# Patient Record
Sex: MALE | Race: WHITE | Employment: FULL TIME | URBAN - METROPOLITAN AREA
[De-identification: names, ages, dates, MRNs, and addresses within clinical notes are randomized per-mention and may not be internally consistent; named-entity substitution may affect disease eponyms.]

---

## 2017-01-03 ENCOUNTER — OFFICE VISIT (OUTPATIENT)
Dept: ORTHOPEDIC SURGERY | Age: 47
End: 2017-01-03

## 2017-01-03 VITALS
DIASTOLIC BLOOD PRESSURE: 84 MMHG | HEIGHT: 74 IN | HEART RATE: 91 BPM | BODY MASS INDEX: 34.01 KG/M2 | WEIGHT: 264.99 LBS | SYSTOLIC BLOOD PRESSURE: 117 MMHG

## 2017-01-03 DIAGNOSIS — M19.011 GLENOHUMERAL ARTHRITIS, RIGHT: ICD-10-CM

## 2017-01-03 DIAGNOSIS — M75.82 ROTATOR CUFF TENDINITIS, LEFT: Primary | ICD-10-CM

## 2017-01-03 PROCEDURE — 20610 DRAIN/INJ JOINT/BURSA W/O US: CPT | Performed by: ORTHOPAEDIC SURGERY

## 2017-01-03 PROCEDURE — 99214 OFFICE O/P EST MOD 30 MIN: CPT | Performed by: ORTHOPAEDIC SURGERY

## 2017-01-27 ENCOUNTER — OFFICE VISIT (OUTPATIENT)
Dept: FAMILY MEDICINE CLINIC | Age: 47
End: 2017-01-27

## 2017-01-27 ENCOUNTER — HOSPITAL ENCOUNTER (OUTPATIENT)
Dept: OTHER | Age: 47
Discharge: HOME | End: 2017-01-31
Attending: ORTHOPAEDIC SURGERY | Admitting: ORTHOPAEDIC SURGERY

## 2017-01-27 ENCOUNTER — OFFICE VISIT (OUTPATIENT)
Dept: PAIN MANAGEMENT | Age: 47
End: 2017-01-27

## 2017-01-27 VITALS
HEIGHT: 74 IN | DIASTOLIC BLOOD PRESSURE: 66 MMHG | SYSTOLIC BLOOD PRESSURE: 110 MMHG | WEIGHT: 267 LBS | TEMPERATURE: 98.1 F | BODY MASS INDEX: 34.27 KG/M2

## 2017-01-27 VITALS
WEIGHT: 267 LBS | DIASTOLIC BLOOD PRESSURE: 76 MMHG | SYSTOLIC BLOOD PRESSURE: 109 MMHG | HEART RATE: 108 BPM | BODY MASS INDEX: 34.28 KG/M2

## 2017-01-27 DIAGNOSIS — G89.4 CHRONIC PAIN SYNDROME: ICD-10-CM

## 2017-01-27 DIAGNOSIS — I10 ESSENTIAL HYPERTENSION: ICD-10-CM

## 2017-01-27 DIAGNOSIS — M54.50 LOW BACK PAIN RADIATING TO RIGHT LEG: ICD-10-CM

## 2017-01-27 DIAGNOSIS — M50.30 DDD (DEGENERATIVE DISC DISEASE), CERVICAL: ICD-10-CM

## 2017-01-27 DIAGNOSIS — F32.A DEPRESSIVE DISORDER: ICD-10-CM

## 2017-01-27 DIAGNOSIS — M51.37 DDD (DEGENERATIVE DISC DISEASE), LUMBOSACRAL: ICD-10-CM

## 2017-01-27 DIAGNOSIS — M47.819 DEGENERATIVE JOINT DISEASE OF SPINAL FACET JOINT: ICD-10-CM

## 2017-01-27 DIAGNOSIS — E03.9 HYPOTHYROIDISM, UNSPECIFIED TYPE: ICD-10-CM

## 2017-01-27 DIAGNOSIS — E78.00 HYPERCHOLESTEROLEMIA: ICD-10-CM

## 2017-01-27 DIAGNOSIS — E66.9 OBESITY (BMI 30-39.9): ICD-10-CM

## 2017-01-27 DIAGNOSIS — M54.16 LUMBAR RADICULOPATHY: ICD-10-CM

## 2017-01-27 DIAGNOSIS — Z00.00 PREVENTATIVE HEALTH CARE: Primary | ICD-10-CM

## 2017-01-27 DIAGNOSIS — M79.604 LOW BACK PAIN RADIATING TO RIGHT LEG: ICD-10-CM

## 2017-01-27 DIAGNOSIS — M96.1 FAILED BACK SURGICAL SYNDROME: ICD-10-CM

## 2017-01-27 DIAGNOSIS — Z23 NEED FOR INFLUENZA VACCINATION: ICD-10-CM

## 2017-01-27 DIAGNOSIS — F32.89 DEPRESSIVE DISORDER, NOT ELSEWHERE CLASSIFIED: ICD-10-CM

## 2017-01-27 LAB
T4 FREE: 1.7 NG/DL (ref 0.9–1.8)
TSH REFLEX: 0.1 UIU/ML (ref 0.27–4.2)

## 2017-01-27 PROCEDURE — 99396 PREV VISIT EST AGE 40-64: CPT | Performed by: FAMILY MEDICINE

## 2017-01-27 PROCEDURE — G8419 CALC BMI OUT NRM PARAM NOF/U: HCPCS | Performed by: INTERNAL MEDICINE

## 2017-01-27 PROCEDURE — 90686 IIV4 VACC NO PRSV 0.5 ML IM: CPT | Performed by: FAMILY MEDICINE

## 2017-01-27 PROCEDURE — G8427 DOCREV CUR MEDS BY ELIG CLIN: HCPCS | Performed by: INTERNAL MEDICINE

## 2017-01-27 PROCEDURE — 99214 OFFICE O/P EST MOD 30 MIN: CPT | Performed by: INTERNAL MEDICINE

## 2017-01-27 PROCEDURE — G8484 FLU IMMUNIZE NO ADMIN: HCPCS | Performed by: INTERNAL MEDICINE

## 2017-01-27 PROCEDURE — 1036F TOBACCO NON-USER: CPT | Performed by: INTERNAL MEDICINE

## 2017-01-27 PROCEDURE — 90471 IMMUNIZATION ADMIN: CPT | Performed by: FAMILY MEDICINE

## 2017-01-27 RX ORDER — PREGABALIN 100 MG/1
CAPSULE ORAL
Qty: 90 CAPSULE | Refills: 0 | Status: SHIPPED | OUTPATIENT
Start: 2017-01-27 | End: 2017-04-05 | Stop reason: SDUPTHER

## 2017-01-27 RX ORDER — DULOXETIN HYDROCHLORIDE 30 MG/1
30 CAPSULE, DELAYED RELEASE ORAL DAILY
Qty: 90 CAPSULE | Refills: 0 | Status: SHIPPED | OUTPATIENT
Start: 2017-01-27 | End: 2017-04-05 | Stop reason: SDUPTHER

## 2017-01-27 RX ORDER — MELOXICAM 15 MG/1
TABLET ORAL
Qty: 90 TABLET | Refills: 0 | Status: SHIPPED | OUTPATIENT
Start: 2017-01-27 | End: 2017-04-05 | Stop reason: SDUPTHER

## 2017-01-27 RX ORDER — ATORVASTATIN CALCIUM 40 MG/1
TABLET, FILM COATED ORAL
Qty: 90 TABLET | Refills: 3 | Status: SHIPPED | OUTPATIENT
Start: 2017-01-27 | End: 2018-01-30 | Stop reason: SDUPTHER

## 2017-01-27 RX ORDER — TIZANIDINE 4 MG/1
TABLET ORAL
Qty: 60 TABLET | Refills: 0 | Status: SHIPPED | OUTPATIENT
Start: 2017-01-27 | End: 2017-05-08 | Stop reason: SDUPTHER

## 2017-01-27 RX ORDER — HYDROCHLOROTHIAZIDE 25 MG/1
TABLET ORAL
Qty: 90 TABLET | Refills: 3 | Status: SHIPPED | OUTPATIENT
Start: 2017-01-27 | End: 2018-01-30 | Stop reason: SDUPTHER

## 2017-01-27 ASSESSMENT — ENCOUNTER SYMPTOMS
RESPIRATORY NEGATIVE: 1
BACK PAIN: 1
GASTROINTESTINAL NEGATIVE: 1
ALLERGIC/IMMUNOLOGIC NEGATIVE: 1
EYES NEGATIVE: 1

## 2017-01-28 LAB — T3 TOTAL: 1.47 NG/ML (ref 0.8–2)

## 2017-02-01 ENCOUNTER — HOSPITAL ENCOUNTER (OUTPATIENT)
Dept: OTHER | Age: 47
Discharge: HOME | End: 2017-02-28
Attending: ORTHOPAEDIC SURGERY | Admitting: ORTHOPAEDIC SURGERY

## 2017-02-20 ENCOUNTER — TELEPHONE (OUTPATIENT)
Dept: PAIN MANAGEMENT | Age: 47
End: 2017-02-20

## 2017-02-21 ENCOUNTER — HOSPITAL ENCOUNTER (OUTPATIENT)
Dept: PHYSICAL THERAPY | Age: 47
Discharge: HOME | End: 2017-02-21
Admitting: ORTHOPAEDIC SURGERY

## 2017-02-23 ENCOUNTER — HOSPITAL ENCOUNTER (OUTPATIENT)
Dept: PHYSICAL THERAPY | Age: 47
Discharge: HOME | End: 2017-02-23
Admitting: ORTHOPAEDIC SURGERY

## 2017-02-28 ENCOUNTER — HOSPITAL ENCOUNTER (OUTPATIENT)
Dept: PHYSICAL THERAPY | Age: 47
Discharge: HOME | End: 2017-02-28
Admitting: ORTHOPAEDIC SURGERY

## 2017-03-28 DIAGNOSIS — M96.1 FAILED BACK SURGICAL SYNDROME: ICD-10-CM

## 2017-03-28 DIAGNOSIS — M47.819 DEGENERATIVE JOINT DISEASE OF SPINAL FACET JOINT: ICD-10-CM

## 2017-03-28 DIAGNOSIS — M54.16 LUMBAR RADICULOPATHY: ICD-10-CM

## 2017-03-28 DIAGNOSIS — M50.30 DDD (DEGENERATIVE DISC DISEASE), CERVICAL: ICD-10-CM

## 2017-03-28 DIAGNOSIS — G89.4 CHRONIC PAIN SYNDROME: ICD-10-CM

## 2017-03-28 DIAGNOSIS — M51.37 DDD (DEGENERATIVE DISC DISEASE), LUMBOSACRAL: ICD-10-CM

## 2017-04-04 ENCOUNTER — OFFICE VISIT (OUTPATIENT)
Dept: ORTHOPEDIC SURGERY | Age: 47
End: 2017-04-04

## 2017-04-04 VITALS
HEART RATE: 89 BPM | BODY MASS INDEX: 34.27 KG/M2 | SYSTOLIC BLOOD PRESSURE: 125 MMHG | DIASTOLIC BLOOD PRESSURE: 87 MMHG | WEIGHT: 267 LBS | HEIGHT: 74 IN

## 2017-04-04 DIAGNOSIS — M25.512 CHRONIC PAIN OF BOTH SHOULDERS: ICD-10-CM

## 2017-04-04 DIAGNOSIS — M19.011 GLENOHUMERAL ARTHRITIS, RIGHT: ICD-10-CM

## 2017-04-04 DIAGNOSIS — M25.511 CHRONIC PAIN OF BOTH SHOULDERS: ICD-10-CM

## 2017-04-04 DIAGNOSIS — G89.29 CHRONIC PAIN OF BOTH SHOULDERS: ICD-10-CM

## 2017-04-04 DIAGNOSIS — M75.82 ROTATOR CUFF TENDINITIS, LEFT: Primary | ICD-10-CM

## 2017-04-04 PROCEDURE — 99214 OFFICE O/P EST MOD 30 MIN: CPT | Performed by: ORTHOPAEDIC SURGERY

## 2017-04-04 PROCEDURE — 1036F TOBACCO NON-USER: CPT | Performed by: ORTHOPAEDIC SURGERY

## 2017-04-04 PROCEDURE — G8427 DOCREV CUR MEDS BY ELIG CLIN: HCPCS | Performed by: ORTHOPAEDIC SURGERY

## 2017-04-04 PROCEDURE — 20610 DRAIN/INJ JOINT/BURSA W/O US: CPT | Performed by: ORTHOPAEDIC SURGERY

## 2017-04-04 PROCEDURE — G8417 CALC BMI ABV UP PARAM F/U: HCPCS | Performed by: ORTHOPAEDIC SURGERY

## 2017-04-05 ENCOUNTER — OFFICE VISIT (OUTPATIENT)
Dept: PAIN MANAGEMENT | Age: 47
End: 2017-04-05

## 2017-04-05 VITALS
DIASTOLIC BLOOD PRESSURE: 90 MMHG | SYSTOLIC BLOOD PRESSURE: 123 MMHG | BODY MASS INDEX: 34.41 KG/M2 | WEIGHT: 268 LBS | HEART RATE: 123 BPM

## 2017-04-05 DIAGNOSIS — M54.16 LUMBAR RADICULOPATHY: ICD-10-CM

## 2017-04-05 DIAGNOSIS — M54.50 LOW BACK PAIN RADIATING TO RIGHT LEG: ICD-10-CM

## 2017-04-05 DIAGNOSIS — M79.604 LOW BACK PAIN RADIATING TO RIGHT LEG: ICD-10-CM

## 2017-04-05 DIAGNOSIS — M47.819 DEGENERATIVE JOINT DISEASE OF SPINAL FACET JOINT: ICD-10-CM

## 2017-04-05 DIAGNOSIS — M19.011 GLENOHUMERAL ARTHRITIS, RIGHT: ICD-10-CM

## 2017-04-05 DIAGNOSIS — M51.37 DDD (DEGENERATIVE DISC DISEASE), LUMBOSACRAL: ICD-10-CM

## 2017-04-05 DIAGNOSIS — F32.89 DEPRESSIVE DISORDER, NOT ELSEWHERE CLASSIFIED: ICD-10-CM

## 2017-04-05 DIAGNOSIS — M50.30 DDD (DEGENERATIVE DISC DISEASE), CERVICAL: ICD-10-CM

## 2017-04-05 DIAGNOSIS — M96.1 FAILED BACK SURGICAL SYNDROME: ICD-10-CM

## 2017-04-05 DIAGNOSIS — G89.4 CHRONIC PAIN SYNDROME: ICD-10-CM

## 2017-04-05 PROCEDURE — 99214 OFFICE O/P EST MOD 30 MIN: CPT | Performed by: INTERNAL MEDICINE

## 2017-04-05 PROCEDURE — G8427 DOCREV CUR MEDS BY ELIG CLIN: HCPCS | Performed by: INTERNAL MEDICINE

## 2017-04-05 PROCEDURE — G8417 CALC BMI ABV UP PARAM F/U: HCPCS | Performed by: INTERNAL MEDICINE

## 2017-04-05 PROCEDURE — 1036F TOBACCO NON-USER: CPT | Performed by: INTERNAL MEDICINE

## 2017-04-05 RX ORDER — MELOXICAM 15 MG/1
TABLET ORAL
Qty: 90 TABLET | Refills: 0 | Status: SHIPPED | OUTPATIENT
Start: 2017-04-05 | End: 2017-06-22 | Stop reason: SDUPTHER

## 2017-04-05 RX ORDER — PREGABALIN 100 MG/1
CAPSULE ORAL
Qty: 90 CAPSULE | Refills: 0 | Status: SHIPPED | OUTPATIENT
Start: 2017-04-05 | End: 2017-05-08 | Stop reason: SDUPTHER

## 2017-04-05 RX ORDER — TRAZODONE HYDROCHLORIDE 50 MG/1
50-100 TABLET ORAL NIGHTLY
Qty: 60 TABLET | Refills: 0 | Status: SHIPPED | OUTPATIENT
Start: 2017-04-05 | End: 2017-05-08 | Stop reason: SDUPTHER

## 2017-04-05 RX ORDER — DULOXETIN HYDROCHLORIDE 30 MG/1
30 CAPSULE, DELAYED RELEASE ORAL DAILY
Qty: 90 CAPSULE | Refills: 0 | Status: SHIPPED | OUTPATIENT
Start: 2017-04-05 | End: 2017-06-30 | Stop reason: SDUPTHER

## 2017-05-08 ENCOUNTER — OFFICE VISIT (OUTPATIENT)
Dept: PAIN MANAGEMENT | Age: 47
End: 2017-05-08

## 2017-05-08 VITALS
DIASTOLIC BLOOD PRESSURE: 83 MMHG | SYSTOLIC BLOOD PRESSURE: 123 MMHG | HEART RATE: 114 BPM | WEIGHT: 265 LBS | BODY MASS INDEX: 34.02 KG/M2

## 2017-05-08 DIAGNOSIS — M79.604 LOW BACK PAIN RADIATING TO RIGHT LEG: ICD-10-CM

## 2017-05-08 DIAGNOSIS — M51.37 DDD (DEGENERATIVE DISC DISEASE), LUMBOSACRAL: ICD-10-CM

## 2017-05-08 DIAGNOSIS — F32.89 DEPRESSIVE DISORDER, NOT ELSEWHERE CLASSIFIED: ICD-10-CM

## 2017-05-08 DIAGNOSIS — M54.16 LUMBAR RADICULOPATHY: ICD-10-CM

## 2017-05-08 DIAGNOSIS — M47.819 DEGENERATIVE JOINT DISEASE OF SPINAL FACET JOINT: ICD-10-CM

## 2017-05-08 DIAGNOSIS — M54.50 LOW BACK PAIN RADIATING TO RIGHT LEG: ICD-10-CM

## 2017-05-08 DIAGNOSIS — G89.4 CHRONIC PAIN SYNDROME: ICD-10-CM

## 2017-05-08 DIAGNOSIS — M96.1 FAILED BACK SURGICAL SYNDROME: ICD-10-CM

## 2017-05-08 DIAGNOSIS — M50.30 DDD (DEGENERATIVE DISC DISEASE), CERVICAL: ICD-10-CM

## 2017-05-08 PROCEDURE — G8427 DOCREV CUR MEDS BY ELIG CLIN: HCPCS | Performed by: INTERNAL MEDICINE

## 2017-05-08 PROCEDURE — 1036F TOBACCO NON-USER: CPT | Performed by: INTERNAL MEDICINE

## 2017-05-08 PROCEDURE — 99214 OFFICE O/P EST MOD 30 MIN: CPT | Performed by: INTERNAL MEDICINE

## 2017-05-08 PROCEDURE — G8417 CALC BMI ABV UP PARAM F/U: HCPCS | Performed by: INTERNAL MEDICINE

## 2017-05-08 RX ORDER — TIZANIDINE 4 MG/1
TABLET ORAL
Qty: 60 TABLET | Refills: 0 | Status: SHIPPED | OUTPATIENT
Start: 2017-05-08 | End: 2017-06-30 | Stop reason: SDUPTHER

## 2017-05-08 RX ORDER — PREGABALIN 100 MG/1
CAPSULE ORAL
Qty: 90 CAPSULE | Refills: 0 | Status: SHIPPED | OUTPATIENT
Start: 2017-05-08 | End: 2017-06-30 | Stop reason: SDUPTHER

## 2017-05-08 RX ORDER — TRAZODONE HYDROCHLORIDE 50 MG/1
50-100 TABLET ORAL NIGHTLY
Qty: 60 TABLET | Refills: 0 | Status: SHIPPED | OUTPATIENT
Start: 2017-05-08 | End: 2017-06-30 | Stop reason: SDUPTHER

## 2017-05-11 ENCOUNTER — TELEPHONE (OUTPATIENT)
Dept: ORTHOPEDIC SURGERY | Age: 47
End: 2017-05-11

## 2017-05-11 ENCOUNTER — OFFICE VISIT (OUTPATIENT)
Dept: ORTHOPEDIC SURGERY | Age: 47
End: 2017-05-11

## 2017-05-11 VITALS
DIASTOLIC BLOOD PRESSURE: 81 MMHG | SYSTOLIC BLOOD PRESSURE: 115 MMHG | HEART RATE: 93 BPM | WEIGHT: 264.99 LBS | BODY MASS INDEX: 34.01 KG/M2 | HEIGHT: 74 IN

## 2017-05-11 DIAGNOSIS — M19.011 GLENOHUMERAL ARTHRITIS, RIGHT: ICD-10-CM

## 2017-05-11 DIAGNOSIS — M25.512 CHRONIC PAIN OF BOTH SHOULDERS: Primary | ICD-10-CM

## 2017-05-11 DIAGNOSIS — M25.511 CHRONIC PAIN OF BOTH SHOULDERS: Primary | ICD-10-CM

## 2017-05-11 DIAGNOSIS — M77.12 LEFT TENNIS ELBOW: ICD-10-CM

## 2017-05-11 DIAGNOSIS — M75.82 ROTATOR CUFF TENDINITIS, LEFT: ICD-10-CM

## 2017-05-11 DIAGNOSIS — G89.29 CHRONIC PAIN OF BOTH SHOULDERS: Primary | ICD-10-CM

## 2017-05-11 PROCEDURE — G8417 CALC BMI ABV UP PARAM F/U: HCPCS | Performed by: ORTHOPAEDIC SURGERY

## 2017-05-11 PROCEDURE — 99214 OFFICE O/P EST MOD 30 MIN: CPT | Performed by: ORTHOPAEDIC SURGERY

## 2017-05-11 PROCEDURE — 1036F TOBACCO NON-USER: CPT | Performed by: ORTHOPAEDIC SURGERY

## 2017-05-11 PROCEDURE — G8427 DOCREV CUR MEDS BY ELIG CLIN: HCPCS | Performed by: ORTHOPAEDIC SURGERY

## 2017-06-15 ENCOUNTER — OFFICE VISIT (OUTPATIENT)
Dept: ORTHOPEDIC SURGERY | Age: 47
End: 2017-06-15

## 2017-06-15 VITALS
DIASTOLIC BLOOD PRESSURE: 89 MMHG | HEART RATE: 105 BPM | HEIGHT: 74 IN | WEIGHT: 264 LBS | BODY MASS INDEX: 33.88 KG/M2 | SYSTOLIC BLOOD PRESSURE: 139 MMHG

## 2017-06-15 DIAGNOSIS — M25.511 CHRONIC PAIN OF BOTH SHOULDERS: ICD-10-CM

## 2017-06-15 DIAGNOSIS — M19.011 GLENOHUMERAL ARTHRITIS, RIGHT: Primary | ICD-10-CM

## 2017-06-15 DIAGNOSIS — M25.512 CHRONIC PAIN OF BOTH SHOULDERS: ICD-10-CM

## 2017-06-15 DIAGNOSIS — G89.29 CHRONIC PAIN OF BOTH SHOULDERS: ICD-10-CM

## 2017-06-15 PROCEDURE — G8427 DOCREV CUR MEDS BY ELIG CLIN: HCPCS | Performed by: ORTHOPAEDIC SURGERY

## 2017-06-15 PROCEDURE — 1036F TOBACCO NON-USER: CPT | Performed by: ORTHOPAEDIC SURGERY

## 2017-06-15 PROCEDURE — PREOPEXAM PRE-OP EXAM: Performed by: ORTHOPAEDIC SURGERY

## 2017-06-15 PROCEDURE — G8417 CALC BMI ABV UP PARAM F/U: HCPCS | Performed by: ORTHOPAEDIC SURGERY

## 2017-06-15 PROCEDURE — L3670 SO ACRO/CLAV CAN WEB PRE OTS: HCPCS | Performed by: ORTHOPAEDIC SURGERY

## 2017-06-20 RX ORDER — SODIUM CHLORIDE, SODIUM LACTATE, POTASSIUM CHLORIDE, CALCIUM CHLORIDE 600; 310; 30; 20 MG/100ML; MG/100ML; MG/100ML; MG/100ML
INJECTION, SOLUTION INTRAVENOUS CONTINUOUS
Status: CANCELLED | OUTPATIENT
Start: 2017-06-20

## 2017-06-21 ENCOUNTER — HOSPITAL ENCOUNTER (OUTPATIENT)
Dept: PREADMISSION TESTING | Age: 47
Discharge: HOME | End: 2017-06-21
Attending: ORTHOPAEDIC SURGERY | Admitting: ORTHOPAEDIC SURGERY

## 2017-06-21 VITALS
SYSTOLIC BLOOD PRESSURE: 139 MMHG | HEIGHT: 74 IN | TEMPERATURE: 98.2 F | RESPIRATION RATE: 16 BRPM | HEART RATE: 103 BPM | OXYGEN SATURATION: 99 % | DIASTOLIC BLOOD PRESSURE: 79 MMHG

## 2017-06-21 LAB
ABO/RH: NORMAL
ANION GAP SERPL CALCULATED.3IONS-SCNC: 12 MMOL/L (ref 3–16)
ANTIBODY SCREEN: NORMAL
APTT: 29 SEC (ref 21–31.8)
BASOPHILS ABSOLUTE: 0 K/UL (ref 0–0.2)
BASOPHILS RELATIVE PERCENT: 0.8 %
BILIRUBIN URINE: NEGATIVE
BLOOD, URINE: NEGATIVE
BUN BLDV-MCNC: 21 MG/DL (ref 7–20)
CALCIUM SERPL-MCNC: 9.7 MG/DL (ref 8.3–10.6)
CHLORIDE BLD-SCNC: 100 MMOL/L (ref 99–110)
CLARITY: CLEAR
CO2: 27 MMOL/L (ref 21–32)
COLOR: YELLOW
CREAT SERPL-MCNC: 0.7 MG/DL (ref 0.9–1.3)
EKG ATRIAL RATE: 98 BPM
EKG DIAGNOSIS: NORMAL
EKG P AXIS: 67 DEGREES
EKG P-R INTERVAL: 130 MS
EKG Q-T INTERVAL: 328 MS
EKG QRS DURATION: 88 MS
EKG QTC CALCULATION (BAZETT): 418 MS
EKG R AXIS: 64 DEGREES
EKG T AXIS: 54 DEGREES
EKG VENTRICULAR RATE: 98 BPM
EOSINOPHILS ABSOLUTE: 0.1 K/UL (ref 0–0.6)
EOSINOPHILS RELATIVE PERCENT: 2.3 %
GFR AFRICAN AMERICAN: >60
GFR NON-AFRICAN AMERICAN: >60
GLUCOSE BLD-MCNC: 138 MG/DL (ref 70–99)
GLUCOSE URINE: NEGATIVE MG/DL
HCT VFR BLD CALC: 41.6 % (ref 40.5–52.5)
HEMOGLOBIN: 13.6 G/DL (ref 13.5–17.5)
INR BLD: 0.93 (ref 0.85–1.15)
KETONES, URINE: NEGATIVE MG/DL
LEUKOCYTE ESTERASE, URINE: NEGATIVE
LYMPHOCYTES ABSOLUTE: 1.5 K/UL (ref 1–5.1)
LYMPHOCYTES RELATIVE PERCENT: 23.7 %
MCH RBC QN AUTO: 28.5 PG (ref 26–34)
MCHC RBC AUTO-ENTMCNC: 32.8 G/DL (ref 31–36)
MCV RBC AUTO: 86.8 FL (ref 80–100)
MICROSCOPIC EXAMINATION: NORMAL
MONOCYTES ABSOLUTE: 0.6 K/UL (ref 0–1.3)
MONOCYTES RELATIVE PERCENT: 9.2 %
NEUTROPHILS ABSOLUTE: 4 K/UL (ref 1.7–7.7)
NEUTROPHILS RELATIVE PERCENT: 64 %
NITRITE, URINE: NEGATIVE
PDW BLD-RTO: 13.4 % (ref 12.4–15.4)
PH UA: 5.5
PLATELET # BLD: 162 K/UL (ref 135–450)
PMV BLD AUTO: 9.9 FL (ref 5–10.5)
POTASSIUM SERPL-SCNC: 4.2 MMOL/L (ref 3.5–5.1)
PROTEIN UA: NEGATIVE MG/DL
PROTHROMBIN TIME: 10.5 SEC (ref 9.6–13)
RBC # BLD: 4.79 M/UL (ref 4.2–5.9)
SODIUM BLD-SCNC: 139 MMOL/L (ref 136–145)
SPECIFIC GRAVITY UA: >=1.03
URINE TYPE: NORMAL
UROBILINOGEN, URINE: 0.2 E.U./DL
WBC # BLD: 6.2 K/UL (ref 4–11)

## 2017-06-21 PROCEDURE — 93010 ELECTROCARDIOGRAM REPORT: CPT | Performed by: INTERNAL MEDICINE

## 2017-06-21 ASSESSMENT — PAIN SCALES - GENERAL: PAINLEVEL_OUTOF10: 7

## 2017-06-21 ASSESSMENT — PAIN DESCRIPTION - LOCATION: LOCATION: SHOULDER

## 2017-06-21 ASSESSMENT — PAIN DESCRIPTION - PAIN TYPE: TYPE: CHRONIC PAIN

## 2017-06-21 ASSESSMENT — PAIN DESCRIPTION - ORIENTATION: ORIENTATION: RIGHT

## 2017-06-22 ENCOUNTER — TELEPHONE (OUTPATIENT)
Dept: PAIN MANAGEMENT | Age: 47
End: 2017-06-22

## 2017-06-22 DIAGNOSIS — M47.819 DEGENERATIVE JOINT DISEASE OF SPINAL FACET JOINT: ICD-10-CM

## 2017-06-22 DIAGNOSIS — M50.30 DDD (DEGENERATIVE DISC DISEASE), CERVICAL: ICD-10-CM

## 2017-06-22 DIAGNOSIS — M51.37 DDD (DEGENERATIVE DISC DISEASE), LUMBOSACRAL: ICD-10-CM

## 2017-06-22 DIAGNOSIS — M54.16 LUMBAR RADICULOPATHY: ICD-10-CM

## 2017-06-22 DIAGNOSIS — M96.1 FAILED BACK SURGICAL SYNDROME: ICD-10-CM

## 2017-06-22 DIAGNOSIS — G89.4 CHRONIC PAIN SYNDROME: ICD-10-CM

## 2017-06-22 LAB
ESTIMATED AVERAGE GLUCOSE: 111.2 MG/DL
HBA1C MFR BLD: 5.5 %
MRSA SCREEN RT-PCR: NORMAL
ORGANISM: ABNORMAL
URINE CULTURE, ROUTINE: ABNORMAL

## 2017-06-22 RX ORDER — MELOXICAM 15 MG/1
TABLET ORAL
Qty: 90 TABLET | Refills: 0 | Status: SHIPPED | OUTPATIENT
Start: 2017-06-22 | End: 2017-06-30 | Stop reason: SDUPTHER

## 2017-06-27 ENCOUNTER — OFFICE VISIT (OUTPATIENT)
Dept: FAMILY MEDICINE CLINIC | Age: 47
End: 2017-06-27

## 2017-06-27 VITALS
HEIGHT: 74 IN | TEMPERATURE: 97.4 F | HEART RATE: 84 BPM | BODY MASS INDEX: 35.32 KG/M2 | DIASTOLIC BLOOD PRESSURE: 84 MMHG | SYSTOLIC BLOOD PRESSURE: 136 MMHG | WEIGHT: 275.2 LBS

## 2017-06-27 DIAGNOSIS — E78.00 HYPERCHOLESTEROLEMIA: ICD-10-CM

## 2017-06-27 DIAGNOSIS — Z01.818 PREOP EXAMINATION: Primary | ICD-10-CM

## 2017-06-27 DIAGNOSIS — N30.00 ACUTE CYSTITIS WITHOUT HEMATURIA: ICD-10-CM

## 2017-06-27 DIAGNOSIS — M19.011 GLENOHUMERAL ARTHRITIS, RIGHT: ICD-10-CM

## 2017-06-27 DIAGNOSIS — I10 ESSENTIAL HYPERTENSION: ICD-10-CM

## 2017-06-27 PROCEDURE — 1036F TOBACCO NON-USER: CPT | Performed by: FAMILY MEDICINE

## 2017-06-27 PROCEDURE — G8417 CALC BMI ABV UP PARAM F/U: HCPCS | Performed by: FAMILY MEDICINE

## 2017-06-27 PROCEDURE — 99243 OFF/OP CNSLTJ NEW/EST LOW 30: CPT | Performed by: FAMILY MEDICINE

## 2017-06-27 PROCEDURE — G8427 DOCREV CUR MEDS BY ELIG CLIN: HCPCS | Performed by: FAMILY MEDICINE

## 2017-06-27 RX ORDER — AMOXICILLIN 500 MG/1
500 CAPSULE ORAL 2 TIMES DAILY
Qty: 14 CAPSULE | Refills: 0 | Status: SHIPPED | OUTPATIENT
Start: 2017-06-27 | End: 2017-07-04

## 2017-06-27 ASSESSMENT — ENCOUNTER SYMPTOMS
ABDOMINAL PAIN: 0
RHINORRHEA: 0
SINUS PRESSURE: 0
DIARRHEA: 0
CONSTIPATION: 0
CHEST TIGHTNESS: 0
BACK PAIN: 1
BLOOD IN STOOL: 0
EYE DISCHARGE: 0
WHEEZING: 0
EYE PAIN: 0
SHORTNESS OF BREATH: 0
VOMITING: 0
COUGH: 0

## 2017-06-30 ENCOUNTER — OFFICE VISIT (OUTPATIENT)
Dept: PAIN MANAGEMENT | Age: 47
End: 2017-06-30

## 2017-06-30 VITALS
BODY MASS INDEX: 35.31 KG/M2 | SYSTOLIC BLOOD PRESSURE: 118 MMHG | DIASTOLIC BLOOD PRESSURE: 81 MMHG | HEART RATE: 89 BPM | WEIGHT: 275 LBS

## 2017-06-30 DIAGNOSIS — G89.4 CHRONIC PAIN SYNDROME: ICD-10-CM

## 2017-06-30 DIAGNOSIS — M54.16 LUMBAR RADICULOPATHY: ICD-10-CM

## 2017-06-30 DIAGNOSIS — M79.604 LOW BACK PAIN RADIATING TO RIGHT LEG: ICD-10-CM

## 2017-06-30 DIAGNOSIS — M50.30 DDD (DEGENERATIVE DISC DISEASE), CERVICAL: ICD-10-CM

## 2017-06-30 DIAGNOSIS — M51.37 DDD (DEGENERATIVE DISC DISEASE), LUMBOSACRAL: ICD-10-CM

## 2017-06-30 DIAGNOSIS — M96.1 FAILED BACK SURGICAL SYNDROME: ICD-10-CM

## 2017-06-30 DIAGNOSIS — M54.50 LOW BACK PAIN RADIATING TO RIGHT LEG: ICD-10-CM

## 2017-06-30 DIAGNOSIS — M47.819 DEGENERATIVE JOINT DISEASE OF SPINAL FACET JOINT: ICD-10-CM

## 2017-06-30 PROCEDURE — 1036F TOBACCO NON-USER: CPT | Performed by: INTERNAL MEDICINE

## 2017-06-30 PROCEDURE — G8427 DOCREV CUR MEDS BY ELIG CLIN: HCPCS | Performed by: INTERNAL MEDICINE

## 2017-06-30 PROCEDURE — G8417 CALC BMI ABV UP PARAM F/U: HCPCS | Performed by: INTERNAL MEDICINE

## 2017-06-30 PROCEDURE — 99213 OFFICE O/P EST LOW 20 MIN: CPT | Performed by: INTERNAL MEDICINE

## 2017-06-30 RX ORDER — TIZANIDINE 4 MG/1
TABLET ORAL
Qty: 60 TABLET | Refills: 1 | Status: SHIPPED | OUTPATIENT
Start: 2017-06-30 | End: 2018-07-24 | Stop reason: SDUPTHER

## 2017-06-30 RX ORDER — TRAZODONE HYDROCHLORIDE 50 MG/1
50-100 TABLET ORAL NIGHTLY
Qty: 60 TABLET | Refills: 1 | Status: SHIPPED | OUTPATIENT
Start: 2017-06-30 | End: 2017-12-19 | Stop reason: ALTCHOICE

## 2017-06-30 RX ORDER — MELOXICAM 15 MG/1
TABLET ORAL
Qty: 90 TABLET | Refills: 1 | Status: SHIPPED | OUTPATIENT
Start: 2017-06-30 | End: 2018-02-13 | Stop reason: SDUPTHER

## 2017-06-30 RX ORDER — DULOXETIN HYDROCHLORIDE 30 MG/1
30 CAPSULE, DELAYED RELEASE ORAL DAILY
Qty: 90 CAPSULE | Refills: 1 | Status: SHIPPED | OUTPATIENT
Start: 2017-06-30 | End: 2018-10-05 | Stop reason: SDUPTHER

## 2017-06-30 RX ORDER — PREGABALIN 100 MG/1
CAPSULE ORAL
Qty: 90 CAPSULE | Refills: 1 | Status: SHIPPED | OUTPATIENT
Start: 2017-06-30 | End: 2017-12-19 | Stop reason: SDUPTHER

## 2017-07-10 ENCOUNTER — HOSPITAL ENCOUNTER (OUTPATIENT)
Dept: VASCULAR LAB | Age: 47
Discharge: HOME | End: 2017-07-10
Admitting: EMERGENCY MEDICINE

## 2017-07-10 DIAGNOSIS — M79.89 OTHER SPECIFIED SOFT TISSUE DISORDERS: ICD-10-CM

## 2017-07-11 ENCOUNTER — OFFICE VISIT (OUTPATIENT)
Dept: ORTHOPEDIC SURGERY | Age: 47
End: 2017-07-11

## 2017-07-11 ENCOUNTER — HOSPITAL ENCOUNTER (OUTPATIENT)
Dept: OTHER | Age: 47
Discharge: HOME | End: 2017-07-31
Attending: ORTHOPAEDIC SURGERY | Admitting: ORTHOPAEDIC SURGERY

## 2017-07-11 VITALS
DIASTOLIC BLOOD PRESSURE: 80 MMHG | BODY MASS INDEX: 35.65 KG/M2 | WEIGHT: 277.78 LBS | HEIGHT: 74 IN | HEART RATE: 110 BPM | SYSTOLIC BLOOD PRESSURE: 128 MMHG

## 2017-07-11 DIAGNOSIS — Z96.611 S/P SHOULDER REPLACEMENT, RIGHT: Primary | ICD-10-CM

## 2017-07-11 PROCEDURE — 99024 POSTOP FOLLOW-UP VISIT: CPT | Performed by: ORTHOPAEDIC SURGERY

## 2017-07-11 RX ORDER — HYDROCODONE BITARTRATE AND ACETAMINOPHEN 5; 325 MG/1; MG/1
TABLET ORAL
Qty: 60 TABLET | Refills: 0 | Status: SHIPPED | OUTPATIENT
Start: 2017-07-11 | End: 2017-08-04 | Stop reason: ALTCHOICE

## 2017-07-17 ENCOUNTER — HOSPITAL ENCOUNTER (OUTPATIENT)
Dept: PHYSICAL THERAPY | Age: 47
Discharge: HOME | End: 2017-07-18
Admitting: ORTHOPAEDIC SURGERY

## 2017-07-19 ENCOUNTER — HOSPITAL ENCOUNTER (OUTPATIENT)
Dept: PHYSICAL THERAPY | Age: 47
Discharge: HOME | End: 2017-07-20
Admitting: ORTHOPAEDIC SURGERY

## 2017-07-31 ENCOUNTER — HOSPITAL ENCOUNTER (OUTPATIENT)
Dept: PHYSICAL THERAPY | Age: 47
Discharge: HOME | End: 2017-08-01
Admitting: ORTHOPAEDIC SURGERY

## 2017-08-01 ENCOUNTER — OFFICE VISIT (OUTPATIENT)
Dept: ORTHOPEDIC SURGERY | Age: 47
End: 2017-08-01

## 2017-08-01 VITALS
WEIGHT: 277.78 LBS | DIASTOLIC BLOOD PRESSURE: 90 MMHG | HEIGHT: 74 IN | SYSTOLIC BLOOD PRESSURE: 128 MMHG | BODY MASS INDEX: 35.65 KG/M2 | HEART RATE: 101 BPM

## 2017-08-01 DIAGNOSIS — Z96.611 S/P SHOULDER REPLACEMENT, RIGHT: Primary | ICD-10-CM

## 2017-08-01 PROCEDURE — 99024 POSTOP FOLLOW-UP VISIT: CPT | Performed by: ORTHOPAEDIC SURGERY

## 2017-08-04 ENCOUNTER — TELEPHONE (OUTPATIENT)
Dept: FAMILY MEDICINE CLINIC | Age: 47
End: 2017-08-04

## 2017-08-04 ENCOUNTER — HOSPITAL ENCOUNTER (OUTPATIENT)
Dept: PHYSICAL THERAPY | Age: 47
Discharge: HOME | End: 2017-08-05
Admitting: ORTHOPAEDIC SURGERY

## 2017-08-04 ENCOUNTER — OFFICE VISIT (OUTPATIENT)
Dept: FAMILY MEDICINE CLINIC | Age: 47
End: 2017-08-04

## 2017-08-04 VITALS
SYSTOLIC BLOOD PRESSURE: 120 MMHG | HEART RATE: 98 BPM | BODY MASS INDEX: 35.55 KG/M2 | TEMPERATURE: 98.3 F | HEIGHT: 74 IN | DIASTOLIC BLOOD PRESSURE: 72 MMHG | WEIGHT: 277 LBS | OXYGEN SATURATION: 98 %

## 2017-08-04 DIAGNOSIS — R00.0 TACHYCARDIA: Primary | ICD-10-CM

## 2017-08-04 DIAGNOSIS — Z85.850 HISTORY OF THYROID CANCER: ICD-10-CM

## 2017-08-04 DIAGNOSIS — R00.0 TACHYCARDIA: ICD-10-CM

## 2017-08-04 LAB
ANION GAP SERPL CALCULATED.3IONS-SCNC: 16 MMOL/L (ref 3–16)
BASOPHILS ABSOLUTE: 0.1 K/UL (ref 0–0.2)
BASOPHILS RELATIVE PERCENT: 1 %
BUN BLDV-MCNC: 15 MG/DL (ref 7–20)
CALCIUM SERPL-MCNC: 9.9 MG/DL (ref 8.3–10.6)
CHLORIDE BLD-SCNC: 99 MMOL/L (ref 99–110)
CO2: 23 MMOL/L (ref 21–32)
CREAT SERPL-MCNC: 0.6 MG/DL (ref 0.9–1.3)
EOSINOPHILS ABSOLUTE: 0.2 K/UL (ref 0–0.6)
EOSINOPHILS RELATIVE PERCENT: 1.9 %
GFR AFRICAN AMERICAN: >60
GFR NON-AFRICAN AMERICAN: >60
GLUCOSE BLD-MCNC: 110 MG/DL (ref 70–99)
HCT VFR BLD CALC: 41.9 % (ref 40.5–52.5)
HEMOGLOBIN: 14 G/DL (ref 13.5–17.5)
LYMPHOCYTES ABSOLUTE: 1.7 K/UL (ref 1–5.1)
LYMPHOCYTES RELATIVE PERCENT: 20.3 %
MCH RBC QN AUTO: 29 PG (ref 26–34)
MCHC RBC AUTO-ENTMCNC: 33.4 G/DL (ref 31–36)
MCV RBC AUTO: 86.8 FL (ref 80–100)
MONOCYTES ABSOLUTE: 0.7 K/UL (ref 0–1.3)
MONOCYTES RELATIVE PERCENT: 8.5 %
NEUTROPHILS ABSOLUTE: 5.9 K/UL (ref 1.7–7.7)
NEUTROPHILS RELATIVE PERCENT: 68.3 %
PDW BLD-RTO: 13 % (ref 12.4–15.4)
PLATELET # BLD: 198 K/UL (ref 135–450)
PMV BLD AUTO: 10.5 FL (ref 5–10.5)
POTASSIUM SERPL-SCNC: 4.7 MMOL/L (ref 3.5–5.1)
PRO-BNP: <5 PG/ML (ref 0–124)
RBC # BLD: 4.83 M/UL (ref 4.2–5.9)
SODIUM BLD-SCNC: 138 MMOL/L (ref 136–145)
T3 TOTAL: 1.54 NG/ML (ref 0.8–2)
T4 FREE: 1.7 NG/DL (ref 0.9–1.8)
TSH REFLEX: 0.07 UIU/ML (ref 0.27–4.2)
WBC # BLD: 8.6 K/UL (ref 4–11)

## 2017-08-04 PROCEDURE — 93000 ELECTROCARDIOGRAM COMPLETE: CPT | Performed by: FAMILY MEDICINE

## 2017-08-04 PROCEDURE — G8417 CALC BMI ABV UP PARAM F/U: HCPCS | Performed by: FAMILY MEDICINE

## 2017-08-04 PROCEDURE — G8427 DOCREV CUR MEDS BY ELIG CLIN: HCPCS | Performed by: FAMILY MEDICINE

## 2017-08-04 PROCEDURE — 1036F TOBACCO NON-USER: CPT | Performed by: FAMILY MEDICINE

## 2017-08-04 PROCEDURE — 99214 OFFICE O/P EST MOD 30 MIN: CPT | Performed by: FAMILY MEDICINE

## 2017-08-04 PROCEDURE — 1111F DSCHRG MED/CURRENT MED MERGE: CPT | Performed by: FAMILY MEDICINE

## 2017-08-04 RX ORDER — LISINOPRIL 10 MG/1
TABLET ORAL
Qty: 90 TABLET | Refills: 1 | Status: SHIPPED | OUTPATIENT
Start: 2017-08-04 | End: 2017-12-08 | Stop reason: SDUPTHER

## 2017-08-04 RX ORDER — LEVOTHYROXINE SODIUM 0.12 MG/1
125 TABLET ORAL DAILY
Qty: 90 TABLET | Refills: 0 | Status: SHIPPED | OUTPATIENT
Start: 2017-08-04 | End: 2018-10-05 | Stop reason: SDUPTHER

## 2017-08-04 ASSESSMENT — ENCOUNTER SYMPTOMS
COUGH: 0
SHORTNESS OF BREATH: 1
CHEST TIGHTNESS: 0

## 2017-08-04 ASSESSMENT — PATIENT HEALTH QUESTIONNAIRE - PHQ9
2. FEELING DOWN, DEPRESSED OR HOPELESS: 0
1. LITTLE INTEREST OR PLEASURE IN DOING THINGS: 0
SUM OF ALL RESPONSES TO PHQ QUESTIONS 1-9: 0
SUM OF ALL RESPONSES TO PHQ9 QUESTIONS 1 & 2: 0

## 2017-08-07 ENCOUNTER — HOSPITAL ENCOUNTER (OUTPATIENT)
Dept: PHYSICAL THERAPY | Age: 47
Discharge: HOME | End: 2017-08-08
Admitting: ORTHOPAEDIC SURGERY

## 2017-08-10 ENCOUNTER — HOSPITAL ENCOUNTER (OUTPATIENT)
Dept: PHYSICAL THERAPY | Age: 47
Discharge: HOME | End: 2017-08-11
Admitting: ORTHOPAEDIC SURGERY

## 2017-08-14 ENCOUNTER — HOSPITAL ENCOUNTER (OUTPATIENT)
Dept: PHYSICAL THERAPY | Age: 47
Discharge: HOME | End: 2017-08-15
Admitting: ORTHOPAEDIC SURGERY

## 2017-08-18 ENCOUNTER — HOSPITAL ENCOUNTER (OUTPATIENT)
Dept: PHYSICAL THERAPY | Age: 47
Discharge: HOME | End: 2017-08-19
Admitting: ORTHOPAEDIC SURGERY

## 2017-08-22 ENCOUNTER — OFFICE VISIT (OUTPATIENT)
Dept: ORTHOPEDIC SURGERY | Age: 47
End: 2017-08-22

## 2017-08-22 VITALS
DIASTOLIC BLOOD PRESSURE: 92 MMHG | HEIGHT: 74 IN | WEIGHT: 275 LBS | SYSTOLIC BLOOD PRESSURE: 110 MMHG | HEART RATE: 83 BPM | BODY MASS INDEX: 35.29 KG/M2

## 2017-08-22 DIAGNOSIS — Z96.611 S/P SHOULDER REPLACEMENT, RIGHT: Primary | ICD-10-CM

## 2017-08-22 PROBLEM — Z96.619 S/P SHOULDER REPLACEMENT: Status: ACTIVE | Noted: 2017-08-22

## 2017-08-22 PROCEDURE — 99024 POSTOP FOLLOW-UP VISIT: CPT | Performed by: ORTHOPAEDIC SURGERY

## 2017-08-25 ENCOUNTER — OFFICE VISIT (OUTPATIENT)
Dept: PAIN MANAGEMENT | Age: 47
End: 2017-08-25

## 2017-08-25 ENCOUNTER — HOSPITAL ENCOUNTER (OUTPATIENT)
Dept: PHYSICAL THERAPY | Age: 47
Discharge: HOME | End: 2017-08-26
Admitting: ORTHOPAEDIC SURGERY

## 2017-08-25 VITALS
HEART RATE: 84 BPM | WEIGHT: 289.4 LBS | DIASTOLIC BLOOD PRESSURE: 89 MMHG | BODY MASS INDEX: 37.16 KG/M2 | SYSTOLIC BLOOD PRESSURE: 129 MMHG

## 2017-08-25 DIAGNOSIS — M50.30 DDD (DEGENERATIVE DISC DISEASE), CERVICAL: ICD-10-CM

## 2017-08-25 DIAGNOSIS — M51.37 DDD (DEGENERATIVE DISC DISEASE), LUMBOSACRAL: ICD-10-CM

## 2017-08-25 DIAGNOSIS — F32.89 DEPRESSIVE DISORDER, NOT ELSEWHERE CLASSIFIED: ICD-10-CM

## 2017-08-25 DIAGNOSIS — M54.16 LUMBAR RADICULOPATHY: ICD-10-CM

## 2017-08-25 DIAGNOSIS — M79.604 LOW BACK PAIN RADIATING TO RIGHT LEG: ICD-10-CM

## 2017-08-25 DIAGNOSIS — M96.1 FAILED BACK SURGICAL SYNDROME: ICD-10-CM

## 2017-08-25 DIAGNOSIS — G89.4 CHRONIC PAIN SYNDROME: ICD-10-CM

## 2017-08-25 DIAGNOSIS — M54.50 LOW BACK PAIN RADIATING TO RIGHT LEG: ICD-10-CM

## 2017-08-25 DIAGNOSIS — M47.819 DEGENERATIVE JOINT DISEASE OF SPINAL FACET JOINT: ICD-10-CM

## 2017-08-25 PROCEDURE — G8427 DOCREV CUR MEDS BY ELIG CLIN: HCPCS | Performed by: INTERNAL MEDICINE

## 2017-08-25 PROCEDURE — 1036F TOBACCO NON-USER: CPT | Performed by: INTERNAL MEDICINE

## 2017-08-25 PROCEDURE — G8417 CALC BMI ABV UP PARAM F/U: HCPCS | Performed by: INTERNAL MEDICINE

## 2017-08-25 PROCEDURE — 99213 OFFICE O/P EST LOW 20 MIN: CPT | Performed by: INTERNAL MEDICINE

## 2017-08-28 ENCOUNTER — HOSPITAL ENCOUNTER (OUTPATIENT)
Dept: PHYSICAL THERAPY | Age: 47
Discharge: HOME | End: 2017-08-29
Admitting: ORTHOPAEDIC SURGERY

## 2017-09-01 ENCOUNTER — HOSPITAL ENCOUNTER (OUTPATIENT)
Dept: OTHER | Age: 47
Discharge: HOME | End: 2017-09-30
Attending: ORTHOPAEDIC SURGERY | Admitting: ORTHOPAEDIC SURGERY

## 2017-09-06 ENCOUNTER — HOSPITAL ENCOUNTER (OUTPATIENT)
Dept: PHYSICAL THERAPY | Age: 47
Discharge: HOME | End: 2017-09-07
Admitting: ORTHOPAEDIC SURGERY

## 2017-09-08 ENCOUNTER — HOSPITAL ENCOUNTER (OUTPATIENT)
Dept: PHYSICAL THERAPY | Age: 47
Discharge: HOME | End: 2017-09-09
Admitting: ORTHOPAEDIC SURGERY

## 2017-09-11 ENCOUNTER — HOSPITAL ENCOUNTER (OUTPATIENT)
Dept: PHYSICAL THERAPY | Age: 47
Discharge: HOME | End: 2017-09-12
Admitting: ORTHOPAEDIC SURGERY

## 2017-09-13 ENCOUNTER — HOSPITAL ENCOUNTER (OUTPATIENT)
Dept: PHYSICAL THERAPY | Age: 47
Discharge: HOME | End: 2017-09-14
Admitting: ORTHOPAEDIC SURGERY

## 2017-09-21 ENCOUNTER — OFFICE VISIT (OUTPATIENT)
Dept: ORTHOPEDIC SURGERY | Age: 47
End: 2017-09-21

## 2017-09-21 VITALS
HEART RATE: 96 BPM | HEIGHT: 74 IN | WEIGHT: 289.46 LBS | SYSTOLIC BLOOD PRESSURE: 114 MMHG | BODY MASS INDEX: 37.15 KG/M2 | DIASTOLIC BLOOD PRESSURE: 83 MMHG

## 2017-09-21 DIAGNOSIS — M67.912 TENDINOPATHY OF ROTATOR CUFF, LEFT: ICD-10-CM

## 2017-09-21 DIAGNOSIS — Z96.611 S/P SHOULDER REPLACEMENT, RIGHT: Primary | ICD-10-CM

## 2017-09-21 PROCEDURE — 99024 POSTOP FOLLOW-UP VISIT: CPT | Performed by: ORTHOPAEDIC SURGERY

## 2017-09-25 ENCOUNTER — HOSPITAL ENCOUNTER (OUTPATIENT)
Dept: PHYSICAL THERAPY | Age: 47
Discharge: HOME | End: 2017-09-26
Admitting: ORTHOPAEDIC SURGERY

## 2017-09-26 ENCOUNTER — OFFICE VISIT (OUTPATIENT)
Dept: FAMILY MEDICINE CLINIC | Age: 47
End: 2017-09-26

## 2017-09-26 VITALS
BODY MASS INDEX: 37.73 KG/M2 | SYSTOLIC BLOOD PRESSURE: 124 MMHG | HEIGHT: 74 IN | DIASTOLIC BLOOD PRESSURE: 86 MMHG | WEIGHT: 294 LBS | TEMPERATURE: 98.4 F

## 2017-09-26 DIAGNOSIS — F41.9 ANXIETY: Primary | ICD-10-CM

## 2017-09-26 PROCEDURE — G8427 DOCREV CUR MEDS BY ELIG CLIN: HCPCS | Performed by: FAMILY MEDICINE

## 2017-09-26 PROCEDURE — 99213 OFFICE O/P EST LOW 20 MIN: CPT | Performed by: FAMILY MEDICINE

## 2017-09-26 PROCEDURE — G8417 CALC BMI ABV UP PARAM F/U: HCPCS | Performed by: FAMILY MEDICINE

## 2017-09-26 PROCEDURE — 1036F TOBACCO NON-USER: CPT | Performed by: FAMILY MEDICINE

## 2017-09-26 RX ORDER — HYDROXYZINE HYDROCHLORIDE 25 MG/1
25 TABLET, FILM COATED ORAL EVERY 8 HOURS PRN
Qty: 30 TABLET | Refills: 0 | Status: SHIPPED | OUTPATIENT
Start: 2017-09-26 | End: 2017-10-06

## 2017-09-26 RX ORDER — HYDROXYZINE HYDROCHLORIDE 25 MG/1
25 TABLET, FILM COATED ORAL EVERY 8 HOURS PRN
Qty: 30 TABLET | Refills: 0 | Status: SHIPPED | OUTPATIENT
Start: 2017-09-26 | End: 2017-09-26 | Stop reason: SDUPTHER

## 2017-09-26 NOTE — MR AVS SNAPSHOT
After Visit Summary             Sofie Meier   2017 8:45 AM   Office Visit    Description:  Male : 1970   Provider:  Nohemi Cruz DO   Department:  [facility]              Your Follow-Up and Future Appointments         Below is a list of your follow-up and future appointments. This may not be a complete list as you may have made appointments directly with providers that we are not aware of or your providers may have made some for you. Please call your providers to confirm appointments. It is important to keep your appointments. Please bring your current insurance card, photo ID, co-pay, and all medication bottles to your appointment. If self-pay, payment is expected at the time of service.         Your To-Do List     Future Appointments Provider Department Dept Phone    2017 10:00 AM Claudeen Soulier, PT [place] -[phone]    10/2/2017 9:30 AM Claudeen Soulier, PT [place] -[phone]    10/4/2017 9:30 AM Claudeen Soulier, PT [place] -[phone]    10/9/2017 9:30 AM Claudeen Soulier, PT [place] -[phone]    10/11/2017 9:30 AM Claudeen Soulier, PT [place] -[phone]    10/16/2017 9:30 AM Claudeen Soulier, PT [place] -[phone]    10/18/2017 9:30 AM Claudeen Soulier, PT [place] -[phone]    10/24/2017 11:30 AM Chago Berman MD [address] [phone]    10/25/2017 9:30 AM Claudeen Soulier, PT [place] -[phone]    10/30/2017 9:30 AM Claudeen Soulier, PT [place] [phone]    2017 9:30 AM Claudeen Soulier, PT [place] -[phone]         Information from Your Visit        Department     Name Address Phone Fax    [facility] [address] [place] [address] [phone]      You Were Seen for:         Comments    Anxiety   [[id number]]         Vital Signs     Blood Pressure Temperature Height Weight Body Mass Index Smoking Status Where to Get Your Medications      These medications were sent to [address] [facility],[address], [address]  [address] [facility], [facility],[address]     Phone:  [phone]     hydrOXYzine 25 MG tablet               Your Current Medications Are              hydrOXYzine (ATARAX) 25 MG tablet Take 1 tablet by mouth every 8 hours as needed for Anxiety    levothyroxine (SYNTHROID) 125 MCG tablet Take 1 tablet by mouth Daily    lisinopril (PRINIVIL;ZESTRIL) 10 MG tablet TAKE 1 TABLET DAILY    aspirin 325 MG EC tablet Take 1 tablet by mouth 2 times daily    tapentadol (NUCYNTA) 75 MG TABS Take 1 tablet by mouth every 6 hours  Max 1-2 per day .     meloxicam (MOBIC) 15 MG tablet TAKE 1 TABLET DAILY    traZODone (DESYREL) 50 MG tablet Take 1-2 tablets by mouth nightly    pregabalin (LYRICA) 100 MG capsule TAKE 1 TAB IN THE AM AND 2 TABS IN THE PM    tiZANidine (ZANAFLEX) 4 MG tablet Take 1-2 tabs po q pm prn    DULoxetine (CYMBALTA) 30 MG extended release capsule Take 1 capsule by mouth daily    hydrochlorothiazide (HYDRODIURIL) 25 MG tablet TAKE 1 TABLET DAILY    atorvastatin (LIPITOR) 40 MG tablet TAKE 1 TABLET DAILY      Allergies              Percocet [Oxycodone-acetaminophen] Nausea And Vomiting         Additional Information        Basic Information     Date Of Birth Sex Race Ethnicity Preferred Language    1970 Male White Non-/Non  English      Problem List as of 9/26/2017  Date Reviewed: 9/21/2017                S/P shoulder replacement    Acute cystitis without hematuria    Hypercholesterolemia    Rotator cuff tendinitis    Glenohumeral arthritis    [place] Quervain's disease (radial styloid tenosynovitis)    History of thyroid cancer    Sinusitis    DDD (degenerative disc disease), lumbosacral    Failed back surgical syndrome    Low back pain radiating to right leg    Rt flank pain    Microscopic hematuria    Back pain    Sinusitis

## 2017-09-26 NOTE — PROGRESS NOTES
times daily 30 tablet 2    tapentadol (NUCYNTA) 75 MG TABS Take 1 tablet by mouth every 6 hours  Max 1-2 per day . 40 tablet 0    meloxicam (MOBIC) 15 MG tablet TAKE 1 TABLET DAILY 90 tablet 1    traZODone (DESYREL) 50 MG tablet Take 1-2 tablets by mouth nightly 60 tablet 1    pregabalin (LYRICA) 100 MG capsule TAKE 1 TAB IN THE AM AND 2 TABS IN THE PM 90 capsule 1    tiZANidine (ZANAFLEX) 4 MG tablet Take 1-2 tabs po q pm prn 60 tablet 1    DULoxetine (CYMBALTA) 30 MG extended release capsule Take 1 capsule by mouth daily 90 capsule 1    hydrochlorothiazide (HYDRODIURIL) 25 MG tablet TAKE 1 TABLET DAILY 90 tablet 3    atorvastatin (LIPITOR) 40 MG tablet TAKE 1 TABLET DAILY 90 tablet 3    [DISCONTINUED] hydrOXYzine (ATARAX) 25 MG tablet Take 1 tablet by mouth every 8 hours as needed for Anxiety 30 tablet 0     No facility-administered encounter medications on file as of 9/26/2017.           Mike Rayo D.O.

## 2017-09-26 NOTE — PATIENT INSTRUCTIONS
Please call your pharmacy if you need any refills of your medication(s). Please call our office at [phone] if you don't hear from us about your test results. Please be sure to call our office if your illness/problem has been treated for but has not completely resolved. Bring an accurate list of your medications with you at every appointment to ensure that we have the correct information. Our office hours are: Monday - Friday 7 am- 5 pm; Saturdays vary on doctor working.     Phone lines turn on at 8 am.

## 2017-09-27 ENCOUNTER — HOSPITAL ENCOUNTER (OUTPATIENT)
Dept: PHYSICAL THERAPY | Age: 47
Discharge: HOME | End: 2017-09-28
Admitting: ORTHOPAEDIC SURGERY

## 2017-10-02 ENCOUNTER — HOSPITAL ENCOUNTER (OUTPATIENT)
Dept: PHYSICAL THERAPY | Age: 47
Discharge: HOME | End: 2017-10-03
Admitting: ORTHOPAEDIC SURGERY

## 2017-10-04 ENCOUNTER — HOSPITAL ENCOUNTER (OUTPATIENT)
Dept: PHYSICAL THERAPY | Age: 47
Discharge: HOME | End: 2017-10-05
Admitting: ORTHOPAEDIC SURGERY

## 2017-10-09 ASSESSMENT — ENCOUNTER SYMPTOMS: SHORTNESS OF BREATH: 0

## 2017-10-11 ENCOUNTER — HOSPITAL ENCOUNTER (OUTPATIENT)
Dept: PHYSICAL THERAPY | Age: 47
Discharge: HOME | End: 2017-10-12
Admitting: ORTHOPAEDIC SURGERY

## 2017-10-16 ENCOUNTER — HOSPITAL ENCOUNTER (OUTPATIENT)
Dept: PHYSICAL THERAPY | Age: 47
Discharge: HOME | End: 2017-10-17
Admitting: ORTHOPAEDIC SURGERY

## 2017-10-18 RX ORDER — HYDROXYZINE HYDROCHLORIDE 25 MG/1
TABLET, FILM COATED ORAL
Qty: 30 TABLET | Refills: 0 | Status: SHIPPED | OUTPATIENT
Start: 2017-10-18 | End: 2017-12-08 | Stop reason: SDUPTHER

## 2017-10-20 ENCOUNTER — HOSPITAL ENCOUNTER (OUTPATIENT)
Dept: PHYSICAL THERAPY | Age: 47
Discharge: HOME | End: 2017-10-21
Admitting: ORTHOPAEDIC SURGERY

## 2017-10-24 ENCOUNTER — OFFICE VISIT (OUTPATIENT)
Dept: ORTHOPEDIC SURGERY | Age: 47
End: 2017-10-24

## 2017-10-24 VITALS
SYSTOLIC BLOOD PRESSURE: 140 MMHG | BODY MASS INDEX: 37.74 KG/M2 | WEIGHT: 294.09 LBS | DIASTOLIC BLOOD PRESSURE: 96 MMHG | HEART RATE: 101 BPM | HEIGHT: 74 IN

## 2017-10-24 DIAGNOSIS — Z96.611 S/P SHOULDER REPLACEMENT, RIGHT: Primary | ICD-10-CM

## 2017-10-24 PROCEDURE — 1036F TOBACCO NON-USER: CPT | Performed by: ORTHOPAEDIC SURGERY

## 2017-10-24 PROCEDURE — G8427 DOCREV CUR MEDS BY ELIG CLIN: HCPCS | Performed by: ORTHOPAEDIC SURGERY

## 2017-10-24 PROCEDURE — G8484 FLU IMMUNIZE NO ADMIN: HCPCS | Performed by: ORTHOPAEDIC SURGERY

## 2017-10-24 PROCEDURE — G8417 CALC BMI ABV UP PARAM F/U: HCPCS | Performed by: ORTHOPAEDIC SURGERY

## 2017-10-24 PROCEDURE — 99213 OFFICE O/P EST LOW 20 MIN: CPT | Performed by: ORTHOPAEDIC SURGERY

## 2017-10-25 ENCOUNTER — HOSPITAL ENCOUNTER (OUTPATIENT)
Dept: PHYSICAL THERAPY | Age: 47
Discharge: HOME | End: 2017-10-26
Admitting: ORTHOPAEDIC SURGERY

## 2017-10-30 ENCOUNTER — HOSPITAL ENCOUNTER (OUTPATIENT)
Dept: PHYSICAL THERAPY | Age: 47
Discharge: HOME | End: 2017-10-31
Admitting: ORTHOPAEDIC SURGERY

## 2017-10-30 NOTE — PROGRESS NOTES
History of Present Illness:  [address] for follow-up of his right shoulder. He is 3-1/2 months out following right anatomic total shoulder replacement. His pain levels continue to be very low and he rates his pain level today at 0 out of 10. He still has a hard time lifting his arm up all the way overhead. He has been doing physical therapy with Sandeep Simmons at the [place] location. He apparently experienced some chest pain and this initiated a workup. He wore a Holter monitor which was negative. An EKG was negative. Doppler did not reveal any blood clots. He does have a history of sleep apnea. Medical History:  Patient's medications, allergies, past medical, surgical, social and family histories were reviewed and updated as appropriate. Pertinent items are noted in HPI  Review of systems reviewed from Patient History Form dated on November 22, 2016 and available in the patient's chart under the Media tab. Vital Signs:  Vitals:    10/24/17 1156   BP: (!) 140/96   Pulse: 101     Constitutional: He is a large man which appears his stated age of 49. He is in no apparent distress. Right Shoulder Examination:    Inspection: The wound looks good with no signs of an infection. Palpation:  No crepitus. Active Range of Motion: Active forward elevation is 110°. Internal rotation to the back is to L5 versus L2 with slight discomfort. Passive Range of Motion:  Passive forward elevation is 160°. Strength:  Negative belly press test.  Positive [place]. Negative external rotation lag sign. Special Tests:  Distal neurovascular exam is intact. Assessment :  My impression is that Kasia Dixon is doing well. He did not have much active forward elevation preoperatively. For this reason he does have deficits compared to his excellent passive range of motion. I do think his active range of motion will improve over time. Impression:  Encounter Diagnosis   Name Primary?     S/P

## 2017-11-01 ENCOUNTER — HOSPITAL ENCOUNTER (OUTPATIENT)
Dept: OTHER | Age: 47
Discharge: HOME | End: 2017-11-30
Attending: ORTHOPAEDIC SURGERY | Admitting: ORTHOPAEDIC SURGERY

## 2017-11-08 ENCOUNTER — HOSPITAL ENCOUNTER (OUTPATIENT)
Dept: PHYSICAL THERAPY | Age: 47
Discharge: HOME | End: 2017-11-09
Admitting: ORTHOPAEDIC SURGERY

## 2017-11-14 ENCOUNTER — HOSPITAL ENCOUNTER (OUTPATIENT)
Dept: PHYSICAL THERAPY | Age: 47
Discharge: HOME | End: 2017-11-15
Admitting: ORTHOPAEDIC SURGERY

## 2017-11-21 ENCOUNTER — TELEPHONE (OUTPATIENT)
Dept: ORTHOPEDIC SURGERY | Age: 47
End: 2017-11-21

## 2017-11-21 ENCOUNTER — HOSPITAL ENCOUNTER (OUTPATIENT)
Dept: PHYSICAL THERAPY | Age: 47
Discharge: HOME | End: 2017-11-22
Admitting: ORTHOPAEDIC SURGERY

## 2017-11-22 ENCOUNTER — HOSPITAL ENCOUNTER (OUTPATIENT)
Dept: PHYSICAL THERAPY | Age: 47
Discharge: HOME | End: 2017-11-23
Admitting: ORTHOPAEDIC SURGERY

## 2017-11-30 ENCOUNTER — HOSPITAL ENCOUNTER (OUTPATIENT)
Dept: PHYSICAL THERAPY | Age: 47
Discharge: HOME | End: 2017-12-01
Admitting: ORTHOPAEDIC SURGERY

## 2017-12-01 ENCOUNTER — HOSPITAL ENCOUNTER (OUTPATIENT)
Dept: OTHER | Age: 47
Discharge: HOME | End: 2017-12-31
Attending: ORTHOPAEDIC SURGERY | Admitting: ORTHOPAEDIC SURGERY

## 2017-12-07 ENCOUNTER — HOSPITAL ENCOUNTER (OUTPATIENT)
Dept: PHYSICAL THERAPY | Age: 47
Discharge: HOME | End: 2017-12-08
Admitting: ORTHOPAEDIC SURGERY

## 2017-12-08 RX ORDER — LISINOPRIL 10 MG/1
TABLET ORAL
Qty: 90 TABLET | Refills: 1 | Status: SHIPPED | OUTPATIENT
Start: 2017-12-08 | End: 2018-10-05 | Stop reason: SDUPTHER

## 2017-12-08 RX ORDER — HYDROXYZINE HYDROCHLORIDE 25 MG/1
TABLET, FILM COATED ORAL
Qty: 30 TABLET | Refills: 0 | Status: SHIPPED | OUTPATIENT
Start: 2017-12-08 | End: 2018-04-10

## 2017-12-12 ENCOUNTER — OFFICE VISIT (OUTPATIENT)
Dept: ORTHOPEDIC SURGERY | Age: 47
End: 2017-12-12

## 2017-12-12 ENCOUNTER — HOSPITAL ENCOUNTER (OUTPATIENT)
Dept: PHYSICAL THERAPY | Age: 47
Discharge: HOME | End: 2017-12-13
Admitting: ORTHOPAEDIC SURGERY

## 2017-12-12 VITALS
BODY MASS INDEX: 36.57 KG/M2 | WEIGHT: 285 LBS | DIASTOLIC BLOOD PRESSURE: 87 MMHG | HEIGHT: 74 IN | HEART RATE: 93 BPM | SYSTOLIC BLOOD PRESSURE: 130 MMHG

## 2017-12-12 DIAGNOSIS — Z96.611 S/P SHOULDER REPLACEMENT, RIGHT: Primary | ICD-10-CM

## 2017-12-12 PROCEDURE — 1036F TOBACCO NON-USER: CPT | Performed by: ORTHOPAEDIC SURGERY

## 2017-12-12 PROCEDURE — G8484 FLU IMMUNIZE NO ADMIN: HCPCS | Performed by: ORTHOPAEDIC SURGERY

## 2017-12-12 PROCEDURE — 99213 OFFICE O/P EST LOW 20 MIN: CPT | Performed by: ORTHOPAEDIC SURGERY

## 2017-12-12 PROCEDURE — G8417 CALC BMI ABV UP PARAM F/U: HCPCS | Performed by: ORTHOPAEDIC SURGERY

## 2017-12-12 PROCEDURE — G8427 DOCREV CUR MEDS BY ELIG CLIN: HCPCS | Performed by: ORTHOPAEDIC SURGERY

## 2017-12-12 NOTE — LETTER
Sofie Meier  1970      Status post right total shoulder replacement    Advance physical therapy to endrange stretches and strengthening. Okay to lift subscapularis precautions. Transition to home-based program.    Modalities as needed. Once or twice a week for 6 weeks. Samer S. Sharyle Scarpa, MD, PhD  12/12/2017

## 2017-12-13 DIAGNOSIS — M96.1 FAILED BACK SURGICAL SYNDROME: ICD-10-CM

## 2017-12-13 DIAGNOSIS — M47.819 DEGENERATIVE JOINT DISEASE OF SPINAL FACET JOINT: ICD-10-CM

## 2017-12-13 DIAGNOSIS — M54.16 LUMBAR RADICULOPATHY: ICD-10-CM

## 2017-12-13 DIAGNOSIS — M50.30 DDD (DEGENERATIVE DISC DISEASE), CERVICAL: ICD-10-CM

## 2017-12-13 DIAGNOSIS — M51.37 DDD (DEGENERATIVE DISC DISEASE), LUMBOSACRAL: ICD-10-CM

## 2017-12-13 DIAGNOSIS — G89.4 CHRONIC PAIN SYNDROME: ICD-10-CM

## 2017-12-16 NOTE — PROGRESS NOTES
History of Present Illness:  Elise Officer returns for follow-up of his right shoulder. He is nearing 5 months out following right anatomic total shoulder replacement. He is working with [employer] to [facility]. He is pleased with his progress. He is doing more with his arm. His pain levels are subsiding. He denies any reinjury. He rates his pain level today at 1 out of 10. Medical History:  Patient's medications, allergies, past medical, surgical, social and family histories were reviewed and updated as appropriate. Pertinent items are noted in HPI  Review of systems reviewed from Patient History Form dated on November 22, 2016 and available in the patient's chart under the Media tab. Vital Signs:  Vitals:    12/12/17 1151   BP: 130/87   Pulse: 93     Constitutional: He is in no apparent distress. He appears a little bit younger than his stated age of 49. Right Shoulder Examination:    Inspection:  The right shoulder is benign appearing. No deformity. Palpation:  No crepitus. Slight tenderness anteriorly. Active Range of Motion: Forward elevation is to 120° external rotation at the side 40°. Internal rotation to the back to L5. Passive Range of Motion:  Further progression of his passive forward elevation to 150° and internal rotation to the back to L3 with some slight pain. Strength:  Positive [place]. Negative belly press test.  4 out of 5 rotator cuff strength. Special Tests:  Distal neurovascular exam is intact. Radiology:     Plane radiographs of the right shoulder were not obtained today. Assessment :  My impression is that Zane Munoz is doing much better. He still needs to work a little bit more and strength. Impression:  Encounter Diagnosis   Name Primary?     S/P shoulder replacement, right Yes       Office Procedures:  Orders Placed This Encounter   Procedures   [facility]     Referral Priority:   Routine

## 2017-12-19 ENCOUNTER — OFFICE VISIT (OUTPATIENT)
Dept: PAIN MANAGEMENT | Age: 47
End: 2017-12-19

## 2017-12-19 VITALS
BODY MASS INDEX: 37.23 KG/M2 | SYSTOLIC BLOOD PRESSURE: 115 MMHG | HEART RATE: 80 BPM | WEIGHT: 290 LBS | DIASTOLIC BLOOD PRESSURE: 80 MMHG

## 2017-12-19 DIAGNOSIS — M51.37 DDD (DEGENERATIVE DISC DISEASE), LUMBOSACRAL: ICD-10-CM

## 2017-12-19 DIAGNOSIS — M47.819 DEGENERATIVE JOINT DISEASE OF SPINAL FACET JOINT: ICD-10-CM

## 2017-12-19 DIAGNOSIS — M79.604 LOW BACK PAIN RADIATING TO RIGHT LEG: ICD-10-CM

## 2017-12-19 DIAGNOSIS — G89.4 CHRONIC PAIN SYNDROME: ICD-10-CM

## 2017-12-19 DIAGNOSIS — M54.16 LUMBAR RADICULOPATHY: ICD-10-CM

## 2017-12-19 DIAGNOSIS — M54.50 LOW BACK PAIN RADIATING TO RIGHT LEG: ICD-10-CM

## 2017-12-19 DIAGNOSIS — M96.1 FAILED BACK SURGICAL SYNDROME: ICD-10-CM

## 2017-12-19 DIAGNOSIS — F33.41 RECURRENT MAJOR DEPRESSIVE DISORDER, IN PARTIAL REMISSION (HCC): ICD-10-CM

## 2017-12-19 DIAGNOSIS — M50.30 DDD (DEGENERATIVE DISC DISEASE), CERVICAL: ICD-10-CM

## 2017-12-19 PROBLEM — F32.4 MAJOR DEPRESSIVE DISORDER IN PARTIAL REMISSION (HCC): Status: ACTIVE | Noted: 2017-12-19

## 2017-12-19 PROCEDURE — G8417 CALC BMI ABV UP PARAM F/U: HCPCS | Performed by: INTERNAL MEDICINE

## 2017-12-19 PROCEDURE — 1036F TOBACCO NON-USER: CPT | Performed by: INTERNAL MEDICINE

## 2017-12-19 PROCEDURE — G8427 DOCREV CUR MEDS BY ELIG CLIN: HCPCS | Performed by: INTERNAL MEDICINE

## 2017-12-19 PROCEDURE — 99214 OFFICE O/P EST MOD 30 MIN: CPT | Performed by: INTERNAL MEDICINE

## 2017-12-19 PROCEDURE — G8484 FLU IMMUNIZE NO ADMIN: HCPCS | Performed by: INTERNAL MEDICINE

## 2017-12-19 RX ORDER — PREGABALIN 100 MG/1
CAPSULE ORAL
Qty: 90 CAPSULE | Refills: 1 | Status: SHIPPED | OUTPATIENT
Start: 2017-12-19 | End: 2018-02-13 | Stop reason: SDUPTHER

## 2017-12-19 NOTE — PROGRESS NOTES
So Armenta  1970  [id number]    HISTORY OF PRESENT ILLNESS:  Mr. Keon Samson is a 49 y.o. male returns for a follow up visit for multiple medical problems. His current presenting problems are   1. Chronic pain syndrome    2. Degenerative joint disease of spinal facet joint    3. DDD (degenerative disc disease), cervical    4. Recurrent major depressive disorder, in partial remission ([address]    5. Lumbar radiculopathy    6. Low back pain radiating to right leg    7. Failed back surgical syndrome    8. DDD (degenerative disc disease), lumbosacral    .    As per information/history obtained from the PADT(patient assessment and documentation tool) - He complains of pain in the lower back with radiation to the buttocks He rates the pain 5/10 and describes it as sharp, aching. Pain is made worse by: movement, walking, standing, sitting, bending, lifting. Current treatment regimen has helped relieve about 50% of the pain. He denies side effects from the current pain regimen. Patient reports that since the last follow up visit the physical functioning is unchanged, family/social relationships are unchanged, mood is unchanged and sleep patterns are unchanged, and that the overall functioning is unchanged. Patient denies neurological bowel or bladder. Patient denies misusing/abusing his narcotic pain medications or using any illegal drugs. There are No indicators for possible drug abuse, addiction or diversion problems. Upon obtaining the medical history from Mr. Keon Samson regarding today's office visit for his presenting problems, Patient states he has been doing ok. He states he is not working, off until March or so next year. Mr. Keon Samson mentions his back hurts more than the shoulder. He mentions the shoulder pain has been ok since surgery, decreased ROM still. Patient states his sleep is fair. Has fairly normal sleep latency. Averages about 4-6 hours of sleep a night. Denies any signs of sleep apnea.  Feels somewhat TABLET DAILY 90 tablet 1    traZODone (DESYREL) 50 MG tablet Take 1-2 tablets by mouth nightly 60 tablet 1    pregabalin (LYRICA) 100 MG capsule TAKE 1 TAB IN THE AM AND 2 TABS IN THE PM 90 capsule 1    tiZANidine (ZANAFLEX) 4 MG tablet Take 1-2 tabs po q pm prn 60 tablet 1    DULoxetine (CYMBALTA) 30 MG extended release capsule Take 1 capsule by mouth daily 90 capsule 1    hydrochlorothiazide (HYDRODIURIL) 25 MG tablet TAKE 1 TABLET DAILY 90 tablet 3    atorvastatin (LIPITOR) 40 MG tablet TAKE 1 TABLET DAILY 90 tablet 3     No facility-administered medications prior to visit. REVIEW OF SYSTEMS:   Constitutional: Negative for fatigue and unexpected weight change. Eyes: Negative for visual disturbance. Respiratory: Negative for shortness of breath. Cardiovascular: Negative for chest pain, palpitations  Gastrointestinal: Negative for blood in stool, abdominal distention, nausea, vomiting, abdominal pain, diarrhea,constipation. Skin: Negative for color change or any abnormal bruising. Neurological: Negative for speech difficulty, weakness and light-headedness, dizziness, tremors, sleepiness  Psychiatric/Behavioral: Negative for suicidal ideas, hallucinations, behavioral problems, self-injury, decreased concentration/cognition, agitation, confusion. PHYSICAL EXAM:   Nursing note and vitals reviewed. /80 (Site: Left Arm, Position: Sitting)   Pulse 80   Wt 290 lb (131.5 kg)   BMI 37.23 kg/m²   General Appearance: Patient is well nourished, well developed, well groomed and in no acute distress. Skin: Skin is warm and dry, good turgor . No rash or lesions noted. He is not diaphoretic. Pulmonary/Chest: Effort normal. No respiratory distress or use of accessory muscles. Auscultation revealing normal air entry. He does not have wheezes in the lung fields. He has no rales. Cardiovascular: Normal rate, regular rhythm, normal heart sounds, and does not have murmur.   Exam reveals no gallop and no friction rub. Abdominal: Soft. Bowel sounds are normal.  On inspection of abdomen is obese no distension and no mass. No tenderness. He has no rebound and no guarding. Musculoskeletal / Extremities: Range of motion is normal. Gait is normal, assistive devices use: none. He exhibits trace edema LE and no tenderness. Neurological/Psychiatric:He is alert and oriented to person, place, and time. Coordination is  normal.   Judgement and Insight is normal  His mood is Appropriate and affect is Flat/blunted and Anxious . His behavior is normal.   thought content normal.        IMPRESSION:     1. Chronic pain syndrome    2. Degenerative joint disease of spinal facet joint    3. DDD (degenerative disc disease), cervical    4. Recurrent major depressive disorder, in partial remission ([address]    5. Lumbar radiculopathy    6. Low back pain radiating to right leg    7. Failed back surgical syndrome    8. DDD (degenerative disc disease), lumbosacral        PLAN:  Informed verbal consent was obtained. -[place] record was obtained and reviewed  for the last one year and no indicators of drug misuse  were found. Any other controlled substance prescriptions  seen on the record have been accounted for, I am aware of the patient receiving these medications. Julian Asencio  [place] record will be rechecked as part of office protocol.   -Urine drug screen with GC/MS for opiates and drugs of abuse was ordered and will follow up on results.  -Will continue with current regimen   -ROM exercises for shoulder   -Continue Nucynta, uses it PRN only, has pills left from before   -d/c Trazodone   -he was advised proper sleep hygiene-told to avoid:use of caffeine or other stimulants after noon, alcohol use near bedtime, long or frequent naps during the day, erratic sleep schedule, heavy meals near bedtime, vigorous exercise near bedtime and use of electronic devices near bedtime  -Advised caffeine reduction, dietary changes, elevate head end of bed, NPO after supper, if using alcohol advised reduction of alcohol intake, tobacco cessation if smoking, weight loss  -He was advised to increase fluids ( 5-7  glasses of fluid daily), limit caffeine, avoid cheese products, increase dietary fiber, increase activity and exercise as tolerated and relax regularly and enjoy meals  -Adv Biofeedback, relaxation and meditation techniques. Referral to psychologist for CBT was also discussed with patient    Mr. Radha Willis will be prescribed  the medications  listed below which are for treatment of his presenting  medical problems which for this visit include:   Diagnoses of Chronic pain syndrome, Degenerative joint disease of spinal facet joint, DDD (degenerative disc disease), cervical, Recurrent major depressive disorder, in partial remission ([address] Lumbar radiculopathy, Low back pain radiating to right leg, Failed back surgical syndrome, and DDD (degenerative disc disease), lumbosacral were pertinent to this visit. Medications/orders associated with this visit:    Current Outpatient Prescriptions   Medication Sig Dispense Refill    lisinopril (PRINIVIL;ZESTRIL) 10 MG tablet TAKE 1 TABLET DAILY 90 tablet 1    hydrOXYzine (ATARAX) 25 MG tablet TAKE 1 TABLET EVERY 8 HOURSAS NEEDED FOR ANXIETY 30 tablet 0    levothyroxine (SYNTHROID) 125 MCG tablet Take 1 tablet by mouth Daily 90 tablet 0    aspirin 325 MG EC tablet Take 1 tablet by mouth 2 times daily 30 tablet 2    tapentadol (NUCYNTA) 75 MG TABS Take 1 tablet by mouth every 6 hours  Max 1-2 per day .  40 tablet 0    meloxicam (MOBIC) 15 MG tablet TAKE 1 TABLET DAILY 90 tablet 1    traZODone (DESYREL) 50 MG tablet Take 1-2 tablets by mouth nightly 60 tablet 1    pregabalin (LYRICA) 100 MG capsule TAKE 1 TAB IN THE AM AND 2 TABS IN THE PM 90 capsule 1    tiZANidine (ZANAFLEX) 4 MG tablet Take 1-2 tabs po q pm prn 60 tablet 1    DULoxetine (CYMBALTA) 30 MG extended release capsule Take 1 capsule by mouth daily

## 2017-12-20 ENCOUNTER — HOSPITAL ENCOUNTER (OUTPATIENT)
Dept: PHYSICAL THERAPY | Age: 47
Discharge: HOME | End: 2017-12-21
Admitting: ORTHOPAEDIC SURGERY

## 2017-12-26 ENCOUNTER — HOSPITAL ENCOUNTER (OUTPATIENT)
Dept: PHYSICAL THERAPY | Age: 47
Discharge: HOME | End: 2017-12-27
Admitting: ORTHOPAEDIC SURGERY

## 2017-12-28 ENCOUNTER — HOSPITAL ENCOUNTER (OUTPATIENT)
Dept: PHYSICAL THERAPY | Age: 47
Discharge: HOME | End: 2017-12-29
Admitting: ORTHOPAEDIC SURGERY

## 2018-01-01 ENCOUNTER — HOSPITAL ENCOUNTER (OUTPATIENT)
Dept: OTHER | Age: 48
Discharge: HOME | End: 2018-01-31
Attending: ORTHOPAEDIC SURGERY | Admitting: ORTHOPAEDIC SURGERY

## 2018-01-03 ENCOUNTER — HOSPITAL ENCOUNTER (OUTPATIENT)
Dept: PHYSICAL THERAPY | Age: 48
Discharge: HOME | End: 2018-01-04
Admitting: ORTHOPAEDIC SURGERY

## 2018-01-10 ENCOUNTER — HOSPITAL ENCOUNTER (OUTPATIENT)
Dept: PHYSICAL THERAPY | Age: 48
Discharge: HOME | End: 2018-01-11
Admitting: ORTHOPAEDIC SURGERY

## 2018-01-17 ENCOUNTER — HOSPITAL ENCOUNTER (OUTPATIENT)
Dept: PHYSICAL THERAPY | Age: 48
Discharge: HOME | End: 2018-01-18
Admitting: ORTHOPAEDIC SURGERY

## 2018-01-18 ENCOUNTER — OFFICE VISIT (OUTPATIENT)
Dept: ORTHOPEDIC SURGERY | Age: 48
End: 2018-01-18

## 2018-01-18 VITALS
DIASTOLIC BLOOD PRESSURE: 89 MMHG | HEIGHT: 74 IN | HEART RATE: 93 BPM | BODY MASS INDEX: 37.21 KG/M2 | WEIGHT: 289.9 LBS | SYSTOLIC BLOOD PRESSURE: 137 MMHG

## 2018-01-18 DIAGNOSIS — Z96.611 S/P SHOULDER REPLACEMENT, RIGHT: Primary | ICD-10-CM

## 2018-01-18 PROCEDURE — 1036F TOBACCO NON-USER: CPT | Performed by: ORTHOPAEDIC SURGERY

## 2018-01-18 PROCEDURE — 99213 OFFICE O/P EST LOW 20 MIN: CPT | Performed by: ORTHOPAEDIC SURGERY

## 2018-01-18 PROCEDURE — G8427 DOCREV CUR MEDS BY ELIG CLIN: HCPCS | Performed by: ORTHOPAEDIC SURGERY

## 2018-01-18 PROCEDURE — G8417 CALC BMI ABV UP PARAM F/U: HCPCS | Performed by: ORTHOPAEDIC SURGERY

## 2018-01-18 PROCEDURE — G8484 FLU IMMUNIZE NO ADMIN: HCPCS | Performed by: ORTHOPAEDIC SURGERY

## 2018-01-18 RX ORDER — METHYLPREDNISOLONE 4 MG/1
TABLET ORAL
Qty: 1 KIT | Refills: 0 | Status: SHIPPED | OUTPATIENT
Start: 2018-01-18 | End: 2018-01-24

## 2018-01-18 NOTE — LETTER
Physical Therapy Rehabilitation Referral    Patient Name:  Isidoro Beth      YOB: 1970    Diagnosis:    1. S/P shoulder replacement, right        Precautions:      Evaluate and Treat    Post Op Instructions:   Continuous passive motion (CPM)  Elbow ROM   Exercise in plane of scapula    Strengthening      [place] and instruction    Home exercise program (copy to patient)    Sling when arm at risk   Sling or brace at all times    AAROM: Forward elevation to  140             AAROM: External rotation  To  40     Isometric external rotator strengthening  AAROM: internal rotation: up the back   Isometric abductor strengthening   AAROM: Internal abduction    Isometric internal rotator strengthening  AAROM: cross-body adduction             Stretching:     Strengthening:   Four quadrant (FE, ER, IR, CBA)   Rotator cuff (ER, IR, Abd)   Forward Elevation     External Rotators      External Rotation     Internal Rotators   Internal Rotation: up/back    Abductors      Internal Rotation: supine in abduction  Flexors   Cross-body abduction     Extensors   Pendulum (FE, Abd/Add, cw/ccw)   Scapular Stabilizers    Wall-walking (FE, Abd)         Shoulder shrugs      Table slides (FE)                 Rhomboid pinch   Elbow (flex, ext, pron, sup)         Lat.  Pull downs      Medial epicondylitis program        Forward punch    Lateral epicondylitis program        Internal rotators      Progressive resistive exercises   Bench Press         Bench press plus  Activities:      Lateral pull-downs   Rowing      Progressive two-hand supine press   Stepper/Exercise bike    Biceps: curls/supination   Swimming   Water exercises    Modalities:     Return to Sport:   Of Choice       Plyometrics   Ultrasound      Rhythmic stabilization   Iontophoresis     Core strengthening    Moist heat      Sports specific program:    Massage          Cryotherapy       Electrical stimulation      Paraffin   Whirlpool   TENS Home exercise program (copy to patient).         Perform exercises for:   30     minutes    3      times/day   Supervised physical therapy  Frequency:   1x week   2x week   3x week   Other:   Duration:  2 weeks    4 weeks   6 weeks   Other:     Additional Instructions:              Tommas Gosselin, MD

## 2018-01-24 ENCOUNTER — HOSPITAL ENCOUNTER (OUTPATIENT)
Dept: PHYSICAL THERAPY | Age: 48
Discharge: HOME | End: 2018-01-25
Admitting: ORTHOPAEDIC SURGERY

## 2018-01-24 NOTE — FLOWSHEET NOTE
understanding of exercises. 7/17/17:  Reviewed HEP. Patient required verbal cues for correct performance of all exercises. 7/24/17 isometric ABD  7/31/17 table slides flexion up to 90 degrees; 8/7/17 increased ROM restriction up to 140  9/13/17 supine wand flexion   9/27/17 for L and R shoulder: sleeper stretch  For L shoulder only: table slides flexion and ER, t-band IR and ER  For R shoulder only: isometric IR/ER/ABD  10/16/17 wall slide L UE  11/8/17 t-band scapular rows, IR and ER with yellow band  1/17/18 reassessed and condensed HEP: scapular circles, Codman's, isometric ABD, supine wand flexion, sleeper stretch, SLER; theraband scapular rows, shoulder IR and ER, shoulder extension. 1/24/18 revised HEP: currently do shrugs, pinches, biceps, curls, supine wand flexion AAROM; stop the exercises currently as issued on 1/17/18. Manual Treatments: STM R levator scapula    Modalities:  IFC with CP R and L shoulders 20' in sitting. Timed Code Treatment Minutes:  45    Total Treatment Minutes:  45    Treatment/Activity Tolerance:  [] Patient tolerated treatment well [] Patient limited by fatigue  [x] Patient limited by pain  [] Patient limited by other medical complications  [] Other:     Prognosis: [x] Good [] Fair  [] Poor    Patient Requires Follow-up: [x] Yes  [] No    Plan:   [x] Continue per plan of care [] Alter current plan (see comments)  [] Plan of care initiated [] Hold pending MD visit [] Discharge  Plan for Next Session:   Continue per protocol. Progress HEP with two-handed supine press.  Resume light strengthening of IR/ER, ABD in 2 sessions from today (1/24/18) if tolerated well       Electronically signed by:  Dena Fatima, PT

## 2018-02-01 ENCOUNTER — HOSPITAL ENCOUNTER (OUTPATIENT)
Dept: OTHER | Age: 48
Discharge: HOME | End: 2018-02-28
Attending: ORTHOPAEDIC SURGERY | Admitting: ORTHOPAEDIC SURGERY

## 2018-02-06 ENCOUNTER — OFFICE VISIT (OUTPATIENT)
Dept: FAMILY MEDICINE CLINIC | Age: 48
End: 2018-02-06

## 2018-02-06 VITALS
BODY MASS INDEX: 38.12 KG/M2 | SYSTOLIC BLOOD PRESSURE: 124 MMHG | WEIGHT: 297 LBS | HEART RATE: 100 BPM | HEIGHT: 74 IN | DIASTOLIC BLOOD PRESSURE: 76 MMHG | TEMPERATURE: 98.4 F

## 2018-02-06 DIAGNOSIS — H65.01 RIGHT ACUTE SEROUS OTITIS MEDIA, RECURRENCE NOT SPECIFIED: Primary | ICD-10-CM

## 2018-02-06 PROCEDURE — 1036F TOBACCO NON-USER: CPT | Performed by: FAMILY MEDICINE

## 2018-02-06 PROCEDURE — 99213 OFFICE O/P EST LOW 20 MIN: CPT | Performed by: FAMILY MEDICINE

## 2018-02-06 PROCEDURE — G8427 DOCREV CUR MEDS BY ELIG CLIN: HCPCS | Performed by: FAMILY MEDICINE

## 2018-02-06 PROCEDURE — G8484 FLU IMMUNIZE NO ADMIN: HCPCS | Performed by: FAMILY MEDICINE

## 2018-02-06 PROCEDURE — G8417 CALC BMI ABV UP PARAM F/U: HCPCS | Performed by: FAMILY MEDICINE

## 2018-02-06 RX ORDER — PREDNISONE 10 MG/1
TABLET ORAL
Qty: 21 TABLET | Refills: 0 | Status: SHIPPED | OUTPATIENT
Start: 2018-02-06 | End: 2018-02-16

## 2018-02-06 RX ORDER — AMOXICILLIN AND CLAVULANATE POTASSIUM 875; 125 MG/1; MG/1
1 TABLET, FILM COATED ORAL 2 TIMES DAILY
Qty: 20 TABLET | Refills: 0 | Status: SHIPPED | OUTPATIENT
Start: 2018-02-06 | End: 2018-02-16

## 2018-02-06 NOTE — PROGRESS NOTES
ANXIETY 30 tablet 0    levothyroxine (SYNTHROID) 125 MCG tablet Take 1 tablet by mouth Daily 90 tablet 0    aspirin 325 MG EC tablet Take 1 tablet by mouth 2 times daily 30 tablet 2    tapentadol (NUCYNTA) 75 MG TABS Take 1 tablet by mouth every 6 hours  Max 1-2 per day . 40 tablet 0    tiZANidine (ZANAFLEX) 4 MG tablet Take 1-2 tabs po q pm prn 60 tablet 1    DULoxetine (CYMBALTA) 30 MG extended release capsule Take 1 capsule by mouth daily 90 capsule 1    pregabalin (LYRICA) 100 MG capsule TAKE 1 TAB IN THE AM AND 2 TABS IN THE PM. 90 capsule 1    meloxicam (MOBIC) 15 MG tablet TAKE 1 TABLET DAILY 90 tablet 1    hydrochlorothiazide (HYDRODIURIL) 25 MG tablet TAKE 1 TABLET DAILY 90 tablet 0    atorvastatin (LIPITOR) 40 MG tablet TAKE 1 TABLET DAILY 90 tablet 0     No current facility-administered medications for this visit. Vitals:    18 1113   BP: 124/76   Pulse: 100   Temp: 98.4 °F (36.9 °C)     Wt Readings from Last 3 Encounters:   18 285 lb (129.3 kg)   18 296 lb 15.4 oz (134.7 kg)   18 297 lb (134.7 kg)     Body mass index is 38.13 kg/m². Immunization History   Administered Date(s) Administered    Influenza Virus Vaccine 2015    Influenza Whole 2012    Influenza, Melina Blanco, 3 yrs and older, IM, Preservative Free 2017    Tdap (Boostrix, Adacel) 2015               ASSESSMENT  1. Right acute serous otitis media, recurrence not specified         PLAN    augmentin and prednisone                  New meds this visit:    Orders Placed This Encounter   Medications    amoxicillin-clavulanate (AUGMENTIN) 875-125 MG per tablet     Sig: Take 1 tablet by mouth 2 times daily for 10 days     Dispense:  20 tablet     Refill:  0    predniSONE (DELTASONE) 10 MG tablet     Si po day #1, 5 po day #2, 4 po day #3, 3 po day #4, 2 po day #5, 1 po day #6, then off.      Dispense:  21 tablet     Refill:  0       New orders placed this visit:  No orders of the defined

## 2018-02-06 NOTE — PATIENT INSTRUCTIONS
Please call your pharmacy if you need any refills of your medication(s). Please call our office at [phone] if you don't hear from us about your test results. Please be sure to call our office if your illness/problem has been treated for but has not completely resolved. Bring an accurate list of your medications with you at every appointment to ensure that we have the correct information. Our office hours are: Monday - Friday 7 am- 5 pm; Saturdays vary on doctor working.     Phone lines turn on at 8 am.]

## 2018-02-07 ENCOUNTER — HOSPITAL ENCOUNTER (OUTPATIENT)
Dept: PHYSICAL THERAPY | Age: 48
Discharge: HOME | End: 2018-02-08
Admitting: ORTHOPAEDIC SURGERY

## 2018-02-07 NOTE — FLOWSHEET NOTE
appointment. 7/24/17 Pt stated he has been doing CPM 1 hour/sessions, 3x/day. Pt has been icing his shoulder 2-3x/day.  7/26/17 POD#19 RTMD 8/1/17. Pt c/o neck and back pain today, and anterior shoulder pain. 8/4/17 POD#28 Pt went to MD, pt stated Dr. Dylon Pierre was pleased with progress so far and gave new PT guidelines. (see new guidelines, hard copy in pt's chart, and updated at \"Exercise\" section of this note.)  8/7/17 Shoulder is doing well today. 8/18/17 POD#42 Shoulder pain improved following STM to shoulder. See new Rx.  9/6/17 POD#61 Pt stated his shoulder pain is decreasing and he is regularly using CPM machine. 9/8/17 POD #63 A little sore following last session. 9/11/17 POD #66 Pt went to [facility] last night, shoulder did okay. 9/13/17 POD #68 Shoulder gets tired at times. 9/21/17 POD #76 Pt cancelled his last 2 sessions. Shoulder pain is a lot less. Pt has had trouble sleeping, somewhat related to his shoulder and also due to difficulty breathing at night. Pt wore a cardiac monitor Monday/Tuesday. Pt had furniture and  delivered on Wednesday. 9/25/17 POD #80 Saw MD, was given new Rx for L shoulder and updated Rx for R shoulder. 9/27/17 POD #82 Shoulder did well following last session  10/2/17 POD #87 pt c/o light pain, pointed to supraspinatus  10/4/17 POD #89   10/11/17 POD #96 Pt stated he has been receiving cardiac testing as his lower legs are swelling. Had EKG and cardiac monitor tests which \"came back fine. \"  Pt stated US testing showed \"small veins. \"    Pt stated the shoulders are \"not too bad. \" Pt stated he feels like he is making some progress with the L shoulder but \"it will bother me for a while and then it goes away. .. And then it will start to flare up again. \"  10/16/17 POD #101 Shoulders are doing well. 10/20/17 POD #105 Pt stated he is frustrated in that his R shoulder active motion does not match his passive motion.   Pt stated his L shoulder pain has been Darwin Jacques yesterday. Pt was instructed to have patience with strengthening. 12/20/17 POD #166 Pt arrived 13' late. Shoulder doing well. PT reviewed attendance policy again. 12/26/17 POD #172   12/28/17 POD #174  1/3/18  Was bothering him pretty good after last session. C/o B shoulder pain. Still pretty sore in B UTs and R ant shoulder. Pain has been waking him up at night. Pain 3-4/10 at rest. Up to 7/10 with movement. Pt has pain when raising arm \"to go over my head. \"  1/10/19 POD #181  Pt stated that he has bilateral shoulder pain, that the L shoulder and R shoulder hurt the same, both rated 5/10. R shoulder pain is located at the lateral aspect of the shoulder and top of the shoulder, and L shoulder pain is located at the anterior shoulder. 1/17/18 POD #188 Pt stated his shoulder has been a lot better over the past week. Today the R shoulder pain rated 0/10. But B shoulder pain (pointed to upper traps) wakes him. Pt stated he has had posterior neck pain and pain at the both of both shoulders. Pt stated he feels like neck problems may contribute to these pains. 1/24/18 POD #195 Pt stated he saw Dr. Darwin Jacques last week. Pt was ordered more PT, and was instructed to \"lay off the weight. \"  Pt stated his shoulder is feeling a little better following day 4 of Medrol. 2/7/18 POD #209 Pt stated his back has been bothering him and he has been unable to get out of bed. Pt also has a major sinus infection, per report. Pt stated his shoulders have been much better since he took the steroid pack. Pt stated he has had no issues with his current HEP (as revised on 1/24/18) and is ready to progress is HEP. Objective:  Observation:  7/19/17:  Min/Moderate edema noted elbow, medial aspect of humerus. Patient walking partially flexed, decreased stride length, wide base of support  7/11/17  Ambulated into PT clinci with UltraSling IV by Jaclyn.   Body Mechanics: Pt demonstrated that he could take his brace off almost are supposed to be, and to avoid pushing himself at home to gain more motion. Pt asked if he can be out of sling some, other than showering and HEP. PT instructed pt to continue to wear sling full time. Home Exercise Program:    7/11/17 HEP was instructed and included Codmans flex/ext, L/R, CW and CCW; scapular shrugs and pinches. .  Pt was given written hand outs and demonstrated exercises correctly. Pt expressed understanding of exercises. 7/17/17:  Reviewed HEP. Patient required verbal cues for correct performance of all exercises. 7/24/17 isometric ABD  7/31/17 table slides flexion up to 90 degrees; 8/7/17 increased ROM restriction up to 140  9/13/17 supine wand flexion   9/27/17 for L and R shoulder: sleeper stretch  For L shoulder only: table slides flexion and ER, t-band IR and ER  For R shoulder only: isometric IR/ER/ABD  10/16/17 wall slide L UE  11/8/17 t-band scapular rows, IR and ER with yellow band  1/17/18 reassessed and condensed HEP: scapular circles, Codman's, isometric ABD, supine wand flexion, sleeper stretch, SLER; theraband scapular rows, shoulder IR and ER, shoulder extension. 1/24/18 revised HEP: currently do shrugs, pinches, biceps, curls, supine wand flexion AAROM; stop the exercises currently as issued on 1/17/18.  2/7/18 resume T-slide IR/ER      Manual Treatments:     Modalities:     Timed Code Treatment Minutes:  60    Total Treatment Minutes:  60    Treatment/Activity Tolerance:  [] Patient tolerated treatment well [] Patient limited by fatigue  [x] Patient limited by pain  [] Patient limited by other medical complications  [] Other:     Prognosis: [x] Good [] Fair  [] Poor    Patient Requires Follow-up: [x] Yes  [] No    Plan:   [x] Continue per plan of care [] Alter current plan (see comments)  [] Plan of care initiated [] Hold pending MD visit [] Discharge  Plan for Next Session:   Continue per protocol. Progress HEP with two-handed supine press.  Resume light strengthening

## 2018-02-08 ENCOUNTER — OFFICE VISIT (OUTPATIENT)
Dept: ORTHOPEDIC SURGERY | Age: 48
End: 2018-02-08

## 2018-02-08 VITALS
WEIGHT: 296.96 LBS | HEIGHT: 74 IN | DIASTOLIC BLOOD PRESSURE: 86 MMHG | BODY MASS INDEX: 38.11 KG/M2 | HEART RATE: 98 BPM | SYSTOLIC BLOOD PRESSURE: 133 MMHG

## 2018-02-08 DIAGNOSIS — Z96.611 S/P SHOULDER REPLACEMENT, RIGHT: Primary | ICD-10-CM

## 2018-02-08 PROCEDURE — G8484 FLU IMMUNIZE NO ADMIN: HCPCS | Performed by: ORTHOPAEDIC SURGERY

## 2018-02-08 PROCEDURE — 1036F TOBACCO NON-USER: CPT | Performed by: ORTHOPAEDIC SURGERY

## 2018-02-08 PROCEDURE — G8427 DOCREV CUR MEDS BY ELIG CLIN: HCPCS | Performed by: ORTHOPAEDIC SURGERY

## 2018-02-08 PROCEDURE — G8417 CALC BMI ABV UP PARAM F/U: HCPCS | Performed by: ORTHOPAEDIC SURGERY

## 2018-02-08 PROCEDURE — 99213 OFFICE O/P EST LOW 20 MIN: CPT | Performed by: ORTHOPAEDIC SURGERY

## 2018-02-08 NOTE — PROGRESS NOTES
Right     shoulder    ROTATOR CUFF REPAIR Right 1993    right    SHOULDER SURGERY      SPINAL FUSION  2006    THYROIDECTOMY  2008       Family History   Problem Relation Age of Onset    Diabetes Father     Heart Failure Father     Hypertension Father     High Blood Pressure Father     High Blood Pressure Mother     Stroke Maternal Grandmother     Anesth Problems Neg Hx     Malig Hyperten Neg Hx     Hypotension Neg Hx     Malig Hypertherm Neg Hx     Pseudochol. Deficiency Neg Hx        Social History     Social History    Marital status:      Spouse name: N/A    Number of children: 3    Years of education: N/A     Occupational History          Social History Main Topics    Smoking status: Never Smoker    Smokeless tobacco: Never Used    Alcohol use 0.0 oz/week      Comment: occasional use    Drug use: No    Sexual activity: Not Asked     Other Topics Concern    None     Social History Narrative    None       Current Outpatient Prescriptions   Medication Sig Dispense Refill    amoxicillin-clavulanate (AUGMENTIN) 875-125 MG per tablet Take 1 tablet by mouth 2 times daily for 10 days 20 tablet 0    predniSONE (DELTASONE) 10 MG tablet 6 po day #1, 5 po day #2, 4 po day #3, 3 po day #4, 2 po day #5, 1 po day #6, then off. 21 tablet 0    pregabalin (LYRICA) 100 MG capsule TAKE 1 TAB IN THE AM AND 2 TABS IN THE PM. 90 capsule 1    lisinopril (PRINIVIL;ZESTRIL) 10 MG tablet TAKE 1 TABLET DAILY 90 tablet 1    hydrOXYzine (ATARAX) 25 MG tablet TAKE 1 TABLET EVERY 8 HOURSAS NEEDED FOR ANXIETY 30 tablet 0    levothyroxine (SYNTHROID) 125 MCG tablet Take 1 tablet by mouth Daily 90 tablet 0    aspirin 325 MG EC tablet Take 1 tablet by mouth 2 times daily 30 tablet 2    tapentadol (NUCYNTA) 75 MG TABS Take 1 tablet by mouth every 6 hours  Max 1-2 per day .  40 tablet 0    meloxicam (MOBIC) 15 MG tablet TAKE 1 TABLET DAILY 90 tablet 1    tiZANidine (ZANAFLEX) 4 MG tablet Take 1-2 tabs po q pm prn 60 tablet 1    DULoxetine (CYMBALTA) 30 MG extended release capsule Take 1 capsule by mouth daily 90 capsule 1    hydrochlorothiazide (HYDRODIURIL) 25 MG tablet TAKE 1 TABLET DAILY 90 tablet 3    atorvastatin (LIPITOR) 40 MG tablet TAKE 1 TABLET DAILY 90 tablet 3     No current facility-administered medications for this visit. Allergies   Allergen Reactions    Percocet [Oxycodone-Acetaminophen] Nausea And Vomiting       Vital signs:  /86   Pulse 98   Ht 6' 2.02\" (1.88 m)   Wt 296 lb 15.4 oz (134.7 kg)   BMI 38.11 kg/m²        Neuro: Alert & oriented x 3,  normal,  no focal deficits noted. Normal affect. Eyes: sclera clear  Ears: Normal external ear  Mouth:  No perioral lesions  Pulm: Respirations unlabored and regular  Pulse: Regular rate    Skin: Warm, well perfused        Right shoulder exam    Inspection:  No gross deformities    Palpation: Mild tenderness overlying the rotator cuff footprint    Active ROM: 150° forward flexion, 140° abduction, 40° external rotation with the elbow at the side, and internal rotation to the level of the back pocket    Passive ROM: Similar    Strength: +4/5 strength testing in abduction in the scapular plane, +4/5 resisted external rotation with the elbow at the side, +4/5 resisted internal rotation with the elbow at the side    Stability:  no gross instability    Neurovascular: Neurovascularly intact    Special Tests: Negative belly press tablet, negative Kevin sign      Diagnostics:  Radiology:     No new x-rays were obtained today    Assessment: 7 months status post anatomic right total shoulder arthroplasty with much improved pain control and range of motion after oral corticosteroid taper. Plan: We are extremely pleased with the patient's subjective pain as well as range of motion.   The patient states that he has resumed the use of his meloxicam which we are finding with an encouraged him to continue with his physical therapy and she

## 2018-02-08 NOTE — LETTER
Home exercise program (copy to patient). Supervised physical therapy  Frequency:   1x week   2x week   3x week   Other:   Duration:  2 weeks    4 weeks   6 weeks   Other:     Sincerely,        Odell Fraser MD  Clinical Fellow in [address] [place] [address]  2/8/2018     This dictation was performed with a verbal recognition program [facility]) and it was checked for errors. It is possible that there are still dictated errors within this office note. If so, please bring any errors to my attention for an addendum. All efforts were made to ensure that this office note is accurate.

## 2018-02-13 ENCOUNTER — OFFICE VISIT (OUTPATIENT)
Dept: PAIN MANAGEMENT | Age: 48
End: 2018-02-13

## 2018-02-13 VITALS
SYSTOLIC BLOOD PRESSURE: 111 MMHG | DIASTOLIC BLOOD PRESSURE: 71 MMHG | BODY MASS INDEX: 36.58 KG/M2 | HEART RATE: 105 BPM | WEIGHT: 285 LBS

## 2018-02-13 DIAGNOSIS — M96.1 FAILED BACK SURGICAL SYNDROME: ICD-10-CM

## 2018-02-13 DIAGNOSIS — M50.30 DDD (DEGENERATIVE DISC DISEASE), CERVICAL: ICD-10-CM

## 2018-02-13 DIAGNOSIS — M47.819 DEGENERATIVE JOINT DISEASE OF SPINAL FACET JOINT: ICD-10-CM

## 2018-02-13 DIAGNOSIS — G89.4 CHRONIC PAIN SYNDROME: ICD-10-CM

## 2018-02-13 DIAGNOSIS — M19.011 OSTEOARTHRITIS OF RIGHT GLENOHUMERAL JOINT: ICD-10-CM

## 2018-02-13 DIAGNOSIS — M51.37 DDD (DEGENERATIVE DISC DISEASE), LUMBOSACRAL: ICD-10-CM

## 2018-02-13 DIAGNOSIS — M54.16 LUMBAR RADICULOPATHY: ICD-10-CM

## 2018-02-13 PROCEDURE — 1036F TOBACCO NON-USER: CPT | Performed by: INTERNAL MEDICINE

## 2018-02-13 PROCEDURE — 99213 OFFICE O/P EST LOW 20 MIN: CPT | Performed by: INTERNAL MEDICINE

## 2018-02-13 PROCEDURE — G8484 FLU IMMUNIZE NO ADMIN: HCPCS | Performed by: INTERNAL MEDICINE

## 2018-02-13 PROCEDURE — G8428 CUR MEDS NOT DOCUMENT: HCPCS | Performed by: INTERNAL MEDICINE

## 2018-02-13 PROCEDURE — G8417 CALC BMI ABV UP PARAM F/U: HCPCS | Performed by: INTERNAL MEDICINE

## 2018-02-13 RX ORDER — MELOXICAM 15 MG/1
TABLET ORAL
Qty: 90 TABLET | Refills: 1 | Status: SHIPPED | OUTPATIENT
Start: 2018-02-13 | End: 2018-07-24 | Stop reason: SDUPTHER

## 2018-02-13 RX ORDER — PREGABALIN 100 MG/1
CAPSULE ORAL
Qty: 90 CAPSULE | Refills: 1 | Status: SHIPPED | OUTPATIENT
Start: 2018-02-13 | End: 2018-07-24 | Stop reason: SDUPTHER

## 2018-02-13 NOTE — PROGRESS NOTES
Tony Ashkan  1970  [id number]    HISTORY OF PRESENT ILLNESS:  Mr. Dena Lockett is a 51 y.o. male returns for a follow up visit for multiple medical problems. His current presenting problems are   1. Chronic pain syndrome    2. Failed back surgical syndrome    3. Degenerative joint disease of spinal facet joint    4. DDD (degenerative disc disease), cervical    5. Lumbar radiculopathy    6. DDD (degenerative disc disease), lumbosacral    7. Osteoarthritis of right glenohumeral joint    . As per information/history obtained from the PADT(patient assessment and documentation tool) - He complains of pain in the lower back with radiation to the buttocks He rates the pain 5/10 and describes it as aching. Pain is made worse by: standing, bending, lifting. Current treatment regimen has helped relieve about 70% of the pain. He denies side effects from the current pain regimen. Patient reports that since the last follow up visit the physical functioning is unchanged, family/social relationships are unchanged, mood is unchanged and sleep patterns are unchanged, and that the overall functioning is unchanged. Patient denies neurological bowel or bladder. Patient denies misusing/abusing his narcotic pain medications or using any illegal drugs. There are No. indicators for possible drug abuse, addiction or diversion problems. Upon obtaining the medical history from Mr. Dena Lockett regarding today's office visit for his presenting problems, Patient reports that the pain is fairly well tolerated with the current regimen has had some exacerbations, but overall has been tolerable. Mr. Dena Lockett states that  he has been staying with his exercise routine and working indoors and/or outdoors as tolerated, performing household chores. Mr. Dena Lockett states he is doing better. Patient reports he is not working, he is in rehab for his shoulder exercises, he saw Ortho recently.        ALLERGIES: Patients list of allergies were reviewed well-developed and well-nourished. No acute distress. Skin: Skin is warm and dry, good turgor. No rash noted. He is not diaphoretic. Cardiovascular: Normal rate, regular rhythm, normal heart sounds, and does not have murmur. Pulmonary/Chest: Effort normal. No respiratory distress. He does not have wheezes in the lung fields. He has no rales. Neurological/Psychiatric:He is alert and oriented to person, place, and time. Coordination is  normal. His mood isAppropriate and affect is Neutral/Euthymic(normal) . IMPRESSION:   1. Chronic pain syndrome    2. Failed back surgical syndrome    3. Degenerative joint disease of spinal facet joint    4. DDD (degenerative disc disease), cervical    5. Lumbar radiculopathy    6. DDD (degenerative disc disease), lumbosacral    7. Osteoarthritis of right glenohumeral joint        PLAN:  Informed verbal consent was obtained  -continue with current regimen  -continue with Lyrica and Mobic  -He was advised weight reduction, diet changes- 800-1200 flex diet, diet diary, exercising, nutritional  consult increased physical activity as tolerated  -continue with exercises as advised  -Patient's urine drug screen results with GC/MS confirmation were obtained and reviewed and were negative for any illicit drugs. Prescribed medications were negative for prescribed medications, using it PRN, ok within acceptable range.     Current Outpatient Prescriptions   Medication Sig Dispense Refill    hydrochlorothiazide (HYDRODIURIL) 25 MG tablet TAKE 1 TABLET DAILY 90 tablet 0    atorvastatin (LIPITOR) 40 MG tablet TAKE 1 TABLET DAILY 90 tablet 0    amoxicillin-clavulanate (AUGMENTIN) 875-125 MG per tablet Take 1 tablet by mouth 2 times daily for 10 days 20 tablet 0    predniSONE (DELTASONE) 10 MG tablet 6 po day #1, 5 po day #2, 4 po day #3, 3 po day #4, 2 po day #5, 1 po day #6, then off. 21 tablet 0    pregabalin (LYRICA) 100 MG capsule TAKE 1 TAB IN THE AM AND 2 TABS IN THE PM. 90 Ability to do home exercises independently. Ability to do household chores indoor and/or outdoor work and social and leisure activities. Improve psychosocial and physical functioning. - he is showing progression towards this treatment goal with the current regimen. He was advised against drinking alcohol with the narcotic pain medicines, advised against driving or handling machinery while adjusting the dose of medicines or if having cognitive  issues related to the current medications. Risk of overdose and death, if medicines not taken as prescribed, were also discussed. If the patient develops new symptoms or if the symptoms worsen, the patient should call the office. While transcribing every attempt was made to maintain the accuracy of the note in terms of it's contents,there may have been some errors made inadvertently. Thank you for allowing me to participate in the care of this patient. Jose Blanchard MD.    Cc: DO FIGUEROA Lange, Mau Pozo, scribing for in the presence  of Dr. Jose Blanchard.   02/13/18  10:29 AM  Octavio Clark Gemma Assistant  I, Dr. Jose Blanchard, personally performed the services described in this documentation as scribed by  Mau Pozo MA in my presence and it is both accurate and complete

## 2018-02-14 ENCOUNTER — HOSPITAL ENCOUNTER (OUTPATIENT)
Dept: PHYSICAL THERAPY | Age: 48
Discharge: HOME | End: 2018-02-15
Admitting: ORTHOPAEDIC SURGERY

## 2018-02-14 NOTE — PROGRESS NOTES
Outpatient Physical Therapy  [] [facility]    Phone: [phone]   Fax: [phone]   [] [facility]   Phone: [phone]   Fax: [phone]  [] [facility]   Phone: [phone]   Fax: [phone]     Physical Therapy Prescription    Date: 2018    Patient Jm Klein                      :  1970                     MRN: [id number]  Restrictions/Precautions:    Pertinent Medical History:  Medical/Treatment Diagnosis Information:  · Diagnosis: s/p TSA; hardware removal  · Treatment Diagnosis: Decreased ADL status  Insurance/Certification information:  PT Insurance Information: [employer] [place]  Physician Information:  Referring Practitioner: Dr. Danni Danielle of care signed (Y/N):  Sent to in basket on 17  Drug Allergies: none    With your approval we would like to add the following to the patient's current PT treatment:             [x] Iontophoresis: Dexamethasone 4mg/ml injectable-30 ml vial     Quantity: 1 vial for iontophoresis     As needed        Electronically signed by:  bAhay Aguillon PT     If you have any questions or concerns, please don't hesitate to call.   Thank you for your referral.    Physician Signature:________________________________Date:__________________  By signing above, therapists plan is approved by physician

## 2018-02-14 NOTE — FLOWSHEET NOTE
shoulder. 1/17/18 POD #188 Pt stated his shoulder has been a lot better over the past week. Today the R shoulder pain rated 0/10. But B shoulder pain (pointed to upper traps) wakes him. Pt stated he has had posterior neck pain and pain at the both of both shoulders. Pt stated he feels like neck problems may contribute to these pains. 1/24/18 POD #195 Pt stated he saw Dr. Roman Stiles last week. Pt was ordered more PT, and was instructed to \"lay off the weight. \"  Pt stated his shoulder is feeling a little better following day 4 of Medrol. 2/7/18 POD #209 Pt stated his back has been bothering him and he has been unable to get out of bed. Pt also has a major sinus infection, per report. Pt stated his shoulders have been much better since he took the steroid pack. Pt stated he has had no issues with his current HEP (as revised on 1/24/18) and is ready to progress is HEP.  2/14/18 POD #216  R shoulder pain is improving but \"I haven't been doing much. \"  Pt stated he saw Dr. Roman Stiles last week. Pt was given new Rx for 1x/week for 6 weeks    Objective:  Observation:  7/19/17:  Min/Moderate edema noted elbow, medial aspect of humerus. Patient walking partially flexed, decreased stride length, wide base of support  7/11/17  Ambulated into PT clinci with UltraSling IV by Jaclyn. Body Mechanics: Pt demonstrated that he could take his brace off almost independently   10/20/17  Pt had tight rhomboids and limited scapular winging (which loosened up during the PT session).   Test measurements:      R UE 10/20/17 11/8/17 12/7/17 12/26/17 1/3/18 1/10/18 1/17/18 2/7/18   AROM           Flexion 87  140  85 120 145 160   ABD 90  120  80  150   IR 68 70   50 60 60 60   ER 70 78   75 75 75 75   Extension 35 40   30 40 45 50   IR behind back To sacrum          PROM           Flexion 150 150 160 160 160 160 160 160   ABD  160 160 160 160 160 160   IR 70 70 70 70 50 60 60 60   IR behind back To L4  To L2  To L2  To L2    ER 70 extension. 1/24/18 revised HEP: currently do shrugs, pinches, biceps, curls, supine wand flexion AAROM; stop the exercises currently as issued on 1/17/18.  2/7/18 resume T-slide IR/ER  2/14/18 SLER      Manual Treatments:     Modalities: US R shoulder    Timed Code Treatment Minutes:  60    Total Treatment Minutes:  60    Treatment/Activity Tolerance:  [] Patient tolerated treatment well [] Patient limited by fatigue  [x] Patient limited by pain  [] Patient limited by other medical complications  [] Other:     Prognosis: [x] Good [] Fair  [] Poor    Patient Requires Follow-up: [x] Yes  [] No    Plan:   [x] Continue per plan of care [] Alter current plan (see comments)  [] Plan of care initiated [] Hold pending MD visit [] Discharge  Plan for Next Session:   Continue per protocol. Progress HEP with supine IR/ER, SLER.  Resume light strengthening of ABD if tolerated well       Electronically signed by:  Gayla Pyle PT

## 2018-02-19 ASSESSMENT — ENCOUNTER SYMPTOMS
EYE PAIN: 0
SHORTNESS OF BREATH: 0
NAUSEA: 0
RHINORRHEA: 1
SORE THROAT: 0

## 2018-02-28 ENCOUNTER — HOSPITAL ENCOUNTER (OUTPATIENT)
Dept: PHYSICAL THERAPY | Age: 48
Discharge: HOME | End: 2018-03-01
Admitting: ORTHOPAEDIC SURGERY

## 2018-03-01 ENCOUNTER — HOSPITAL ENCOUNTER (OUTPATIENT)
Dept: OTHER | Age: 48
Discharge: HOME | End: 2018-03-31
Attending: ORTHOPAEDIC SURGERY | Admitting: ORTHOPAEDIC SURGERY

## 2018-03-07 ENCOUNTER — HOSPITAL ENCOUNTER (OUTPATIENT)
Dept: PHYSICAL THERAPY | Age: 48
Discharge: HOME | End: 2018-03-08
Admitting: ORTHOPAEDIC SURGERY

## 2018-03-21 ENCOUNTER — HOSPITAL ENCOUNTER (OUTPATIENT)
Dept: PHYSICAL THERAPY | Age: 48
Discharge: HOME | End: 2018-03-22
Admitting: ORTHOPAEDIC SURGERY

## 2018-03-21 NOTE — FLOWSHEET NOTE
included Codmans flex/ext, L/R, CW and CCW; scapular shrugs and pinches. .  Pt was given written hand outs and demonstrated exercises correctly. Pt expressed understanding of exercises. 7/17/17:  Reviewed HEP. Patient required verbal cues for correct performance of all exercises. 7/24/17 isometric ABD  7/31/17 table slides flexion up to 90 degrees; 8/7/17 increased ROM restriction up to 140  9/13/17 supine wand flexion   9/27/17 for L and R shoulder: sleeper stretch  For L shoulder only: table slides flexion and ER, t-band IR and ER  For R shoulder only: isometric IR/ER/ABD  10/16/17 wall slide L UE  11/8/17 t-band scapular rows, IR and ER with yellow band  1/17/18 reassessed and condensed HEP: scapular circles, Codman's, isometric ABD, supine wand flexion, sleeper stretch, SLER; theraband scapular rows, shoulder IR and ER, shoulder extension. 1/24/18 revised HEP: currently do shrugs, pinches, biceps, curls, supine wand flexion AAROM; stop the exercises currently as issued on 1/17/18.  2/7/18 resume T-slide IR/ER  2/14/18 SLER  2/19/18 standing ABD, no resistance  3/8/18 t-band shoulder ABD      Manual Treatments:     Modalities: US R shoulder 1.5 w/cm2, 8'  Iontophoresis (hybresis) R anterior shoulder- pt was instructed to keep patch on for 3 hours, check for redness/skin irritation, take off if irritable. Timed Code Treatment Minutes:  60    Total Treatment Minutes:  60    Treatment/Activity Tolerance:  [] Patient tolerated treatment well [] Patient limited by fatigue  [x] Patient limited by pain  [] Patient limited by other medical complications  [] Other:     Prognosis: [x] Good [] Fair  [] Poor    Patient Requires Follow-up: [x] Yes  [] No    Plan:   [x] Continue per plan of care [] Alter current plan (see comments)  [] Plan of care initiated [] Hold pending MD visit [] Discharge  Plan for Next Session:   Continue per protocol. Check for response from Dr. Yamel Rousseau.       Electronically signed by:  Harley Carmona

## 2018-03-22 ENCOUNTER — OFFICE VISIT (OUTPATIENT)
Dept: ORTHOPEDIC SURGERY | Age: 48
End: 2018-03-22

## 2018-03-22 VITALS
HEIGHT: 74 IN | SYSTOLIC BLOOD PRESSURE: 114 MMHG | WEIGHT: 285.06 LBS | BODY MASS INDEX: 36.58 KG/M2 | DIASTOLIC BLOOD PRESSURE: 69 MMHG | HEART RATE: 89 BPM

## 2018-03-22 DIAGNOSIS — Z96.611 S/P SHOULDER REPLACEMENT, RIGHT: Primary | ICD-10-CM

## 2018-03-22 PROCEDURE — G8484 FLU IMMUNIZE NO ADMIN: HCPCS | Performed by: ORTHOPAEDIC SURGERY

## 2018-03-22 PROCEDURE — 1036F TOBACCO NON-USER: CPT | Performed by: ORTHOPAEDIC SURGERY

## 2018-03-22 PROCEDURE — G8417 CALC BMI ABV UP PARAM F/U: HCPCS | Performed by: ORTHOPAEDIC SURGERY

## 2018-03-22 PROCEDURE — G8427 DOCREV CUR MEDS BY ELIG CLIN: HCPCS | Performed by: ORTHOPAEDIC SURGERY

## 2018-03-22 PROCEDURE — 99213 OFFICE O/P EST LOW 20 MIN: CPT | Performed by: ORTHOPAEDIC SURGERY

## 2018-03-26 ENCOUNTER — HOSPITAL ENCOUNTER (OUTPATIENT)
Dept: PHYSICAL THERAPY | Age: 48
Discharge: HOME | End: 2018-03-27
Admitting: ORTHOPAEDIC SURGERY

## 2018-03-26 NOTE — PROGRESS NOTES
History of Present Illness:  [address] for follow-up of his right shoulder. He underwent right anatomic shoulder arthroplasty back in July. He does very physical work and still does not have adequate overhead work strength or endurance. He's been working with Teresa Gloria at the [employer]. They have suggested some work conditioning. He had that previously after back surgery. He still has pain with certain stretches. However he does not have pain at rest.  He feels better than he did before surgery. He rates his pain level today at 0 out of 10. Medical History:  Patient's medications, allergies, past medical, surgical, social and family histories were reviewed and updated as appropriate. Pertinent items are noted in HPI  Review of systems reviewed from Patient History Form dated on January 18, 2018 and available in the patient's chart under the Media tab. Vital Signs:  Vitals:    03/22/18 1324   BP: 114/69   Pulse: 89     Constitutional: he is in no apparent distress. Right Shoulder Examination:    Inspection:  The right shoulder is benign appearing. Palpation:  No subacromial or glenohumeral crepitus. Active Range of Motion: active elevation 150. Internal rotation to the back to the ipsilateral back pocket. Passive Range of Motion:  Similar to his active range of motion. Passive internal rotation L5. Passive external rotation to 60. Strength:  Good rotator cuff strength. Equivocal [place]. Negative belly press test.    Special Tests:  No Kevin muscle deformity. Assessment :  My impression is that he does doing well but his pain level is likely related to tightness of his posterior capsule. He really needs to work on sleeper stretches. Impression:  Encounter Diagnosis   Name Primary?     S/P shoulder replacement, right Yes       Office Procedures:  Orders Placed This Encounter   Procedures    OT evaluate and treat for work conditioning

## 2018-03-29 ENCOUNTER — HOSPITAL ENCOUNTER (OUTPATIENT)
Dept: PHYSICAL THERAPY | Age: 48
Discharge: HOME | End: 2018-03-30
Admitting: ORTHOPAEDIC SURGERY

## 2018-03-29 NOTE — FLOWSHEET NOTE
Physical Therapy Daily Treatment Note  Date:  3/29/2018    Patient Name:  Mandy Ramesh    :  1970  MRN: [id number]  Restrictions/Precautions:    Pertinent Medical History:  Medical/Treatment Diagnosis Information:  · Diagnosis: s/p TSA; hardware removal  · Treatment Diagnosis: Decreased ADL status  Insurance/Certification information:  PT Insurance Information: Medical [place]  Physician Information:  Referring Practitioner: Dr. Hawa Dumas of care signed (Y/N):  Sent to in basket on 17  Visit# / total visits:  52  Pain level: 0-6/10     G-Code (if applicable):      Date / Visit # G-Code Applied:  17  PT G-Codes  Functional Assessment Tool Used: Darrel [place]  Score: 100% dysfunction  Functional Limitation: Carrying, moving and handling objects  Carrying, Moving and Handling Objects Current Status (): 100 percent impaired, limited or restricted  Carrying, Moving and Handling Objects Goal Status (): At least 20 percent but less than 40 percent impaired, limited or restricted    Progress Note: []  Yes  []  No  Next due by: Visit #10      History of Injury:Pt had R total shoulder arthroplasty on 17. Pt stated his R shoulder pain was surprisingly lower than he expected. But, pt rated shoulder pain from 0/10 to 5-8/10. Pt stated he has been wearing his sling regularly, and he started to use his CPM machine yesterday and today. Pt reported he had a rotator cuff muscle that needed to be repaired for a tear and his biceps tendon was repaired, and that he had 2 staples removed from previous surgeries. Pt stated he is expected to be off of his normal work for 6-9 months (). Subjective:  1/10/19 POD #181  Pt stated that he has bilateral shoulder pain, that the L shoulder and R shoulder hurt the same, both rated 5/10.   R shoulder pain is located at the lateral aspect of the shoulder and top of the shoulder, and L shoulder pain is located at the anterior

## 2018-04-01 ENCOUNTER — HOSPITAL ENCOUNTER (OUTPATIENT)
Dept: OTHER | Age: 48
Discharge: HOME | End: 2018-04-11
Attending: ORTHOPAEDIC SURGERY | Admitting: ORTHOPAEDIC SURGERY

## 2018-04-05 ENCOUNTER — HOSPITAL ENCOUNTER (OUTPATIENT)
Dept: PHYSICAL THERAPY | Age: 48
Discharge: HOME | End: 2018-04-06
Admitting: ORTHOPAEDIC SURGERY

## 2018-04-10 ENCOUNTER — OFFICE VISIT (OUTPATIENT)
Dept: PAIN MANAGEMENT | Age: 48
End: 2018-04-10

## 2018-04-10 VITALS
DIASTOLIC BLOOD PRESSURE: 74 MMHG | WEIGHT: 296 LBS | BODY MASS INDEX: 37.99 KG/M2 | HEART RATE: 93 BPM | SYSTOLIC BLOOD PRESSURE: 127 MMHG

## 2018-04-10 DIAGNOSIS — M50.30 DDD (DEGENERATIVE DISC DISEASE), CERVICAL: ICD-10-CM

## 2018-04-10 DIAGNOSIS — G89.4 CHRONIC PAIN SYNDROME: ICD-10-CM

## 2018-04-10 DIAGNOSIS — M96.1 FAILED BACK SURGICAL SYNDROME: ICD-10-CM

## 2018-04-10 DIAGNOSIS — F33.41 RECURRENT MAJOR DEPRESSIVE DISORDER, IN PARTIAL REMISSION (HCC): ICD-10-CM

## 2018-04-10 DIAGNOSIS — M51.37 DDD (DEGENERATIVE DISC DISEASE), LUMBOSACRAL: ICD-10-CM

## 2018-04-10 DIAGNOSIS — M47.819 DEGENERATIVE JOINT DISEASE OF SPINAL FACET JOINT: ICD-10-CM

## 2018-04-10 DIAGNOSIS — M54.16 LUMBAR RADICULOPATHY: ICD-10-CM

## 2018-04-10 PROCEDURE — 1036F TOBACCO NON-USER: CPT | Performed by: INTERNAL MEDICINE

## 2018-04-10 PROCEDURE — G8427 DOCREV CUR MEDS BY ELIG CLIN: HCPCS | Performed by: INTERNAL MEDICINE

## 2018-04-10 PROCEDURE — 99214 OFFICE O/P EST MOD 30 MIN: CPT | Performed by: INTERNAL MEDICINE

## 2018-04-10 PROCEDURE — G8417 CALC BMI ABV UP PARAM F/U: HCPCS | Performed by: INTERNAL MEDICINE

## 2018-04-10 RX ORDER — LOSARTAN POTASSIUM AND HYDROCHLOROTHIAZIDE 12.5; 5 MG/1; MG/1
1 TABLET ORAL DAILY
Qty: 30 TABLET | Refills: 0 | Status: SHIPPED | OUTPATIENT
Start: 2018-04-10 | End: 2018-04-10 | Stop reason: ALTCHOICE

## 2018-04-10 RX ORDER — HYDROCHLOROTHIAZIDE 50 MG/1
50 TABLET ORAL DAILY
Qty: 30 TABLET | Refills: 0 | Status: SHIPPED | OUTPATIENT
Start: 2018-04-10 | End: 2018-05-29

## 2018-04-11 ENCOUNTER — HOSPITAL ENCOUNTER (OUTPATIENT)
Dept: PHYSICAL THERAPY | Age: 48
Discharge: HOME | End: 2018-04-12
Admitting: ORTHOPAEDIC SURGERY

## 2018-04-11 NOTE — FLOWSHEET NOTE
shoulder. 1/17/18 POD #188 Pt stated his shoulder has been a lot better over the past week. Today the R shoulder pain rated 0/10. But B shoulder pain (pointed to upper traps) wakes him. Pt stated he has had posterior neck pain and pain at the both of both shoulders. Pt stated he feels like neck problems may contribute to these pains. 1/24/18 POD #195 Pt stated he saw Dr. Nohemy Quintero last week. Pt was ordered more PT, and was instructed to \"lay off the weight. \"  Pt stated his shoulder is feeling a little better following day 4 of Medrol. 2/7/18 POD #209 Pt stated his back has been bothering him and he has been unable to get out of bed. Pt also has a major sinus infection, per report. Pt stated his shoulders have been much better since he took the steroid pack. Pt stated he has had no issues with his current HEP (as revised on 1/24/18) and is ready to progress is HEP.  2/14/18 POD #216  R shoulder pain is improving but \"I haven't been doing much. \"  Pt stated he saw Dr. Nohemy Quintero last week. Pt was given new Rx for 1x/week for 6 weeks  2/19/18 shoulder was \"a little fatigued\" after last session, but doing well. 2/28/18 POD #230  3/8/18 POD #238 shoulder pain has been good, R arm \"still fatigues\"  3/14/18 POD #244 Pt stated his R shoulder no longer hurts, it \"just gets sore from using it. \"  Shoulder no longer hurts when he lies on the R side. Pt stated he has not tested out his R arm. Pt cannot yet wash his groin in the shower with the R arm.   3/21/18 POD #251 Pt was doing yardwork throughout the day on Sunday, was sore \"all over,\" including his back and shoulder blades. 3/26/18 POD #256  Pt saw doctor. Pt stated that he told doctor he was not happy with reaching behind the back. 3/29/18 POD #259 Shoulder was fatigued following last session, did not have pain afterward. 4/5/18 POD  #265  Pt stated he called [employer] to get started with work conditioning, but did not get scheduled.   Pt stated he did not know when to get started. PT requested pt to get started as soon as possible. Pt stated that he left [employer] his insurance information and he thinks that they will check for approval, but was not certain. Pt got his overhead pulleys and has been using them. PT asked pt if he would like to call [facility] from the PT clinic and PT could help him get set up. Pt stated he was on a tight deadline today. 4/11/18 POD #271 Pt stated that he will begin with [facility] for work conditioning on Thursday, April 12. Pt stated he has overhead pulleys to work his shoulder ROM. Pain levels of R shoulder have been low. Pt can lie on his R side to sleep. Pt has been able to perform yardwork for several hours. Pt noted improvements with washing himself between his legs and at his R buttocks. Pt stated he is \"trying to do them (HEP) every day. .. As long as I don't have anything going on. \"    Objective:  Observation:    Test measurements:      R UE 10/20/17 3/14/18 4/11/18   AROM      Flexion 87 150    ABD 90 150    IR 68 65    ER 70 85 85   Extension 35 60 T0 S2   IR behind back      PROM      Flexion 150 160 160   ABD  160   IR 70 65 65   IR behind back To L4 To L2 To L2   ER 70 90 90   Strength      Flex 3-/5 4/5 4+/5   ABD 3-/5 4/5 4+/5   IR NT 4+/5 5/5   ER 5/5 5/5 5/5     L shoulder 1/3/18   AROM    Flexion WFL   ABD WFL   IR WFL   ER WFL   Extension WFL   IR behind back    PROM    Flexion WFL   ABD WFL   IR [facility]   ER WFL   Strength    Flex 4/5, pain   ABD 4+/5   IR 5/5   ER 5/5          Exercises:     Exercise/Equipment Resistance/Repetitions Other comments   R shoulder     Four quadrant stretches  Forward elevation  Cross body  PROM shoulder IR behind back  Extension  AAROM shoulder IR behind back with belt   60\"  3x30\" (70, 90, 110 degrees)  15x  10x          Supine IR/ER 2#, 30x   SLER 2#, 10x3    Overhead pulley  flexion  IR behind back  Belt IR stretch 30x  10x, 10\" holds  10x, 10\" holds         T-band  IR  ER  ABD

## 2018-04-12 ENCOUNTER — TELEPHONE (OUTPATIENT)
Dept: ORTHOPEDIC SURGERY | Age: 48
End: 2018-04-12

## 2018-04-18 DIAGNOSIS — M75.80 ROTATOR CUFF TENDINITIS, UNSPECIFIED LATERALITY: Primary | ICD-10-CM

## 2018-05-23 RX ORDER — HYDROCHLOROTHIAZIDE 25 MG/1
TABLET ORAL
Qty: 90 TABLET | Refills: 1 | Status: SHIPPED | OUTPATIENT
Start: 2018-05-23 | End: 2018-10-05 | Stop reason: SDUPTHER

## 2018-05-29 ENCOUNTER — OFFICE VISIT (OUTPATIENT)
Dept: PAIN MANAGEMENT | Age: 48
End: 2018-05-29

## 2018-05-29 ENCOUNTER — TELEPHONE (OUTPATIENT)
Dept: FAMILY MEDICINE CLINIC | Age: 48
End: 2018-05-29

## 2018-05-29 VITALS
BODY MASS INDEX: 37.86 KG/M2 | HEART RATE: 82 BPM | SYSTOLIC BLOOD PRESSURE: 128 MMHG | DIASTOLIC BLOOD PRESSURE: 84 MMHG | WEIGHT: 295 LBS

## 2018-05-29 DIAGNOSIS — M47.819 DEGENERATIVE JOINT DISEASE OF SPINAL FACET JOINT: ICD-10-CM

## 2018-05-29 DIAGNOSIS — M50.30 DDD (DEGENERATIVE DISC DISEASE), CERVICAL: ICD-10-CM

## 2018-05-29 DIAGNOSIS — F33.0 MILD EPISODE OF RECURRENT MAJOR DEPRESSIVE DISORDER (HCC): ICD-10-CM

## 2018-05-29 DIAGNOSIS — G89.4 CHRONIC PAIN SYNDROME: ICD-10-CM

## 2018-05-29 DIAGNOSIS — M96.1 FAILED BACK SURGICAL SYNDROME: ICD-10-CM

## 2018-05-29 DIAGNOSIS — M54.16 LUMBAR RADICULOPATHY: ICD-10-CM

## 2018-05-29 PROCEDURE — G8417 CALC BMI ABV UP PARAM F/U: HCPCS | Performed by: INTERNAL MEDICINE

## 2018-05-29 PROCEDURE — 1036F TOBACCO NON-USER: CPT | Performed by: INTERNAL MEDICINE

## 2018-05-29 PROCEDURE — 99213 OFFICE O/P EST LOW 20 MIN: CPT | Performed by: INTERNAL MEDICINE

## 2018-05-29 PROCEDURE — G8427 DOCREV CUR MEDS BY ELIG CLIN: HCPCS | Performed by: INTERNAL MEDICINE

## 2018-07-17 ENCOUNTER — OFFICE VISIT (OUTPATIENT)
Dept: ORTHOPEDIC SURGERY | Age: 48
End: 2018-07-17

## 2018-07-17 VITALS
SYSTOLIC BLOOD PRESSURE: 106 MMHG | HEIGHT: 74 IN | DIASTOLIC BLOOD PRESSURE: 70 MMHG | HEART RATE: 103 BPM | BODY MASS INDEX: 37.6 KG/M2 | WEIGHT: 293 LBS

## 2018-07-17 DIAGNOSIS — M19.011 GLENOHUMERAL ARTHRITIS, RIGHT: ICD-10-CM

## 2018-07-17 DIAGNOSIS — Z96.611 S/P SHOULDER REPLACEMENT, RIGHT: Primary | ICD-10-CM

## 2018-07-17 DIAGNOSIS — M25.511 RIGHT SHOULDER PAIN, UNSPECIFIED CHRONICITY: ICD-10-CM

## 2018-07-17 PROCEDURE — G8417 CALC BMI ABV UP PARAM F/U: HCPCS | Performed by: ORTHOPAEDIC SURGERY

## 2018-07-17 PROCEDURE — 1036F TOBACCO NON-USER: CPT | Performed by: ORTHOPAEDIC SURGERY

## 2018-07-17 PROCEDURE — G8427 DOCREV CUR MEDS BY ELIG CLIN: HCPCS | Performed by: ORTHOPAEDIC SURGERY

## 2018-07-17 PROCEDURE — 99213 OFFICE O/P EST LOW 20 MIN: CPT | Performed by: ORTHOPAEDIC SURGERY

## 2018-07-24 ENCOUNTER — OFFICE VISIT (OUTPATIENT)
Dept: PAIN MANAGEMENT | Age: 48
End: 2018-07-24

## 2018-07-24 VITALS
HEIGHT: 74 IN | HEART RATE: 73 BPM | WEIGHT: 295 LBS | DIASTOLIC BLOOD PRESSURE: 68 MMHG | SYSTOLIC BLOOD PRESSURE: 100 MMHG | OXYGEN SATURATION: 97 % | BODY MASS INDEX: 37.86 KG/M2

## 2018-07-24 DIAGNOSIS — G89.4 CHRONIC PAIN SYNDROME: ICD-10-CM

## 2018-07-24 DIAGNOSIS — M47.819 DEGENERATIVE JOINT DISEASE OF SPINAL FACET JOINT: ICD-10-CM

## 2018-07-24 DIAGNOSIS — M54.16 LUMBAR RADICULOPATHY: ICD-10-CM

## 2018-07-24 DIAGNOSIS — M96.1 FAILED BACK SURGICAL SYNDROME: ICD-10-CM

## 2018-07-24 DIAGNOSIS — M50.30 DDD (DEGENERATIVE DISC DISEASE), CERVICAL: ICD-10-CM

## 2018-07-24 DIAGNOSIS — M54.50 LOW BACK PAIN RADIATING TO RIGHT LEG: ICD-10-CM

## 2018-07-24 DIAGNOSIS — M51.37 DDD (DEGENERATIVE DISC DISEASE), LUMBOSACRAL: ICD-10-CM

## 2018-07-24 DIAGNOSIS — M19.011 OSTEOARTHRITIS OF RIGHT GLENOHUMERAL JOINT: ICD-10-CM

## 2018-07-24 DIAGNOSIS — M79.604 LOW BACK PAIN RADIATING TO RIGHT LEG: ICD-10-CM

## 2018-07-24 PROCEDURE — 99213 OFFICE O/P EST LOW 20 MIN: CPT | Performed by: INTERNAL MEDICINE

## 2018-07-24 PROCEDURE — G8427 DOCREV CUR MEDS BY ELIG CLIN: HCPCS | Performed by: INTERNAL MEDICINE

## 2018-07-24 PROCEDURE — G8417 CALC BMI ABV UP PARAM F/U: HCPCS | Performed by: INTERNAL MEDICINE

## 2018-07-24 PROCEDURE — 1036F TOBACCO NON-USER: CPT | Performed by: INTERNAL MEDICINE

## 2018-07-24 RX ORDER — MELOXICAM 15 MG/1
TABLET ORAL
Qty: 90 TABLET | Refills: 1 | Status: SHIPPED | OUTPATIENT
Start: 2018-07-24 | End: 2018-07-24 | Stop reason: CLARIF

## 2018-07-24 RX ORDER — PREGABALIN 100 MG/1
CAPSULE ORAL
Qty: 90 CAPSULE | Refills: 2 | Status: SHIPPED | OUTPATIENT
Start: 2018-07-24 | End: 2018-07-24 | Stop reason: CLARIF

## 2018-07-24 RX ORDER — TIZANIDINE 4 MG/1
TABLET ORAL
Qty: 60 TABLET | Refills: 1 | Status: SHIPPED | OUTPATIENT
Start: 2018-07-24 | End: 2018-07-24 | Stop reason: CLARIF

## 2018-07-24 NOTE — PROGRESS NOTES
Medication Sig Dispense Refill    hydrochlorothiazide (HYDRODIURIL) 25 MG tablet TAKE 1 TABLET DAILY 90 tablet 1    pregabalin (LYRICA) 100 MG capsule TAKE 1 TAB IN THE AM AND 2 TABS IN THE PM. 90 capsule 1    meloxicam (MOBIC) 15 MG tablet TAKE 1 TABLET DAILY 90 tablet 1    atorvastatin (LIPITOR) 40 MG tablet TAKE 1 TABLET DAILY 90 tablet 0    lisinopril (PRINIVIL;ZESTRIL) 10 MG tablet TAKE 1 TABLET DAILY 90 tablet 1    levothyroxine (SYNTHROID) 125 MCG tablet Take 1 tablet by mouth Daily 90 tablet 0    aspirin 325 MG EC tablet Take 1 tablet by mouth 2 times daily 30 tablet 2    tapentadol (NUCYNTA) 75 MG TABS Take 1 tablet by mouth every 6 hours  Max 1-2 per day . 40 tablet 0    tiZANidine (ZANAFLEX) 4 MG tablet Take 1-2 tabs po q pm prn 60 tablet 1    DULoxetine (CYMBALTA) 30 MG extended release capsule Take 1 capsule by mouth daily 90 capsule 1     No facility-administered medications prior to visit. SOCIAL/FAMILY/PAST MEDICAL HISTORY: Mr. Alyssa Lomeli, family and past medical history was reviewed. REVIEW OF SYSTEMS:    Respiratory: Negative for apnea, chest tightness and shortness of breath or change in baseline breathing. Gastrointestinal: Negative for nausea, vomiting, abdominal pain, diarrhea, constipation, blood in stool and abdominal distention. PHYSICAL EXAM:   Nursing note and vitals reviewed. /68 (Site: Left Arm, Position: Sitting, Cuff Size: Large Adult)   Pulse 73   Ht 6' 2\" (1.88 m)   Wt 295 lb (133.8 kg)   SpO2 97% Comment: RA  BMI 37.88 kg/m²   Constitutional: He appears well-developed and well-nourished. No acute distress. Skin: Skin is warm and dry, good turgor. No rash noted. He is not diaphoretic. Cardiovascular: Normal rate, regular rhythm, normal heart sounds, and does not have murmur. Pulmonary/Chest: Effort normal. No respiratory distress. He does not have wheezes in the lung fields. He has no rales.      Neurological/Psychiatric:He is alert allowing me to participate in the care of this patient. Rickie Echols MD.    Cc: Clive Dawn,     I, Ashwini Church, scribing for in the presence  of Dr. Rickie Echols.   07/24/18  9:01 AM  Hallie Cao Assistant  I, Dr. Rickie Echols, personally performed the services described in this documentation as scribed by  Ashwini Church MA in my presence and it is both accurate and complete

## 2018-08-07 ENCOUNTER — HOSPITAL ENCOUNTER (OUTPATIENT)
Dept: OTHER | Age: 48
Discharge: HOME | End: 2018-08-31

## 2018-08-07 ASSESSMENT — PAIN SCALES - QUEBEC BACK PAIN DISABILITY SCALE
QUEBEC CMS MODIFIER: CJ
TAKE FOOD OUT OF THE REFRIGERATOR: 0
MAKE YOUR BED: 0
CLIMB ONE FLIGHT OF STAIRS: 1
STAND UP FOR 20 TO 30 MINUTES: 2
CARRY TWO BAGS OF GROCERIES: 1
LIFT AND CARRY A HEAVY SUITCASE: 2
WALK A FEW BLOCKS OR 300 TO 400M: 2
REACH UP TO HIGH SHELVES: 0
SLEEP THROUGH THE NIGHT: 1
BEND OVER TO CLEAN THE BATHTUB: 2
RUN ONE BLOCK OR 100M: 5
GET OUT OF BED: 1
PULL OR PUSH HEAVY DOORS: 2
WALK SEVERAL KILOMETERS  OR MILES: 4
RIDE IN A CAR: 1
THROW A BALL: 1
PUT ON SOCKS OR PANYHOSE: 1
SIT IN A CHAIR FOR SEVERAL HOURS: 4
TURN OVER IN BED: 1
TOTAL SCORE: 32
MOVE A CHAIR: 1
QUEBEC DISABILITY INDEX: 20-39%

## 2018-08-07 NOTE — PROGRESS NOTES
Outpatient Physical Therapy  [] [facility]    Phone: [phone]   Fax: [phone]   [] [facility]  Phone: [phone]              Fax: [phone]  [x] [facility]   Phone: [phone]   Fax: [phone]     To: Referring Practitioner: Sulma Dixon PA-C      Patient: Rodolfo Aguilar   : 1970   MRN: [id number]  Evaluation Date: 2018      Diagnosis Information:  · Diagnosis: s/p shoulder replacement, R (Z96.611)   · Treatment Diagnosis: Decreased ADL status     Physical Therapy Certification/Re-Certification Form  Dear Dr. Gay Hendricks PA-C,  The following patient has been evaluated for physical therapy services and for therapy to continue, Medicare requires monthly physician review of the treatment plan. Please review the attached evaluation and/or summary of the patient's plan of care, and verify that you agree therapy should continue by signing the attached document and sending it back to our office. Plan of Care/Treatment to date:  [x] Therapeutic Exercise    [x] Modalities:  [] Therapeutic Activity     [] Ultrasound  [] Electrical Stimulation  [] Gait Training      [] Cervical Traction [] Lumbar Traction  [x] Neuromuscular Re-education    [] Cold/hotpack [] Iontophoresis   [x] Instruction in HEP     Other:  [x] Manual Therapy      []             [x]    Work on strengthening and conditioning in order to help pt return to work at [employer]. ? Frequency/Duration:  # Days per week: [] 1 day # Weeks: [] 1 week [] 5 weeks     [x] 2 days? [] 2 weeks [] 6 weeks     [] 3 days   [] 3 weeks [] 7 weeks     [] 4 days   [x] 4 weeks [] 8 weeks    Rehab Potential: [] Excellent [x] Good [] Fair  [] Poor       Electronically signed by:  Luis Anderson PT      If you have any questions or concerns, please don't hesitate to call.   Thank you for your referral.      Physician Signature:________________________________Date:__________________  By signing above, therapists plan is

## 2018-08-07 NOTE — FLOWSHEET NOTE
Physical Therapy Daily Treatment Note  Date:  2018    Patient Name:  Angelina Kruse    :  1970  MRN: [id number]  Restrictions/Precautions:    Pertinent Medical History:  Medical/Treatment Diagnosis Information:  · Diagnosis: s/p shoulder replacement, R (Z96.611)  · Treatment Diagnosis: Decreased ADL status  Insurance/Certification information:  PT Insurance Information: Medical [place]  Physician Information:  Referring Practitioner: Jimmy Edgar PA-C  Plan of care signed (Y/N):  Sent to in basket on 18  Visit# / total visits:    Pain level: /10     G-Code (if applicable):      Date / Visit # G-Code Applied:  18  PT G-Codes  Functional Assessment Tool Used: Hetaher Calderone  Score: 34.1% dysfunction  Functional Limitation: Carrying, moving and handling objects  Carrying, Moving and Handling Objects Current Status (): At least 20 percent but less than 40 percent impaired, limited or restricted  Carrying, Moving and Handling Objects Goal Status (): At least 1 percent but less than 20 percent impaired, limited or restricted    Progress Note: []  Yes  []  No  Next due by: Visit #10      History of Injury: Pt stated he was scheduled for work conditioning but it was denied. Pt was referred to PT to work more intensely in order to return to work. Pt stated he lacks stamina and strength. 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 Pt stated he has weakness reaching overhead. Pt stated he would have \"difficulty dragging and pulling things, like 300 pounds on a 2 wheel wendy. \"  Pt stated he has not yet returned to work and would not be able to return to work until he is \"100%\". Pt stated he must be able to lift up to 150 pounds. Pt stated currently he is not doing much of an exercise program for his shoulders. Pt has been walking \"probably 3 times a week\" for 30-40 minutes.   \"The problem is my back has been bothering me- I

## 2018-08-07 NOTE — PROGRESS NOTES
poor control when arms are overhead and \"I can't support weight\" overhead. 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 Pt can throw a softball 10 feet but \"I can't do for a long period of time. \"    Additional Comments: Pt has been working part time at a produce stand. Pt stated his endurance has been limited as he can \"move melons\" for up to 15 minutes then must take a break. Pt stated he can move 2 melons at a time by carrying 1 melon with each arm, for 5 trips, then can only carry one melon. Pt stated the \"shock from catching (produce) from a truck\" is fatiguing. Objective  Gait: L hip hike and circumduction. RLE, LLE, LUE AROM: WFL  AROM RUE (degrees)  R Shoulder Flexion 0-180: 165 (PROM 168)  R Shoulder Extension 0-45: 60 (IR behind back AROM to L5, PROM to L2 R; L AROM to T7.)  R Shoulder ABduction 0-180: 165 (PROM 168)  R Shoulder Int Rotation  0-70: 60 (PROM 60) (UE ABD 80 deg)  R Shoulder Ext Rotation 0-90: 80 (PROM 80) (UE ABD 90 deg)  AROM Cervical: AROM flexion, extension, rotation L and R WFL, no c/o pain. (Pt stated when driving and looking over L shoulder he has sharp pain at R neck.)  AROM Lumbar: AROM flexion: decreased 50%, low back pain L/R; Extension WFL, no pain; SB L and R WFL, no pain. (Hx L3/4 spinal fusion 2006.)  Strength RLE, LLE, LUE: WNL  Strength RUE  Strength RUE: Exception  R Shoulder Flexion: 4/5  R Shoulder ABduction: 4/5  R Shoulder Internal Rotation: 5/5  R Shoulder External Rotation: 5/5  R Elbow Flexion: 5/5  R Elbow Extension: 5/5  R Forearm Pron: 5/5  R Forearm Sup: 5/5  R Wrist Flexion: 5/5  R Wrist Extension: 5/5  R Wrist Radial Deviation: 5/5  R Wrist Ulnar Deviation: 5/5  Strength Other: Scapular strength: Lats L 5/5, R 4+/5; mid traps L 5/5, R 4-/5; low traps L 4+/5, R 3-/5. B hip extensors 5/5. Flexibility: Hamstrings B poor; gastrocs B fair.   Valentín test: poor B IT band, B Disability/Symptom Score : 34.09 %    Goals  Long term goals  Time Frame for Long term goals : 4 weeks  Long term goal 1: Pt will be independent with HEP and conditioning program.  Long term goal 2: Pt will have 5/5 R shoulder flexors strength. Long term goal 3: Pt will report performing flexibility program at home daily. Long term goal 4: Pt will be able to return to work. Patient Goals   Patient goals : \"I want to get my arm strengthen. .. and my endurance in my arm built up. \"  \"Get back to work. \"       Therapy Time   Individual Concurrent Group Co-treatment   Time In 1330         Time Out 1450         Minutes 80         Timed Code Treatment Minutes: 1416 Giorgi Leggett

## 2018-08-14 ENCOUNTER — HOSPITAL ENCOUNTER (OUTPATIENT)
Dept: PHYSICAL THERAPY | Age: 48
Discharge: HOME | End: 2018-08-15

## 2018-08-14 NOTE — FLOWSHEET NOTE
Physical Therapy Daily Treatment Note  Date:  2018    Patient Name:  Wayne Gitelman    :  1970  MRN: [id number]  Restrictions/Precautions:    Pertinent Medical History:  Medical/Treatment Diagnosis Information:  · Diagnosis: s/p shoulder replacement, R (Z96.611)  · Treatment Diagnosis: Decreased ADL status  Insurance/Certification information:  PT Insurance Information: Medical [place]  Physician Information:  Referring Practitioner: Gurney Councilman, PA-C  Plan of care signed (Y/N):  Sent to in basket on 18  Visit# / total visits:  3/8  Pain level: /10     G-Code (if applicable):      Date / Visit # G-Code Applied:  18  PT G-Codes  Functional Assessment Tool Used: Marcel Bee  Score: 34.1% dysfunction  Functional Limitation: Carrying, moving and handling objects  Carrying, Moving and Handling Objects Current Status (): At least 20 percent but less than 40 percent impaired, limited or restricted  Carrying, Moving and Handling Objects Goal Status (): At least 1 percent but less than 20 percent impaired, limited or restricted    Progress Note: []  Yes  []  No  Next due by: Visit #10      History of Injury: Pt stated he was scheduled for work conditioning but it was denied. Pt was referred to PT to work more intensely in order to return to work. Pt stated he lacks stamina and strength. 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 Pt stated he has weakness reaching overhead. Pt stated he would have \"difficulty dragging and pulling things, like 300 pounds on a 2 wheel wendy. \"  Pt stated he has not yet returned to work and would not be able to return to work until he is \"100%\". Pt stated he must be able to lift up to 150 pounds. Pt stated currently he is not doing much of an exercise program for his shoulders. Pt has been walking \"probably 3 times a week\" for 30-40 minutes.   \"The problem is my back has been bothering me- I poor   Quads poor   Gastrocs Fair               Exercises:  Exercise/Equipment Resistance/Repetitions Other comments   Treadmill  8/14/18 pt stated he did his tm 13' at home just prior to coming to PT at 2.5 mph   Stretches  Gastroc incline  Hamstring  Quads prone standing  Hip flexors kneeling   1x30\"  1x30\"  1x30\"    Stretching UE R  Cross body  IR  ER  Forward elevation  IR supine (in ABD)   1x30\"  1x30\"  1x30\"  1x30\"  3x15\"    IR behind back 10x 5\"    Supine shoulder flexion  deg 1#, 30x    AAROM R UE  Shoulder flexion  ER  IR  Internal ABDuction  Cross body  Elbow flexion/extension   10x  10x  10x  10x  10x  10x    T-slide  IR  ER  ABD  Rows  Biceps curls  Triceps curls  Shoulder extension  T-slide shoulder ADD  T-slide shoulder extension   3x10, green  3x10, green  2x10, yellow  3x10, green  3x15, orange  3x15, orange  3x10, purple  3x10, purple  3x10, purple    T-slide shoulder ABD 2x10, yellow    Rower  Narrow  Wide   20#, 2x10  20#, 2x10    Lat pull     Leg press 100#, 3x15 Sled #3   Squat ball on wall with ball raise 20x    Dumbbell biceps curls 8#, 3x10           Other Therapeutic Activities:      Home Exercise Program:   8/7/18 HEP was instructed and included isometric shoulder ABD, IR, ER, t-band IR, t-band ER, treadmill walk 15' at 2-3 MPH. Pt was given written hand outs and demonstrated exercises correctly. Pt expressed understanding of exercises. 8/10/18 HEP: standing HS stretch, standing gastroc stretch, standing quad stretch, triceps stretch and posterior capsule stretch.      Manual Treatments:       Modalities:      Timed Code Treatment Minutes:  60    Total Treatment Minutes:  80    Treatment/Activity Tolerance:  [x] Patient tolerated treatment well [] Patient limited by fatigue  [] Patient limited by pain  [] Patient limited by other medical complications  [] Other:     Prognosis: [x] Good [] Fair  [] Poor    Patient Requires Follow-up: [x] Yes  [] No    Plan:   [x] Continue per plan

## 2018-08-16 ENCOUNTER — HOSPITAL ENCOUNTER (OUTPATIENT)
Dept: PHYSICAL THERAPY | Age: 48
Discharge: HOME | End: 2018-08-17

## 2018-08-20 ENCOUNTER — HOSPITAL ENCOUNTER (OUTPATIENT)
Dept: PHYSICAL THERAPY | Age: 48
Discharge: HOME | End: 2018-08-21

## 2018-08-20 NOTE — FLOWSHEET NOTE
can't walk a lot, I do what I have to do. \"  Pt stated walking on the treadmill is easier than walking on the road/concrete. Pt stated he can not do repetitive motion with 40-50 pounds as his R arm must rest.   Pt stated his shoulder pain is minimal to none. The R shoulder strength is limited as he has poor control when arms are overhead and \"I can't support weight\" overhead. 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 Pt can throw a softball 10 feet but \"I can't do for a long period of time. \"      Subjective:     8/10/18 Pt stated he needed to switch a time on Thursday, 8/16/18. PT department switched pt's schedule on 8/16/18 from 1:30 PM to 7:30 AM to accommodate pt's work schedule. PT expressed to pt the importance of keeping his schedule and arriving to PT punctually. Pt stated shoulder did okay following first PT session. Pt stated he is doing HEP 1x/day and exercises are going well. Pt did \"15 minutes on treadmill at home yesterday. \"    Objective:  Observation:   8/7/18  Gait: L hip hike and circumduction. RLE, LLE, LUE AROM: WFL  Strength RLE, LLE, LUE: WNL  AROM Cervical: AROM flexion, extension, rotation L and R WFL, no c/o pain. (Pt stated when driving and looking over L shoulder he has sharp pain at R neck.)  AROM Lumbar: AROM flexion: decreased 50%, low back pain L/R; Extension WFL, no pain; SB L and R WFL, no pain. (Hx L3/4 spinal fusion 2006. )  Special Tests: (-) R empty can and speeds test.  Test measurements:    8/7/18    AROM R shoulder 8/7/18   Flexion 165      Extension 60   IR behind back To L5   IR (UE ABD 90 deg) 60   ER (UE ABD 90 deg) 80   Lumbar flexion Decreased 50%, low back pain   Strength    R shoulder flexion 4/5   R shoulder ABD 4/5   R shoulder IR 5/5   R shoulder ER 5/5   R lats 4+/5   R mid traps 4-/5   R low traps 3-/5   Flexibility B LE    Hamstrings poor   ITB poor   Iliopsoas treatment well [] Patient limited by fatigue  [] Patient limited by pain  [] Patient limited by other medical complications  [] Other:     Prognosis: [x] Good [] Fair  [] Poor    Patient Requires Follow-up: [x] Yes  [] No    Plan:   [x] Continue per plan of care [] Alter current plan (see comments)  [] Plan of care initiated [] Hold pending MD visit [] Discharge  Plan for Next Session:  Focus on strengthening and endurance of R UE, work on LE flexibility, progression of HEP.       Electronically signed by:  Quincy Lantigua PT

## 2018-08-28 ENCOUNTER — HOSPITAL ENCOUNTER (OUTPATIENT)
Dept: PHYSICAL THERAPY | Age: 48
Discharge: HOME | End: 2018-08-29

## 2018-08-30 ENCOUNTER — HOSPITAL ENCOUNTER (OUTPATIENT)
Dept: PHYSICAL THERAPY | Age: 48
Discharge: HOME | End: 2018-08-31

## 2018-09-01 ENCOUNTER — HOSPITAL ENCOUNTER (OUTPATIENT)
Dept: OTHER | Age: 48
Discharge: HOME | End: 2018-09-01

## 2018-09-18 ENCOUNTER — OFFICE VISIT (OUTPATIENT)
Dept: PAIN MANAGEMENT | Age: 48
End: 2018-09-18

## 2018-09-18 VITALS — HEART RATE: 100 BPM | DIASTOLIC BLOOD PRESSURE: 79 MMHG | SYSTOLIC BLOOD PRESSURE: 133 MMHG

## 2018-09-18 DIAGNOSIS — M54.50 LOW BACK PAIN RADIATING TO RIGHT LEG: ICD-10-CM

## 2018-09-18 DIAGNOSIS — M50.30 DDD (DEGENERATIVE DISC DISEASE), CERVICAL: ICD-10-CM

## 2018-09-18 DIAGNOSIS — M51.37 DDD (DEGENERATIVE DISC DISEASE), LUMBOSACRAL: ICD-10-CM

## 2018-09-18 DIAGNOSIS — M96.1 FAILED BACK SURGICAL SYNDROME: ICD-10-CM

## 2018-09-18 DIAGNOSIS — G89.4 CHRONIC PAIN SYNDROME: ICD-10-CM

## 2018-09-18 DIAGNOSIS — M54.16 LUMBAR RADICULOPATHY: ICD-10-CM

## 2018-09-18 DIAGNOSIS — M79.604 LOW BACK PAIN RADIATING TO RIGHT LEG: ICD-10-CM

## 2018-09-18 DIAGNOSIS — M47.819 DEGENERATIVE JOINT DISEASE OF SPINAL FACET JOINT: ICD-10-CM

## 2018-09-18 PROCEDURE — 99213 OFFICE O/P EST LOW 20 MIN: CPT | Performed by: INTERNAL MEDICINE

## 2018-09-18 PROCEDURE — 1036F TOBACCO NON-USER: CPT | Performed by: INTERNAL MEDICINE

## 2018-09-18 PROCEDURE — G8417 CALC BMI ABV UP PARAM F/U: HCPCS | Performed by: INTERNAL MEDICINE

## 2018-09-18 PROCEDURE — G8427 DOCREV CUR MEDS BY ELIG CLIN: HCPCS | Performed by: INTERNAL MEDICINE

## 2018-09-18 RX ORDER — TIZANIDINE 4 MG/1
TABLET ORAL
Qty: 60 TABLET | Refills: 0 | Status: SHIPPED | OUTPATIENT
Start: 2018-09-18 | End: 2019-03-06 | Stop reason: SDUPTHER

## 2018-09-18 NOTE — PROGRESS NOTES
medications are   Outpatient Medications Prior to Visit   Medication Sig Dispense Refill    hydrochlorothiazide (HYDRODIURIL) 25 MG tablet TAKE 1 TABLET DAILY 90 tablet 1    atorvastatin (LIPITOR) 40 MG tablet TAKE 1 TABLET DAILY 90 tablet 0    lisinopril (PRINIVIL;ZESTRIL) 10 MG tablet TAKE 1 TABLET DAILY 90 tablet 1    levothyroxine (SYNTHROID) 125 MCG tablet Take 1 tablet by mouth Daily 90 tablet 0    aspirin 325 MG EC tablet Take 1 tablet by mouth 2 times daily 30 tablet 2    tapentadol (NUCYNTA) 75 MG TABS Take 1 tablet by mouth every 6 hours  Max 1-2 per day . 40 tablet 0    DULoxetine (CYMBALTA) 30 MG extended release capsule Take 1 capsule by mouth daily 90 capsule 1     No facility-administered medications prior to visit. SOCIAL/FAMILY/PAST MEDICAL HISTORY: Mr. Jewel Zavala, family and past medical history was reviewed. REVIEW OF SYSTEMS:    Respiratory: Negative for apnea, chest tightness and shortness of breath or change in baseline breathing. Gastrointestinal: Negative for nausea, vomiting, abdominal pain, diarrhea, constipation, blood in stool and abdominal distention. PHYSICAL EXAM:   Nursing note and vitals reviewed. /79   Pulse 100   Constitutional: He appears well-developed and well-nourished. No acute distress. Skin: Skin is warm and dry, good turgor. No rash noted. He is not diaphoretic. Cardiovascular: Normal rate, regular rhythm, normal heart sounds, and does not have murmur. Pulmonary/Chest: Effort normal. No respiratory distress. He does not have wheezes in the lung fields. He has no rales. Neurological/Psychiatric:He is alert and oriented to person, place, and time. Coordination is  normal. His mood isAppropriate and affect is Neutral/Euthymic(normal) . IMPRESSION:   1. Chronic pain syndrome    2. Failed back surgical syndrome    3. Degenerative joint disease of spinal facet joint    4. DDD (degenerative disc disease), cervical    5.  Lumbar obtained. Goals of current treatment regimen include improvement in pain, restoration of functioning- with focus on improvement in physical performance, general activity, work or disability,emotional distress, health care utilization and  decreased medication consumption. Will continue to monitor progress towards achieving/maintaining therapeutic goals with special emphasis on  1. Improvement in perceived interfernce  of pain with ADL's. Ability to do home exercises independently. Ability to do household chores indoor and/or outdoor work and social and leisure activities. Improve psychosocial and physical functioning. - he is showing progression towards this treatment goal with the current regimen. He was advised against drinking alcohol with the narcotic pain medicines, advised against driving or handling machinery while adjusting the dose of medicines or if having cognitive  issues related to the current medications. Risk of overdose and death, if medicines not taken as prescribed, were also discussed. If the patient develops new symptoms or if the symptoms worsen, the patient should call the office. While transcribing every attempt was made to maintain the accuracy of the note in terms of it's contents,there may have been some errors made inadvertently. Thank you for allowing me to participate in the care of this patient. Robbi Naranjo MD.    Cc: DO FIGUEROA Yang Smitty Gauss, scribing for in the presence  of Dr. Robbi Naranjo.   09/18/18  1:39 PM  Gonzalez Chandler I, Dr. Robbi Naranjo, personally performed the services described in this documentation as scribed by  Dc Parsons MA in my presence and it is both accurate and complete

## 2018-09-20 ENCOUNTER — OFFICE VISIT (OUTPATIENT)
Dept: ORTHOPEDIC SURGERY | Age: 48
End: 2018-09-20

## 2018-09-20 VITALS
BODY MASS INDEX: 37.86 KG/M2 | HEART RATE: 96 BPM | WEIGHT: 295 LBS | SYSTOLIC BLOOD PRESSURE: 111 MMHG | HEIGHT: 74 IN | DIASTOLIC BLOOD PRESSURE: 70 MMHG

## 2018-09-20 DIAGNOSIS — Z96.611 S/P SHOULDER REPLACEMENT, RIGHT: Primary | ICD-10-CM

## 2018-09-20 DIAGNOSIS — M19.011 GLENOHUMERAL ARTHRITIS, RIGHT: ICD-10-CM

## 2018-09-20 PROCEDURE — 1036F TOBACCO NON-USER: CPT | Performed by: ORTHOPAEDIC SURGERY

## 2018-09-20 PROCEDURE — G8427 DOCREV CUR MEDS BY ELIG CLIN: HCPCS | Performed by: ORTHOPAEDIC SURGERY

## 2018-09-20 PROCEDURE — 99213 OFFICE O/P EST LOW 20 MIN: CPT | Performed by: ORTHOPAEDIC SURGERY

## 2018-09-20 PROCEDURE — G8417 CALC BMI ABV UP PARAM F/U: HCPCS | Performed by: ORTHOPAEDIC SURGERY

## 2018-09-20 NOTE — PROGRESS NOTES
Review of Systems   Musculoskeletal: Positive for joint pain. Right shoulder pain    All other systems reviewed and are negative.

## 2018-09-23 NOTE — PROGRESS NOTES
History of Present Illness:  [address] for follow-up of his right shoulder. 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 He had his initial open stabilization around 30 years ago. He recovered good motion but his strength has not been enough to return back to physical work. He's been off work for over a year. We tried to get work conditioning approved but since his surgery was not through [facility] of workers compensation his private insurance denied this. Instead he's been working with Dorisann Landau. He's had 8 sessions. He feels that his shoulder is much stronger but he still questions whether or not he'll be able do his work. He fatigues easily and he continues to experience pain with excessive use. He denies any reinjury. Medical History:  Patient's medications, allergies, past medical, surgical, social and family histories were reviewed and updated as appropriate. Pertinent items are noted in HPI  Review of systems reviewed from Patient History Form dated on January 10, 2018 and available in the patient's chart under the Media tab. Vital Signs:  Vitals:    09/20/18 1112   BP: 111/70   Pulse: 96     Constitutional: He is in no apparent distress. He appears his stated age of 51. Right Shoulder Examination:    Inspection:  The right shoulder is benign appearing. No sign of an infection. Palpation:  Slight tenderness anteriorly. No crepitus. Active Range of Motion: Active forward elevation is 150 abduction 140. External rotation at the side is to 60. Internal rotation to the back is to L5. Passive Range of Motion:  Her other progression of his internal rotation to L4 but with pain. Strength:  Negative bear hug. Negative belly press test.  4+ over 5 external rotation strength and internal rotation strength. 5 over 5 deltoid strength.     Special Tests:  No Kevin muscle

## 2018-10-01 ENCOUNTER — TELEPHONE (OUTPATIENT)
Dept: FAMILY MEDICINE CLINIC | Age: 48
End: 2018-10-01

## 2018-10-01 DIAGNOSIS — M51.37 DDD (DEGENERATIVE DISC DISEASE), LUMBOSACRAL: ICD-10-CM

## 2018-10-01 DIAGNOSIS — M54.16 LUMBAR RADICULOPATHY: ICD-10-CM

## 2018-10-01 DIAGNOSIS — M50.30 DDD (DEGENERATIVE DISC DISEASE), CERVICAL: ICD-10-CM

## 2018-10-01 DIAGNOSIS — M96.1 FAILED BACK SURGICAL SYNDROME: ICD-10-CM

## 2018-10-01 DIAGNOSIS — G89.4 CHRONIC PAIN SYNDROME: ICD-10-CM

## 2018-10-03 ENCOUNTER — TELEPHONE (OUTPATIENT)
Dept: FAMILY MEDICINE CLINIC | Age: 48
End: 2018-10-03

## 2018-10-03 DIAGNOSIS — E78.00 PURE HYPERCHOLESTEROLEMIA: Primary | ICD-10-CM

## 2018-10-03 DIAGNOSIS — Z00.00 ROUTINE GENERAL MEDICAL EXAMINATION AT A HEALTH CARE FACILITY: ICD-10-CM

## 2018-10-03 DIAGNOSIS — Z12.5 SCREENING PSA (PROSTATE SPECIFIC ANTIGEN): ICD-10-CM

## 2018-10-03 DIAGNOSIS — Z85.850 HISTORY OF THYROID CANCER: ICD-10-CM

## 2018-10-03 RX ORDER — LISINOPRIL 10 MG/1
TABLET ORAL
Qty: 90 TABLET | Refills: 1 | OUTPATIENT
Start: 2018-10-03

## 2018-10-05 RX ORDER — ATORVASTATIN CALCIUM 40 MG/1
TABLET, FILM COATED ORAL
Qty: 90 TABLET | Refills: 0 | Status: SHIPPED | OUTPATIENT
Start: 2018-10-05 | End: 2018-12-06 | Stop reason: SDUPTHER

## 2018-10-05 RX ORDER — DULOXETIN HYDROCHLORIDE 30 MG/1
30 CAPSULE, DELAYED RELEASE ORAL DAILY
Qty: 90 CAPSULE | Refills: 0 | Status: SHIPPED | OUTPATIENT
Start: 2018-10-05 | End: 2018-11-06 | Stop reason: SDUPTHER

## 2018-10-05 RX ORDER — HYDROCHLOROTHIAZIDE 25 MG/1
TABLET ORAL
Qty: 90 TABLET | Refills: 0 | Status: SHIPPED | OUTPATIENT
Start: 2018-10-05 | End: 2019-03-04 | Stop reason: SDUPTHER

## 2018-10-05 RX ORDER — LISINOPRIL 10 MG/1
TABLET ORAL
Qty: 90 TABLET | Refills: 0 | Status: SHIPPED | OUTPATIENT
Start: 2018-10-05 | End: 2018-12-06 | Stop reason: SDUPTHER

## 2018-10-05 RX ORDER — LEVOTHYROXINE SODIUM 0.12 MG/1
125 TABLET ORAL DAILY
Qty: 90 TABLET | Refills: 0 | Status: SHIPPED | OUTPATIENT
Start: 2018-10-05 | End: 2019-03-25 | Stop reason: DRUGHIGH

## 2018-10-10 ENCOUNTER — TELEPHONE (OUTPATIENT)
Dept: ORTHOPEDIC SURGERY | Age: 48
End: 2018-10-10

## 2018-10-15 NOTE — PROGRESS NOTES
Outpatient Physical Therapy  [] [facility]    Phone: [phone]   Fax: [phone]   [] [facility]  Phone: [phone]   Fax: [phone]  [x] [facility]              Phone: [phone]   Fax: [phone]     Physical Therapy Discharge Note  Date: 10/15/2018        Patient Angel DONG                      OWL:  [birth year]                     ECY: [id number]  Restrictions/Precautions:    Pertinent Medical History:  Medical/Treatment Diagnosis Information:  · Diagnosis: s/p shoulder replacement, R (Z96.611)  · Treatment Diagnosis: Decreased ADL status  Insurance/Certification information:  PT Insurance Information: [employer]  Physician Information:  Referring Practitioner: Mikie Castellanos PA-C  Plan of care signed (Y/N):  Sent to in basket on 8/7/18  Visit# / total visits:  8/8  Pain level:     0 /10         Time Period for Report:   8/7/28-8/30/18  Cancels/No-shows to date:  2, which were made up the day after cancelling     G-Code (if applicable):      Date G-Code Applied:  10/15/18  PT G-Codes  Functional Assessment Tool Used: Judeth Clause  Score: 29.5  Functional Limitation: Carrying, moving and handling objects  Carrying, Moving and Handling Objects Goal Status (): At least 1 percent but less than 20 percent impaired, limited or restricted  Carrying, Moving and Handling Objects Discharge Status (): At least 20 percent but less than 40 percent impaired, limited or restricted     Time Period for Report:  8/7/18-8/30/18  Cancels/No-shows to date:  1    Plan of Care/Treatment to date:  [x] Therapeutic Exercise    [x] Modalities:  [] Therapeutic Activity     [] Ultrasound  [] Electrical Stimulation  [] Gait Training      [] Cervical Traction    [] Lumbar Traction  [] Neuromuscular Re-education  [] Cold/hotpack [] Iontophoresis  [] Instruction in HEP      Other:  [x] Manual Therapy       []    [] Aquatic Therapy       []                    ?       Significant Findings At Last Visit/Comments:    Subjective:     8/30/18 Pt will see Dr. Mariela Son soon.  Pt stated he does not have R shoulder pain, but has shoulder soreness at times. 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  Pt stated he has been working out daily, including using the treadmill 15 minutes at a time and lifting dumbbells at home. When asked if pt could return to [facility], pt stated \"I don't know if I'd be able to yet, I don't know if I'm strong enough\" and pt questioned his endurance. Pt stated he feels like he is improving and that \"I feel better now than I did before, that's for sure. \"     Objective:  · Observation:  · Quick Dash: pt improved from 34% dysfunction to 29% dysfunction per Jak Arch. · Test measurements:    · 8/7/18  Gait: L hip hike and circumduction.    RLE, LLE, LUE AROM: WFL  Strength RLE, LLE, LUE: WNL  AROM Cervical: AROM flexion, extension, rotation L and R WFL, no c/o pain. (Pt stated when driving and looking over L shoulder he has sharp pain at R neck.)  AROM Lumbar: AROM flexion: decreased 50%, low back pain L/R; Extension WFL, no pain; SB L and R WFL, no pain. (Hx L3/4 spinal fusion 2006. )  Special Tests: (-) R empty can and speeds test.  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 Pt has been working out at home and in the clinic. Pt has had 90 minute to 2 hour sessions in PT has performed them well. · Pt is currently working at a fruit stand 25 hours/week and has been able to unload fruit trucks. When pt returns to [address] [place] [address] he will have to manage heavier loads. Pt is progressing toward this goal.  Pt's true physical status to return to work is unknown. · PT department at [facility] is not equipped to perform a functional capacity evaluation to find pt's true maximum loads. Pt may benefit from an FCE. · Patient's response to treatment: good     Progress towards goals:   Pt is progressing toward goals. Long term goals  Time Frame for Long term goals : 4 weeks  Long term goal 1: Pt will be independent with HEP and conditioning program.  Long term goal 2: Pt will have 5/5 R shoulder flexors strength. Long term goal 3: Pt will report performing flexibility program at home daily. Long term goal 4: Pt will be able to return to work. Patient Goals   Patient goals : \"I want to get my arm strengthen. .. and my endurance in my arm built up. \"  \"Get back to work. \"     Current Frequency/Duration:  # Days per week:       [] 1 day  # Weeks:        [] 1 week            [x] 4 weeks                                                [x] 2 days? [] 2 weeks          [] 5 weeks                                                [] 3 days                         [] 3 weeks          [] 6 weeks               Rehab Potential:        [] Excellent        [x] Good  [] Fair                 [] Poor                Goal Status:               [] Achieved        [x] Partially Achieved                  [] Not Achieved       Patient Status:           [x] DC PT. Pt will continue conditioning and strengthening independently.                                           Electronically signed by:  Quincy Lantigua PT     If you have any questions or concerns, please don't hesitate to call.   Thank you for your referral.

## 2018-10-24 ENCOUNTER — TELEPHONE (OUTPATIENT)
Dept: ORTHOPEDIC SURGERY | Age: 48
End: 2018-10-24

## 2018-10-25 ENCOUNTER — OFFICE VISIT (OUTPATIENT)
Dept: ORTHOPEDIC SURGERY | Age: 48
End: 2018-10-25
Payer: COMMERCIAL

## 2018-10-25 VITALS
HEART RATE: 90 BPM | BODY MASS INDEX: 37.86 KG/M2 | WEIGHT: 294.98 LBS | SYSTOLIC BLOOD PRESSURE: 131 MMHG | HEIGHT: 74 IN | DIASTOLIC BLOOD PRESSURE: 80 MMHG

## 2018-10-25 DIAGNOSIS — M25.521 RIGHT ELBOW PAIN: ICD-10-CM

## 2018-10-25 DIAGNOSIS — M77.11 RIGHT TENNIS ELBOW: Primary | ICD-10-CM

## 2018-10-25 PROCEDURE — 99214 OFFICE O/P EST MOD 30 MIN: CPT | Performed by: ORTHOPAEDIC SURGERY

## 2018-10-25 PROCEDURE — 20551 NJX 1 TENDON ORIGIN/INSJ: CPT | Performed by: ORTHOPAEDIC SURGERY

## 2018-10-25 PROCEDURE — G8417 CALC BMI ABV UP PARAM F/U: HCPCS | Performed by: ORTHOPAEDIC SURGERY

## 2018-10-25 PROCEDURE — 1036F TOBACCO NON-USER: CPT | Performed by: ORTHOPAEDIC SURGERY

## 2018-10-25 PROCEDURE — L3908 WHO COCK-UP NONMOLDE PRE OTS: HCPCS | Performed by: ORTHOPAEDIC SURGERY

## 2018-10-25 PROCEDURE — G8427 DOCREV CUR MEDS BY ELIG CLIN: HCPCS | Performed by: ORTHOPAEDIC SURGERY

## 2018-10-25 PROCEDURE — G8484 FLU IMMUNIZE NO ADMIN: HCPCS | Performed by: ORTHOPAEDIC SURGERY

## 2018-10-25 PROCEDURE — MISCD86 TENNIS ELBOW STRAP-BREG: Performed by: ORTHOPAEDIC SURGERY

## 2018-11-01 ENCOUNTER — OFFICE VISIT (OUTPATIENT)
Dept: FAMILY MEDICINE CLINIC | Age: 48
End: 2018-11-01
Payer: COMMERCIAL

## 2018-11-01 VITALS
TEMPERATURE: 98.3 F | BODY MASS INDEX: 37.22 KG/M2 | HEIGHT: 74 IN | DIASTOLIC BLOOD PRESSURE: 80 MMHG | WEIGHT: 290 LBS | SYSTOLIC BLOOD PRESSURE: 130 MMHG

## 2018-11-01 DIAGNOSIS — E78.00 PURE HYPERCHOLESTEROLEMIA: ICD-10-CM

## 2018-11-01 DIAGNOSIS — M75.81 TENDINITIS OF RIGHT ROTATOR CUFF: ICD-10-CM

## 2018-11-01 DIAGNOSIS — Z00.00 ROUTINE GENERAL MEDICAL EXAMINATION AT A HEALTH CARE FACILITY: ICD-10-CM

## 2018-11-01 DIAGNOSIS — M77.8 ELBOW TENDINITIS: ICD-10-CM

## 2018-11-01 DIAGNOSIS — Z85.850 HISTORY OF THYROID CANCER: ICD-10-CM

## 2018-11-01 DIAGNOSIS — Z12.5 SCREENING PSA (PROSTATE SPECIFIC ANTIGEN): ICD-10-CM

## 2018-11-01 DIAGNOSIS — M19.019 GLENOHUMERAL ARTHRITIS: ICD-10-CM

## 2018-11-01 DIAGNOSIS — Z00.00 ENCOUNTER FOR PREVENTIVE CARE: Primary | ICD-10-CM

## 2018-11-01 LAB
A/G RATIO: 1.8 (ref 1.1–2.2)
ALBUMIN SERPL-MCNC: 4.4 G/DL (ref 3.4–5)
ALP BLD-CCNC: 78 U/L (ref 40–129)
ALT SERPL-CCNC: 21 U/L (ref 10–40)
ANION GAP SERPL CALCULATED.3IONS-SCNC: 15 MMOL/L (ref 3–16)
AST SERPL-CCNC: 16 U/L (ref 15–37)
BILIRUB SERPL-MCNC: 0.4 MG/DL (ref 0–1)
BUN BLDV-MCNC: 21 MG/DL (ref 7–20)
CALCIUM SERPL-MCNC: 9.1 MG/DL (ref 8.3–10.6)
CHLORIDE BLD-SCNC: 102 MMOL/L (ref 99–110)
CHOLESTEROL, TOTAL: 192 MG/DL (ref 0–199)
CO2: 23 MMOL/L (ref 21–32)
CREAT SERPL-MCNC: 0.9 MG/DL (ref 0.9–1.3)
GFR AFRICAN AMERICAN: >60
GFR NON-AFRICAN AMERICAN: >60
GLOBULIN: 2.5 G/DL
GLUCOSE BLD-MCNC: 96 MG/DL (ref 70–99)
HDLC SERPL-MCNC: 38 MG/DL (ref 40–60)
LDL CHOLESTEROL CALCULATED: 104 MG/DL
POTASSIUM SERPL-SCNC: 4.3 MMOL/L (ref 3.5–5.1)
PROSTATE SPECIFIC ANTIGEN: 0.3 NG/ML (ref 0–4)
SODIUM BLD-SCNC: 140 MMOL/L (ref 136–145)
T4 FREE: 0.5 NG/DL (ref 0.9–1.8)
TOTAL PROTEIN: 6.9 G/DL (ref 6.4–8.2)
TRIGL SERPL-MCNC: 248 MG/DL (ref 0–150)
TSH REFLEX: 100 UIU/ML (ref 0.27–4.2)
VLDLC SERPL CALC-MCNC: 50 MG/DL

## 2018-11-01 PROCEDURE — 99396 PREV VISIT EST AGE 40-64: CPT | Performed by: FAMILY MEDICINE

## 2018-11-01 PROCEDURE — G8484 FLU IMMUNIZE NO ADMIN: HCPCS | Performed by: FAMILY MEDICINE

## 2018-11-01 RX ORDER — MULTIVITAMIN WITH IRON
250 TABLET ORAL DAILY
COMMUNITY
End: 2022-07-14

## 2018-11-01 RX ORDER — MELOXICAM 15 MG/1
15 TABLET ORAL DAILY
COMMUNITY
End: 2019-11-08

## 2018-11-01 ASSESSMENT — ENCOUNTER SYMPTOMS
CONSTIPATION: 0
EYE DISCHARGE: 0
SORE THROAT: 0
BLOOD IN STOOL: 0
SINUS PAIN: 0
SHORTNESS OF BREATH: 0
WHEEZING: 0

## 2018-11-01 NOTE — PROGRESS NOTES
Review of Systems   Musculoskeletal: Positive for joint pain.      Depomedrol     NDC#: 2551-9798-93  Lot: [id number]  Expiration Date: 10/20  Dose: 1cc  Location: injection was given in Right  elbow      Lido    NDC#: 1326-8666-19  Lot: [id number]  Expiration Date: 5/20  Dose:1   Location: injection was given in Right elbow
with terminal extension. Strength:  4/5  strength. Good elbow flexion and extension strength. Special Tests:  Positive piano key test.  Pain with resisted extension. Radiology:     Plain radiographs of the right elbow comprising 3 views: AP lateral and oblique obtained and reviewed in the office: These are unremarkable with no osteoarthritis or loose bodies. There is a small olecranon osteophyte. Impression: Chronic lateral epicondylitis right elbow. Assessment :  My impression is that Alex Alfaro has chronic lateral epicondylitis of the right elbow. It has recently flared up. I'm recommending multimodal conservative treatment including a corticosteroid injection today. Impression:  Encounter Diagnoses   Name Primary?  Right elbow pain     Right tennis elbow Yes       Office Procedures:  Orders Placed This Encounter   Procedures    Aircast Arm Band $20     Patient was supplied a Aircast Arm Band. This retail item was supplied to provide functional support and assist in protecting the affected area. Verbal and written instructions for the use of and application of this item were provided. The patient was educated and fit by a healthcare professional with expert knowledge and specialization in brace application. They were instructed to contact the office immediately should the equipment result in increased pain, decreased sensation, increased swelling or worsening of the condition.       XR ELBOW RIGHT (MIN 3 VIEWS)     X-ray elbow right 3 views     Order Specific Question:   Reason for exam:     Answer:   pain    [facility]     Referral Priority:   Routine     Referral Type:   Eval and Treat     Referral Reason:   Specialty Services Required     Requested Specialty:   Physical Therapy     Number of Visits Requested:   1    ND METHYLPREDNISOLONE 40 MG INJ    ND ARTHROCENTESIS ASPIR&/INJ INTERM JT/BURS W/O US    DJO Quick Fit Wrist     Patient was

## 2018-11-06 ENCOUNTER — OFFICE VISIT (OUTPATIENT)
Dept: PAIN MANAGEMENT | Age: 48
End: 2018-11-06
Payer: COMMERCIAL

## 2018-11-06 VITALS
DIASTOLIC BLOOD PRESSURE: 78 MMHG | WEIGHT: 295 LBS | BODY MASS INDEX: 37.86 KG/M2 | SYSTOLIC BLOOD PRESSURE: 116 MMHG | HEART RATE: 87 BPM

## 2018-11-06 DIAGNOSIS — M19.011 OSTEOARTHRITIS OF RIGHT GLENOHUMERAL JOINT: ICD-10-CM

## 2018-11-06 DIAGNOSIS — M47.819 DEGENERATIVE JOINT DISEASE OF SPINAL FACET JOINT: ICD-10-CM

## 2018-11-06 DIAGNOSIS — M54.16 LUMBAR RADICULOPATHY: ICD-10-CM

## 2018-11-06 DIAGNOSIS — M96.1 FAILED BACK SURGICAL SYNDROME: ICD-10-CM

## 2018-11-06 DIAGNOSIS — M79.604 LOW BACK PAIN RADIATING TO RIGHT LEG: ICD-10-CM

## 2018-11-06 DIAGNOSIS — G89.4 CHRONIC PAIN SYNDROME: ICD-10-CM

## 2018-11-06 DIAGNOSIS — M51.37 DDD (DEGENERATIVE DISC DISEASE), LUMBOSACRAL: ICD-10-CM

## 2018-11-06 DIAGNOSIS — M54.50 LOW BACK PAIN RADIATING TO RIGHT LEG: ICD-10-CM

## 2018-11-06 DIAGNOSIS — F33.41 RECURRENT MAJOR DEPRESSIVE DISORDER, IN PARTIAL REMISSION (HCC): ICD-10-CM

## 2018-11-06 DIAGNOSIS — M50.30 DDD (DEGENERATIVE DISC DISEASE), CERVICAL: ICD-10-CM

## 2018-11-06 PROCEDURE — 99214 OFFICE O/P EST MOD 30 MIN: CPT | Performed by: INTERNAL MEDICINE

## 2018-11-06 PROCEDURE — G8484 FLU IMMUNIZE NO ADMIN: HCPCS | Performed by: INTERNAL MEDICINE

## 2018-11-06 PROCEDURE — G8417 CALC BMI ABV UP PARAM F/U: HCPCS | Performed by: INTERNAL MEDICINE

## 2018-11-06 PROCEDURE — 1036F TOBACCO NON-USER: CPT | Performed by: INTERNAL MEDICINE

## 2018-11-06 PROCEDURE — G8427 DOCREV CUR MEDS BY ELIG CLIN: HCPCS | Performed by: INTERNAL MEDICINE

## 2018-11-06 RX ORDER — DULOXETIN HYDROCHLORIDE 30 MG/1
30 CAPSULE, DELAYED RELEASE ORAL DAILY
Qty: 30 CAPSULE | Refills: 0 | Status: SHIPPED | OUTPATIENT
Start: 2018-11-06 | End: 2019-03-06 | Stop reason: SDUPTHER

## 2018-11-06 RX ORDER — PREGABALIN 100 MG/1
CAPSULE ORAL
Qty: 90 CAPSULE | Refills: 2 | Status: SHIPPED | OUTPATIENT
Start: 2018-11-06 | End: 2019-03-06 | Stop reason: SDUPTHER

## 2018-11-06 NOTE — PROGRESS NOTES
MCG tablet Take 1 tablet by mouth Daily 90 tablet 0    tiZANidine (ZANAFLEX) 4 MG tablet Take . 5-1 tablet by mouth bid prn pain 60 tablet 0    aspirin 325 MG EC tablet Take 1 tablet by mouth 2 times daily 30 tablet 2    DULoxetine (CYMBALTA) 30 MG extended release capsule Take 1 capsule by mouth daily 90 capsule 0     No facility-administered medications prior to visit. REVIEW OF SYSTEMS:   Constitutional: Negative for fatigue and unexpected weight change. Eyes: Negative for visual disturbance. Respiratory: Negative for shortness of breath. Cardiovascular: Negative for chest pain, palpitations  Gastrointestinal: Negative for blood in stool, abdominal distention, nausea, vomiting, abdominal pain, diarrhea,constipation. Skin: Negative for color change or any abnormal bruising. Neurological: Negative for speech difficulty, weakness and light-headedness, dizziness, tremors, sleepiness  Psychiatric/Behavioral: Negative for suicidal ideas, hallucinations, behavioral problems, self-injury, decreased concentration/cognition, agitation, confusion. PHYSICAL EXAM:   Nursing note and vitals reviewed. /78   Pulse 87   Wt 295 lb (133.8 kg)   BMI 37.86 kg/m²   General Appearance: Patient is well nourished, well developed, well groomed and in no acute distress. Skin: Skin is warm and dry, good turgor . No rash or lesions noted. He is not diaphoretic. Pulmonary/Chest: Effort normal. No respiratory distress or use of accessory muscles. Auscultation revealing normal air entry. He does not have wheezes in the lung fields. He has no rales. Cardiovascular: Normal rate, regular rhythm, normal heart sounds, and does not have murmur. Exam reveals no gallop and no friction rub. Abdominal: Soft. Bowel sounds are normal.  On inspection of abdomen is obese no distension and no mass. No tenderness. He has no rebound and no guarding.      Musculoskeletal / Extremities: Range of motion is normal. Gait is normal, assistive devices use: none. He exhibits edema: none, and no tenderness. Neurological/Psychiatric:He is alert and oriented to person, place, and time. Coordination is  normal.   Judgement and Insight is normal  His mood is Appropriate and affect is Flat/blunted and Anxious . His behavior is normal.   thought content normal.        IMPRESSION:     1. Chronic pain syndrome    2. Degenerative joint disease of spinal facet joint    3. DDD (degenerative disc disease), cervical    4. Lumbar radiculopathy    5. Low back pain radiating to right leg    6. Failed back surgical syndrome    7. DDD (degenerative disc disease), lumbosacral    8. Recurrent major depressive disorder, in partial remission ([facility] [address]    9. Osteoarthritis of right glenohumeral joint        PLAN:  Informed verbal consent was obtained. -[place] record was obtained and reviewed  for the last one year and no indicators of drug misuse  were found. Any other controlled substance prescriptions  seen on the record have been accounted for, I am aware of the patient receiving these medications. Farooq Garcia [place] record will be rechecked as part of office protocol.    -Continue with ROM/Stretching exercises for back   -He was advised weight reduction, diet changes- 800-1200 flex diet, diet diary, exercising, nutritional  consult increased physical activity as tolerated   -Walk 30 minutes daily   -Adv Biofeedback, relaxation and meditation techniques.  Referral to psychologist for CBT was also discussed with patient   -Urine drug screen with GC/MS for opiates and drugs of abuse was ordered and will follow up on results.   -Continue with Mobic as needed only and Zanaflex 1-2 per day   -he was advised proper sleep hygiene-told to avoid:use of caffeine or other stimulants after noon, alcohol use near bedtime, long or frequent naps during the day, erratic sleep schedule, heavy meals near bedtime, vigorous exercise near bedtime and use of electronic devices near bedtime -He was advised to increase fluids ( 5-7  glasses of fluid daily), limit caffeine, avoid cheese products, increase dietary fiber, increase activity and exercise as tolerated and relax regularly and enjoy meals   -Continue with Lyrica same dose and Cymbalta 30 mg QD  -Will try tapering off the Lyrica over 2 months   -Still has Nucynta left over from before   -Most recent labs were reviewed and are within normal limits. Will repeat them within next 9-12 months if there is no status change   Mr. Wilber Miranda will be prescribed  the medications  listed below which are for treatment of his presenting  medical problems which for this visit include:   Diagnoses of Chronic pain syndrome, Degenerative joint disease of spinal facet joint, DDD (degenerative disc disease), cervical, Lumbar radiculopathy, Low back pain radiating to right leg, Failed back surgical syndrome, DDD (degenerative disc disease), lumbosacral, Recurrent major depressive disorder, in partial remission ([address] and Osteoarthritis of right glenohumeral joint were pertinent to this visit. Medications/orders associated with this visit:    Current Outpatient Prescriptions   Medication Sig Dispense Refill    pregabalin (LYRICA) 100 MG capsule TAKE 1 TAB IN THE AM AND 2 TABS IN THE PM. 90 capsule 2    DULoxetine (CYMBALTA) 30 MG extended release capsule Take 1 capsule by mouth daily 30 capsule 0    meloxicam (MOBIC) 15 MG tablet Take 15 mg by mouth daily      magnesium (MAGNESIUM-OXIDE) 250 MG TABS tablet Take 250 mg by mouth daily      hydrochlorothiazide (HYDRODIURIL) 25 MG tablet TAKE 1 TABLET DAILY 90 tablet 0    atorvastatin (LIPITOR) 40 MG tablet TAKE 1 TABLET DAILY 90 tablet 0    lisinopril (PRINIVIL;ZESTRIL) 10 MG tablet TAKE 1 TABLET DAILY 90 tablet 0    levothyroxine (SYNTHROID) 125 MCG tablet Take 1 tablet by mouth Daily 90 tablet 0    tiZANidine (ZANAFLEX) 4 MG tablet Take . 5-1 tablet by mouth bid prn pain 60 tablet 0    aspirin 325 MG EC tablet Take 1 tablet by mouth 2 times daily 30 tablet 2     No current facility-administered medications for this visit. Goals of current treatment regimen include improvement in pain, restoration of functioning- with focus on improvement in physical performance, general activity, work or disability,emotional distress, health care utilization and  decreased medication consumption. Will continue to monitor progress towards achieving/maintaining therapeutic goals with special emphasis on  1. Improvement in perceived interfernce  of pain with ADL's. Ability to do home exercises independently. Ability to do household chores indoor and/or outdoor work and social and leisure activities. To increase flexibility/ROM, strength and endurance. Improve psychosocial and physical functioning.- he is showing progression towards this treatment goal with the current regimen. 2. Improving sleep to 6-7 hours a night. Improve mood/ anxiety and depression symptoms such as crying spells, low energy, problems with concentration, motivation.- he is showing progression towards this treatment goal with the current regimen. 3. Reduction of reliance on opioid analgesia/more appropriate opioid use. - he is showing progression towards this treatment goal with the current regimen. Risks and benefits of the medications and other alternative treatments have been/were  discussed with the patient. Any questions on the  common side effects of these medications were also answered. He was advised against drinking alcohol with the narcotic pain medicines, advised against driving or handling machinery when  starting or adjusting the dose of medicines, feeling groggy or drowsy, or if having any cognitive issues related to the current medications. Heis fully aware of the risk of overdose and death, if medicines are misused and not taken as prescribed. If he develops new symptoms or if the symptoms worsen, he was told to call the office.  .  Thank

## 2018-12-04 ENCOUNTER — HOSPITAL ENCOUNTER (OUTPATIENT)
Dept: PHYSICAL THERAPY | Age: 48
Setting detail: THERAPIES SERIES
Discharge: HOME | End: 2018-12-04
Payer: COMMERCIAL

## 2018-12-04 PROCEDURE — 97161 PT EVAL LOW COMPLEX 20 MIN: CPT

## 2018-12-04 PROCEDURE — G8985 CARRY GOAL STATUS: HCPCS

## 2018-12-04 PROCEDURE — 97140 MANUAL THERAPY 1/> REGIONS: CPT

## 2018-12-04 PROCEDURE — 97035 APP MDLTY 1+ULTRASOUND EA 15: CPT

## 2018-12-04 PROCEDURE — G8984 CARRY CURRENT STATUS: HCPCS

## 2018-12-04 PROCEDURE — 97110 THERAPEUTIC EXERCISES: CPT

## 2018-12-04 NOTE — FLOWSHEET NOTE
Objective:  Observation:   Test measurements:     12/4/18  Palpation: TTP: R radial head. AROM RUE (degrees)  RUE General AROM: R elbow, forearm and hand WFL. Strength RUE  R Shoulder Flexion: 4/5  R Shoulder ABduction: 4+/5  R Shoulder Internal Rotation: 5/5  R Shoulder External Rotation: 5/5  R Elbow Flexion: 5/5  R Elbow Extension: 5/5 (\"little twinge\" of pain at elbow)  R Forearm Pron: 5/5  R Forearm Sup: 4+/5 (pain)  R Wrist Flexion: 5/5  R Wrist Extension: 5/5  R Wrist Radial Deviation: 5/5  R Wrist Ulnar Deviation: 5/5  Strength Other  Other: : dynomometer (lbs): L 122, 105, 104; R 80, 89, 78. Special Tests: Flexibility: tight R wrist extensors. Exercises:  Exercise/Equipment Resistance/Repetitions Other comments   Dumbbell Wrist  Extension  RD  Pronation/supination   1#, 10x; 2#, 10x  1#, 10x; 2#, 10x  1#, 10x, 2#, 10x    Dumbbell biceps 1#, 10x; 2#, 10x                        Manual stretch of R wrist extensors Begin     Mob radial head  Begin                                     Other Therapeutic Activities:      Home Exercise Program:   12/4/18 HEP was instructed and included stretch wrist flexors and extensors, resisted wrist extension, RD, supination/pronation and biceps curls with supination. Pt was given written hand outs and demonstrated exercises correctly. Pt expressed understanding of exercises.         Manual Treatments:  [facility] R proximal wrist extensors 15'    Modalities:  US R proximal wrist extensors 1.5 W/cm2, 100%, 8'    Timed Code Treatment Minutes:   40    Total Treatment Minutes:  55    Treatment/Activity Tolerance:  [] Patient tolerated treatment well [] Patient limited by fatigue  [x] Patient limited by pain  [] Patient limited by other medical complications  [] Other:     Prognosis: [x] Good [] Fair  [] Poor    Patient Requires Follow-up: [x] Yes  [] No    Plan:   [] Continue per plan of care [] Alter current plan (see comments)  [x] Plan of care initiated [] Hold pending

## 2018-12-04 NOTE — PROGRESS NOTES
: dynomometer (lbs): L 122, 105, 104; R 80, 89, 78. Special Tests: Flexibility: tight R wrist extensors. Other: HEP: stretch wrist flexors and extensors, resisted wrist extension, RD, supination/pronation and biceps curls with supination. Assessment   Conditions Requiring Skilled Therapeutic Intervention  Body structures, Functions, Activity limitations: Decreased ADL status; Decreased strength  Assessment: Pt presented to PT with subacute R elbow pain and dysfunction. PLOF: independent and active with R lower arm  Treatment Diagnosis: Decreased ADL status  Prognosis: Good  Decision Making: Low Complexity  REQUIRES PT FOLLOW UP: Yes  Activity Tolerance  Activity Tolerance: Patient limited by pain         Plan   Plan  Times per week: PT 1x/week for 4-6 weeks    G-Code  PT G-Codes  Functional Assessment Tool Used: Quick Dash  Score: 34.1% dysfunction  Functional Limitation: Carrying, moving and handling objects  Carrying, Moving and Handling Objects Current Status (): At least 20 percent but less than 40 percent impaired, limited or restricted  Carrying, Moving and Handling Objects Goal Status (): At least 1 percent but less than 20 percent impaired, limited or restricted    OutComes Score  QuickDASH Total Score: 26       QuickDASH Disability/Symptom Score : 34.09 %    Goals  Long term goals  Time Frame for Long term goals : 4-6 weeks  Long term goal 1: Pt will be independent with HEP. Long term goal 2: AROM R elbow, forearm, wrist and hand will be full and painless. Long term goal 3: Pt will report R elbow pain 0-2/10. Long term goal 4: Strength R  95 pounds or greater per dynomometer.   Long term goal 5: Pt will be able to resume fitness program.  Patient Goals   Patient goals : \"get this pain away so I can strengthen my arms again so I can get back to work\"       Therapy Time   Individual Concurrent Group Co-treatment   Time In 1435         Time Out 1530         Minutes 55         Timed

## 2018-12-06 ENCOUNTER — APPOINTMENT (OUTPATIENT)
Dept: PHYSICAL THERAPY | Age: 48
End: 2018-12-06
Payer: COMMERCIAL

## 2018-12-06 DIAGNOSIS — Z00.00 PREVENTATIVE HEALTH CARE: Primary | ICD-10-CM

## 2018-12-06 PROCEDURE — 6360000002 HC RX W HCPCS

## 2018-12-06 PROCEDURE — 99396 PREV VISIT EST AGE 40-64: CPT | Performed by: FAMILY MEDICINE

## 2018-12-06 RX ORDER — ATORVASTATIN CALCIUM 40 MG/1
TABLET, FILM COATED ORAL
Qty: 90 TABLET | Refills: 3 | Status: SHIPPED | OUTPATIENT
Start: 2018-12-06 | End: 2019-03-04 | Stop reason: SDUPTHER

## 2018-12-06 RX ORDER — LISINOPRIL 10 MG/1
TABLET ORAL
Qty: 90 TABLET | Refills: 3 | Status: SHIPPED | OUTPATIENT
Start: 2018-12-06 | End: 2019-03-04 | Stop reason: SDUPTHER

## 2018-12-08 ENCOUNTER — OFFICE VISIT (OUTPATIENT)
Dept: ORTHOPEDIC SURGERY | Age: 48
End: 2018-12-08
Payer: COMMERCIAL

## 2018-12-08 VITALS
BODY MASS INDEX: 37.86 KG/M2 | SYSTOLIC BLOOD PRESSURE: 101 MMHG | HEART RATE: 100 BPM | WEIGHT: 294.98 LBS | DIASTOLIC BLOOD PRESSURE: 72 MMHG | HEIGHT: 74 IN

## 2018-12-08 DIAGNOSIS — Z96.611 STATUS POST TOTAL SHOULDER ARTHROPLASTY, RIGHT: ICD-10-CM

## 2018-12-08 DIAGNOSIS — M77.11 RIGHT TENNIS ELBOW: Primary | ICD-10-CM

## 2018-12-08 DIAGNOSIS — M25.521 RIGHT ELBOW PAIN: ICD-10-CM

## 2018-12-08 PROCEDURE — G8427 DOCREV CUR MEDS BY ELIG CLIN: HCPCS | Performed by: ORTHOPAEDIC SURGERY

## 2018-12-08 PROCEDURE — 99214 OFFICE O/P EST MOD 30 MIN: CPT | Performed by: ORTHOPAEDIC SURGERY

## 2018-12-08 PROCEDURE — G8417 CALC BMI ABV UP PARAM F/U: HCPCS | Performed by: ORTHOPAEDIC SURGERY

## 2018-12-08 PROCEDURE — G8484 FLU IMMUNIZE NO ADMIN: HCPCS | Performed by: ORTHOPAEDIC SURGERY

## 2018-12-08 PROCEDURE — 1036F TOBACCO NON-USER: CPT | Performed by: ORTHOPAEDIC SURGERY

## 2018-12-08 NOTE — PROGRESS NOTES
Chief Complaint    Shoulder Pain (F/u right elbow)      History of Present Illness:  Loretta Norris is a 51 y.o. male returns for follow up on his right tennis elbow. He has a recent history of right total shoulder with biceps tenodesis in 2017. During his rehab process he flared up a chronic underlying lateral epicondylitis. Since our last visit in October 2018 he has been using a counterforce brace, cockup wrist splint, and anti-inflammatories. He also had an injection at his last appointment. He reports that his pain is much improved at this point and reports his pain today 2/10. He reports that he needs to be at full strength in order to return to his job at [employer]. He has been off work for over one year. He denies any recent setbacks. Medical History:    Patient's medications, allergies, past medical, surgical, social and family histories were reviewed and updated as appropriate.     Past Medical History:   Diagnosis Date    Back pain     Chronic pain     Chronic pain syndrome     DDD (degenerative disc disease), cervical     Degenerative joint disease of spinal facet joint     Depressive disorder, not elsewhere classified     Dizziness     Failed back surgical syndrome     Headache(784.0)     HTN (hypertension)     Hyperlipidemia     Lumbar radiculopathy     Sleep apnea     does not use CPAP    thyroid cancer     thyroid    Thyroid disease       Social History     Social History    Marital status:      Spouse name: N/A    Number of children: 3    Years of education: N/A     Occupational History          Social History Main Topics    Smoking status: Never Smoker    Smokeless tobacco: Never Used    Alcohol use 0.0 oz/week      Comment: occasional use    Drug use: No    Sexual activity: Not on file     Other Topics Concern    Not on file     Social History Narrative    No narrative on file       Allergies   Allergen Reactions    Oxycontin [Oxycodone] Nausea Only supination. Passive Range of Motion:  Pain with terminal extension. Strength:  4/5  strength. Good elbow flexion and extension strength. Special Tests:  Positive piano key test.  Pain with resisted extension. Right shoulder exam shows great range of motion and good strength of his rotator cuff and deltoid. Good contour to his biceps. Neurovascularly intact. Radiology:     None taken today         Assessment :  Acute on chronic right lateral epicondylitis and history of right total shoulder in 2017    Impression:  Encounter Diagnoses   Name Primary?  Right elbow pain     Right tennis elbow Yes    Status post total shoulder arthroplasty, right        Office Procedures:  Orders Placed This Encounter   Procedures    [facility] Vikki Esposito     Referral Priority:   Routine     Referral Type:   Eval and Treat     Referral Reason:   Specialty Services Required     Requested Specialty:   Physical Therapy     Number of Visits Requested:   1       Treatment Plan:  We discussed at length the issues he is having with his right elbow. He has undergone many conservative treatments for the elbow at this time and he is improving slowly. We recommend that he stay the course and continue anti-inflammatories, counterforce bracing, cock up splint. If it is not improved he may need another injection for the elbow at a future date. He should continue to work with therapy. He was given a new prescription to work with therapist for range of motion and strengthening of his shoulder as well as lateral epicondylitis program.  Our goal is to get him back to work at [employer]. We would like to see him back in 6 weeks. All questions were answered today. He is in agreement with the plan. Sindy Walsh IV, D.O.    Clinical Fellow, [facility] [place]  Date:    12/8/2018      The encounter with Martina Arce was supervised by Dr. Grant Zhao, who

## 2018-12-10 ENCOUNTER — HOSPITAL ENCOUNTER (OUTPATIENT)
Dept: PHYSICAL THERAPY | Age: 48
Setting detail: THERAPIES SERIES
Discharge: HOME | End: 2018-12-10
Payer: COMMERCIAL

## 2018-12-10 PROCEDURE — 97035 APP MDLTY 1+ULTRASOUND EA 15: CPT

## 2018-12-10 PROCEDURE — 97140 MANUAL THERAPY 1/> REGIONS: CPT

## 2018-12-10 PROCEDURE — 97110 THERAPEUTIC EXERCISES: CPT

## 2018-12-10 NOTE — FLOWSHEET NOTE
Physical Therapy Daily Treatment Note  Date:  12/10/2018    Patient Name:  Velvet Pfeiffer    :  1970  MRN: [id number]  Restrictions/Precautions:    Pertinent Medical History:  Medical/Treatment Diagnosis Information:  · Diagnosis: R elbow pain (M25.521); R tennis elbow (M77.11)  · Treatment Diagnosis: Decreased ADL status  Insurance/Certification information:  PT Insurance Information: Medical [place]  Physician Information:  Referring Practitioner: Dr. Roberto Mitchell of care signed (Y/N):  Sent to in basket on 18  Visit# / total visits:    Pain level: 0-9/10 R elbow    G-Code (if applicable):      Date / Visit # G-Code Applied:  18       Progress Note: []  Yes  []  No  Next due by: Visit #10      History of Injury: Pt stated one month ago he strained his elbow when trying to rehab himself in order to get back to work. Pt was doing some biceps curls, some dips on a dip machine, \"nothing crazy\". Pt was lifting 8 to 10 pounds. Pt stated he had pain during his exercising, but had pain afterward. Pt stated he had tendinitis in the elbow which did not get better, but got progressively worse. Pt saw Dr. Lino Govea and had cortisone shot which was helpful as it \"took a lot of the pain away\" but still has \"twinges of pain. \"  Pt has been wearing a splint at night to help immobilize, and has been wearing tennis elbow splint. Pt rated elbow pain 0-4/10 at rest and 5-10/10 with activity. Pain is worst at the R lateral epicondyle and feels like \"a knot, its real tender. \"      Subjective:     12/10/18  Pt saw Dr. Lino Govea on 18 and was given a new Rx to add the shoulder to his PT. Objective:  Observation:   Test measurements:     18  Palpation: TTP: R radial head. AROM RUE (degrees)  RUE General AROM: R elbow, forearm and hand WFL. : dynomometer (lbs): L 122, 105, 104; R 80, 89, 78.     R shoulder 18   AROM    Flexion WFL   ABD [facility]   IR [facility] [place]   ER [facility] [place]   Extension [facility]   Strength

## 2018-12-12 ENCOUNTER — APPOINTMENT (OUTPATIENT)
Dept: PHYSICAL THERAPY | Age: 48
End: 2018-12-12
Payer: COMMERCIAL

## 2018-12-17 ENCOUNTER — HOSPITAL ENCOUNTER (OUTPATIENT)
Dept: PHYSICAL THERAPY | Age: 48
Setting detail: THERAPIES SERIES
Discharge: HOME | End: 2018-12-17
Payer: COMMERCIAL

## 2018-12-17 PROCEDURE — 97140 MANUAL THERAPY 1/> REGIONS: CPT

## 2018-12-17 PROCEDURE — 97033 APP MDLTY 1+IONTPHRSIS EA 15: CPT

## 2018-12-17 PROCEDURE — 97110 THERAPEUTIC EXERCISES: CPT

## 2018-12-17 NOTE — FLOWSHEET NOTE
Physical Therapy Daily Treatment Note  Date:  2018    Patient Name:  Agnes Aschoff    :  1970  MRN: [id number]  Restrictions/Precautions:    Pertinent Medical History:  Medical/Treatment Diagnosis Information:  · Diagnosis: R elbow pain (M25.521); R tennis elbow (M77.11)  · Treatment Diagnosis: Decreased ADL status  Insurance/Certification information:  PT Insurance Information: Medical [place]  Physician Information:  Referring Practitioner: Dr. Hanna Sanchez of care signed (Y/N):  Sent to in basket on 18  Visit# / total visits:  3/12  Pain level: 0-9/10 R elbow    G-Code (if applicable):      Date / Visit # G-Code Applied:  18       Progress Note: []  Yes  []  No  Next due by: Visit #10      History of Injury: Pt stated one month ago he strained his elbow when trying to rehab himself in order to get back to work. Pt was doing some biceps curls, some dips on a dip machine, \"nothing crazy\". Pt was lifting 8 to 10 pounds. Pt stated he had pain during his exercising, but had pain afterward. Pt stated he had tendinitis in the elbow which did not get better, but got progressively worse. Pt saw Dr. Gabe Rice and had cortisone shot which was helpful as it \"took a lot of the pain away\" but still has \"twinges of pain. \"  Pt has been wearing a splint at night to help immobilize, and has been wearing tennis elbow splint. Pt rated elbow pain 0-4/10 at rest and 5-10/10 with activity. Pain is worst at the R lateral epicondyle and feels like \"a knot, its real tender. \"      Subjective:     12/10/18  Pt saw Dr. Gabe Rice on 18 and was given a new Rx to add the shoulder to his PT.  18  Hurting today. Just doesn't get time to rest it  Objective:  Observation:   Test measurements:     18  Palpation: TTP: R radial head. AROM RUE (degrees)  RUE General AROM: R elbow, forearm and hand WFL. : dynomometer (lbs): L 122, 105, 104; R 80, 89, 78.     R shoulder 18   AROM    Flexion [facility]

## 2018-12-19 ENCOUNTER — APPOINTMENT (OUTPATIENT)
Dept: PHYSICAL THERAPY | Age: 48
End: 2018-12-19
Payer: COMMERCIAL

## 2018-12-24 ENCOUNTER — HOSPITAL ENCOUNTER (OUTPATIENT)
Dept: PHYSICAL THERAPY | Age: 48
Setting detail: THERAPIES SERIES
Discharge: HOME | End: 2018-12-24
Payer: COMMERCIAL

## 2018-12-24 PROCEDURE — 97033 APP MDLTY 1+IONTPHRSIS EA 15: CPT

## 2018-12-24 PROCEDURE — 97110 THERAPEUTIC EXERCISES: CPT

## 2018-12-24 PROCEDURE — 97140 MANUAL THERAPY 1/> REGIONS: CPT

## 2019-01-02 ENCOUNTER — HOSPITAL ENCOUNTER (OUTPATIENT)
Dept: PHYSICAL THERAPY | Age: 49
Setting detail: THERAPIES SERIES
Discharge: HOME | End: 2019-01-02
Payer: COMMERCIAL

## 2019-01-02 PROCEDURE — 97140 MANUAL THERAPY 1/> REGIONS: CPT

## 2019-01-02 PROCEDURE — 97110 THERAPEUTIC EXERCISES: CPT

## 2019-01-02 PROCEDURE — 97033 APP MDLTY 1+IONTPHRSIS EA 15: CPT

## 2019-01-07 ENCOUNTER — HOSPITAL ENCOUNTER (OUTPATIENT)
Dept: PHYSICAL THERAPY | Age: 49
Setting detail: THERAPIES SERIES
Discharge: HOME | End: 2019-01-07
Payer: COMMERCIAL

## 2019-01-07 PROCEDURE — 97110 THERAPEUTIC EXERCISES: CPT

## 2019-01-07 PROCEDURE — 97033 APP MDLTY 1+IONTPHRSIS EA 15: CPT

## 2019-01-07 PROCEDURE — 97140 MANUAL THERAPY 1/> REGIONS: CPT

## 2019-01-09 ENCOUNTER — HOSPITAL ENCOUNTER (OUTPATIENT)
Dept: PHYSICAL THERAPY | Age: 49
Setting detail: THERAPIES SERIES
Discharge: HOME | End: 2019-01-09
Payer: COMMERCIAL

## 2019-01-09 ENCOUNTER — OFFICE VISIT (OUTPATIENT)
Dept: PAIN MANAGEMENT | Age: 49
End: 2019-01-09
Payer: COMMERCIAL

## 2019-01-09 VITALS
HEART RATE: 93 BPM | BODY MASS INDEX: 37.37 KG/M2 | SYSTOLIC BLOOD PRESSURE: 113 MMHG | DIASTOLIC BLOOD PRESSURE: 61 MMHG | WEIGHT: 291.2 LBS

## 2019-01-09 DIAGNOSIS — M47.819 DEGENERATIVE JOINT DISEASE OF SPINAL FACET JOINT: ICD-10-CM

## 2019-01-09 DIAGNOSIS — G89.4 CHRONIC PAIN SYNDROME: ICD-10-CM

## 2019-01-09 DIAGNOSIS — M54.16 LUMBAR RADICULOPATHY: ICD-10-CM

## 2019-01-09 DIAGNOSIS — M50.30 DDD (DEGENERATIVE DISC DISEASE), CERVICAL: ICD-10-CM

## 2019-01-09 DIAGNOSIS — M96.1 FAILED BACK SURGICAL SYNDROME: ICD-10-CM

## 2019-01-09 DIAGNOSIS — M51.37 DDD (DEGENERATIVE DISC DISEASE), LUMBOSACRAL: ICD-10-CM

## 2019-01-09 PROCEDURE — G8484 FLU IMMUNIZE NO ADMIN: HCPCS | Performed by: INTERNAL MEDICINE

## 2019-01-09 PROCEDURE — 97033 APP MDLTY 1+IONTPHRSIS EA 15: CPT

## 2019-01-09 PROCEDURE — G8417 CALC BMI ABV UP PARAM F/U: HCPCS | Performed by: INTERNAL MEDICINE

## 2019-01-09 PROCEDURE — 97140 MANUAL THERAPY 1/> REGIONS: CPT

## 2019-01-09 PROCEDURE — 99213 OFFICE O/P EST LOW 20 MIN: CPT | Performed by: INTERNAL MEDICINE

## 2019-01-09 PROCEDURE — 97110 THERAPEUTIC EXERCISES: CPT

## 2019-01-09 PROCEDURE — G8427 DOCREV CUR MEDS BY ELIG CLIN: HCPCS | Performed by: INTERNAL MEDICINE

## 2019-01-09 PROCEDURE — 1036F TOBACCO NON-USER: CPT | Performed by: INTERNAL MEDICINE

## 2019-01-14 ENCOUNTER — HOSPITAL ENCOUNTER (OUTPATIENT)
Dept: PHYSICAL THERAPY | Age: 49
Setting detail: THERAPIES SERIES
Discharge: HOME | End: 2019-01-14
Payer: COMMERCIAL

## 2019-01-14 PROCEDURE — 97110 THERAPEUTIC EXERCISES: CPT

## 2019-01-14 PROCEDURE — 97033 APP MDLTY 1+IONTPHRSIS EA 15: CPT

## 2019-01-14 PROCEDURE — 97140 MANUAL THERAPY 1/> REGIONS: CPT

## 2019-01-16 ENCOUNTER — HOSPITAL ENCOUNTER (OUTPATIENT)
Dept: PHYSICAL THERAPY | Age: 49
Setting detail: THERAPIES SERIES
Discharge: HOME | End: 2019-01-16
Payer: COMMERCIAL

## 2019-01-16 PROCEDURE — 97140 MANUAL THERAPY 1/> REGIONS: CPT

## 2019-01-16 PROCEDURE — 97110 THERAPEUTIC EXERCISES: CPT

## 2019-01-16 PROCEDURE — 97033 APP MDLTY 1+IONTPHRSIS EA 15: CPT

## 2019-01-21 ENCOUNTER — HOSPITAL ENCOUNTER (OUTPATIENT)
Dept: PHYSICAL THERAPY | Age: 49
Setting detail: THERAPIES SERIES
Discharge: HOME | End: 2019-01-21
Payer: COMMERCIAL

## 2019-01-21 PROCEDURE — 97033 APP MDLTY 1+IONTPHRSIS EA 15: CPT

## 2019-01-21 PROCEDURE — 97110 THERAPEUTIC EXERCISES: CPT

## 2019-01-21 PROCEDURE — 97140 MANUAL THERAPY 1/> REGIONS: CPT

## 2019-01-28 ENCOUNTER — HOSPITAL ENCOUNTER (OUTPATIENT)
Dept: PHYSICAL THERAPY | Age: 49
Setting detail: THERAPIES SERIES
Discharge: HOME | End: 2019-01-28
Payer: COMMERCIAL

## 2019-01-28 PROCEDURE — 97033 APP MDLTY 1+IONTPHRSIS EA 15: CPT

## 2019-01-28 PROCEDURE — 97110 THERAPEUTIC EXERCISES: CPT

## 2019-01-28 PROCEDURE — 97035 APP MDLTY 1+ULTRASOUND EA 15: CPT

## 2019-01-28 PROCEDURE — 97140 MANUAL THERAPY 1/> REGIONS: CPT

## 2019-01-29 ENCOUNTER — APPOINTMENT (OUTPATIENT)
Dept: GENERAL RADIOLOGY | Age: 49
End: 2019-01-29
Payer: COMMERCIAL

## 2019-01-29 ENCOUNTER — HOSPITAL ENCOUNTER (EMERGENCY)
Age: 49
Discharge: HOME | End: 2019-01-29
Attending: EMERGENCY MEDICINE
Payer: COMMERCIAL

## 2019-01-29 VITALS
RESPIRATION RATE: 12 BRPM | WEIGHT: 285.94 LBS | OXYGEN SATURATION: 99 % | TEMPERATURE: 97.7 F | HEART RATE: 88 BPM | BODY MASS INDEX: 36.7 KG/M2 | SYSTOLIC BLOOD PRESSURE: 107 MMHG | HEIGHT: 74 IN | DIASTOLIC BLOOD PRESSURE: 66 MMHG

## 2019-01-29 DIAGNOSIS — K22.2 ESOPHAGEAL OBSTRUCTION DUE TO FOOD IMPACTION: Primary | ICD-10-CM

## 2019-01-29 DIAGNOSIS — T18.128A ESOPHAGEAL OBSTRUCTION DUE TO FOOD IMPACTION: Primary | ICD-10-CM

## 2019-01-29 PROCEDURE — 2709999900 HC NON-CHARGEABLE SUPPLY: Performed by: INTERNAL MEDICINE

## 2019-01-29 PROCEDURE — 99284 EMERGENCY DEPT VISIT MOD MDM: CPT

## 2019-01-29 PROCEDURE — 74022 RADEX COMPL AQT ABD SERIES: CPT

## 2019-01-29 PROCEDURE — 99152 MOD SED SAME PHYS/QHP 5/>YRS: CPT | Performed by: INTERNAL MEDICINE

## 2019-01-29 PROCEDURE — 6360000002 HC RX W HCPCS: Performed by: INTERNAL MEDICINE

## 2019-01-29 PROCEDURE — 2500000003 HC RX 250 WO HCPCS: Performed by: EMERGENCY MEDICINE

## 2019-01-29 PROCEDURE — 96374 THER/PROPH/DIAG INJ IV PUSH: CPT

## 2019-01-29 PROCEDURE — 3609012800 HC EGD DIAGNOSTIC ONLY: Performed by: INTERNAL MEDICINE

## 2019-01-29 RX ORDER — FENTANYL CITRATE 50 UG/ML
INJECTION, SOLUTION INTRAMUSCULAR; INTRAVENOUS
Status: COMPLETED | OUTPATIENT
Start: 2019-01-29 | End: 2019-01-29

## 2019-01-29 RX ORDER — MIDAZOLAM HYDROCHLORIDE 1 MG/ML
INJECTION INTRAMUSCULAR; INTRAVENOUS
Status: COMPLETED | OUTPATIENT
Start: 2019-01-29 | End: 2019-01-29

## 2019-01-29 RX ORDER — OMEPRAZOLE 40 MG/1
40 CAPSULE, DELAYED RELEASE ORAL
Qty: 30 CAPSULE | Refills: 0 | Status: SHIPPED | OUTPATIENT
Start: 2019-01-29 | End: 2020-09-08

## 2019-01-29 RX ORDER — MIDAZOLAM HYDROCHLORIDE 1 MG/ML
INJECTION INTRAMUSCULAR; INTRAVENOUS
Status: DISCONTINUED
Start: 2019-01-29 | End: 2019-01-30 | Stop reason: HOSPADM

## 2019-01-29 RX ORDER — FENTANYL CITRATE 50 UG/ML
INJECTION, SOLUTION INTRAMUSCULAR; INTRAVENOUS
Status: DISCONTINUED
Start: 2019-01-29 | End: 2019-01-30 | Stop reason: HOSPADM

## 2019-01-29 RX ADMIN — GLUCAGON HYDROCHLORIDE 1 MG: KIT at 20:53

## 2019-01-29 ASSESSMENT — PAIN SCALES - GENERAL
PAINLEVEL_OUTOF10: 0
PAINLEVEL_OUTOF10: 3

## 2019-01-29 ASSESSMENT — PAIN SCALES - WONG BAKER: WONGBAKER_NUMERICALRESPONSE: 4

## 2019-01-29 ASSESSMENT — PAIN DESCRIPTION - PAIN TYPE: TYPE: ACUTE PAIN

## 2019-01-29 ASSESSMENT — PAIN DESCRIPTION - LOCATION: LOCATION: THROAT

## 2019-01-29 ASSESSMENT — PAIN DESCRIPTION - DESCRIPTORS: DESCRIPTORS: PRESSURE

## 2019-01-29 ASSESSMENT — PAIN DESCRIPTION - FREQUENCY: FREQUENCY: CONTINUOUS

## 2019-01-30 ENCOUNTER — HOSPITAL ENCOUNTER (OUTPATIENT)
Dept: PHYSICAL THERAPY | Age: 49
Setting detail: THERAPIES SERIES
Discharge: HOME | End: 2019-01-30
Payer: COMMERCIAL

## 2019-01-30 PROCEDURE — G8986 CARRY D/C STATUS: HCPCS

## 2019-01-30 PROCEDURE — 97140 MANUAL THERAPY 1/> REGIONS: CPT

## 2019-01-30 PROCEDURE — G8985 CARRY GOAL STATUS: HCPCS

## 2019-01-30 PROCEDURE — 97110 THERAPEUTIC EXERCISES: CPT

## 2019-02-07 NOTE — PROGRESS NOTES
Specific Question:   Is Patient Fasting?/# of Hours     Answer:   12 hours     We were unable to discuss labs taken 11/1/18 because the results had not come back yet. We will call him with today's results tomorrow. We discussed the importance of exercise to strengthen his shoulder and back in order to be able to get back to work.           Tess Felipe, DO Asc Procedure Text (F): After obtaining clear surgical margins the patient was sent to an ASC for surgical repair.  The patient understands they will receive post-surgical care and follow-up from the ASC physician.

## 2019-02-12 ENCOUNTER — OFFICE VISIT (OUTPATIENT)
Dept: ORTHOPEDIC SURGERY | Age: 49
End: 2019-02-12
Payer: COMMERCIAL

## 2019-02-12 VITALS
HEART RATE: 91 BPM | DIASTOLIC BLOOD PRESSURE: 77 MMHG | BODY MASS INDEX: 36.7 KG/M2 | WEIGHT: 285.94 LBS | HEIGHT: 74 IN | SYSTOLIC BLOOD PRESSURE: 109 MMHG

## 2019-02-12 DIAGNOSIS — M77.11 RIGHT TENNIS ELBOW: Primary | ICD-10-CM

## 2019-02-12 DIAGNOSIS — M25.521 RIGHT ELBOW PAIN: ICD-10-CM

## 2019-02-12 PROCEDURE — 1036F TOBACCO NON-USER: CPT | Performed by: ORTHOPAEDIC SURGERY

## 2019-02-12 PROCEDURE — 3017F COLORECTAL CA SCREEN DOC REV: CPT | Performed by: ORTHOPAEDIC SURGERY

## 2019-02-12 PROCEDURE — G8417 CALC BMI ABV UP PARAM F/U: HCPCS | Performed by: ORTHOPAEDIC SURGERY

## 2019-02-12 PROCEDURE — G8427 DOCREV CUR MEDS BY ELIG CLIN: HCPCS | Performed by: ORTHOPAEDIC SURGERY

## 2019-02-12 PROCEDURE — G8484 FLU IMMUNIZE NO ADMIN: HCPCS | Performed by: ORTHOPAEDIC SURGERY

## 2019-02-12 PROCEDURE — 99213 OFFICE O/P EST LOW 20 MIN: CPT | Performed by: ORTHOPAEDIC SURGERY

## 2019-02-13 ENCOUNTER — OFFICE VISIT (OUTPATIENT)
Dept: ORTHOPEDIC SURGERY | Age: 49
End: 2019-02-13
Payer: COMMERCIAL

## 2019-02-13 VITALS — HEIGHT: 74 IN | BODY MASS INDEX: 36.57 KG/M2 | WEIGHT: 285 LBS

## 2019-02-13 DIAGNOSIS — S53.431A RADIAL COLLATERAL LIGAMENT SPRAIN OF RIGHT ELBOW, INITIAL ENCOUNTER: ICD-10-CM

## 2019-02-13 DIAGNOSIS — M77.11 LATERAL EPICONDYLITIS, RIGHT ELBOW: Primary | ICD-10-CM

## 2019-02-13 DIAGNOSIS — M67.90 TENDINOPATHY OF UPPER EXTREMITY: ICD-10-CM

## 2019-02-13 DIAGNOSIS — M25.421 EFFUSION OF RIGHT ELBOW: ICD-10-CM

## 2019-02-13 PROCEDURE — G8427 DOCREV CUR MEDS BY ELIG CLIN: HCPCS | Performed by: INTERNAL MEDICINE

## 2019-02-13 PROCEDURE — 3017F COLORECTAL CA SCREEN DOC REV: CPT | Performed by: INTERNAL MEDICINE

## 2019-02-13 PROCEDURE — G8417 CALC BMI ABV UP PARAM F/U: HCPCS | Performed by: INTERNAL MEDICINE

## 2019-02-13 PROCEDURE — G8484 FLU IMMUNIZE NO ADMIN: HCPCS | Performed by: INTERNAL MEDICINE

## 2019-02-13 PROCEDURE — 99243 OFF/OP CNSLTJ NEW/EST LOW 30: CPT | Performed by: INTERNAL MEDICINE

## 2019-02-15 ENCOUNTER — TELEPHONE (OUTPATIENT)
Dept: ORTHOPEDIC SURGERY | Age: 49
End: 2019-02-15

## 2019-03-04 RX ORDER — HYDROCHLOROTHIAZIDE 25 MG/1
TABLET ORAL
Qty: 90 TABLET | Refills: 3 | Status: SHIPPED | OUTPATIENT
Start: 2019-03-04 | End: 2019-03-13 | Stop reason: DRUGHIGH

## 2019-03-04 RX ORDER — ATORVASTATIN CALCIUM 40 MG/1
TABLET, FILM COATED ORAL
Qty: 90 TABLET | Refills: 3 | Status: SHIPPED | OUTPATIENT
Start: 2019-03-04 | End: 2019-11-08

## 2019-03-04 RX ORDER — LISINOPRIL 10 MG/1
TABLET ORAL
Qty: 90 TABLET | Refills: 3 | Status: SHIPPED | OUTPATIENT
Start: 2019-03-04 | End: 2020-03-30 | Stop reason: SDUPTHER

## 2019-03-05 ENCOUNTER — OFFICE VISIT (OUTPATIENT)
Dept: ORTHOPEDIC SURGERY | Age: 49
End: 2019-03-05
Payer: COMMERCIAL

## 2019-03-05 VITALS
WEIGHT: 285.06 LBS | HEART RATE: 88 BPM | DIASTOLIC BLOOD PRESSURE: 86 MMHG | HEIGHT: 74 IN | BODY MASS INDEX: 36.58 KG/M2 | SYSTOLIC BLOOD PRESSURE: 121 MMHG

## 2019-03-05 DIAGNOSIS — M67.90 TENDINOPATHY OF UPPER EXTREMITY: ICD-10-CM

## 2019-03-05 DIAGNOSIS — M77.11 LATERAL EPICONDYLITIS, RIGHT ELBOW: Primary | ICD-10-CM

## 2019-03-05 PROCEDURE — 99213 OFFICE O/P EST LOW 20 MIN: CPT | Performed by: ORTHOPAEDIC SURGERY

## 2019-03-06 ENCOUNTER — OFFICE VISIT (OUTPATIENT)
Dept: PAIN MANAGEMENT | Age: 49
End: 2019-03-06
Payer: COMMERCIAL

## 2019-03-06 VITALS
DIASTOLIC BLOOD PRESSURE: 74 MMHG | BODY MASS INDEX: 37.35 KG/M2 | WEIGHT: 291 LBS | SYSTOLIC BLOOD PRESSURE: 112 MMHG | HEART RATE: 97 BPM

## 2019-03-06 DIAGNOSIS — M50.30 DDD (DEGENERATIVE DISC DISEASE), CERVICAL: ICD-10-CM

## 2019-03-06 DIAGNOSIS — M51.37 DDD (DEGENERATIVE DISC DISEASE), LUMBOSACRAL: ICD-10-CM

## 2019-03-06 DIAGNOSIS — M96.1 FAILED BACK SURGICAL SYNDROME: ICD-10-CM

## 2019-03-06 DIAGNOSIS — M19.011 OSTEOARTHRITIS OF RIGHT GLENOHUMERAL JOINT: ICD-10-CM

## 2019-03-06 DIAGNOSIS — M47.819 DEGENERATIVE JOINT DISEASE OF SPINAL FACET JOINT: ICD-10-CM

## 2019-03-06 DIAGNOSIS — M19.011 GLENOHUMERAL ARTHRITIS, RIGHT: ICD-10-CM

## 2019-03-06 DIAGNOSIS — M54.16 LUMBAR RADICULOPATHY: ICD-10-CM

## 2019-03-06 DIAGNOSIS — G89.4 CHRONIC PAIN SYNDROME: ICD-10-CM

## 2019-03-06 PROCEDURE — 99213 OFFICE O/P EST LOW 20 MIN: CPT | Performed by: INTERNAL MEDICINE

## 2019-03-06 RX ORDER — DULOXETIN HYDROCHLORIDE 30 MG/1
30 CAPSULE, DELAYED RELEASE ORAL DAILY
Qty: 30 CAPSULE | Refills: 2 | Status: SHIPPED | OUTPATIENT
Start: 2019-03-06 | End: 2019-05-29 | Stop reason: SDUPTHER

## 2019-03-06 RX ORDER — PREGABALIN 100 MG/1
CAPSULE ORAL
Qty: 90 CAPSULE | Refills: 2 | Status: SHIPPED | OUTPATIENT
Start: 2019-03-06 | End: 2019-05-29 | Stop reason: SDUPTHER

## 2019-03-06 RX ORDER — TIZANIDINE 4 MG/1
TABLET ORAL
Qty: 60 TABLET | Refills: 2 | Status: SHIPPED | OUTPATIENT
Start: 2019-03-06 | End: 2019-05-29 | Stop reason: SDUPTHER

## 2019-03-12 DIAGNOSIS — E03.9 HYPOTHYROIDISM (ACQUIRED): Primary | ICD-10-CM

## 2019-03-13 ENCOUNTER — OFFICE VISIT (OUTPATIENT)
Dept: FAMILY MEDICINE CLINIC | Age: 49
End: 2019-03-13
Payer: COMMERCIAL

## 2019-03-13 VITALS
HEIGHT: 74 IN | OXYGEN SATURATION: 98 % | HEART RATE: 92 BPM | WEIGHT: 293 LBS | TEMPERATURE: 98.6 F | SYSTOLIC BLOOD PRESSURE: 120 MMHG | DIASTOLIC BLOOD PRESSURE: 82 MMHG | BODY MASS INDEX: 37.6 KG/M2

## 2019-03-13 DIAGNOSIS — Z01.818 PRE-OP EXAM: Primary | ICD-10-CM

## 2019-03-13 DIAGNOSIS — E03.9 HYPOTHYROIDISM (ACQUIRED): ICD-10-CM

## 2019-03-13 DIAGNOSIS — M51.37 DDD (DEGENERATIVE DISC DISEASE), LUMBOSACRAL: ICD-10-CM

## 2019-03-13 DIAGNOSIS — I10 ESSENTIAL HYPERTENSION: ICD-10-CM

## 2019-03-13 DIAGNOSIS — G89.4 CHRONIC PAIN SYNDROME: ICD-10-CM

## 2019-03-13 DIAGNOSIS — G47.33 OSA (OBSTRUCTIVE SLEEP APNEA): ICD-10-CM

## 2019-03-13 DIAGNOSIS — M77.11 LATERAL EPICONDYLITIS OF RIGHT ELBOW: ICD-10-CM

## 2019-03-13 DIAGNOSIS — E78.00 HYPERCHOLESTEROLEMIA: ICD-10-CM

## 2019-03-13 DIAGNOSIS — M67.921 TENDINOPATHY OF ELBOW, RIGHT: ICD-10-CM

## 2019-03-13 PROCEDURE — 99243 OFF/OP CNSLTJ NEW/EST LOW 30: CPT | Performed by: FAMILY MEDICINE

## 2019-03-13 RX ORDER — HYDROCHLOROTHIAZIDE 25 MG/1
TABLET ORAL
Qty: 90 TABLET | Refills: 3 | Status: SHIPPED | OUTPATIENT
Start: 2019-03-13 | End: 2020-03-30 | Stop reason: SDUPTHER

## 2019-03-13 ASSESSMENT — ENCOUNTER SYMPTOMS
VOMITING: 0
SORE THROAT: 0
WHEEZING: 0
EYE DISCHARGE: 0
ROS SKIN COMMENTS: NO SKIN WOUNDS.
EYE ITCHING: 0
SHORTNESS OF BREATH: 0
COUGH: 0
EYE PAIN: 0
BLOOD IN STOOL: 0
DIARRHEA: 0
ABDOMINAL PAIN: 0

## 2019-03-14 ENCOUNTER — TELEPHONE (OUTPATIENT)
Dept: ORTHOPEDIC SURGERY | Age: 49
End: 2019-03-14

## 2019-03-14 LAB — TSH REFLEX: 3.15 UIU/ML (ref 0.27–4.2)

## 2019-03-25 RX ORDER — LEVOTHYROXINE SODIUM 0.2 MG/1
200 TABLET ORAL DAILY
Qty: 90 TABLET | Refills: 3 | Status: SHIPPED | OUTPATIENT
Start: 2019-03-25 | End: 2019-11-08

## 2019-04-02 NOTE — PROGRESS NOTES
effect during this procedure. I give my doctor permission to give me blood or blood products. I understand that there are risks with receiving blood such as hepatitis, AIDS, fever, or allergic reaction. I acknowledge that the risks, benefits, and alternatives of this treatment have been explained to me and that no express or implied warranty has been given by the hospital, any blood bank, or any person or entity as to the blood or blood components transfused. At the discretion of my doctor, I agree to allow observers, equipment/product representatives and allow photographing, and/or televising of the procedure, provided my name or identity is maintained confidentially. I agree the hospital may dispose of or use for scientific or educational purposes any tissue, fluid, or body parts which may be removed.     ________________________________Date________Time______ am/pm  ([facility])  Patient or Signature of Closest Relative or Legal Guardian    ________________________________Date________Time______am/pm      Page 1 of  1  Witness

## 2019-04-04 NOTE — PROGRESS NOTES
PRE-OP INSTRUCTIONS FOR THE SURGICAL PATIENT YOU ARE UNABLE TO MAKE CONTACT FOR AN INTERVIEW:      1. Follow instructions for your ARRIVAL TIME as DIRECTED BY YOUR SURGEON. 2. Enter the MAIN entrance located on [address] and report to the desk. 3. Bring your insurance & prescription card and photo ID with you. You may also be asked to pay a co-pay, as you may want to bring a check or credit card with you. 4. Leave all other valuables at home. 5. Arrange for someone to drive you home and be with you for the first 24 hours after discharge. 6. You must contact your surgeon for ALL medication instructions, especially if taking blood thinners, aspirin, or diabetic medication. 7. A Pre-op History and Physical for surgery MUST be completed by your Physician or an Urgent Care within 30 days of your procedure date. Please bring a copy with you on the day of your procedure and along with any other testing performed. 8. DO NOT EAT ANYTHING eight hours prior to surgery. May have up to 8 ounces of water 4 hours prior to surgery. 9. No gum, candy, mints, or ice chips day of procedure. 10. Please refrain from drinking alcohol the day before or day of your procedure. 11. Please do not smoke the day of your procedure. 12. Dress in loose, comfortable clothing appropriate for redressing after your procedure. Do not wear jewelry (including body piercings), make-up, fingernail polish, lotion, powders or metal hairclips. 15. Contacts will need to be removed prior to surgery. You may want to bring your            Eye glasses to wear immediately before and after surgery. 14. Dentures will need to be removed before your procedure. 13. Bring cases for your glasses, contacts, dentures, or hearing aids to protect them while you are in surgery. 16. If you use a CPAP, please bring it with you on the day of your procedure.   17. Do not shave or wax for 72 hours prior to procedure near your operative site  18. FOR WOMAN OF CHILDBEARING AGE ONLY- please bring a urine sample with you on day of surgery or make sure we can collect on arrival.    If you have further questions, you may contact us at [phone]    Left instructions on patient's voicemail. Vandana Bustos. 4/4/2019 .8:52 AM

## 2019-04-07 ENCOUNTER — ANESTHESIA EVENT (OUTPATIENT)
Dept: OPERATING ROOM | Age: 49
End: 2019-04-07
Payer: COMMERCIAL

## 2019-04-08 ENCOUNTER — ANESTHESIA (OUTPATIENT)
Dept: OPERATING ROOM | Age: 49
End: 2019-04-08
Payer: COMMERCIAL

## 2019-04-08 ENCOUNTER — HOSPITAL ENCOUNTER (OUTPATIENT)
Age: 49
Setting detail: OUTPATIENT SURGERY
Discharge: HOME | End: 2019-04-08
Attending: ORTHOPAEDIC SURGERY | Admitting: ORTHOPAEDIC SURGERY
Payer: COMMERCIAL

## 2019-04-08 ENCOUNTER — APPOINTMENT (OUTPATIENT)
Dept: PHYSICAL THERAPY | Age: 49
End: 2019-04-08
Payer: COMMERCIAL

## 2019-04-08 VITALS — SYSTOLIC BLOOD PRESSURE: 101 MMHG | OXYGEN SATURATION: 88 % | TEMPERATURE: 97.3 F | DIASTOLIC BLOOD PRESSURE: 57 MMHG

## 2019-04-08 VITALS
DIASTOLIC BLOOD PRESSURE: 70 MMHG | RESPIRATION RATE: 18 BRPM | BODY MASS INDEX: 36.57 KG/M2 | HEART RATE: 92 BPM | SYSTOLIC BLOOD PRESSURE: 122 MMHG | TEMPERATURE: 97.9 F | OXYGEN SATURATION: 96 % | HEIGHT: 74 IN | WEIGHT: 285 LBS

## 2019-04-08 DIAGNOSIS — G89.18 ACUTE POST-OPERATIVE PAIN: Primary | ICD-10-CM

## 2019-04-08 PROCEDURE — 3600000004 HC SURGERY LEVEL 4 BASE: Performed by: ORTHOPAEDIC SURGERY

## 2019-04-08 PROCEDURE — 2580000003 HC RX 258: Performed by: ANESTHESIOLOGY

## 2019-04-08 PROCEDURE — 6360000002 HC RX W HCPCS: Performed by: ANESTHESIOLOGY

## 2019-04-08 PROCEDURE — 2580000003 HC RX 258: Performed by: ORTHOPAEDIC SURGERY

## 2019-04-08 PROCEDURE — 6360000002 HC RX W HCPCS: Performed by: NURSE ANESTHETIST, CERTIFIED REGISTERED

## 2019-04-08 PROCEDURE — 7100000000 HC PACU RECOVERY - FIRST 15 MIN: Performed by: ORTHOPAEDIC SURGERY

## 2019-04-08 PROCEDURE — 2709999900 HC NON-CHARGEABLE SUPPLY: Performed by: ORTHOPAEDIC SURGERY

## 2019-04-08 PROCEDURE — 7100000001 HC PACU RECOVERY - ADDTL 15 MIN: Performed by: ORTHOPAEDIC SURGERY

## 2019-04-08 PROCEDURE — 2500000003 HC RX 250 WO HCPCS: Performed by: ORTHOPAEDIC SURGERY

## 2019-04-08 PROCEDURE — 2500000003 HC RX 250 WO HCPCS: Performed by: NURSE ANESTHETIST, CERTIFIED REGISTERED

## 2019-04-08 PROCEDURE — 3600000014 HC SURGERY LEVEL 4 ADDTL 15MIN: Performed by: ORTHOPAEDIC SURGERY

## 2019-04-08 PROCEDURE — 3700000001 HC ADD 15 MINUTES (ANESTHESIA): Performed by: ORTHOPAEDIC SURGERY

## 2019-04-08 PROCEDURE — 3700000000 HC ANESTHESIA ATTENDED CARE: Performed by: ORTHOPAEDIC SURGERY

## 2019-04-08 RX ORDER — FENTANYL CITRATE 50 UG/ML
50 INJECTION, SOLUTION INTRAMUSCULAR; INTRAVENOUS EVERY 5 MIN PRN
Status: DISCONTINUED | OUTPATIENT
Start: 2019-04-08 | End: 2019-04-08 | Stop reason: HOSPADM

## 2019-04-08 RX ORDER — BUPIVACAINE HYDROCHLORIDE 5 MG/ML
INJECTION, SOLUTION EPIDURAL; INTRACAUDAL PRN
Status: DISCONTINUED | OUTPATIENT
Start: 2019-04-08 | End: 2019-04-08 | Stop reason: ALTCHOICE

## 2019-04-08 RX ORDER — PROPOFOL 10 MG/ML
INJECTION, EMULSION INTRAVENOUS PRN
Status: DISCONTINUED | OUTPATIENT
Start: 2019-04-08 | End: 2019-04-08 | Stop reason: SDUPTHER

## 2019-04-08 RX ORDER — SODIUM CHLORIDE 0.9 % (FLUSH) 0.9 %
10 SYRINGE (ML) INJECTION EVERY 12 HOURS SCHEDULED
Status: DISCONTINUED | OUTPATIENT
Start: 2019-04-08 | End: 2019-04-08 | Stop reason: SDUPTHER

## 2019-04-08 RX ORDER — HYDROCODONE BITARTRATE AND ACETAMINOPHEN 5; 325 MG/1; MG/1
1 TABLET ORAL
Status: DISCONTINUED | OUTPATIENT
Start: 2019-04-08 | End: 2019-04-08 | Stop reason: HOSPADM

## 2019-04-08 RX ORDER — FENTANYL CITRATE 50 UG/ML
INJECTION, SOLUTION INTRAMUSCULAR; INTRAVENOUS PRN
Status: DISCONTINUED | OUTPATIENT
Start: 2019-04-08 | End: 2019-04-08 | Stop reason: SDUPTHER

## 2019-04-08 RX ORDER — LIDOCAINE HYDROCHLORIDE 10 MG/ML
1 INJECTION, SOLUTION EPIDURAL; INFILTRATION; INTRACAUDAL; PERINEURAL
Status: DISCONTINUED | OUTPATIENT
Start: 2019-04-08 | End: 2019-04-08 | Stop reason: HOSPADM

## 2019-04-08 RX ORDER — ROCURONIUM BROMIDE 10 MG/ML
INJECTION, SOLUTION INTRAVENOUS PRN
Status: DISCONTINUED | OUTPATIENT
Start: 2019-04-08 | End: 2019-04-08 | Stop reason: SDUPTHER

## 2019-04-08 RX ORDER — SODIUM CHLORIDE 0.9 % (FLUSH) 0.9 %
10 SYRINGE (ML) INJECTION PRN
Status: DISCONTINUED | OUTPATIENT
Start: 2019-04-08 | End: 2019-04-08 | Stop reason: HOSPADM

## 2019-04-08 RX ORDER — GLYCOPYRROLATE 1 MG/5 ML
SYRINGE (ML) INTRAVENOUS PRN
Status: DISCONTINUED | OUTPATIENT
Start: 2019-04-08 | End: 2019-04-08 | Stop reason: SDUPTHER

## 2019-04-08 RX ORDER — SODIUM CHLORIDE, SODIUM LACTATE, POTASSIUM CHLORIDE, CALCIUM CHLORIDE 600; 310; 30; 20 MG/100ML; MG/100ML; MG/100ML; MG/100ML
INJECTION, SOLUTION INTRAVENOUS CONTINUOUS
Status: DISCONTINUED | OUTPATIENT
Start: 2019-04-08 | End: 2019-04-08 | Stop reason: SDUPTHER

## 2019-04-08 RX ORDER — SODIUM CHLORIDE 0.9 % (FLUSH) 0.9 %
10 SYRINGE (ML) INJECTION PRN
Status: DISCONTINUED | OUTPATIENT
Start: 2019-04-08 | End: 2019-04-08 | Stop reason: SDUPTHER

## 2019-04-08 RX ORDER — MORPHINE SULFATE 4 MG/ML
2 INJECTION, SOLUTION INTRAMUSCULAR; INTRAVENOUS EVERY 5 MIN PRN
Status: DISCONTINUED | OUTPATIENT
Start: 2019-04-08 | End: 2019-04-08 | Stop reason: HOSPADM

## 2019-04-08 RX ORDER — MIDAZOLAM HYDROCHLORIDE 1 MG/ML
INJECTION INTRAMUSCULAR; INTRAVENOUS PRN
Status: DISCONTINUED | OUTPATIENT
Start: 2019-04-08 | End: 2019-04-08 | Stop reason: SDUPTHER

## 2019-04-08 RX ORDER — AMOXICILLIN 250 MG
2 CAPSULE ORAL DAILY PRN
Qty: 30 TABLET | Refills: 0 | COMMUNITY
Start: 2019-04-08 | End: 2019-10-11 | Stop reason: ALTCHOICE

## 2019-04-08 RX ORDER — CEFAZOLIN SODIUM 1 G/3ML
INJECTION, POWDER, FOR SOLUTION INTRAMUSCULAR; INTRAVENOUS PRN
Status: DISCONTINUED | OUTPATIENT
Start: 2019-04-08 | End: 2019-04-08 | Stop reason: SDUPTHER

## 2019-04-08 RX ORDER — FENTANYL CITRATE 50 UG/ML
25 INJECTION, SOLUTION INTRAMUSCULAR; INTRAVENOUS EVERY 5 MIN PRN
Status: DISCONTINUED | OUTPATIENT
Start: 2019-04-08 | End: 2019-04-08 | Stop reason: HOSPADM

## 2019-04-08 RX ORDER — FENTANYL CITRATE 50 UG/ML
INJECTION, SOLUTION INTRAMUSCULAR; INTRAVENOUS
Status: DISCONTINUED
Start: 2019-04-08 | End: 2019-04-08 | Stop reason: HOSPADM

## 2019-04-08 RX ORDER — MEPERIDINE HYDROCHLORIDE 25 MG/ML
12.5 INJECTION INTRAMUSCULAR; INTRAVENOUS; SUBCUTANEOUS EVERY 5 MIN PRN
Status: DISCONTINUED | OUTPATIENT
Start: 2019-04-08 | End: 2019-04-08 | Stop reason: HOSPADM

## 2019-04-08 RX ORDER — LABETALOL HYDROCHLORIDE 5 MG/ML
5 INJECTION, SOLUTION INTRAVENOUS EVERY 10 MIN PRN
Status: DISCONTINUED | OUTPATIENT
Start: 2019-04-08 | End: 2019-04-08 | Stop reason: HOSPADM

## 2019-04-08 RX ORDER — SUCCINYLCHOLINE/SOD CL,ISO/PF 200MG/10ML
SYRINGE (ML) INTRAVENOUS PRN
Status: DISCONTINUED | OUTPATIENT
Start: 2019-04-08 | End: 2019-04-08 | Stop reason: SDUPTHER

## 2019-04-08 RX ORDER — ONDANSETRON 2 MG/ML
4 INJECTION INTRAMUSCULAR; INTRAVENOUS
Status: DISCONTINUED | OUTPATIENT
Start: 2019-04-08 | End: 2019-04-08 | Stop reason: HOSPADM

## 2019-04-08 RX ORDER — PREDNISONE 10 MG/1
TABLET ORAL
Qty: 42 TABLET | Refills: 0 | Status: SHIPPED | OUTPATIENT
Start: 2019-04-08 | End: 2019-04-18

## 2019-04-08 RX ORDER — SODIUM CHLORIDE 0.9 % (FLUSH) 0.9 %
10 SYRINGE (ML) INJECTION EVERY 12 HOURS SCHEDULED
Status: DISCONTINUED | OUTPATIENT
Start: 2019-04-08 | End: 2019-04-08 | Stop reason: HOSPADM

## 2019-04-08 RX ORDER — ONDANSETRON 2 MG/ML
INJECTION INTRAMUSCULAR; INTRAVENOUS PRN
Status: DISCONTINUED | OUTPATIENT
Start: 2019-04-08 | End: 2019-04-08 | Stop reason: SDUPTHER

## 2019-04-08 RX ORDER — SODIUM CHLORIDE, SODIUM LACTATE, POTASSIUM CHLORIDE, CALCIUM CHLORIDE 600; 310; 30; 20 MG/100ML; MG/100ML; MG/100ML; MG/100ML
INJECTION, SOLUTION INTRAVENOUS CONTINUOUS
Status: DISCONTINUED | OUTPATIENT
Start: 2019-04-08 | End: 2019-04-08 | Stop reason: HOSPADM

## 2019-04-08 RX ORDER — HYDROCODONE BITARTRATE AND ACETAMINOPHEN 5; 325 MG/1; MG/1
1 TABLET ORAL EVERY 6 HOURS PRN
Qty: 28 TABLET | Refills: 0 | Status: SHIPPED | OUTPATIENT
Start: 2019-04-08 | End: 2019-04-15

## 2019-04-08 RX ORDER — MAGNESIUM HYDROXIDE 1200 MG/15ML
LIQUID ORAL CONTINUOUS PRN
Status: COMPLETED | OUTPATIENT
Start: 2019-04-08 | End: 2019-04-08

## 2019-04-08 RX ADMIN — CEFAZOLIN SODIUM 3000 MG: 1 POWDER, FOR SOLUTION INTRAMUSCULAR; INTRAVENOUS at 09:38

## 2019-04-08 RX ADMIN — Medication 0.2 MG: at 09:00

## 2019-04-08 RX ADMIN — FENTANYL CITRATE 25 MCG: 50 INJECTION INTRAMUSCULAR; INTRAVENOUS at 10:15

## 2019-04-08 RX ADMIN — FENTANYL CITRATE 50 MCG: 50 INJECTION INTRAMUSCULAR; INTRAVENOUS at 09:37

## 2019-04-08 RX ADMIN — ROCURONIUM BROMIDE 20 MG: 10 INJECTION, SOLUTION INTRAVENOUS at 09:39

## 2019-04-08 RX ADMIN — SODIUM CHLORIDE, POTASSIUM CHLORIDE, SODIUM LACTATE AND CALCIUM CHLORIDE: 600; 310; 30; 20 INJECTION, SOLUTION INTRAVENOUS at 09:00

## 2019-04-08 RX ADMIN — PROPOFOL 50 MG: 10 INJECTION, EMULSION INTRAVENOUS at 09:17

## 2019-04-08 RX ADMIN — Medication 140 MG: at 09:11

## 2019-04-08 RX ADMIN — PROPOFOL 250 MG: 10 INJECTION, EMULSION INTRAVENOUS at 09:11

## 2019-04-08 RX ADMIN — MIDAZOLAM HYDROCHLORIDE 1 MG: 2 INJECTION, SOLUTION INTRAMUSCULAR; INTRAVENOUS at 09:00

## 2019-04-08 RX ADMIN — SUGAMMADEX 300 MG: 100 INJECTION, SOLUTION INTRAVENOUS at 10:04

## 2019-04-08 RX ADMIN — PHENYLEPHRINE HYDROCHLORIDE 80 MCG: 10 INJECTION, SOLUTION INTRAMUSCULAR; INTRAVENOUS; SUBCUTANEOUS at 10:01

## 2019-04-08 RX ADMIN — ONDANSETRON 4 MG: 2 INJECTION INTRAMUSCULAR; INTRAVENOUS at 09:53

## 2019-04-08 RX ADMIN — FENTANYL CITRATE 50 MCG: 50 INJECTION INTRAMUSCULAR; INTRAVENOUS at 09:11

## 2019-04-08 RX ADMIN — ROCURONIUM BROMIDE 5 MG: 10 INJECTION, SOLUTION INTRAVENOUS at 09:11

## 2019-04-08 RX ADMIN — FENTANYL CITRATE 25 MCG: 50 INJECTION INTRAMUSCULAR; INTRAVENOUS at 09:58

## 2019-04-08 RX ADMIN — LIDOCAINE HYDROCHLORIDE 100 MG: 20 INJECTION, SOLUTION INTRAVENOUS at 09:11

## 2019-04-08 RX ADMIN — PHENYLEPHRINE HYDROCHLORIDE 40 MCG: 10 INJECTION, SOLUTION INTRAMUSCULAR; INTRAVENOUS; SUBCUTANEOUS at 09:52

## 2019-04-08 RX ADMIN — ROCURONIUM BROMIDE 25 MG: 10 INJECTION, SOLUTION INTRAVENOUS at 09:16

## 2019-04-08 ASSESSMENT — PULMONARY FUNCTION TESTS
PIF_VALUE: 0
PIF_VALUE: 3
PIF_VALUE: 25
PIF_VALUE: 23
PIF_VALUE: 1
PIF_VALUE: 25
PIF_VALUE: 25
PIF_VALUE: 1
PIF_VALUE: 25
PIF_VALUE: 24
PIF_VALUE: 25
PIF_VALUE: 26
PIF_VALUE: 25
PIF_VALUE: 14
PIF_VALUE: 26
PIF_VALUE: 25
PIF_VALUE: 24
PIF_VALUE: 25
PIF_VALUE: 26
PIF_VALUE: 4
PIF_VALUE: 25
PIF_VALUE: 23
PIF_VALUE: 1
PIF_VALUE: 25
PIF_VALUE: 26
PIF_VALUE: 24
PIF_VALUE: 23
PIF_VALUE: 24
PIF_VALUE: 25
PIF_VALUE: 23
PIF_VALUE: 25
PIF_VALUE: 24
PIF_VALUE: 24
PIF_VALUE: 23
PIF_VALUE: 1
PIF_VALUE: 24
PIF_VALUE: 6
PIF_VALUE: 26
PIF_VALUE: 0
PIF_VALUE: 24
PIF_VALUE: 1
PIF_VALUE: 24
PIF_VALUE: 24
PIF_VALUE: 23
PIF_VALUE: 24
PIF_VALUE: 26
PIF_VALUE: 23
PIF_VALUE: 26
PIF_VALUE: 25
PIF_VALUE: 24
PIF_VALUE: 25
PIF_VALUE: 24
PIF_VALUE: 22
PIF_VALUE: 25
PIF_VALUE: 24
PIF_VALUE: 25
PIF_VALUE: 25
PIF_VALUE: 26
PIF_VALUE: 24
PIF_VALUE: 25
PIF_VALUE: 25
PIF_VALUE: 24
PIF_VALUE: 27
PIF_VALUE: 24
PIF_VALUE: 25
PIF_VALUE: 24
PIF_VALUE: 24
PIF_VALUE: 19
PIF_VALUE: 25
PIF_VALUE: 26
PIF_VALUE: 25
PIF_VALUE: 24
PIF_VALUE: 1
PIF_VALUE: 43
PIF_VALUE: 25
PIF_VALUE: 1
PIF_VALUE: 2
PIF_VALUE: 20
PIF_VALUE: 25
PIF_VALUE: 25
PIF_VALUE: 1
PIF_VALUE: 26
PIF_VALUE: 7

## 2019-04-08 ASSESSMENT — PAIN SCALES - GENERAL
PAINLEVEL_OUTOF10: 3
PAINLEVEL_OUTOF10: 4
PAINLEVEL_OUTOF10: 4

## 2019-04-08 ASSESSMENT — PAIN - FUNCTIONAL ASSESSMENT: PAIN_FUNCTIONAL_ASSESSMENT: 0-10

## 2019-04-08 NOTE — ANESTHESIA PRE PROCEDURE
Department of Anesthesiology  Preprocedure Note       Name:  Bertha Delgado   Age:  47 y.o.  :  1970                                          MRN:  [id number]         Date:  2019      Surgeon: Selvin Naranjo):  Elida Blanco MD    Procedure: RIGHT TENNIS ELBOW RELEASE (Right )    Medications prior to admission:   Prior to Admission medications    Medication Sig Start Date End Date Taking? Authorizing Provider   levothyroxine (SYNTHROID) 200 MCG tablet Take 1 tablet by mouth daily 3/25/19   Lyell Border, DO   hydrochlorothiazide (HYDRODIURIL) 25 MG tablet TAKE 2 TABLETs every morning 3/13/19   [facility]   pregabalin (LYRICA) 100 MG capsule TAKE 1 TAB IN THE AM AND 2 TABS IN THE PM 3/6/19 8/14/19  Dez Cardona MD   DULoxetine (CYMBALTA) 30 MG extended release capsule Take 1 capsule by mouth daily 3/6/19 4/5/19  Dez Cardona MD   tiZANidine (ZANAFLEX) 4 MG tablet Take . 5-1 tablet by mouth bid prn pain 3/6/19   Dez Cardona MD   atorvastatin (LIPITOR) 40 MG tablet TAKE 1 TABLET DAILY 3/4/19   [facility]   lisinopril (PRINIVIL;ZESTRIL) 10 MG tablet TAKE 1 TABLET DAILY 3/4/19   [facility], DO   aspirin 81 MG tablet Take 81 mg by mouth daily    Historical Provider, MD   omeprazole (PRILOSEC) 40 MG delayed release capsule Take 1 capsule by mouth every morning (before breakfast) 19   Samara Tapia MD   meloxicam (MOBIC) 15 MG tablet Take 15 mg by mouth daily    Historical Provider, MD   magnesium (MAGNESIUM-OXIDE) 250 MG TABS tablet Take 250 mg by mouth daily    Historical Provider, MD       Current medications:    No current facility-administered medications for this encounter. Allergies:     Allergies   Allergen Reactions    Oxycontin [Oxycodone] Nausea Only    Percocet [Oxycodone-Acetaminophen] Nausea And Vomiting       Problem List:    Patient Active Problem List   Diagnosis Code    Chronic pain syndrome G89.4    Degenerative joint disease of spinal facet joint M47.819    Recurrent major depressive disorder ([address] F33.9    DDD (degenerative disc disease), cervical M50.30    Lumbar radiculopathy M54.16    Hyperlipidemia E78.5    HTN (hypertension) I10    CTS (carpal tunnel syndrome) G56.00    Sinusitis J32.9    Back pain M54.9    Rt flank pain R10.9    Microscopic hematuria R31.29    Low back pain radiating to right leg M54.5    Failed back surgical syndrome M96.1    DDD (degenerative disc disease), lumbosacral M51.37    History of thyroid cancer Z85.850    De Quervain's disease (radial styloid tenosynovitis) M65.4    Rotator cuff tendinitis M75.80    Glenohumeral arthritis M19.019    Hypercholesterolemia E78.00    Acute cystitis without hematuria N30.00    S/P shoulder replacement Z96.619    Major depressive disorder in partial remission ([address] F32.4       Past Medical History:        Diagnosis Date    Back pain     Chronic pain     Chronic pain syndrome     DDD (degenerative disc disease), cervical     Degenerative joint disease of spinal facet joint     Depressive disorder, not elsewhere classified     Dizziness     Failed back surgical syndrome     Headache(784.0)     HTN (hypertension)     Hyperlipidemia     Lumbar radiculopathy     Sleep apnea     does not use CPAP    thyroid cancer     thyroid    Thyroid disease        Past Surgical History:        Procedure Laterality Date    CARPAL TUNNEL RELEASE Left 3-5-2013    CARPAL TUNNEL RELEASE Right 4-    HAND SURGERY Left 1-5-2016    First dorsal extensor compartment tendon release.     JOINT REPLACEMENT Right     shoulder    ROTATOR CUFF REPAIR Right 1993    right    SHOULDER SURGERY      SPINAL FUSION  2006    THYROIDECTOMY  2008    UPPER GASTROINTESTINAL ENDOSCOPY N/A 1/29/2019    EGD DIAGNOSTIC ONLY performed by Cindy Amador MD at [address] [place] [address] History:    Social History     Tobacco Use    Smoking status: Never Smoker    Smokeless tobacco: Never Used   Substance Use Topics    Alcohol use: Yes     Alcohol/week: 0.0 oz     Comment: occasional use                                Counseling given: Not Answered      Vital Signs (Current): There were no vitals filed for this visit. BP Readings from Last 3 Encounters:   03/13/19 120/82   03/06/19 112/74   03/05/19 121/86       NPO Status:                                                                                 BMI:   Wt Readings from Last 3 Encounters:   03/13/19 293 lb (132.9 kg)   03/06/19 291 lb (132 kg)   03/05/19 285 lb 0.9 oz (129.3 kg)     There is no height or weight on file to calculate BMI.    CBC:   Lab Results   Component Value Date    WBC 8.6 08/04/2017    RBC 4.83 08/04/2017    HGB 14.0 08/04/2017    HCT 41.9 08/04/2017    MCV 86.8 08/04/2017    RDW 13.0 08/04/2017     08/04/2017       CMP:   Lab Results   Component Value Date     11/01/2018    K 4.3 11/01/2018     11/01/2018    CO2 23 11/01/2018    BUN 21 11/01/2018    CREATININE 0.9 11/01/2018    GFRAA >60 11/01/2018    GFRAA >60 05/24/2011    AGRATIO 1.8 11/01/2018    LABGLOM >60 11/01/2018    GLUCOSE 96 11/01/2018    PROT 6.9 11/01/2018    CALCIUM 9.1 11/01/2018    BILITOT 0.4 11/01/2018    ALKPHOS 78 11/01/2018    AST 16 11/01/2018    ALT 21 11/01/2018       POC Tests: No results for input(s): POCGLU, POCNA, POCK, POCCL, POCBUN, POCHEMO, POCHCT in the last 72 hours.     Coags:   Lab Results   Component Value Date    PROTIME 10.5 06/21/2017    INR 0.93 06/21/2017    APTT 29.0 06/21/2017       HCG (If Applicable): No results found for: PREGTESTUR, PREGSERUM, HCG, HCGQUANT     ABGs: No results found for: PHART, PO2ART, XXT1QOJ, SYT9PNR, BEART, W9FBPMFM     Type & Screen (If Applicable):  No results found for: LABABO, [facility]    Anesthesia Evaluation  Patient summary reviewed  Airway: Mallampati: IV        Dental:    (+) caps      Pulmonary:normal exam    (+) sleep apnea:                             Cardiovascular:    (+) hypertension:,                   Neuro/Psych:               GI/Hepatic/Renal: Neg GI/Hepatic/Renal ROS            Endo/Other:    (+) hypothyroidism::., .                 Abdominal:   (+) obese,         Vascular:                                        Anesthesia Plan      general     ASA 3             Anesthetic plan and risks discussed with patient. Plan discussed with CRNA.     Attending anesthesiologist reviewed and agrees with Pre Eval content              Sheron Morales MD   4/8/2019

## 2019-04-08 NOTE — OP NOTE
[facility]               [address]                                OPERATIVE REPORT    PATIENT NAME: Luna Condon                     :        1970  MED REC NO:   [id number]                          ROOM:  ACCOUNT NO:   [de-identified]                           ADMIT DATE: 2019  PROVIDER:     Sarah Thomas MD    DATE OF PROCEDURE:  2019    PREOPERATIVE DIAGNOSIS:  Chronic lateral epicondylitis, right elbow. POSTOPERATIVE DIAGNOSIS:  Chronic lateral epicondylitis, right elbow. PROCEDURE:  Right open tennis elbow release. ANESTHESIA:  General anesthesia, local anesthetic. IV FLUIDS:  800 mL of crystalloid. ESTIMATED BLOOD LOSS:  10 mL. TOURNIQUET TIME:  20 minutes. COMPLICATIONS:  None. SURGEON:  Sarah Thomas MD.    FINDINGS:  Examination under anesthesia revealed normal elbow range of  motion. No instability. Arthrotomy revealed extensive degenerative  tearing involving the extensor carpi radialis brevis and extending a  little bit, involved the extensor carpi radialis longus. We were able  to carry out incision, extensive debridement, drilling, and then primary  closure of extensor carpi radialis longus. The elbow remained stable. There was a fair amount of effusion. It was removed as well as some  intraarticular synovitis that was debrided. BRIEF HISTORY AND PRESENTING ILLNESS:  The patient is a 50-year-old  gentleman, who presented with chronic lateral epicondylitis. He had  lateral elbow pain for many months. He had failed multimodal  conservative treatments including corticosteroids injectables, bracing,  home exercise, physical therapy, activity modification, and modalities. We recommended operative intervention. An MRI confirmed the diagnosis.   He  understood the potential risks and benefits of surgery such as  incomplete pain relief, bleeding, infection, anesthetic risks, injury complex. We evacuated significant  effusion. We used a small rongeur to remove all the debris. Synovial  rongeur was available for the smaller and more delicate debridement. Finally, we used a curette to remove all the degenerative tissue off the  common extensor origin. We drilled three to four drill holes to a depth  of 4 mm for neovascularization. This was done into the bony origin. We  irrigated copiously. We closed the extensor carpi radialis longus using 0 Vicryl  figure-of-eight stitches, four sutures were used. We dropped tourniquet  time to 20 minutes. We used bipolar and regular cautery to establish  hemostasis. We closed the subcutaneous tissue with 2-0 Vicryl in  interrupted and inverted fashion. Routine skin closure with 4-0  Monocryl and Prineo dressing was used to close the skin. We infiltrated  with 0.5% Marcaine into the wound for postoperative analgesia. Local  dressing was applied, overwrapped with Webril and Ace wrap. A sling was  provided for comfort. The patient was repositioned, promptly awakened  from anesthesia having tolerated the procedure well and was taken from  the operating room to the recovery room in a satisfactory condition. PLAN:  The patient will be discharged on oral analgesics with  instructions to begin outpatient physical therapy. Follow up in the  office in one week.         Carole Oneal MD    D: 04/08/2019 10:35:44       T: 04/08/2019 12:58:33     [email or web address]  Job#: [id number]     Doc#: [id number]    CC:

## 2019-04-08 NOTE — H&P
Transportation needs:     Medical: Not on file     Non-medical: Not on file   Tobacco Use    Smoking status: Never Smoker    Smokeless tobacco: Never Used   Substance and Sexual Activity    Alcohol use: Yes     Alcohol/week: 0.0 oz     Comment: occasional use    Drug use: No    Sexual activity: Not on file   Lifestyle    Physical activity:     Days per week: Not on file     Minutes per session: Not on file    Stress: Not on file   Relationships    Social connections:     Talks on phone: Not on file     Gets together: Not on file     Attends Taoism service: Not on file     Active member of club or organization: Not on file     Attends meetings of clubs or organizations: Not on file     Relationship status: Not on file    Intimate partner violence:     Fear of current or ex partner: Not on file     Emotionally abused: Not on file     Physically abused: Not on file     Forced sexual activity: Not on file   Other Topics Concern    Not on file   Social History Narrative    Not on file         Medications Prior to Admission:      Prior to Admission medications    Medication Sig Start Date End Date Taking? Authorizing Provider   levothyroxine (SYNTHROID) 200 MCG tablet Take 1 tablet by mouth daily 3/25/19   Lashay Mcclure DO   hydrochlorothiazide (HYDRODIURIL) 25 MG tablet TAKE 2 TABLETs every morning 3/13/19   Lashay Mcclure DO   pregabalin (LYRICA) 100 MG capsule TAKE 1 TAB IN THE AM AND 2 TABS IN THE PM 3/6/19 8/14/19  Yfn Vásquez MD   DULoxetine (CYMBALTA) 30 MG extended release capsule Take 1 capsule by mouth daily 3/6/19 4/5/19  Yfn Vásquez MD   tiZANidine (ZANAFLEX) 4 MG tablet Take . 5-1 tablet by mouth bid prn pain 3/6/19   Yfn Vásquez MD   atorvastatin (LIPITOR) 40 MG tablet TAKE 1 TABLET DAILY 3/4/19   Lashay Mcclure DO   lisinopril (PRINIVIL;ZESTRIL) 10 MG tablet TAKE 1 TABLET DAILY 3/4/19   Lashay Mcclure DO   aspirin 81 MG tablet Take 81 mg by mouth daily Historical Provider, MD   omeprazole (PRILOSEC) 40 MG delayed release capsule Take 1 capsule by mouth every morning (before breakfast) 1/29/19   Samara Tapia MD   meloxicam (MOBIC) 15 MG tablet Take 15 mg by mouth daily    Historical Provider, MD   magnesium (MAGNESIUM-OXIDE) 250 MG TABS tablet Take 250 mg by mouth daily    Historical Provider, MD         Allergies:  Oxycontin [oxycodone] and Percocet [oxycodone-acetaminophen]    PHYSICAL EXAM:      /84   Pulse 92   Temp 97.6 °F (36.4 °C) (Oral)   Resp 18   Ht 6' 2\" (1.88 m)   Wt 285 lb (129.3 kg)   SpO2 95%   BMI 36.59 kg/m²      Heart:  Regular rate and rhythm, No murmur noted    Lungs: No increased work of breathing, good air exchange, clear to ausculation bilaterally     Abdomen:  Soft, non-distended, non-tender, normal active bowel sounds, no masses palpated    ASSESSMENT AND PLAN:    1. Patient seen and focused exam done today- no new changes since last physical exam on 3/13/19    2. Access to ancillary services are available per request of the provider.     HECTOR Barajas - CNP     4/8/2019

## 2019-04-10 ENCOUNTER — APPOINTMENT (OUTPATIENT)
Dept: PHYSICAL THERAPY | Age: 49
End: 2019-04-10
Payer: COMMERCIAL

## 2019-04-12 ENCOUNTER — TELEPHONE (OUTPATIENT)
Dept: ORTHOPEDIC SURGERY | Age: 49
End: 2019-04-12

## 2019-04-15 ENCOUNTER — APPOINTMENT (OUTPATIENT)
Dept: PHYSICAL THERAPY | Age: 49
End: 2019-04-15
Payer: COMMERCIAL

## 2019-04-15 ENCOUNTER — TELEPHONE (OUTPATIENT)
Dept: ORTHOPEDIC SURGERY | Age: 49
End: 2019-04-15

## 2019-04-15 ENCOUNTER — HOSPITAL ENCOUNTER (OUTPATIENT)
Dept: PHYSICAL THERAPY | Age: 49
Setting detail: THERAPIES SERIES
Discharge: HOME | End: 2019-04-15
Payer: COMMERCIAL

## 2019-04-15 PROCEDURE — 97161 PT EVAL LOW COMPLEX 20 MIN: CPT

## 2019-04-15 PROCEDURE — G0283 ELEC STIM OTHER THAN WOUND: HCPCS

## 2019-04-15 PROCEDURE — 97110 THERAPEUTIC EXERCISES: CPT

## 2019-04-15 NOTE — PROGRESS NOTES
Outpatient Physical Therapy  [] [facility]    Phone: [phone]   Fax: [phone]   [] [facility]  Phone: [phone]              Fax: [phone]  [x] [facility]   Phone: [phone]   Fax: [phone]     To: Referring Practitioner: Dr. Adela De Guzman      Patient: Natan Bradshaw   : 1970   MRN: [id number]  Evaluation Date: 4/15/2019      Diagnosis Information:  · Diagnosis: s/p tennis elbow release   · Treatment Diagnosis: Decreased ADL status     Physical Therapy Certification/Re-Certification Form  Dear Dr. Esperanza Neville,  The following patient has been evaluated for physical therapy services and for therapy to continue, Medicare requires monthly physician review of the treatment plan. Please review the attached evaluation and/or summary of the patient's plan of care, and verify that you agree therapy should continue by signing the attached document and sending it back to our office. Plan of Care/Treatment to date:  [x] Therapeutic Exercise    [x] Modalities:  [] Therapeutic Activity     [] Ultrasound  [] Electrical Stimulation  [] Gait Training      [] Cervical Traction [] Lumbar Traction  [] Neuromuscular Re-education    [] Cold/hotpack [] Iontophoresis   [x] Instruction in HEP     Other:  [x] Manual Therapy      []             [] Aquatic Therapy      []           ? Frequency/Duration:  # Days per week: [] 1 day # Weeks: [] 1 week [] 5 weeks     [x] 2 days? [] 2 weeks [] 6 weeks     [] 3 days   [] 3 weeks [] 7 weeks     [] 4 days   [x] 4 weeks [] 8 weeks    Rehab Potential: [] Excellent [x] Good [] Fair  [] Poor       Electronically signed by:  Emerson Wilkins PT      If you have any questions or concerns, please don't hesitate to call.   Thank you for your referral.      Physician Signature:________________________________Date:__________________  By signing above, therapists plan is approved by physician
4-/5(pain)  R Wrist Radial Deviation: 4+/5  R Wrist Ulnar Deviation: 4+/5  Strength Other  Other: R fingers 2-4 extension 3+/5, pain; Dynomometer (lbs): L 104, R 13       Assessment   Conditions Requiring Skilled Therapeutic Intervention  Body structures, Functions, Activity limitations: Decreased ADL status; Decreased strength; Increased Pain  Assessment: PT presented to PT with chronic R elbow pain for which he had an open tennis elbow release surgery 1 week ago. PLOF: independent, chronic pain at times affected function  Treatment Diagnosis: Decreased ADL status  Prognosis: Good  Decision Making: Low Complexity  REQUIRES PT FOLLOW UP: Yes  Activity Tolerance  Activity Tolerance: Patient limited by pain         Plan   Plan  Times per week: PT 2x/week for 4 weeks    OutComes Score  QuickDASH Total Score: 49       QuickDASH Disability/Symptom Score : 86.36 %    Goals  Short term goals  Time Frame for Short term goals: 1 week  Short term goal 1: Pt will be independent with a ROM HEP. Long term goals  Long term goal 1: R elbow, wrist and forearm AROM WFL and painless all directions. Long term goal 2: Pain R elbow will rate 0-2/10. Long term goal 3: Strength R biceps, triceps, wrist and finger extensors 5/5 and painless. Long term goal 4:  strength per dynomometer (pounds) will be 75 pounds of . Long term goal 5: Pt will be able to lift items greater than 5 pounds without pain.   Patient Goals   Patient goals : \"pain to finally go away\"       Therapy Time   Individual Concurrent Group Co-treatment   Time In 2506         Time Out 0886         Minutes 60         Timed Code Treatment Minutes: Fab 79, PT

## 2019-04-15 NOTE — FLOWSHEET NOTE
Physical Therapy Daily Treatment Note  Date:  4/15/2019    Patient Name:  Juan Carlos Hernandez    :  1970  MRN: [id number]  Restrictions/Precautions:    Pertinent Medical History:  Medical/Treatment Diagnosis Information:  · Diagnosis: s/p tennis elbow release  · Treatment Diagnosis: Decreased ADL status  Insurance/Certification information:  PT Insurance Information: [facility]  Physician Information:  Referring Practitioner: Dr. Breanne Garrett of care signed (Y/N):  Sent to in basket on 4/15/19  Visit# / total visits:    Pain level: 5-6/10       Progress Note: []  Yes  []  No  Next due by: Visit #10      History of Injury:Pt had R open tennis elbow release surgery on 19. Pt stated \"it was worse than they thought because they had to make a larger incision. \"  Pt has been resting his forearm this week and \"I had it all wrapped up until Saturday. \"  Pt stated his R elbow pain is rated 5-6/10- \"its not too bad, but I can't really use it. \"  Pt has been \"sleeping in a recliner and keeping it propped up all night. \"          Subjective:       Objective:  Observation:   Test measurements:  4/15/19    AROM RUE (degrees)  R Shoulder Flexion 0-180: WFL  R Shoulder Extension 0-45: WFL  R Shoulder ABduction 0-180: WFL  R Shoulder Int Rotation  0-70: WFL  R Shoulder Ext Rotation 0-90: WFL  R Elbow Flexion 0-145: 130  R Elbow Extension 145-0: -25, pain  R Forearm Pron 0-90: 90  R Forearm Supination  0-90: 70, minimal pain  R Wrist Flexion 0-80: 52  R Wrist Extension 0-70: 60, pain  R Wrist Radial Deviation 0-20: 27  R Wrist Ulnar Deviation 0-45: 15  Strength RUE  R Shoulder Flexion: 5/5  R Shoulder ABduction: 5/5  R Shoulder Internal Rotation: 5/5  R Shoulder External Rotation: 5/5  R Elbow Flexion: 4+/5  R Elbow Extension: 4+/5(min pain)  R Forearm Pron: 4+/5  R Forearm Sup: 4/5(min pain)  R Wrist Flexion: 4+/5  R Wrist Extension: 4-/5(pain)  R Wrist Radial Deviation: 4+/5  R Wrist Ulnar Deviation: 4+/5  Strength Other  Other: R fingers 2-4 extension 3+/5, pain; Dynomometer (lbs): L 104, R 13        Exercises:  Exercise/Equipment Resistance/Repetitions Other comments                                                                            Other Therapeutic Activities:      Home Exercise Program:    4/15/19 HEP was instructed and included AAROM elbow flex/ext, AROM wrist flex/ext, AROM wrist RD/UD, thumb/finger opposition, instructed all in a painless ROM. Pt was given written hand outs and demonstrated exercises correctly. Pt expressed understanding of exercises.       Manual Treatments:      Modalities:  IFC with CP 15' R elbow    Timed Code Treatment Minutes:  20    Total Treatment Minutes:  60    Assessment  [] Patient tolerated treatment well [] Patient limited by fatigue  [x] Patient limited by pain  [] Patient limited by other medical complications  [] Other:         Prognosis: [x] Good [] Fair  [] Poor    Patient Requires Follow-up: [x] Yes  [] No    Plan:   [] Continue per plan of care [] Alter current plan (see comments)  [x] Plan of care initiated [] Hold pending MD visit [] Discharge  Plan for Next Session:      Electronically signed by:  Sydney Gordillo PT

## 2019-04-16 ENCOUNTER — OFFICE VISIT (OUTPATIENT)
Dept: ORTHOPEDIC SURGERY | Age: 49
End: 2019-04-16

## 2019-04-16 VITALS
DIASTOLIC BLOOD PRESSURE: 66 MMHG | BODY MASS INDEX: 36.57 KG/M2 | HEART RATE: 105 BPM | WEIGHT: 285 LBS | SYSTOLIC BLOOD PRESSURE: 109 MMHG | HEIGHT: 74 IN

## 2019-04-16 DIAGNOSIS — M77.12 LEFT TENNIS ELBOW: Primary | ICD-10-CM

## 2019-04-16 DIAGNOSIS — Z98.890 HISTORY OF ELBOW SURGERY: ICD-10-CM

## 2019-04-16 PROCEDURE — 99024 POSTOP FOLLOW-UP VISIT: CPT | Performed by: PHYSICIAN ASSISTANT

## 2019-04-16 NOTE — PROGRESS NOTES
Review of Systems   Musculoskeletal:        Joint pain
Left tennis elbow Yes    History of elbow surgery        Office Procedures:  No orders of the defined types were placed in this encounter. Treatment Plan:    Overall Raiza Whitley is doing well. The pain is well-controlled. He will continue in physical therapy at [facility] just once per week for now. We will update his physical therapy script in Epic. He has continued to take Advil PRN for pain control. All of his questions were fully answered today. We would like to see Raiza Whitley back in 2 weeks for follow-up visit. 4/19/2019  7:34 PM    Howard Chaudhry ATC  Orthopaedic Sports Medicine     During this examination, I, Howard Chaudhry ATC, functioned as a scribe for [employer]. The history taking and physical examination were performed by ROCCO Bhatt. All counsleing during the appointment was performed between the patient and ROCCO Bhatt. 10:00 AM      Margaret Wu PA-C  Orthopaedic Sports Medicine Physician Assistant    This dictation was performed with a verbal recognition program [facility]) and it was checked for errors. It is possible that there are still dictated errors within this office note. If so, please bring any errors to my attention for an addendum. All efforts were made to ensure that this office note is accurate.

## 2019-04-16 NOTE — LETTER
Physical Therapy Rehabilitation Referral    Patient Name:  Callie Salazar      YOB: 1970    Diagnosis:    1. Left tennis elbow    2. Right open tennis elbow release. Precautions:     [x] Evaluate and Treat    Post Op Instructions:  [] Continuous passive motion (CPM)  [x] Elbow ROM  [x] Exercise in plane of scapula  []  Strengthening     [] Pulley and instruction   [x] Home exercise program (copy to patient)   [] Sling when arm at risk  [] Sling or brace at all times   [] AAROM: Forward elevation to  140            [] AAROM: External rotation  To  40    [] Isometric external rotator strengthening [] AAROM: internal rotation: up the back  [x] Isometric abductor strengthening  [] AAROM: Internal abduction   [] Isometric internal rotator strengthening [] AAROM: cross-body adduction             Stretching:     Strengthening:  [] Four quadrant (FE, ER, IR, CBA)  [] Rotator cuff (ER, IR, Abd)  [] Forward Elevation    [] External Rotators     [] External Rotation    [] Internal Rotators  [] Internal Rotation: up/back   [] Abductors     [] Internal Rotation: supine in abduction  [] Sleeper Stretch    [] Flexors  [] Cross-body abduction    [] Extensors  [] Pendulum (FE, Abd/Add, cw/ccw)  [x] Scapular Stabilizers   [] Wall-walking (FE, Abd)        [x] Shoulder shrugs     [] Table slides (FE)                [x] Rhomboid pinch  [x] Elbow (flex, ext, pron, sup)        [] Lat.  Pull downs     [x] Medial epicondylitis program       [] Forward punch   [x] Lateral epicondylitis program       [] Internal rotators     [x] Progressive resistive exercises  [] Bench Press        [] Bench press plus  Activities:     [] Lateral pull-downs  [] Rowing     [] Progressive two-hand supine press  [] Stepper/Exercise bike   [] Biceps: curls/supination  [] Swimming  [] Water exercises    Modalities:     Return to Sport:  [x] Of Choice      [] Plyometrics  [x] Ultrasound     [] Rhythmic stabilization [x] Iontophoresis    [] Core strengthening   [x] Moist heat     [] Sports specific program:   [x] Massage         [x] Cryotherapy      [x] Electrical stimulation     [x] Paraffin  [x] Whirlpool  [x] TENS    [x] Home exercise program (copy to patient). Perform exercises for:   15     minutes    3      times/day  [x] Supervised physical therapy  Frequency: []  1x week  [x] 2x week  [] 3x week  [] Other:   Duration: [] 2 weeks   [] 4 weeks  [x] 6 weeks  [] Other:     Additional Instructions:   Please work on obtaining full Elbow ROM FIRST.           Massiel Martinez PA-C

## 2019-04-17 ENCOUNTER — HOSPITAL ENCOUNTER (OUTPATIENT)
Dept: PHYSICAL THERAPY | Age: 49
Setting detail: THERAPIES SERIES
Discharge: HOME | End: 2019-04-17
Payer: COMMERCIAL

## 2019-04-17 PROCEDURE — 97140 MANUAL THERAPY 1/> REGIONS: CPT

## 2019-04-17 PROCEDURE — G0283 ELEC STIM OTHER THAN WOUND: HCPCS

## 2019-04-17 NOTE — FLOWSHEET NOTE
Physical Therapy Daily Treatment Note  Date:  2019    Patient Name:  Italia Donaldson    :  1970  MRN: [id number]  Restrictions/Precautions:    Pertinent Medical History:  Medical/Treatment Diagnosis Information:  · Diagnosis: s/p tennis elbow release  · Treatment Diagnosis: Decreased ADL status  Insurance/Certification information:  PT Insurance Information: [facility]  Physician Information:  Referring Practitioner: Dr. Jez Us of care signed (Y/N):  Sent to in basket on 4/15/19  Visit# / total visits:    Pain level: 5-6/10       Progress Note: []  Yes  []  No  Next due by: Visit #10      History of Injury:Pt had R open tennis elbow release surgery on 19. Pt stated \"it was worse than they thought because they had to make a larger incision. \"  Pt has been resting his forearm this week and \"I had it all wrapped up until Saturday. \"  Pt stated his R elbow pain is rated 5-6/10- \"its not too bad, but I can't really use it. \"  Pt has been \"sleeping in a recliner and keeping it propped up all night. \"          Subjective:   Trying not to use arm.  Some things are difficult because he is R handed    Objective:  Observation:   Test measurements:  4/15/19    AROM RUE (degrees)  R Shoulder Flexion 0-180: WFL  R Shoulder Extension 0-45: WFL  R Shoulder ABduction 0-180: WFL  R Shoulder Int Rotation  0-70: WFL  R Shoulder Ext Rotation 0-90: WFL  R Elbow Flexion 0-145: 130  R Elbow Extension 145-0: -25, pain  R Forearm Pron 0-90: 90  R Forearm Supination  0-90: 70, minimal pain  R Wrist Flexion 0-80: 52  R Wrist Extension 0-70: 60, pain  R Wrist Radial Deviation 0-20: 27  R Wrist Ulnar Deviation 0-45: 15  Strength RUE  R Shoulder Flexion: 5/5  R Shoulder ABduction: 5/5  R Shoulder Internal Rotation: 5/5  R Shoulder External Rotation: 5/5  R Elbow Flexion: 4+/5  R Elbow Extension: 4+/5(min pain)  R Forearm Pron: 4+/5  R Forearm Sup: 4/5(min pain)  R Wrist Flexion: 4+/5  R Wrist Extension: 4-/5(pain)  R Wrist Radial Deviation: 4+/5  R Wrist Ulnar Deviation: 4+/5  Strength Other  Other: R fingers 2-4 extension 3+/5, pain; Dynomometer (lbs): L 104, R 13        Exercises:  Exercise/Equipment Resistance/Repetitions Other comments        Shoulder shrugs  Scap retract                              Gentle stretching R wrist  Medial epicondyle   Lateral epicondyle x5 ea    PROM R elbow  Flex  Ext  Pron/sup  20 min                                Other Therapeutic Activities:      Home Exercise Program:    4/15/19 HEP was instructed and included AAROM elbow flex/ext, AROM wrist flex/ext, AROM wrist RD/UD, thumb/finger opposition, instructed all in a painless ROM. Pt was given written hand outs and demonstrated exercises correctly. Pt expressed understanding of exercises.       Manual Treatments:      Modalities:  IFC with CP 15' R elbow    Timed Code Treatment Minutes:  25    Total Treatment Minutes:  40    Assessment  [] Patient tolerated treatment well [] Patient limited by fatigue  [x] Patient limited by pain  [] Patient limited by other medical complications  [x] Other: Difficulty sleeping in bed because he sleeps on his side        Prognosis: [x] Good [] Fair  [] Poor    Patient Requires Follow-up: [x] Yes  [] No    Plan:   [x] Continue per plan of care [] Alter current plan (see comments)  [] Plan of care initiated [] Hold pending MD visit [] Discharge  Plan for Next Session:  See above    Electronically signed by:  Ramesh Li, PTA [id number]

## 2019-04-19 ENCOUNTER — OFFICE VISIT (OUTPATIENT)
Dept: FAMILY MEDICINE CLINIC | Age: 49
End: 2019-04-19
Payer: COMMERCIAL

## 2019-04-19 VITALS
TEMPERATURE: 98.1 F | SYSTOLIC BLOOD PRESSURE: 120 MMHG | BODY MASS INDEX: 36.85 KG/M2 | WEIGHT: 287 LBS | DIASTOLIC BLOOD PRESSURE: 70 MMHG

## 2019-04-19 DIAGNOSIS — H65.112 ACUTE MUCOID OTITIS MEDIA OF LEFT EAR: Primary | ICD-10-CM

## 2019-04-19 PROCEDURE — 99213 OFFICE O/P EST LOW 20 MIN: CPT | Performed by: FAMILY MEDICINE

## 2019-04-19 RX ORDER — AMOXICILLIN AND CLAVULANATE POTASSIUM 875; 125 MG/1; MG/1
1 TABLET, FILM COATED ORAL 2 TIMES DAILY
Qty: 20 TABLET | Refills: 0 | Status: SHIPPED | OUTPATIENT
Start: 2019-04-19 | End: 2019-04-29

## 2019-04-19 RX ORDER — MECLIZINE HYDROCHLORIDE 25 MG/1
25 TABLET ORAL 3 TIMES DAILY PRN
Qty: 30 TABLET | Refills: 0 | Status: CANCELLED | OUTPATIENT
Start: 2019-04-19 | End: 2019-04-29

## 2019-04-19 NOTE — PROGRESS NOTES
(LIPITOR) 40 MG tablet TAKE 1 TABLET DAILY 90 tablet 3    lisinopril (PRINIVIL;ZESTRIL) 10 MG tablet TAKE 1 TABLET DAILY 90 tablet 3    aspirin 81 MG tablet Take 81 mg by mouth daily      omeprazole (PRILOSEC) 40 MG delayed release capsule Take 1 capsule by mouth every morning (before breakfast) 30 capsule 0    meloxicam (MOBIC) 15 MG tablet Take 15 mg by mouth daily      magnesium (MAGNESIUM-OXIDE) 250 MG TABS tablet Take 250 mg by mouth daily       No current facility-administered medications for this visit. Vitals:    19 1401   BP: 120/70   Temp: 98.1 °F (36.7 °C)      Body mass index is 36.85 kg/m². Lab Review   Orders Only on 2019   Component Date Value    TSH 2019 3.15            ASSESSMENT AND PLAN     Diagnosis Orders   1. Acute mucoid otitis media of left ear         Pt is stable on current meds. Continue with current meds. New meds this visit:    Orders Placed This Encounter   Medications    amoxicillin-clavulanate (AUGMENTIN) 875-125 MG per tablet     Sig: Take 1 tablet by mouth 2 times daily for 10 days     Dispense:  20 tablet     Refill:  0     New orders placed this visit:  No orders of the defined types were placed in this encounter. No follow-ups on file.   continue with the following meds:    Outpatient Encounter Medications as of 2019   Medication Sig Dispense Refill    [] amoxicillin-clavulanate (AUGMENTIN) 875-125 MG per tablet Take 1 tablet by mouth 2 times daily for 10 days 20 tablet 0    senna-docusate (PERICOLACE) 8.6-50 MG per tablet Take 2 tablets by mouth daily as needed for Constipation 30 tablet 0    levothyroxine (SYNTHROID) 200 MCG tablet Take 1 tablet by mouth daily 90 tablet 3    hydrochlorothiazide (HYDRODIURIL) 25 MG tablet TAKE 2 TABLETs every morning 90 tablet 3    pregabalin (LYRICA) 100 MG capsule TAKE 1 TAB IN THE AM AND 2 TABS IN THE PM 90 capsule 2    DULoxetine (CYMBALTA) 30 MG extended release capsule Take 1 capsule by

## 2019-04-22 ENCOUNTER — HOSPITAL ENCOUNTER (OUTPATIENT)
Dept: PHYSICAL THERAPY | Age: 49
Setting detail: THERAPIES SERIES
Discharge: HOME | End: 2019-04-22
Payer: COMMERCIAL

## 2019-04-22 PROCEDURE — G0283 ELEC STIM OTHER THAN WOUND: HCPCS

## 2019-04-22 PROCEDURE — 97140 MANUAL THERAPY 1/> REGIONS: CPT

## 2019-04-22 NOTE — FLOWSHEET NOTE
Physical Therapy Daily Treatment Note  Date:  2019    Patient Name:  Tsering Lopez    :  1970  MRN: [id number]  Restrictions/Precautions:    Pertinent Medical History:  Medical/Treatment Diagnosis Information:  · Diagnosis: s/p tennis elbow release  · Treatment Diagnosis: Decreased ADL status  Insurance/Certification information:  PT Insurance Information: [facility]  Physician Information:  Referring Practitioner: Dr. Princess Rashid of care signed (Y/N):  Sent to in basket on 4/15/19  Visit# / total visits:  3/8  Pain level: 0-5/10        Progress Note: []  Yes  []  No  Next due by: Visit #10      History of Injury:Pt had R open tennis elbow release surgery on 19. Pt stated \"it was worse than they thought because they had to make a larger incision. \"  Pt has been resting his forearm this week and \"I had it all wrapped up until Saturday. \"  Pt stated his R elbow pain is rated 5-6/10- \"its not too bad, but I can't really use it. \"  Pt has been \"sleeping in a recliner and keeping it propped up all night. \"          Subjective:     19 Trying not to use arm. Some things are difficult because he is R handed  19  Arms not doing too bad. Pain going away a little bit. No pain at present. Up to 5/10 over the weekend.  Elbow is still pretty swollen    Objective:  Observation:   Test measurements:  4/15/19    AROM RUE (degrees)  R Shoulder Flexion 0-180: WFL  R Shoulder Extension 0-45: WFL  R Shoulder ABduction 0-180: WFL  R Shoulder Int Rotation  0-70: WFL  R Shoulder Ext Rotation 0-90: WFL  R Elbow Flexion 0-145: 130  R Elbow Extension 145-0: -25, pain  R Forearm Pron 0-90: 90  R Forearm Supination  0-90: 70, minimal pain  R Wrist Flexion 0-80: 52  R Wrist Extension 0-70: 60, pain  R Wrist Radial Deviation 0-20: 27  R Wrist Ulnar Deviation 0-45: 15  Strength RUE  R Shoulder Flexion: 5/5  R Shoulder ABduction: 5/5  R Shoulder Internal Rotation: 5/5  R Shoulder External Rotation: 5/5  R Elbow Flexion: 4+/5  R Elbow Extension: 4+/5(min pain)  R Forearm Pron: 4+/5  R Forearm Sup: 4/5(min pain)  R Wrist Flexion: 4+/5  R Wrist Extension: 4-/5(pain)  R Wrist Radial Deviation: 4+/5  R Wrist Ulnar Deviation: 4+/5  Strength Other  Other: R fingers 2-4 extension 3+/5, pain; Dynomometer (lbs): L 104, R 13        Exercises:  Exercise/Equipment Resistance/Repetitions Other comments        Shoulder shrugs  Scap retract 10x  10x                             Gentle stretching R wrist  Medial epicondyle   Lateral epicondyle x5 ea    PROM R elbow  Flex  Ext  Pron/sup  20 min                                Other Therapeutic Activities:      Home Exercise Program:    4/15/19 HEP was instructed and included AAROM elbow flex/ext, AROM wrist flex/ext, AROM wrist RD/UD, thumb/finger opposition, instructed all in a painless ROM. Pt was given written hand outs and demonstrated exercises correctly. Pt expressed understanding of exercises.       Manual Treatments:      Modalities:  IFC with CP 15' R elbow    Timed Code Treatment Minutes:  25    Total Treatment Minutes:  40    Assessment  [] Patient tolerated treatment well [] Patient limited by fatigue  [x] Patient limited by pain  [] Patient limited by other medical complications  [x] Other: Difficulty sleeping in bed because he sleeps on his side        Prognosis: [x] Good [] Fair  [] Poor    Patient Requires Follow-up: [x] Yes  [] No    Plan:   [x] Continue per plan of care [] Alter current plan (see comments)  [] Plan of care initiated [] Hold pending MD visit [] Discharge  Plan for Next Session:  See above    Electronically signed by:  Maricel Moore, PTA [id number]

## 2019-04-24 ENCOUNTER — HOSPITAL ENCOUNTER (OUTPATIENT)
Dept: PHYSICAL THERAPY | Age: 49
Setting detail: THERAPIES SERIES
Discharge: HOME | End: 2019-04-24
Payer: COMMERCIAL

## 2019-04-24 PROCEDURE — 97140 MANUAL THERAPY 1/> REGIONS: CPT

## 2019-04-24 PROCEDURE — G0283 ELEC STIM OTHER THAN WOUND: HCPCS

## 2019-04-24 NOTE — FLOWSHEET NOTE
Internal Rotation: 5/5  R Shoulder External Rotation: 5/5  R Elbow Flexion: 4+/5  R Elbow Extension: 4+/5(min pain)  R Forearm Pron: 4+/5  R Forearm Sup: 4/5(min pain)  R Wrist Flexion: 4+/5  R Wrist Extension: 4-/5(pain)  R Wrist Radial Deviation: 4+/5  R Wrist Ulnar Deviation: 4+/5  Strength Other  Other: R fingers 2-4 extension 3+/5, pain; Dynomometer (lbs): L 104, R 13        Exercises:  Exercise/Equipment Resistance/Repetitions Other comments        Shoulder shrugs  Scap retract 10x  10x                             Gentle stretching R wrist  Medial epicondyle   Lateral epicondyle x5 ea    PROM R elbow  Flex  Ext  Pron/sup  20 min                                Other Therapeutic Activities:      Home Exercise Program:    4/15/19 HEP was instructed and included AAROM elbow flex/ext, AROM wrist flex/ext, AROM wrist RD/UD, thumb/finger opposition, instructed all in a painless ROM. Pt was given written hand outs and demonstrated exercises correctly. Pt expressed understanding of exercises.       Manual Treatments:      Modalities:  IFC with CP 15' R elbow    Timed Code Treatment Minutes:  25    Total Treatment Minutes:  40    Assessment  [] Patient tolerated treatment well [] Patient limited by fatigue  [x] Patient limited by pain  [] Patient limited by other medical complications  [x] Other: Difficulty sleeping in bed because he sleeps on his side        Prognosis: [x] Good [] Fair  [] Poor    Patient Requires Follow-up: [x] Yes  [] No    Plan:   [x] Continue per plan of care [] Alter current plan (see comments)  [] Plan of care initiated [] Hold pending MD visit [] Discharge  Plan for Next Session:  See above    Electronically signed by:  Cody Champagne, PTA [id number]

## 2019-04-29 ENCOUNTER — HOSPITAL ENCOUNTER (OUTPATIENT)
Dept: PHYSICAL THERAPY | Age: 49
Setting detail: THERAPIES SERIES
Discharge: HOME | End: 2019-04-29
Payer: COMMERCIAL

## 2019-04-29 PROCEDURE — 97110 THERAPEUTIC EXERCISES: CPT

## 2019-04-29 PROCEDURE — G0283 ELEC STIM OTHER THAN WOUND: HCPCS

## 2019-04-29 NOTE — PROGRESS NOTES
Outpatient Physical Therapy  [] [facility]    Phone: [phone]   Fax: [phone]   [] [facility]  Phone: [phone]   Fax: [phone]  [x] [facility]              Phone: [phone]   Fax: [phone]     Physical Therapy Progress/Discharge Note  Date: 2019        Patient Name: Abimael Simons                      :  1970                     MRN: [id number]  Restrictions/Precautions:    Pertinent Medical History:  Medical/Treatment Diagnosis Information:  · Diagnosis: s/p tennis elbow release  · Treatment Diagnosis: Decreased ADL status  Insurance/Certification information:  PT Insurance Information: BCBS  Physician Information:  Referring Practitioner: Dr. Dwayne Ahmadi of care signed (Y/N):  Sent to in basket on 4/15/19  Visit# / total visits:    Pain level:      2-3/10     Time Period for Report:  4/15/19-19  Cancels/No-shows to date:  0    Plan of Care/Treatment to date:  [x] Therapeutic Exercise    [x] Modalities:  [] Therapeutic Activity     [] Ultrasound  [] Electrical Stimulation  [] Gait Training      [] Cervical Traction    [] Lumbar Traction  [] Neuromuscular Re-education  [] Cold/hotpack [] Iontophoresis  [x] Instruction in HEP      Other:  [x] Manual Therapy       []    [] Aquatic Therapy       []                    ? Significant Findings At Last Visit/Comments:    Subjective:      19 Trying not to use arm. Some things are difficult because he is R handed  19  Arms not doing too bad. Pain going away a little bit. No pain at present. Up to 5/10 over the weekend. Elbow is still pretty swollen  19 Didn't feel too bad after last session. Has been doing pretty good  19  Pt stated he will see Dr. Erwin Lu this week.   Pain has been low, rated 2/10     Objective:  Observation:  Test measurements:   ·  4/15/19    AROM and Strength R Shoulder WFL     AROM R UE 4/15/19 4/29/19   Elbow flexion 130 138   Elbow extension -25, pain -8   Pronation 90 90 Supination 70, min pain 70   Wrist flexion 52 70   Wrist extension 60, pain 70   RD 27 30   UD 15 25   Strength       Biceps 4+/5 4+/5   Triceps 4+/5, min pain 4+/5   Pronators 4+/5 5/5   Supinators 4/5, min pain 4+/5   Wrist flexors 4+/5 5/5   Wrist extensors 4-/5, pain 4+/5   Wrist RD 4+/5 5/5   Wrist UD 4+/5 5/5   Finger extensors 2-4 3+/5, pain 4+/5   Dynomometer (pounds) R 13, L 104 NT        Assessment:  Summary: ROM, strength, pain are progressing well. Patient's response to treatment: good    Progress towards goals:    Short term goals  Time Frame for Short term goals: 1 week  Short term goal 1: Pt will be independent with a ROM HEP. Long term goals  Long term goal 1: R elbow, wrist and forearm AROM WFL and painless all directions. Long term goal 2: Pain R elbow will rate 0-2/10. Long term goal 3: Strength R biceps, triceps, wrist and finger extensors 5/5 and painless. Long term goal 4:  strength per dynomometer (pounds) will be 75 pounds of . Long term goal 5: Pt will be able to lift items greater than 5 pounds without pain. Patient Goals   Patient goals : \"pain to finally go away\"       Rehab Potential: [] Excellent [x] Good [] Fair  [] Poor     Goal Status:  [] Achieved [x] Partially Achieved  [] Not Achieved     Patient Status: [x] Continue per initial plan of Care     [] Patient now discharged     [] Additional visits requested, Please re-certify for additional visits:      Requested frequency/duration:  X/week for weeks    Electronically signed by:  Bryson Holt PT    If you have any questions or concerns, please don't hesitate to call.   Thank you for your referral.    Physician Signature:________________________________Date:__________________  By signing above, therapists plan is approved by physician

## 2019-04-29 NOTE — FLOWSHEET NOTE
Physical Therapy Daily Treatment Note  Date:  2019    Patient Name:  Jennifer Brown    :  1970  MRN: [id number]  Restrictions/Precautions:    Pertinent Medical History:  Medical/Treatment Diagnosis Information:  · Diagnosis: s/p tennis elbow release  · Treatment Diagnosis: Decreased ADL status  Insurance/Certification information:  PT Insurance Information: [facility]  Physician Information:  Referring Practitioner: Dr. Yvan Linder of care signed (Y/N):  Sent to in basket on 4/15/19  Visit# / total visits:    Pain level: 2-3/10        Progress Note: []  Yes  []  No  Next due by: Visit #10      History of Injury:Pt had R open tennis elbow release surgery on 19. Pt stated \"it was worse than they thought because they had to make a larger incision. \"  Pt has been resting his forearm this week and \"I had it all wrapped up until Saturday. \"  Pt stated his R elbow pain is rated 5-6/10- \"its not too bad, but I can't really use it. \"  Pt has been \"sleeping in a recliner and keeping it propped up all night. \"          Subjective:     19 Trying not to use arm. Some things are difficult because he is R handed  19  Arms not doing too bad. Pain going away a little bit. No pain at present. Up to 5/10 over the weekend. Elbow is still pretty swollen  19 Didn't feel too bad after last session.  Has been doing pretty good  Objective:  Observation:   Test measurements:  4/15/19    AROM and Strength R Shoulder WFL    AROM R UE 4/15/19 4/29/19   Elbow flexion 130 138   Elbow extension -25, pain -8   Pronation 90 90   Supination 70, min pain 70   Wrist flexion 52 70   Wrist extension 60, pain 70   RD 27 30   UD 15 25   Strength     Biceps 4+/5 4+/5   Triceps 4+/5, min pain 4+/5   Pronators 4+/5 5/5   Supinators 4/5, min pain 4+/5   Wrist flexors 4+/5 5/5   Wrist extensors 4-/5, pain 4+/5   Wrist RD 4+/5 5/5   Wrist UD 4+/5 5/5   Finger extensors 2-4 3+/5, pain 4+/5   Dynomometer (pounds) R 13, L 104 NT Exercises:  Exercise/Equipment Resistance/Repetitions Other comments        Shoulder shrugs  Scap retract 10x  10x                             Gentle stretching R wrist  Medial epicondyle   Lateral epicondyle x5 ea    PROM, AAROM, AROM R elbow  Flex  Ext  Pron/sup   Wrist flex/ext  Wrist UD/RD  Finger flex/ext 15x each    Squeeze putty- pink 2'    Isometric wrist  Flexion  Extension  RD  UD  Pronation  Supination 10x each                     Other Therapeutic Activities:      Home Exercise Program:    4/15/19 HEP was instructed and included AAROM elbow flex/ext, AROM wrist flex/ext, AROM wrist RD/UD, thumb/finger opposition, instructed all in a painless ROM. Pt was given written hand outs and demonstrated exercises correctly. Pt expressed understanding of exercises. 4/29/19 isometrics: wrist flex/ext/RD/UD/supination/pronation.       Manual Treatments:      Modalities:  IFC with CP 15' R elbow    Timed Code Treatment Minutes:  35    Total Treatment Minutes:  50    Assessment  [] Patient tolerated treatment well [] Patient limited by fatigue  [x] Patient limited by pain  [] Patient limited by other medical complications  [x] Other: Difficulty sleeping in bed because he sleeps on his side        Prognosis: [x] Good [] Fair  [] Poor    Patient Requires Follow-up: [x] Yes  [] No    Plan:   [x] Continue per plan of care [] Alter current plan (see comments)  [] Plan of care initiated [] Hold pending MD visit [] Discharge  Plan for Next Session:  See above    Electronically signed by:  Aurelio Mix PT

## 2019-05-01 ENCOUNTER — HOSPITAL ENCOUNTER (OUTPATIENT)
Dept: PHYSICAL THERAPY | Age: 49
Setting detail: THERAPIES SERIES
Discharge: HOME | End: 2019-05-01
Payer: COMMERCIAL

## 2019-05-01 PROCEDURE — 97140 MANUAL THERAPY 1/> REGIONS: CPT

## 2019-05-01 PROCEDURE — G0283 ELEC STIM OTHER THAN WOUND: HCPCS

## 2019-05-01 PROCEDURE — 97110 THERAPEUTIC EXERCISES: CPT

## 2019-05-01 NOTE — FLOWSHEET NOTE
Physical Therapy Daily Treatment Note  Date:  2019    Patient Name:  Emily Olson    :  1970  MRN: [id number]  Restrictions/Precautions:    Pertinent Medical History:  Medical/Treatment Diagnosis Information:  · Diagnosis: s/p tennis elbow release  · Treatment Diagnosis: Decreased ADL status  Insurance/Certification information:  PT Insurance Information: [facility]  Physician Information:  Referring Practitioner: Dr. Efren Bauer of care signed (Y/N):  Sent to in basket on 4/15/19  Visit# / total visits:    Pain level: 2-3/10        Progress Note: []  Yes  []  No  Next due by: Visit #10      History of Injury:Pt had R open tennis elbow release surgery on 19. Pt stated \"it was worse than they thought because they had to make a larger incision. \"  Pt has been resting his forearm this week and \"I had it all wrapped up until Saturday. \"  Pt stated his R elbow pain is rated 5-6/10- \"its not too bad, but I can't really use it. \"  Pt has been \"sleeping in a recliner and keeping it propped up all night. \"          Subjective:     19 Trying not to use arm. Some things are difficult because he is R handed  19  Arms not doing too bad. Pain going away a little bit. No pain at present. Up to 5/10 over the weekend. Elbow is still pretty swollen  19 Didn't feel too bad after last session. Has been doing pretty good  19 Sore with doing the exercises.  Trying to use arm without over exerting it  Objective:  Observation:   Test measurements:  4/15/19    AROM and Strength R Shoulder WFL    AROM R UE 4/15/19 4/29/19   Elbow flexion 130 138   Elbow extension -25, pain -8   Pronation 90 90   Supination 70, min pain 70   Wrist flexion 52 70   Wrist extension 60, pain 70   RD 27 30   UD 15 25   Strength     Biceps 4+/5 4+/5   Triceps 4+/5, min pain 4+/5   Pronators 4+/5 5/5   Supinators 4/5, min pain 4+/5   Wrist flexors 4+/5 5/5   Wrist extensors 4-/5, pain 4+/5   Wrist RD 4+/5 5/5   Wrist UD 4+/5 5/5   Finger extensors 2-4 3+/5, pain 4+/5   Dynomometer (pounds) R 13, L 104 NT              Exercises:  Exercise/Equipment Resistance/Repetitions Other comments        Shoulder shrugs  Scap retract 10x  10x                             Gentle stretching R wrist  Medial epicondyle   Lateral epicondyle x5 ea    PROM, AAROM, AROM R elbow  Flex  Ext  Pron/sup   Wrist flex/ext  Wrist UD/RD  Finger flex/ext 15x each    Squeeze putty- pink 2'    Isometric wrist  Flexion  Extension  RD  UD  Pronation  Supination 10x each    AROM  Ext  Flex  RD/UD  Sup/pro 15x ea                Other Therapeutic Activities:      Home Exercise Program:    4/15/19 HEP was instructed and included AAROM elbow flex/ext, AROM wrist flex/ext, AROM wrist RD/UD, thumb/finger opposition, instructed all in a painless ROM. Pt was given written hand outs and demonstrated exercises correctly. Pt expressed understanding of exercises. 4/29/19 isometrics: wrist flex/ext/RD/UD/supination/pronation. Manual Treatments:      Modalities:  IFC with CP 15' R elbow    Timed Code Treatment Minutes:  35    Total Treatment Minutes:  50    Assessment  [] Patient tolerated treatment well [] Patient limited by fatigue  [x] Patient limited by pain  [] Patient limited by other medical complications  [x] Other: Sleeping somewhat better, but still tosses and turns all night.  Cont to wear wrist guard at night        Prognosis: [x] Good [] Fair  [] Poor    Patient Requires Follow-up: [x] Yes  [] No    Plan:   [x] Continue per plan of care [] Alter current plan (see comments)  [] Plan of care initiated [] Hold pending MD visit [] Discharge  Plan for Next Session:  See above    Electronically signed by:  Kailash Pearce PTA

## 2019-05-02 ENCOUNTER — OFFICE VISIT (OUTPATIENT)
Dept: ORTHOPEDIC SURGERY | Age: 49
End: 2019-05-02

## 2019-05-02 VITALS
HEIGHT: 74 IN | HEART RATE: 92 BPM | DIASTOLIC BLOOD PRESSURE: 69 MMHG | SYSTOLIC BLOOD PRESSURE: 116 MMHG | WEIGHT: 287.04 LBS | BODY MASS INDEX: 36.84 KG/M2

## 2019-05-02 DIAGNOSIS — M77.11 LATERAL EPICONDYLITIS, RIGHT ELBOW: ICD-10-CM

## 2019-05-02 DIAGNOSIS — M77.12 LEFT TENNIS ELBOW: ICD-10-CM

## 2019-05-02 DIAGNOSIS — Z98.890 HISTORY OF ELBOW SURGERY: Primary | ICD-10-CM

## 2019-05-02 PROCEDURE — 99024 POSTOP FOLLOW-UP VISIT: CPT | Performed by: ORTHOPAEDIC SURGERY

## 2019-05-02 NOTE — PROGRESS NOTES
Rachid [address] [facility]  Right  Upper Extremity Pain    Chief Complaint    Elbow Pain (F/u right elbow)      DOS: 4/8/19 - open debridement right chronic lateral epicondylitis    Pain Assessment  Location of Pain: Elbow  Location Modifiers: Right  Severity of Pain: 0  Quality of Pain: Aching  Frequency of Pain: Intermittent  Aggravating Factors: Exercise  Limiting Behavior: Yes  Relieving Factors: Exercise, Ice  Result of Injury: No  Work-Related Injury: No  Are there other pain locations you wish to document?: No    History of Present Illness:  Italia Donaldson is a 47 y.o. male who is now approximately 1 month status post open tennis elbow release. Patient reports that his incision has healed uneventfully. He continues to have a little bit of soreness just posterior to the surgical incision but denies any signs or symptoms of infection. He is taking his rehabilitation slow but reports is that he has improvements. Medical History:    Patient has had no medical changes since last evaluated      Patient's medications, allergies, past medical, surgical, social and family histories were reviewed and updated as appropriate. Past Medical History:   Diagnosis Date    Back pain     Chronic pain     Chronic pain syndrome     DDD (degenerative disc disease), cervical     Degenerative joint disease of spinal facet joint     Depressive disorder, not elsewhere classified     Dizziness     Failed back surgical syndrome     Headache(784.0)     HTN (hypertension)     Hyperlipidemia     Lumbar radiculopathy     Sleep apnea     does not use CPAP    thyroid cancer     thyroid    Thyroid disease       Past Surgical History:   Procedure Laterality Date    CARPAL TUNNEL RELEASE Left 3-5-2013    CARPAL TUNNEL RELEASE Right 4-    HAND SURGERY Left 1-5-2016    First dorsal extensor compartment tendon release.     JOINT REPLACEMENT Right     shoulder    [place] DECOMPRESS FOREARM,EXCIS MUSC/NERV Right 4/8/2019    RIGHT OPEN TENNIS ELBOW RELEASE performed by Margarita Duarte MD at [address] Right 1993    right   [place] SPINAL FUSION  2006    THYROIDECTOMY  2008    UPPER GASTROINTESTINAL ENDOSCOPY N/A 1/29/2019    EGD DIAGNOSTIC ONLY performed by Man Villalobos MD at [address] [facility] History     Socioeconomic History    Marital status:      Spouse name: Not on file    Number of children: 3    Years of education: Not on file    Highest education level: Not on file   Occupational History    Occupation:    Social Needs    Financial resource strain: Not on file    Food insecurity:     Worry: Not on file     Inability: Not on file   [place] needs:     Medical: Not on file     Non-medical: Not on file   Tobacco Use    Smoking status: Never Smoker    Smokeless tobacco: Never Used   Substance and Sexual Activity    Alcohol use:  Yes     Alcohol/week: 0.0 oz     Comment: occasional use    Drug use: No    Sexual activity: Not on file   Lifestyle    Physical activity:     Days per week: Not on file     Minutes per session: Not on file    Stress: Not on file   Relationships    Social connections:     Talks on phone: Not on file     Gets together: Not on file     Attends [employer] service: Not on file     Active member of club or organization: Not on file     Attends meetings of clubs or organizations: Not on file     Relationship status: Not on file    Intimate partner violence:     Fear of current or ex partner: Not on file     Emotionally abused: Not on file     Physically abused: Not on file     Forced sexual activity: Not on file   Other Topics Concern    Not on file   Social History Narrative    Not on file       Allergies   Allergen Reactions    Oxycontin [Oxycodone] Nausea Only    Percocet [Oxycodone-Acetaminophen] Nausea And Vomiting     Current Outpatient Medications on File Prior to Visit Medication Sig Dispense Refill    senna-docusate (PERICOLACE) 8.6-50 MG per tablet Take 2 tablets by mouth daily as needed for Constipation 30 tablet 0    levothyroxine (SYNTHROID) 200 MCG tablet Take 1 tablet by mouth daily 90 tablet 3    hydrochlorothiazide (HYDRODIURIL) 25 MG tablet TAKE 2 TABLETs every morning 90 tablet 3    pregabalin (LYRICA) 100 MG capsule TAKE 1 TAB IN THE AM AND 2 TABS IN THE PM 90 capsule 2    DULoxetine (CYMBALTA) 30 MG extended release capsule Take 1 capsule by mouth daily 30 capsule 2    tiZANidine (ZANAFLEX) 4 MG tablet Take . 5-1 tablet by mouth bid prn pain 60 tablet 2    atorvastatin (LIPITOR) 40 MG tablet TAKE 1 TABLET DAILY 90 tablet 3    lisinopril (PRINIVIL;ZESTRIL) 10 MG tablet TAKE 1 TABLET DAILY 90 tablet 3    aspirin 81 MG tablet Take 81 mg by mouth daily      omeprazole (PRILOSEC) 40 MG delayed release capsule Take 1 capsule by mouth every morning (before breakfast) 30 capsule 0    meloxicam (MOBIC) 15 MG tablet Take 15 mg by mouth daily      magnesium (MAGNESIUM-OXIDE) 250 MG TABS tablet Take 250 mg by mouth daily       No current facility-administered medications on file prior to visit.           Review of Systems:  A 14 point review of systems available in the scanned medical record, under the media tab, as documented by the patient. The review is negative with the exception of those things mentioned in the HPI and Past Medical History. Vital Signs:  Vitals:    05/02/19 1345   BP: 116/69   Pulse: 92       General Exam:   Well nourished, and groomed. Awake, Alert, Oriented to Person/Place/Time  No acute distress    Upper Extremity:    Right elbow exam    Inspection: There is no bruising or signs of infection. The surgical incision appears to be well-healed and approximated however at the distal and he does have a little bit of Monocryl suture protruding through the wound.     Palpation: Some mild tenderness over the radiocapitellar joint is worsened with resisted extension of his wrist and digits. No appreciable mechanical blocks or crepitation through range of motion    Active/Passive ROM:     Full uninhibited range of motion of the elbow in addition to pronation and supination    Strength: 4+/5 wrist extension as well as extension of the digits otherwise he has 5/5 pronation and supination and wrist flexion along with  strength    Neurovascular:   2+ Radial pulses with capillary refill brisk <2 seconds. 2/2 sensation to light touch in C5-T1 distributions tested. Imaging:     none    Office Procedures:  Orders Placed This Encounter   Procedures    [facility]     Referral Priority:   Routine     Referral Type:   Eval and Treat     Referral Reason:   Specialty Services Required     Requested Specialty:   Physical Therapy     Number of Visits Requested:   1       Assessment :  Mauricio Cardozo healing well status post debridement right lateral epicondylitis    Impression:  Encounter Diagnoses   Name Primary?  Left tennis elbow     History of elbow surgery Yes    Lateral epicondylitis, right elbow          Plan: We have removed the residual suture at the distal end of his incision and cleaned with alcohol. The patient will continue his rehabilitation as previously directed. We'll have him follow up in approximately 6 weeks to see if his symptoms of improved. We believe that the residual soreness may be the result of some residual compensation and we expect this to resolve is again strength in his healing continues      Marques Espino MD  Fellow, [address] [place] [address]  Date:    5/2/2019    The encounter with Mauricio Cardozo was supervised by Dr Lashae Borrego, who personally examined the patient and reviewed the plan. This dictation was performed with a verbal recognition program (DRAGON) and it was checked for errors.   It is possible that there are still dictated errors within

## 2019-05-06 ASSESSMENT — ENCOUNTER SYMPTOMS: SHORTNESS OF BREATH: 0

## 2019-05-08 ENCOUNTER — HOSPITAL ENCOUNTER (OUTPATIENT)
Dept: PHYSICAL THERAPY | Age: 49
Setting detail: THERAPIES SERIES
Discharge: HOME | End: 2019-05-08
Payer: COMMERCIAL

## 2019-05-08 NOTE — FLOWSHEET NOTE
Physical Therapy  Cancellation/No-show Note  Patient Name:  Natan Bradshaw  :  1970   Date:  2019  Cancelled visits to date: 1  No-shows to date: 0    For today's appointment patient:  [x]  Cancelled  []  Rescheduled appointment  []  No-show     Reason given by patient:  []  Patient ill  []  Conflicting appointment  []  No transportation    []  Conflict with work  [x]  No reason given  []  Other:     Comments:      Electronically signed by:  Emerson Wilkins PT

## 2019-05-09 ENCOUNTER — HOSPITAL ENCOUNTER (OUTPATIENT)
Dept: PHYSICAL THERAPY | Age: 49
Setting detail: THERAPIES SERIES
Discharge: HOME | End: 2019-05-09
Payer: COMMERCIAL

## 2019-05-09 PROCEDURE — 97110 THERAPEUTIC EXERCISES: CPT

## 2019-05-09 PROCEDURE — 97140 MANUAL THERAPY 1/> REGIONS: CPT

## 2019-05-09 PROCEDURE — G0283 ELEC STIM OTHER THAN WOUND: HCPCS

## 2019-05-09 NOTE — FLOWSHEET NOTE
extensors 4-/5, pain 4+/5    Wrist RD 4+/5 5/5    Wrist UD 4+/5 5/5    Finger extensors 2-4 3+/5, pain 4+/5    Dynomometer (pounds) R 13, L 104 NT R 55               Exercises:  Exercise/Equipment Resistance/Repetitions Other comments        Shoulder shrugs  Scap retract 10x  10x    Roll ball on table- green  Flexion   30x    Roll ball on wall- red  Flexion               Supine triceps curls 0#, 10x-    Gentle stretching R wrist  Medial epicondyle   Lateral epicondyle x5 ea    PROM, AROM R elbow  Flex  Ext  Pron/sup   Wrist flex/ext  Wrist UD/RD  Finger flex/ext 15x each    Squeeze putty- pink 2'    Isometric wrist  Extension  RD  Supination 10x each    AROM  Ext  Flex  RD/UD  Sup/pro 15x ea    Dumbbell wrist exercises  Extension  RD  Biceps curls  Supination/pronation   1#, 2x15  1#, 2x15  1#, 2x15  1#, 2x15           Other Therapeutic Activities:      Home Exercise Program:    4/15/19 HEP was instructed and included AAROM elbow flex/ext, AROM wrist flex/ext, AROM wrist RD/UD, thumb/finger opposition, instructed all in a painless ROM. Pt was given written hand outs and demonstrated exercises correctly. Pt expressed understanding of exercises. 4/29/19 isometrics: wrist flex/ext/RD/UD/supination/pronation. Manual Treatments:  MFR incision    Modalities:  IFC with CP 15' R elbow    Timed Code Treatment Minutes:  35    Total Treatment Minutes:  50    Assessment  [] Patient tolerated treatment well [] Patient limited by fatigue  [x] Patient limited by pain  [] Patient limited by other medical complications  [x] Other: Sleeping somewhat better, but still tosses and turns all night.  Cont to wear wrist guard at night        Prognosis: [x] Good [] Fair  [] Poor    Patient Requires Follow-up: [x] Yes  [] No    Plan:   [x] Continue per plan of care [] Alter current plan (see comments)  [] Plan of care initiated [] Hold pending MD visit [] Discharge  Plan for Next Session:  See above    Electronically signed by:  Trisha Flanagan

## 2019-05-13 ENCOUNTER — HOSPITAL ENCOUNTER (OUTPATIENT)
Dept: PHYSICAL THERAPY | Age: 49
Setting detail: THERAPIES SERIES
Discharge: HOME | End: 2019-05-13
Payer: COMMERCIAL

## 2019-05-13 PROCEDURE — 97140 MANUAL THERAPY 1/> REGIONS: CPT

## 2019-05-13 PROCEDURE — G0283 ELEC STIM OTHER THAN WOUND: HCPCS

## 2019-05-13 PROCEDURE — 97110 THERAPEUTIC EXERCISES: CPT

## 2019-05-13 NOTE — FLOWSHEET NOTE
Physical Therapy Daily Treatment Note  Date:  2019    Patient Name:  Juan Carlos Hernandez    :  1970  MRN: [id number]  Restrictions/Precautions:    Pertinent Medical History:  Medical/Treatment Diagnosis Information:  · Diagnosis: s/p tennis elbow release  · Treatment Diagnosis: Decreased ADL status  Insurance/Certification information:  PT Insurance Information: [facility]  Physician Information:  Referring Practitioner: Dr. Breanne Garrett of care signed (Y/N):  Sent to in basket on 4/15/19  Visit# / total visits:   Pain level: 2-3/10        Progress Note: []  Yes  []  No  Next due by: Visit #10      History of Injury:Pt had R open tennis elbow release surgery on 19. Pt stated \"it was worse than they thought because they had to make a larger incision. \"  Pt has been resting his forearm this week and \"I had it all wrapped up until Saturday. \"  Pt stated his R elbow pain is rated 5-6/10- \"its not too bad, but I can't really use it. \"  Pt has been \"sleeping in a recliner and keeping it propped up all night. \"          Subjective:     19 Trying not to use arm. Some things are difficult because he is R handed  19  Arms not doing too bad. Pain going away a little bit. No pain at present. Up to 5/10 over the weekend. Elbow is still pretty swollen  19 Didn't feel too bad after last session. Has been doing pretty good  19 Sore with doing the exercises. Trying to use arm without over exerting it  19  Not really having too much pain. Still sensitive to touch. Did bump it the other day and that hurt pretty good. Carried 2 packs of pop cradled under his arm yesterday.  Had some pain when he went to put it down  Objective:  Observation:   Test measurements:  4/15/19    AROM and Strength R Shoulder WFL    AROM R UE 4/15/19 4/29/19 5/9/19   Elbow flexion 130 138 142   Elbow extension -25, pain -8 -5 (PROM 0 deg)   Pronation 90 90 WFL   Supination 70, min pain 70 WFL   Wrist flexion 52 70 [facility] Wrist extension 60, pain 70 WFL   RD 27 30 WFL   UD 15 25 WFL   Strength      Biceps 4+/5 4+/5    Triceps 4+/5, min pain 4+/5    Pronators 4+/5 5/5    Supinators 4/5, min pain 4+/5    Wrist flexors 4+/5 5/5    Wrist extensors 4-/5, pain 4+/5    Wrist RD 4+/5 5/5    Wrist UD 4+/5 5/5    Finger extensors 2-4 3+/5, pain 4+/5    Dynomometer (pounds) R 13, L 104 NT R 55               Exercises:  Exercise/Equipment Resistance/Repetitions Other comments        Shoulder shrugs  Scap retract 10x  10x    Roll ball on table- green  Flexion   30x    Roll ball on wall- red  Flexion   20x              Supine triceps curls 0#, 20x-    Gentle stretching R wrist  Medial epicondyle   Lateral epicondyle x5 ea    PROM, AROM R elbow  Flex  Ext  Pron/sup   Wrist flex/ext  Wrist UD/RD  Finger flex/ext 15x each    Squeeze putty- pink 2'    Isometric wrist  Extension  RD  Supination 10x each    AROM  Ext  Flex  RD/UD  Sup/pro 20x ea    Dumbbell wrist exercises   Extension  RD  Biceps curls  Supination/pronation   1#, 2x15  1#, 2x15  1#, 2x15  1#, 2x15           Other Therapeutic Activities:      Home Exercise Program:    4/15/19 HEP was instructed and included AAROM elbow flex/ext, AROM wrist flex/ext, AROM wrist RD/UD, thumb/finger opposition, instructed all in a painless ROM. Pt was given written hand outs and demonstrated exercises correctly. Pt expressed understanding of exercises. 4/29/19 isometrics: wrist flex/ext/RD/UD/supination/pronation. Manual Treatments:  MFR incision    Modalities:  IFC with CP 15' R elbow    Timed Code Treatment Minutes:  45    Total Treatment Minutes:  60    Assessment  [] Patient tolerated treatment well [] Patient limited by fatigue  [x] Patient limited by pain  [] Patient limited by other medical complications  [x] Other: Cont to wear wrist guard at night.  Hasn't been wearing it during the day        Prognosis: [x] Good [] Fair  [] Poor    Patient Requires Follow-up: [x] Yes  [] No    Plan:   [x] Continue per plan of care [] Alter current plan (see comments)  [] Plan of care initiated [] Hold pending MD visit [] Discharge  Plan for Next Session:  See above    Electronically signed by:  Radha Augustine, PTA [id number]

## 2019-05-15 ENCOUNTER — HOSPITAL ENCOUNTER (OUTPATIENT)
Dept: PHYSICAL THERAPY | Age: 49
Setting detail: THERAPIES SERIES
Discharge: HOME | End: 2019-05-15
Payer: COMMERCIAL

## 2019-05-15 PROCEDURE — 97140 MANUAL THERAPY 1/> REGIONS: CPT

## 2019-05-15 PROCEDURE — 97110 THERAPEUTIC EXERCISES: CPT

## 2019-05-15 PROCEDURE — G0283 ELEC STIM OTHER THAN WOUND: HCPCS

## 2019-05-15 NOTE — FLOWSHEET NOTE
Physical Therapy Daily Treatment Note  Date:  5/15/2019    Patient Name:  Kendal Avelar    :  1970  MRN: [id number]  Restrictions/Precautions:    Pertinent Medical History:  Medical/Treatment Diagnosis Information:  · Diagnosis: s/p tennis elbow release  · Treatment Diagnosis: Decreased ADL status  Insurance/Certification information:  PT Insurance Information: [facility]  Physician Information:  Referring Practitioner: Dr. James Ganser of care signed (Y/N):  Sent to in basket on 4/15/19  Visit# / total visits: 10 /16  Pain level: 2-3/10        Progress Note: []  Yes  []  No  Next due by: Visit #10      History of Injury:Pt had R open tennis elbow release surgery on 19. Pt stated \"it was worse than they thought because they had to make a larger incision. \"  Pt has been resting his forearm this week and \"I had it all wrapped up until Saturday. \"  Pt stated his R elbow pain is rated 5-6/10- \"its not too bad, but I can't really use it. \"  Pt has been \"sleeping in a recliner and keeping it propped up all night. \"          Subjective:     19 Trying not to use arm. Some things are difficult because he is R handed  19  Arms not doing too bad. Pain going away a little bit. No pain at present. Up to 5/10 over the weekend. Elbow is still pretty swollen  19 Didn't feel too bad after last session. Has been doing pretty good  19 Sore with doing the exercises. Trying to use arm without over exerting it  19  Not really having too much pain. Still sensitive to touch. Did bump it the other day and that hurt pretty good. Carried 2 packs of pop cradled under his arm yesterday.  Had some pain when he went to put it down    Objective:  Observation:   Test measurements:  4/15/19    AROM and Strength R Shoulder WFL    AROM R UE 4/15/19 4/29/19 5/9/19   Elbow flexion 130 138 142   Elbow extension -25, pain -8 -5 (PROM 0 deg)   Pronation 90 90 WFL   Supination 70, min pain 70 WFL   Wrist flexion 52 70 WFL   Wrist extension 60, pain 70 WFL   RD 27 30 WFL   UD 15 25 WFL   Strength      Biceps 4+/5 4+/5    Triceps 4+/5, min pain 4+/5    Pronators 4+/5 5/5    Supinators 4/5, min pain 4+/5    Wrist flexors 4+/5 5/5    Wrist extensors 4-/5, pain 4+/5    Wrist RD 4+/5 5/5    Wrist UD 4+/5 5/5    Finger extensors 2-4 3+/5, pain 4+/5    Dynomometer (pounds) R 13, L 104 NT R 55               Exercises:  Exercise/Equipment Resistance/Repetitions Other comments        Shoulder shrugs  Scap retract 10x  10x    Roll ball on table- green  Flexion   30x    Roll ball on wall- red  Flexion   20x              Supine triceps curls 1#, 2#, 3# 10x    Gentle stretching R wrist  Medial epicondyle   Lateral epicondyle x5 ea    PROM, AROM R elbow  Flex  Ext  Pron/sup   Wrist flex/ext  Wrist UD/RD  Finger flex/ext 15x each    Squeeze putty- pink 2'    Isometric wrist  Extension  RD  Supination 10x each    AROM  Ext  Flex  RD/UD  Sup/pro 20x ea    Dumbbell wrist exercises   Extension  RD  Biceps curls  Supination/pronation   1#, 2#, 3# 10x each  1#, 2#, 3#, 10x each  1#, 2#, 3#, 10x each  1#, 2#, 3#, 10x each           Other Therapeutic Activities:      Home Exercise Program:    4/15/19 HEP was instructed and included AAROM elbow flex/ext, AROM wrist flex/ext, AROM wrist RD/UD, thumb/finger opposition, instructed all in a painless ROM. Pt was given written hand outs and demonstrated exercises correctly. Pt expressed understanding of exercises. 4/29/19 isometrics: wrist flex/ext/RD/UD/supination/pronation. Manual Treatments:  MFR incision    Modalities:  IFC with CP 15' R elbow    Timed Code Treatment Minutes:  35    Total Treatment Minutes:  55    Assessment  [] Patient tolerated treatment well [] Patient limited by fatigue  [x] Patient limited by pain  [] Patient limited by other medical complications  [x] Other: Cont to wear wrist guard at night.  Hasn't been wearing it during the day        Prognosis: [x] Good [] Fair  [] Poor    Patient Requires Follow-up: [x] Yes  [] No    Plan:   [x] Continue per plan of care [] Alter current plan (see comments)  [] Plan of care initiated [] Hold pending MD visit [] Discharge  Plan for Next Session:  See above    Electronically signed by:  Argentina Blanco, PT [id number]

## 2019-05-20 ENCOUNTER — HOSPITAL ENCOUNTER (OUTPATIENT)
Dept: PHYSICAL THERAPY | Age: 49
Setting detail: THERAPIES SERIES
Discharge: HOME | End: 2019-05-20
Payer: COMMERCIAL

## 2019-05-20 PROCEDURE — 97140 MANUAL THERAPY 1/> REGIONS: CPT

## 2019-05-20 PROCEDURE — 97110 THERAPEUTIC EXERCISES: CPT

## 2019-05-20 PROCEDURE — G0283 ELEC STIM OTHER THAN WOUND: HCPCS

## 2019-05-20 NOTE — FLOWSHEET NOTE
Pronation 90 90 WFL   Supination 70, min pain 70 WFL   Wrist flexion 52 70 WFL   Wrist extension 60, pain 70 WFL   RD 27 30 WFL   UD 15 25 WFL   Strength      Biceps 4+/5 4+/5    Triceps 4+/5, min pain 4+/5    Pronators 4+/5 5/5    Supinators 4/5, min pain 4+/5    Wrist flexors 4+/5 5/5    Wrist extensors 4-/5, pain 4+/5    Wrist RD 4+/5 5/5    Wrist UD 4+/5 5/5    Finger extensors 2-4 3+/5, pain 4+/5    Dynomometer (pounds) R 13, L 104 NT R 55               Exercises:  Exercise/Equipment Resistance/Repetitions Other comments        Shoulder shrugs  Scap retract 10x  10x    Roll ball on table- green  Flexion   30x    Roll ball on wall- red  Flexion   20x              Supine triceps curls 1#, 2#, 3# 10x    Gentle stretching R wrist  Medial epicondyle   Lateral epicondyle x5 ea    PROM, AROM R elbow  Flex  Ext  Pron/sup   Wrist flex/ext  Wrist UD/RD  Finger flex/ext 15x each    Squeeze putty- pink 2'    Isometric wrist  Extension  RD  Supination 10x each    AROM  Ext  Flex  RD/UD  Sup/pro 20x ea    Dumbbell wrist exercises   Extension  RD  Biceps curls  Supination/pronation   1#, 2#, 3# 10x each  1#, 2#, 3#, 10x each  1#, 2#, 3#, 10x each  1#, 2#, 3#, 10x each           Other Therapeutic Activities:      Home Exercise Program:    4/15/19 HEP was instructed and included AAROM elbow flex/ext, AROM wrist flex/ext, AROM wrist RD/UD, thumb/finger opposition, instructed all in a painless ROM. Pt was given written hand outs and demonstrated exercises correctly. Pt expressed understanding of exercises. 4/29/19 isometrics: wrist flex/ext/RD/UD/supination/pronation.       Manual Treatments:  MFR incision    Modalities:  IFC with CP 15' R elbow    Timed Code Treatment Minutes:  40    Total Treatment Minutes:  55    Assessment  [] Patient tolerated treatment well [] Patient limited by fatigue  [x] Patient limited by pain  [] Patient limited by other medical complications  [x] Other: Cont to wear wrist guard at

## 2019-05-22 ENCOUNTER — HOSPITAL ENCOUNTER (OUTPATIENT)
Dept: PHYSICAL THERAPY | Age: 49
Setting detail: THERAPIES SERIES
Discharge: HOME | End: 2019-05-22
Payer: COMMERCIAL

## 2019-05-22 PROCEDURE — G0283 ELEC STIM OTHER THAN WOUND: HCPCS

## 2019-05-22 PROCEDURE — 97110 THERAPEUTIC EXERCISES: CPT

## 2019-05-22 NOTE — PROGRESS NOTES
Outpatient Physical Therapy  [] [facility]    Phone: [phone]   Fax: [phone]   [] [facility]  Phone: [phone]   Fax: [phone]  [x] [facility]              Phone: [phone]   Fax: [phone]     Physical Therapy Progress/Discharge Note  Date: 2019        Patient Name:  Abimael Simons                      :  1970                     MRN: [id number]  Restrictions/Precautions:    Pertinent Medical History:  Medical/Treatment Diagnosis Information:  · Diagnosis: s/p tennis elbow release  · Treatment Diagnosis: Decreased ADL status  Insurance/Certification information:  PT Insurance Information: [employer]  Physician Information:  Referring Practitioner: Dr. Dwayne Ahmadi of care signed (Y/N):  Sent to in basket on 4/15/19  Visit# / total visits:   Pain level:      2/10       Time Period for Report:  4/15/19-19  Cancels/No-shows to date:      Plan of Care/Treatment to date:  [x] Therapeutic Exercise    [x] Modalities:  [] Therapeutic Activity     [] Ultrasound  [] Electrical Stimulation  [] Gait Training      [] Cervical Traction    [] Lumbar Traction  [] Neuromuscular Re-education  [] Cold/hotpack [] Iontophoresis  [x] Instruction in HEP      Other:  [x] Manual Therapy       []    [] Aquatic Therapy       []                    ? Significant Findings At Last Visit/Comments:    Subjective:      19  Not really having too much pain. Still sensitive to touch. Did bump it the other day and that hurt pretty good. Carried 2 packs of pop cradled under his arm yesterday.  Had some pain when he went to put it down  19  Elbow has been bothering him some today, so has been wearing wrist brace  19 elbow/forearm is feeling good today     Objective:  Observation:  Test measurements:    AROM R UE 4/15/19 4/29/19 5/9/19 5/22/19   Elbow flexion 130 138 142 142   Elbow extension -25, pain -8 -5 (PROM 0 deg) +2 (PROM +5)   Pronation 90 90 WFL WFL   Supination 70, min pain 70 WFL WFL   Wrist flexion 52 70 WFL WFL   Wrist extension 60, pain 70 WFL WFL   RD 27 30 WFL WFL   UD 15 25 WFL WFL   Strength           Biceps 4+/5 4+/5   5/5   Triceps 4+/5, min pain 4+/5   5/5   Pronators 4+/5 5/5   5/5   Supinators 4/5, min pain 4+/5   5/5   Wrist flexors 4+/5 5/5   5/5   Wrist extensors 4-/5, pain 4+/5   5-/5   Wrist RD 4+/5 5/5   5-/5   Wrist UD 4+/5 5/5   5/5   Finger extensors 2-4 3+/5, pain 4+/5   5/5   Dynomometer (pounds) R 13, L 104 NT R 55 65                    Assessment:  Summary: pt is progressing well with strength, ROM, flexibility, pain, function  Patient's response to treatment: good    Progress towards goals:  Progressing toward goals  Short term goals  Time Frame for Short term goals: 1 week  Short term goal 1: Pt will be independent with a ROM HEP. Long term goals  Long term goal 1: R elbow, wrist and forearm AROM WFL and painless all directions. Long term goal 2: Pain R elbow will rate 0-2/10. Long term goal 3: Strength R biceps, triceps, wrist and finger extensors 5/5 and painless. Long term goal 4:  strength per dynomometer (pounds) will be 75 pounds of . Long term goal 5: Pt will be able to lift items greater than 5 pounds without pain. Patient Goals   Patient goals : \"pain to finally go away\"      Rehab Potential: [] Excellent [x] Good [] Fair  [] Poor     Goal Status:  [] Achieved [x] Partially Achieved  [] Not Achieved     Patient Status: [x] Continue per initial plan of Care- pt has 4 more sessions remaining as of 5/22/19. Recommend completing those PT sessions then discharging PT. Electronically signed by:  Brandon Gurrola PT    If you have any questions or concerns, please don't hesitate to call.   Thank you for your referral.    Physician Signature:________________________________Date:__________________  By signing above, therapists plan is approved by physician

## 2019-05-22 NOTE — FLOWSHEET NOTE
Physical Therapy Daily Treatment Note  Date:  2019    Patient Name:  Martínez Campbell    :  1970  MRN: [id number]  Restrictions/Precautions:    Pertinent Medical History:  Medical/Treatment Diagnosis Information:  · Diagnosis: s/p tennis elbow release  · Treatment Diagnosis: Decreased ADL status  Insurance/Certification information:  PT Insurance Information: [facility]  Physician Information:  Referring Practitioner: Dr. Calhoun Median of care signed (Y/N):  Sent to in basket on 4/15/19  Visit# / total visits:   Pain level: 2/10        Progress Note: []  Yes  []  No  Next due by: Visit #10      History of Injury:Pt had R open tennis elbow release surgery on 19. Pt stated \"it was worse than they thought because they had to make a larger incision. \"  Pt has been resting his forearm this week and \"I had it all wrapped up until Saturday. \"  Pt stated his R elbow pain is rated 5-6/10- \"its not too bad, but I can't really use it. \"  Pt has been \"sleeping in a recliner and keeping it propped up all night. \"          Subjective:     19 Trying not to use arm. Some things are difficult because he is R handed  19  Arms not doing too bad. Pain going away a little bit. No pain at present. Up to 5/10 over the weekend. Elbow is still pretty swollen  19 Didn't feel too bad after last session. Has been doing pretty good  19 Sore with doing the exercises. Trying to use arm without over exerting it  19  Not really having too much pain. Still sensitive to touch. Did bump it the other day and that hurt pretty good. Carried 2 packs of pop cradled under his arm yesterday.  Had some pain when he went to put it down  19  Elbow has been bothering him some today, so has been wearing wrist brace  19 elbow/forearm is feeling good today      Objective:  Observation:   Test measurements:  4/15/19    AROM and Strength R Shoulder WFL    AROM R UE 4/15/19 4/29/19 5/9/19 5/22/19   Elbow flexion 130 138 142 142   Elbow extension -25, pain -8 -5 (PROM 0 deg) +2 (PROM +5)   Pronation 90 90 WFL WFL   Supination 70, min pain 70 WFL WFL   Wrist flexion 52 70 WFL WFL   Wrist extension 60, pain 70 WFL WFL   RD 27 30 WFL WFL   UD 15 25 WFL WFL   Strength       Biceps 4+/5 4+/5  5/5   Triceps 4+/5, min pain 4+/5  5/5   Pronators 4+/5 5/5  5/5   Supinators 4/5, min pain 4+/5  5/5   Wrist flexors 4+/5 5/5  5/5   Wrist extensors 4-/5, pain 4+/5  5-/5   Wrist RD 4+/5 5/5  5-/5   Wrist UD 4+/5 5/5  5/5   Finger extensors 2-4 3+/5, pain 4+/5  5/5   Dynomometer (pounds) R 13, L 104 NT R 55 65                Exercises:  Exercise/Equipment Resistance/Repetitions Other comments        Shoulder shrugs  Scap retract 10x  10x    Roll ball on table- green  Flexion   30x    Roll ball on wall- red  Flexion   20x              Supine triceps curls 3# 10x2, 4# 10x1    Gentle stretching R wrist  Medial epicondyle   Lateral epicondyle x5 ea    PROM, AROM R elbow  Flex  Ext  Pron/sup   Wrist flex/ext 15x each    Squeeze putty- pink 2'    Isometric wrist  Extension  RD  Supination 10x each         Dumbbell wrist exercises   Extension  RD  Biceps curls  Supination/pronation   3# 10x, 4# 2x10  3# 10x, 4# 2x10  3# 10x, 4# 2x10   3# 10x, 4# 2x10            Other Therapeutic Activities:      Home Exercise Program:    4/15/19 HEP was instructed and included AAROM elbow flex/ext, AROM wrist flex/ext, AROM wrist RD/UD, thumb/finger opposition, instructed all in a painless ROM. Pt was given written hand outs and demonstrated exercises correctly. Pt expressed understanding of exercises. 4/29/19 isometrics: wrist flex/ext/RD/UD/supination/pronation.       Manual Treatments:      Modalities:  IFC with CP 15' R elbow    Timed Code Treatment Minutes:  40    Total Treatment Minutes:  55    Assessment  [] Patient tolerated treatment well [] Patient limited by fatigue  [x] Patient limited by pain  [] Patient limited by other medical complications  [x]

## 2019-05-29 ENCOUNTER — OFFICE VISIT (OUTPATIENT)
Dept: PAIN MANAGEMENT | Age: 49
End: 2019-05-29
Payer: COMMERCIAL

## 2019-05-29 VITALS
BODY MASS INDEX: 36.58 KG/M2 | WEIGHT: 285 LBS | DIASTOLIC BLOOD PRESSURE: 79 MMHG | SYSTOLIC BLOOD PRESSURE: 124 MMHG | HEART RATE: 90 BPM

## 2019-05-29 DIAGNOSIS — M50.30 DDD (DEGENERATIVE DISC DISEASE), CERVICAL: ICD-10-CM

## 2019-05-29 DIAGNOSIS — M19.011 OSTEOARTHRITIS OF RIGHT GLENOHUMERAL JOINT: ICD-10-CM

## 2019-05-29 DIAGNOSIS — M54.16 LUMBAR RADICULOPATHY: ICD-10-CM

## 2019-05-29 DIAGNOSIS — G89.4 CHRONIC PAIN SYNDROME: ICD-10-CM

## 2019-05-29 DIAGNOSIS — M47.819 DEGENERATIVE JOINT DISEASE OF SPINAL FACET JOINT: ICD-10-CM

## 2019-05-29 DIAGNOSIS — M96.1 FAILED BACK SURGICAL SYNDROME: ICD-10-CM

## 2019-05-29 DIAGNOSIS — M51.37 DDD (DEGENERATIVE DISC DISEASE), LUMBOSACRAL: ICD-10-CM

## 2019-05-29 PROCEDURE — 99213 OFFICE O/P EST LOW 20 MIN: CPT | Performed by: INTERNAL MEDICINE

## 2019-05-29 RX ORDER — TIZANIDINE 4 MG/1
TABLET ORAL
Qty: 60 TABLET | Refills: 2 | Status: SHIPPED | OUTPATIENT
Start: 2019-05-29 | End: 2019-08-13 | Stop reason: SDUPTHER

## 2019-05-29 RX ORDER — PREGABALIN 100 MG/1
CAPSULE ORAL
Qty: 90 CAPSULE | Refills: 2 | Status: SHIPPED | OUTPATIENT
Start: 2019-05-29 | End: 2019-08-13 | Stop reason: SDUPTHER

## 2019-05-29 RX ORDER — DULOXETIN HYDROCHLORIDE 30 MG/1
30 CAPSULE, DELAYED RELEASE ORAL DAILY
Qty: 30 CAPSULE | Refills: 2 | Status: SHIPPED | OUTPATIENT
Start: 2019-05-29 | End: 2019-08-13 | Stop reason: SDUPTHER

## 2019-05-29 NOTE — PROGRESS NOTES
Cayla Albarran  1970  [id number]    HISTORY OF PRESENT ILLNESS:  Mr. Anne Manley is a 47 y.o. male returns for a follow up visit for multiple medical problems. His current presenting problems are   1. Chronic pain syndrome    2. Failed back surgical syndrome    3. Degenerative joint disease of spinal facet joint    4. DDD (degenerative disc disease), cervical    5. Lumbar radiculopathy    6. DDD (degenerative disc disease), lumbosacral    7. Recurrent major depressive disorder, [facility]    . As per information/history obtained from the PADT(patient assessment and documentation tool) - He complains of pain in the neck and lower back with radiation to the shoulders Right, arms Right, elbows Right and hands Right He rates the pain 6/10 and describes it as sharp, aching, burning, numbness. Pain is made worse by: movement, walking, standing, sitting, bending, lifting. Current treatment regimen has helped relieve about 60% of the pain. He denies side effects from the current pain regimen. Patient reports that since the last follow up visit the physical functioning is unchanged, family/social relationships are unchanged, mood is unchanged and sleep patterns are unchanged, and that the overall functioning is unchanged. Patient denies neurological bowel or bladder. Patient denies misusing/abusing his narcotic pain medications or using any illegal drugs. There are No indicators for possible drug abuse, addiction or diversion problems. Upon obtaining the medical history from Mr. Anne Manley regarding today's office visit for his presenting problems, Patient states he is doing fair and is currently off work. He says he is doing some light house chores. He mentions he is using Nucynta very seldom and has some pills left at home. He reports his weight has been stable. ALLERGIES: Patients list of allergies were reviewed     MEDICATIONS: Mr. Anne Manley list of medications were reviewed. His current medications are   Outpatient Medications Prior to Visit   Medication Sig Dispense Refill    senna-docusate (PERICOLACE) 8.6-50 MG per tablet Take 2 tablets by mouth daily as needed for Constipation 30 tablet 0    levothyroxine (SYNTHROID) 200 MCG tablet Take 1 tablet by mouth daily 90 tablet 3    hydrochlorothiazide (HYDRODIURIL) 25 MG tablet TAKE 2 TABLETs every morning 90 tablet 3    pregabalin (LYRICA) 100 MG capsule TAKE 1 TAB IN THE AM AND 2 TABS IN THE PM 90 capsule 2    tiZANidine (ZANAFLEX) 4 MG tablet Take . 5-1 tablet by mouth bid prn pain 60 tablet 2    atorvastatin (LIPITOR) 40 MG tablet TAKE 1 TABLET DAILY 90 tablet 3    lisinopril (PRINIVIL;ZESTRIL) 10 MG tablet TAKE 1 TABLET DAILY 90 tablet 3    aspirin 81 MG tablet Take 81 mg by mouth daily      omeprazole (PRILOSEC) 40 MG delayed release capsule Take 1 capsule by mouth every morning (before breakfast) 30 capsule 0    meloxicam (MOBIC) 15 MG tablet Take 15 mg by mouth daily      magnesium (MAGNESIUM-OXIDE) 250 MG TABS tablet Take 250 mg by mouth daily      DULoxetine (CYMBALTA) 30 MG extended release capsule Take 1 capsule by mouth daily 30 capsule 2     No facility-administered medications prior to visit. SOCIAL/FAMILY/PAST MEDICAL HISTORY: Mr. Darlene Sandoval, family and past medical history was reviewed. REVIEW OF SYSTEMS:    Respiratory: Negative for apnea, chest tightness and shortness of breath or change in baseline breathing. Gastrointestinal: Negative for nausea, vomiting, abdominal pain, diarrhea, constipation, blood in stool and abdominal distention. PHYSICAL EXAM:   Nursing note and vitals reviewed. /79   Pulse 90   Wt 285 lb (129.3 kg)   BMI 36.58 kg/m²   Constitutional: He appears well-developed and well-nourished. No acute distress. Skin: Skin is warm and dry, good turgor. No rash noted. He is not diaphoretic. Cardiovascular: Normal rate, regular rhythm, normal heart sounds, and does not have murmur. Pulmonary/Chest: Effort normal. No respiratory distress. He does not have wheezes in the lung fields. He has no rales. Neurological/Psychiatric:He is alert and oriented to person, place, and time. Coordination is  normal.  His mood isAppropriate and affect is Neutral/Euthymic(normal) . Other: trace of edema LE     IMPRESSION:   1. Chronic pain syndrome    2. Failed back surgical syndrome    3. Degenerative joint disease of spinal facet joint    4. DDD (degenerative disc disease), cervical    5. Lumbar radiculopathy    6. DDD (degenerative disc disease), lumbosacral        PLAN:  Informed verbal consent was obtained  -Continue with current regimen   -Adv Biofeedback, relaxation and meditation techniques. Referral to psychologist for CBT was also discussed with patient   -Back stretching exercises as advised   -He was advised weight reduction, diet changes- 800-1200 flex diet, diet diary, exercising, nutritional  consult increased physical activity as tolerated   -Consider swimming exercises    Current Outpatient Medications   Medication Sig Dispense Refill    senna-docusate (PERICOLACE) 8.6-50 MG per tablet Take 2 tablets by mouth daily as needed for Constipation 30 tablet 0    levothyroxine (SYNTHROID) 200 MCG tablet Take 1 tablet by mouth daily 90 tablet 3    hydrochlorothiazide (HYDRODIURIL) 25 MG tablet TAKE 2 TABLETs every morning 90 tablet 3    pregabalin (LYRICA) 100 MG capsule TAKE 1 TAB IN THE AM AND 2 TABS IN THE PM 90 capsule 2    tiZANidine (ZANAFLEX) 4 MG tablet Take . 5-1 tablet by mouth bid prn pain 60 tablet 2    atorvastatin (LIPITOR) 40 MG tablet TAKE 1 TABLET DAILY 90 tablet 3    lisinopril (PRINIVIL;ZESTRIL) 10 MG tablet TAKE 1 TABLET DAILY 90 tablet 3    aspirin 81 MG tablet Take 81 mg by mouth daily      omeprazole (PRILOSEC) 40 MG delayed release capsule Take 1 capsule by mouth every morning (before breakfast) 30 capsule 0    meloxicam (MOBIC) 15 MG tablet Take 15 mg by mouth medications. Risk of overdose and death, if medicines not taken as prescribed, were also discussed. If the patient develops new symptoms or if the symptoms worsen, the patient should call the office. While transcribing every attempt was made to maintain the accuracy of the note in terms of it's contents,there may have been some errors made inadvertently. Thank you for allowing me to participate in the care of this patient.     Alexandre Perez MD.    Cc: [address],     I, Keara Allen, am scribing for and in the presence of Dr. Alexandre Perez.   05/29/19  1:40 PM  Keara Allen MA    I, Dr. Alexandre Perez, personally performed the services described in this documentation as scribed by   Keara Allen MA in my presence and it is both accurate and complete

## 2019-06-03 RX ORDER — BUPROPION HYDROCHLORIDE 100 MG/1
100 TABLET, EXTENDED RELEASE ORAL 2 TIMES DAILY
Qty: 60 TABLET | Refills: 3 | Status: SHIPPED | OUTPATIENT
Start: 2019-06-03 | End: 2019-07-26 | Stop reason: SDUPTHER

## 2019-06-05 ENCOUNTER — HOSPITAL ENCOUNTER (OUTPATIENT)
Dept: PHYSICAL THERAPY | Age: 49
Setting detail: THERAPIES SERIES
Discharge: HOME | End: 2019-06-05
Payer: COMMERCIAL

## 2019-06-05 PROCEDURE — 97140 MANUAL THERAPY 1/> REGIONS: CPT

## 2019-06-05 PROCEDURE — 97110 THERAPEUTIC EXERCISES: CPT

## 2019-06-05 PROCEDURE — G0283 ELEC STIM OTHER THAN WOUND: HCPCS

## 2019-06-05 NOTE — FLOWSHEET NOTE
Physical Therapy Daily Treatment Note  Date:  2019    Patient Name:  Aroldo Montaño    :  1970  MRN: [id number]  Restrictions/Precautions:    Pertinent Medical History:  Medical/Treatment Diagnosis Information:  · Diagnosis: s/p tennis elbow release  · Treatment Diagnosis: Decreased ADL status  Insurance/Certification information:  PT Insurance Information: BC  Physician Information:  Referring Practitioner: Dr. Bueno King [place] of care signed (Y/N):  Sent to in basket on 4/15/19  Visit# / total visits:   Pain level: 2/10        Progress Note: []  Yes  []  No  Next due by: Visit #10      History of Injury:Pt had R open tennis elbow release surgery on 19. Pt stated \"it was worse than they thought because they had to make a larger incision. \"  Pt has been resting his forearm this week and \"I had it all wrapped up until Saturday. \"  Pt stated his R elbow pain is rated 5-6/10- \"its not too bad, but I can't really use it. \"  Pt has been \"sleeping in a recliner and keeping it propped up all night. \"          Subjective:     19 Trying not to use arm. Some things are difficult because he is R handed  19  Arms not doing too bad. Pain going away a little bit. No pain at present. Up to 5/10 over the weekend. Elbow is still pretty swollen  19 Didn't feel too bad after last session. Has been doing pretty good  19 Sore with doing the exercises. Trying to use arm without over exerting it  19  Not really having too much pain. Still sensitive to touch. Did bump it the other day and that hurt pretty good. Carried 2 packs of pop cradled under his arm yesterday. Had some pain when he went to put it down  19  Elbow has been bothering him some today, so has been wearing wrist brace  19 elbow/forearm is feeling good today  19 Pt no showed for his last 2 PT sessions. Pt stated his R forearm has been \"not too bad.   I was using it doing some gardening and stuff, a little Advil and it went away. \"        Objective:  Observation:   Test measurements:  4/15/19    AROM and Strength R Shoulder WFL    AROM R UE 4/15/19 4/29/19 5/9/19 5/22/19 6/5/19   Elbow flexion 130 138 142 142 144   Elbow extension -25, pain -8 -5 (PROM 0 deg) +2 (PROM +5) +2 (PROM +5)   Pronation 90 90 WFL WFL WFL   Supination 70, min pain 70 [facility] WF   Wrist flexion 52 70 [facility]   Wrist extension 60, pain 70 [facility] WFL   RD 27 30 WFL WFL WFL   UD 15 25 WFL [facility] WFL   Strength        Biceps 4+/5 4+/5  5/5 5/5   Triceps 4+/5, min pain 4+/5  5/5 5/5   Pronators 4+/5 5/5  5/5 5/5   Supinators 4/5, min pain 4+/5  5/5 5/5   Wrist flexors 4+/5 5/5  5/5 5/5   Wrist extensors 4-/5, pain 4+/5  5-/5 5/5   Wrist RD 4+/5 5/5  5-/5 5-/5   Wrist UD 4+/5 5/5  5/5 5/5   Finger extensors 2-4 3+/5, pain 4+/5  5/5 5/5   Dynomometer (pounds) R 13, L 104 NT R 55 65 65         Exercises:  Exercise/Equipment Resistance/Repetitions Other comments        Shoulder shrugs  Scap retract 10x  10x    Roll ball on table- green  Flexion   30x    Roll ball on wall- red  Flexion   20x              Supine triceps curls 3# 10x2, 4# 10x1    Gentle stretching R wrist  Medial epicondyle   Lateral epicondyle x5 ea    PROM, AROM R elbow  Flex  Ext  Pron/sup   Wrist flex/ext 15x each    Squeeze putty- pink 2'    Isometric wrist  Extension  RD  Supination 10x each         Dumbbell wrist exercises   Extension  RD  Biceps curls  Supination/pronation   3# 10x, 4# 2x10  3# 10x, 4# 2x10  3# 10x, 4# 2x10   3# 10x, 4# 2x10            Other Therapeutic Activities:      Home Exercise Program:    4/15/19 HEP was instructed and included AAROM elbow flex/ext, AROM wrist flex/ext, AROM wrist RD/UD, thumb/finger opposition, instructed all in a painless ROM. Pt was given written hand outs and demonstrated exercises correctly. Pt expressed understanding of exercises. 4/29/19 isometrics: wrist flex/ext/RD/UD/supination/pronation.       Manual Treatments:  [facility] friction massage R forearm 10'    Modalities:  IFC with CP 15' R elbow    Timed Code Treatment Minutes:  35    Total Treatment Minutes:  55    Assessment  [] Patient tolerated treatment well [] Patient limited by fatigue  [x] Patient limited by pain  [] Patient limited by other medical complications  [x] Other: Cont to wear wrist guard at night        Prognosis: [x] Good [] Fair  [] Poor    Patient Requires Follow-up: [x] Yes  [] No    Plan:   [x] Continue per plan of care [] Alter current plan (see comments)  [] Plan of care initiated [] Hold pending MD visit [] Discharge  Plan for Next Session:      Electronically signed by:  Samir Figueroa PT

## 2019-06-10 ENCOUNTER — HOSPITAL ENCOUNTER (OUTPATIENT)
Dept: PHYSICAL THERAPY | Age: 49
Setting detail: THERAPIES SERIES
Discharge: HOME | End: 2019-06-10
Payer: COMMERCIAL

## 2019-06-10 PROCEDURE — 97140 MANUAL THERAPY 1/> REGIONS: CPT

## 2019-06-10 PROCEDURE — 97110 THERAPEUTIC EXERCISES: CPT

## 2019-06-10 PROCEDURE — G0283 ELEC STIM OTHER THAN WOUND: HCPCS

## 2019-06-10 NOTE — FLOWSHEET NOTE
Physical Therapy Daily Treatment Note  Date:  6/10/2019    Patient Name:  Martínez Campbell    :  1970  MRN: [id number]  Restrictions/Precautions:    Pertinent Medical History:  Medical/Treatment Diagnosis Information:  · Diagnosis: s/p tennis elbow release  · Treatment Diagnosis: Decreased ADL status  Insurance/Certification information:  PT Insurance Information: [facility]  Physician Information:  Referring Practitioner: Dr. Calhoun Median of care signed (Y/N):  Sent to in basket on 4/15/19  Visit# / total visits:   Pain level: 2/10        Progress Note: []  Yes  []  No  Next due by: Visit #10      History of Injury:Pt had R open tennis elbow release surgery on 19. Pt stated \"it was worse than they thought because they had to make a larger incision. \"  Pt has been resting his forearm this week and \"I had it all wrapped up until Saturday. \"  Pt stated his R elbow pain is rated 5-6/10- \"its not too bad, but I can't really use it. \"  Pt has been \"sleeping in a recliner and keeping it propped up all night. \"          Subjective:     19 Trying not to use arm. Some things are difficult because he is R handed  19  Arms not doing too bad. Pain going away a little bit. No pain at present. Up to 5/10 over the weekend. Elbow is still pretty swollen  19 Didn't feel too bad after last session. Has been doing pretty good  19 Sore with doing the exercises. Trying to use arm without over exerting it  19  Not really having too much pain. Still sensitive to touch. Did bump it the other day and that hurt pretty good. Carried 2 packs of pop cradled under his arm yesterday. Had some pain when he went to put it down  19  Elbow has been bothering him some today, so has been wearing wrist brace  19 elbow/forearm is feeling good today  19 Pt no showed for his last 2 PT sessions. Pt stated his R forearm has been \"not too bad.   I was using it doing some gardening and stuff, a little Advil and it went away. \"  6/10/19  Still gets sore if he overuses it. Just really needs to get it strengthened up. Still gets a pulling in it if he  anything tight. Feels like his shoulder and biceps are still really weak from shoulder surgery. Thinks that attributes to the lower arm issues.  Sees MD tomorrow        Objective:  Observation:   Test measurements:  4/15/19    AROM and Strength R Shoulder WFL    AROM R UE 4/15/19 4/29/19 5/9/19 5/22/19 6/5/19   Elbow flexion 130 138 142 142 144   Elbow extension -25, pain -8 -5 (PROM 0 deg) +2 (PROM +5) +2 (PROM +5)   Pronation 90 90 WFL WFL WFL   Supination 70, min pain 70 [facility] WF   Wrist flexion 52 70 [facility] WF   Wrist extension 60, pain 70 [facility] WFL   RD 27 30 WFL WFL WFL   UD 15 25 WFL [facility] WFL   Strength        Biceps 4+/5 4+/5  5/5 5/5   Triceps 4+/5, min pain 4+/5  5/5 5/5   Pronators 4+/5 5/5  5/5 5/5   Supinators 4/5, min pain 4+/5  5/5 5/5   Wrist flexors 4+/5 5/5  5/5 5/5   Wrist extensors 4-/5, pain 4+/5  5-/5 5/5   Wrist RD 4+/5 5/5  5-/5 5-/5   Wrist UD 4+/5 5/5  5/5 5/5   Finger extensors 2-4 3+/5, pain 4+/5  5/5 5/5   Dynomometer (pounds) R 13, L 104 NT R 55 65 65         Exercises:  Exercise/Equipment Resistance/Repetitions Other comments        Shoulder shrugs  Scap retract 10x  10x    Roll ball on table- green  Flexion   30x    Roll ball on wall- red  Flexion   20x              Supine triceps curls 3# 10x2, 4# 10x1    Gentle stretching R wrist  Medial epicondyle   Lateral epicondyle x5 ea    PROM, AROM R elbow  Flex  Ext  Pron/sup   Wrist flex/ext 15x each    Squeeze putty- pink 2'    Isometric wrist  Extension  RD  Supination 10x each         Dumbbell wrist exercises   Extension  RD  Biceps curls w/ rotation  Supination/pronation   3# 10x, 4# 2x10  3# 10x, 4# 2x10  3# 10x, 4# 2x10   3# 10x, 4# 2x10            Other Therapeutic Activities:      Home Exercise Program:    4/15/19 HEP was instructed and included AAROM elbow flex/ext, AROM

## 2019-06-10 NOTE — PROGRESS NOTES
Outpatient Physical Therapy  [] [facility]    Phone: [phone]   Fax: [phone]   [] [facility]  Phone: [phone]   Fax: [phone]  [x] [place]              Phone: [phone]   Fax: [phone]     Physical Therapy Progress/Discharge Note  Date: 6/10/2019        Patient Name:  Raiza Whitley                      :  1970                     MRN: [id number]  Restrictions/Precautions:    Pertinent Medical History:  Medical/Treatment Diagnosis Information:  · Diagnosis: s/p tennis elbow release  · Treatment Diagnosis: Decreased ADL status  Insurance/Certification information:  PT Insurance Information: BCBS  Physician Information:  Referring Practitioner: Dr. Alfreda Don of care signed (Y/N):  Sent to in basket on 4/15/19  Visit# / total visits: 14   Pain level:      2/10          Time Period for Report:  4/15/19-6/10/19  Cancels/No-shows to date:  3    Plan of Care/Treatment to date:  [x] Therapeutic Exercise    [x] Modalities:  [] Therapeutic Activity     [] Ultrasound  [] Electrical Stimulation  [] Gait Training      [] Cervical Traction    [] Lumbar Traction  [] Neuromuscular Re-education  [] Cold/hotpack [] Iontophoresis  [x] Instruction in HEP      Other:  [x] Manual Therapy       []    [] Aquatic Therapy       []                    ? Significant Findings At Last Visit/Comments:    Subjective:     19 Pt no showed for his last 2 PT sessions. Pt stated his R forearm has been \"not too bad. I was using it doing some gardening and stuff, a little Advil and it went away. \"  6/10/19  Still gets sore if he overuses it. Just really needs to get it strengthened up. Still gets a pulling in it if he  anything tight. Feels like his shoulder and biceps are still really weak from shoulder surgery. Thinks that attributes to the lower arm issues.  Sees MD tomorrow       Objective:  Observation:  Test measurements:       AROM R UE 4/15/19 4/29/19 5/9/19 5/22/19 6/5/19   Elbow flexion 130 138 142 142 144   Elbow extension -25, pain -8 -5 (PROM 0 deg) +2 (PROM +5) +2 (PROM +5)   Pronation 90 90 WFL WFL WFL   Supination 70, min pain 70 WFL [facility]   Wrist flexion 52 70 [facility]   Wrist extension 60, pain 70 [facility] WFL   RD 27 30 [facility] WFL   UD 15 25 [facility] WF   Strength             Biceps 4+/5 4+/5   5/5 5/5   Triceps 4+/5, min pain 4+/5   5/5 5/5   Pronators 4+/5 5/5   5/5 5/5   Supinators 4/5, min pain 4+/5   5/5 5/5   Wrist flexors 4+/5 5/5   5/5 5/5   Wrist extensors 4-/5, pain 4+/5   5-/5 5/5   Wrist RD 4+/5 5/5   5-/5 5-/5   Wrist UD 4+/5 5/5   5/5 5/5   Finger extensors 2-4 3+/5, pain 4+/5   5/5 5/5   Dynomometer (pounds) R 13, L 104 NT R 55 65 65         Assessment:  Summary: pt is progressing well. Pt has one more appointment scheduled, then PT will discharge pt. Patient's response to treatment: good    Rehab Potential: [] Excellent [x] Good [] Fair  [] Poor     Goal Status:  [] Achieved [x] Partially Achieved  [] Not Achieved     Patient Status: [x] Continue x 1 PT session on 6/12/19, then discharge     [] Patient now discharged     [] Additional visits requested, Please re-certify for additional visits:      Requested frequency/duration:  X/week for weeks    Electronically signed by:  Tate Hickey PT    If you have any questions or concerns, please don't hesitate to call.   Thank you for your referral.    Physician Signature:________________________________Date:__________________  By signing above, therapists plan is approved by physician

## 2019-06-11 ENCOUNTER — OFFICE VISIT (OUTPATIENT)
Dept: ORTHOPEDIC SURGERY | Age: 49
End: 2019-06-11

## 2019-06-11 VITALS
WEIGHT: 285.06 LBS | DIASTOLIC BLOOD PRESSURE: 79 MMHG | HEIGHT: 74 IN | HEART RATE: 109 BPM | BODY MASS INDEX: 36.58 KG/M2 | SYSTOLIC BLOOD PRESSURE: 121 MMHG

## 2019-06-11 DIAGNOSIS — Z98.890 HISTORY OF ELBOW SURGERY: Primary | ICD-10-CM

## 2019-06-11 DIAGNOSIS — M77.12 LEFT TENNIS ELBOW: ICD-10-CM

## 2019-06-11 DIAGNOSIS — M67.90 TENDINOPATHY OF UPPER EXTREMITY: ICD-10-CM

## 2019-06-11 DIAGNOSIS — M77.11 LATERAL EPICONDYLITIS, RIGHT ELBOW: ICD-10-CM

## 2019-06-11 PROCEDURE — 99024 POSTOP FOLLOW-UP VISIT: CPT | Performed by: ORTHOPAEDIC SURGERY

## 2019-06-11 NOTE — PROGRESS NOTES
History of Present Illness:  Kyle Feliciano is a pleasant, 47 y.o., male, here today for follow up of right elbow. He underwent an open debridement right chronic lateral epicondylitis on 4/8/19. He has continued in physical therapy at [facility] working with Yael Gallagher. He has noted that his  strength is improving. He is having some elevated pain with overuse of his elbow. He is hoping to begin work conditioning. He reports no new injuries or setbacks. Medical History:  Patient's medications, allergies, past medical, surgical, social and family histories were reviewed and updated as appropriate. Patient has had no medical changes since last evaluated      Review of Systems  A 14 point review of systems was completed by the patient on 10/25/18 and is available in the media section of the scanned medical record and was reviewed on 6/11/2019. The review is negative with the exception of those things mentioned in the HPI and Past Medical History    Vital Signs:  Vitals:    06/11/19 1413   BP: 121/79   Pulse: 109       General/Appearance: Alert and oriented and in no apparent distress. Skin:  There are no skin lesions, cellulitis, or extreme edema. The patient has warm and well-perfused Bilateral upper extremities with brisk capillary refill. RIGHT Elbow Exam:  Inspection: Incision is healing well. No gross deformities, no signs of infection. Palpation:  Some mild tenderness over common extensor origin. Active Range of Motion:  Full active motion in elbow flexion and extension in addition to full motion in pronation and supination. Passive Range of Motion: Deferred    Strength: Pronation and supination 5/5,  strength 4+/5    Special Tests: No Kevin muscle deformity. Neurovascular: Sensation to light touch is intact, no motor deficits, palpable radial pulses 2+      Radiology:     No new XR obtained at this time.           Assessment :  Kyle Feliciano healing well status post debridement right lateral epicondylitis      Impression:  Encounter Diagnoses   Name Primary?  Left tennis elbow     History of elbow surgery Yes    Lateral epicondylitis, right elbow     Tendinopathy of upper extremity        Office Procedures:  Orders Placed This Encounter   Procedures    [facility] Therapy Radha Felipe     Referral Priority:   Routine     Referral Type:   Eval and Treat     Referral Reason:   Specialty Services Required     Requested Specialty:   Physical Therapy     Number of Visits Requested:   1       Treatment Plan:  Priti Eckert is progressing well but he should continue in physical therapy at [place]. A new physical therapy letter was documented in [facility] today. He will continue working on gaining strength and endurance. We will see Bell Elliott back in 4-6 weeks and/or as needed. We will plan on having him begin work conditioning at that time. All questions were answered to patient's satisfaction and was encouraged to call with any further questions or concerns. Aston Page is in agreement with this plan. 6/11/2019  2:22 PM      Nam Frank ATC  Orthopaedic Sports Medicine     During this examination, INam ATC, functioned as a scribe for Dr. Deidra Goldsmith. The history taking and physical examination were performed by Dr. Primo Veras. All counsleing during the appointment was performed between the patient and Dr. Primo Veras. 6/11/19   ________________  I, Dr. Deidra Goldsmith, personally performed the services described in this documentation as described by Nam Frank ATC in my presence, and it is both accurate and complete. Chago Veras MD, PhD  6/11/2019

## 2019-06-11 NOTE — LETTER
Physical Therapy Rehabilitation Referral    Patient Name:  Matthew Lima      YOB: 1970    Diagnosis:    1. Left tennis elbow    2. Right open tennis elbow release. DOS: 4/8/19    Precautions:     [x] Evaluate and Treat    Post Op Instructions:  [] Continuous passive motion (CPM)  [x] Elbow ROM  [x] Exercise in plane of scapula  []  Strengthening     [] Pulley and instruction   [x] Home exercise program (copy to patient)   [] Sling when arm at risk  [] Sling or brace at all times   [] AAROM: Forward elevation to  140            [] AAROM: External rotation  To  40    [] Isometric external rotator strengthening [] AAROM: internal rotation: up the back  [x] Isometric abductor strengthening  [] AAROM: Internal abduction   [] Isometric internal rotator strengthening [] AAROM: cross-body adduction             Stretching:     Strengthening:  [] Four quadrant (FE, ER, IR, CBA)  [] Rotator cuff (ER, IR, Abd)  [] Forward Elevation    [] External Rotators     [] External Rotation    [] Internal Rotators  [] Internal Rotation: up/back   [] Abductors     [] Internal Rotation: supine in abduction  [] Sleeper Stretch    [] Flexors  [] Cross-body abduction    [] Extensors  [] Pendulum (FE, Abd/Add, cw/ccw)  [x] Scapular Stabilizers   [] Wall-walking (FE, Abd)        [x] Shoulder shrugs     [] Table slides (FE)                [x] Rhomboid pinch  [x] Elbow (flex, ext, pron, sup)        [] Lat.  Pull downs     [x] Medial epicondylitis program       [] Forward punch   [x] Lateral epicondylitis program       [] Internal rotators     [x] Progressive resistive exercises  [] Bench Press        [] Bench press plus  Activities:     [] Lateral pull-downs  [] Rowing     [] Progressive two-hand supine press  [] Stepper/Exercise bike   [] Biceps: curls/supination  [] Swimming  [] Water exercises    Modalities:     Return to Sport:  [x] Of Choice      [] Plyometrics  [x] Ultrasound     [] Rhythmic stabilization

## 2019-06-12 ENCOUNTER — HOSPITAL ENCOUNTER (OUTPATIENT)
Dept: PHYSICAL THERAPY | Age: 49
Setting detail: THERAPIES SERIES
Discharge: HOME | End: 2019-06-12
Payer: COMMERCIAL

## 2019-06-12 PROCEDURE — 97110 THERAPEUTIC EXERCISES: CPT

## 2019-06-12 PROCEDURE — G0283 ELEC STIM OTHER THAN WOUND: HCPCS

## 2019-06-12 PROCEDURE — 97140 MANUAL THERAPY 1/> REGIONS: CPT

## 2019-06-12 NOTE — FLOWSHEET NOTE
Physical Therapy Daily Treatment Note  Date:  2019    Patient Name:  Brenna Morley    :  1970  MRN: [id number]  Restrictions/Precautions:    Pertinent Medical History:  Medical/Treatment Diagnosis Information:  · Diagnosis: s/p tennis elbow release  · Treatment Diagnosis: Decreased ADL status  Insurance/Certification information:  PT Insurance Information: [facility]  Physician Information:  Referring Practitioner: Dr. Larson Car of care signed (Y/N):  Sent to in basket on 4/15/19  Visit# / total visits: 15 /16  Pain level: 2/10        Progress Note: []  Yes  []  No  Next due by: Visit #10      History of Injury:Pt had R open tennis elbow release surgery on 19. Pt stated \"it was worse than they thought because they had to make a larger incision. \"  Pt has been resting his forearm this week and \"I had it all wrapped up until Saturday. \"  Pt stated his R elbow pain is rated 5-6/10- \"its not too bad, but I can't really use it. \"  Pt has been \"sleeping in a recliner and keeping it propped up all night. \"          Subjective:     19 Trying not to use arm. Some things are difficult because he is R handed  19  Arms not doing too bad. Pain going away a little bit. No pain at present. Up to 5/10 over the weekend. Elbow is still pretty swollen  19 Didn't feel too bad after last session. Has been doing pretty good  19 Sore with doing the exercises. Trying to use arm without over exerting it  19  Not really having too much pain. Still sensitive to touch. Did bump it the other day and that hurt pretty good. Carried 2 packs of pop cradled under his arm yesterday. Had some pain when he went to put it down  19  Elbow has been bothering him some today, so has been wearing wrist brace  19 elbow/forearm is feeling good today  19 Pt no showed for his last 2 PT sessions. Pt stated his R forearm has been \"not too bad.   I was using it doing some gardening and stuff, a little 20x  Orange, 20x         Gentle stretching R wrist  Medial epicondyle   Lateral epicondyle x5 ea    PROM, AROM R elbow  Flex  Ext  Pron/sup   Wrist flex/ext 15x each    Squeeze putty- pink 2'              Dumbbell wrist exercises   Extension  RD  Biceps curls w/ rotation  Supination/pronation   3# 10x, 4# 2x15  3# 10x, 4# 2x15  3# 10x, 4# 2x15  3# 10x, 4# 2x15           Other Therapeutic Activities:      Home Exercise Program:    4/15/19 HEP was instructed and included AAROM elbow flex/ext, AROM wrist flex/ext, AROM wrist RD/UD, thumb/finger opposition, instructed all in a painless ROM. Pt was given written hand outs and demonstrated exercises correctly. Pt expressed understanding of exercises. 4/29/19 isometrics: wrist flex/ext/RD/UD/supination/pronation.       Manual Treatments:  Cross friction massage R forearm 10'    Modalities:  IFC with CP 15' R elbow    Timed Code Treatment Minutes:  40    Total Treatment Minutes:  60    Assessment  [] Patient tolerated treatment well [] Patient limited by fatigue  [x] Patient limited by pain  [] Patient limited by other medical complications  [x] Other:  Painful         Prognosis: [x] Good [] Fair  [] Poor    Patient Requires Follow-up: [x] Yes  [] No    Plan:   [x] Continue per plan of care [] Alter current plan (see comments)  [] Plan of care initiated [] Hold pending MD visit [] Discharge  Plan for Next Session:      Electronically signed by:  Estella Ponce, [address] [place] [address]

## 2019-06-19 ENCOUNTER — HOSPITAL ENCOUNTER (OUTPATIENT)
Dept: PHYSICAL THERAPY | Age: 49
Setting detail: THERAPIES SERIES
Discharge: HOME | End: 2019-06-19
Payer: COMMERCIAL

## 2019-06-19 NOTE — FLOWSHEET NOTE
Physical Therapy  Cancellation/No-show Note  Patient Name:  Hyun Fitzgerald  :  1970   Date:  2019  Cancelled visits to date: 2  No-shows to date: 2    For today's appointment patient:  []  Cancelled  []  Rescheduled appointment  [x]  No-show     Reason given by patient:  []  Patient ill  []  Conflicting appointment  []  No transportation    []  Conflict with work  []  No reason given  [x]  Other:     Comments:   Pt called to cancel PT today. Pt is working at a [employer]. 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 Pt rescheduled this session for Friday.     Electronically signed by:  Alex Espitia PT

## 2019-06-21 ENCOUNTER — HOSPITAL ENCOUNTER (OUTPATIENT)
Dept: PHYSICAL THERAPY | Age: 49
Setting detail: THERAPIES SERIES
Discharge: HOME | End: 2019-06-21
Payer: COMMERCIAL

## 2019-06-21 PROCEDURE — 97110 THERAPEUTIC EXERCISES: CPT

## 2019-06-21 PROCEDURE — G0283 ELEC STIM OTHER THAN WOUND: HCPCS

## 2019-06-21 NOTE — FLOWSHEET NOTE
Physical Therapy Daily Treatment Note  Date:  2019    Patient Name:  Leonel Tyler    :  1970  MRN: [id number]  Restrictions/Precautions:    Pertinent Medical History:  Medical/Treatment Diagnosis Information:  · Diagnosis: s/p tennis elbow release  · Treatment Diagnosis: Decreased ADL status  Insurance/Certification information:  PT Insurance Information: [facility]  Physician Information:  Referring Practitioner: Dr. Lulu Crowe of care signed (Y/N):  Sent to in basket on 4/15/19  Visit# / total visits:   Pain level: 2/10        Progress Note: []  Yes  []  No  Next due by: Visit #10      History of Injury:Pt had R open tennis elbow release surgery on 19. Pt stated \"it was worse than they thought because they had to make a larger incision. \"  Pt has been resting his forearm this week and \"I had it all wrapped up until Saturday. \"  Pt stated his R elbow pain is rated 5-6/10- \"its not too bad, but I can't really use it. \"  Pt has been \"sleeping in a recliner and keeping it propped up all night. \"          Subjective:     19 Trying not to use arm. Some things are difficult because he is R handed  19  Arms not doing too bad. Pain going away a little bit. No pain at present. Up to 5/10 over the weekend. Elbow is still pretty swollen  19 Didn't feel too bad after last session. Has been doing pretty good  19 Sore with doing the exercises. Trying to use arm without over exerting it  19  Not really having too much pain. Still sensitive to touch. Did bump it the other day and that hurt pretty good. Carried 2 packs of pop cradled under his arm yesterday. Had some pain when he went to put it down  19  Elbow has been bothering him some today, so has been wearing wrist brace  19 elbow/forearm is feeling good today  19 Pt no showed for his last 2 PT sessions. Pt stated his R forearm has been \"not too bad.   I was using it doing some gardening and stuff, a little Other comments        Shoulder shrugs  Scap retract 10x  10x    Roll ball on table- green  Flexion   30x    Roll ball on wall- red  Flexion   20x         T-slide  Biceps curls  Triceps curls   Orange, 20x  Orange, 20x         Gentle stretching R wrist  Medial epicondyle   Lateral epicondyle x5 ea    PROM, AROM R elbow  Flex  Ext  Pron/sup   Wrist flex/ext 15x each    Squeeze putty- pink 2'              Dumbbell wrist exercises   Extension  RD  Biceps curls w/ rotation  Supination/pronation   3# 10x, 4# 2x15  3# 10x, 4# 2x15  3# 10x, 4# 2x15  3# 10x, 4# 2x15           Other Therapeutic Activities:      Home Exercise Program:    4/15/19 HEP was instructed and included AAROM elbow flex/ext, AROM wrist flex/ext, AROM wrist RD/UD, thumb/finger opposition, instructed all in a painless ROM. Pt was given written hand outs and demonstrated exercises correctly. Pt expressed understanding of exercises. 4/29/19 isometrics: wrist flex/ext/RD/UD/supination/pronation. 6/21/19 PT reviewed HEP with pt and issued another set of print outs, including the following exercises: Towel  squeeze, stretch wrist extensors and flexors, pronation/supination with hammer, dumbbell wrist flexion, extension, RD, biceps and triceps curls. PT gave pt putty for home use to practice squeezing and  strength.       Manual Treatments:      Modalities:  IFC with CP 15' R elbow    Timed Code Treatment Minutes:  30    Total Treatment Minutes:  50    Assessment  [] Patient tolerated treatment well [] Patient limited by fatigue  [x] Patient limited by pain  [] Patient limited by other medical complications  [x] Other:  Painful         Prognosis: [x] Good [] Fair  [] Poor    Patient Requires Follow-up: [x] Yes  [] No    Plan:   [x] Continue per plan of care [] Alter current plan (see comments)  [] Plan of care initiated [] Hold pending MD visit [] Discharge  Plan for Next Session:  Pt has PT sessions on 6/24/19 and 7/8/19, DC following those 2 sessions. Pt has reassessment with MD office on 7/11/19.     Electronically signed by:  Richard Casey PT

## 2019-06-24 ENCOUNTER — HOSPITAL ENCOUNTER (OUTPATIENT)
Dept: PHYSICAL THERAPY | Age: 49
Setting detail: THERAPIES SERIES
Discharge: HOME | End: 2019-06-24
Payer: COMMERCIAL

## 2019-06-24 PROCEDURE — 97110 THERAPEUTIC EXERCISES: CPT

## 2019-06-24 PROCEDURE — G0283 ELEC STIM OTHER THAN WOUND: HCPCS

## 2019-06-24 NOTE — FLOWSHEET NOTE
Physical Therapy Daily Treatment Note  Date:  2019    Patient Name:  Shahana Don    :  1970  MRN: [id number]  Restrictions/Precautions:    Pertinent Medical History:  Medical/Treatment Diagnosis Information:  · Diagnosis: s/p tennis elbow release  · Treatment Diagnosis: Decreased ADL status  Insurance/Certification information:  PT Insurance Information: [employer]  Physician Information:  Referring Practitioner: Dr. Loyda Ortiz of care signed (Y/N):  Sent to in basket on 4/15/19  Visit# / total visits:   Pain level: 2/10        Progress Note: []  Yes  []  No  Next due by: Visit #10      History of Injury:Pt had R open tennis elbow release surgery on 19. Pt stated \"it was worse than they thought because they had to make a larger incision. \"  Pt has been resting his forearm this week and \"I had it all wrapped up until Saturday. \"  Pt stated his R elbow pain is rated 5-6/10- \"its not too bad, but I can't really use it. \"  Pt has been \"sleeping in a recliner and keeping it propped up all night. \"          Subjective:     19 Trying not to use arm. Some things are difficult because he is R handed  19  Arms not doing too bad. Pain going away a little bit. No pain at present. Up to 5/10 over the weekend. Elbow is still pretty swollen  19 Didn't feel too bad after last session. Has been doing pretty good  19 Sore with doing the exercises. Trying to use arm without over exerting it  19  Not really having too much pain. Still sensitive to touch. Did bump it the other day and that hurt pretty good. Carried 2 packs of pop cradled under his arm yesterday. Had some pain when he went to put it down  19  Elbow has been bothering him some today, so has been wearing wrist brace  19 elbow/forearm is feeling good today  19 Pt no showed for his last 2 PT sessions. Pt stated his R forearm has been \"not too bad.   I was using it doing some gardening and stuff, a little Other comments        Shoulder shrugs  Scap retract 10x  10x    Roll ball on table- green  Flexion   30x    Roll ball on wall- red  Flexion   20x    [place] flexion 20x    T-slide  Biceps curls  Triceps curls   Orange, 20x  Orange, 20x         Gentle stretching R wrist  Medial epicondyle   Lateral epicondyle x5 ea    PROM, AROM R elbow  Flex  Ext  Pron/sup   Wrist flex/ext 15x each                   Dumbbell wrist exercises   Extension  RD  Biceps curls w/ rotation  Supination/pronation   3# 10x, 4# 2x15  3# 10x, 4# 2x15  3# 10x, 4# 2x15  3# 10x, 4# 2x15           Other Therapeutic Activities:      Home Exercise Program:    4/15/19 HEP was instructed and included AAROM elbow flex/ext, AROM wrist flex/ext, AROM wrist RD/UD, thumb/finger opposition, instructed all in a painless ROM. Pt was given written hand outs and demonstrated exercises correctly. Pt expressed understanding of exercises. 4/29/19 isometrics: wrist flex/ext/RD/UD/supination/pronation. 6/21/19 PT reviewed HEP with pt and issued another set of print outs, including the following exercises: Towel  squeeze, stretch wrist extensors and flexors, pronation/supination with hammer, dumbbell wrist flexion, extension, RD, biceps and triceps curls. PT gave pt putty for home use to practice squeezing and  strength. Manual Treatments:      Modalities:  IFC with CP 15' R elbow    Timed Code Treatment Minutes:  30    Total Treatment Minutes:  50    Assessment  [] Patient tolerated treatment well [] Patient limited by fatigue  [x] Patient limited by pain  [] Patient limited by other medical complications  [x] Other:  Painful         Prognosis: [x] Good [] Fair  [] Poor    Patient Requires Follow-up: [x] Yes  [] No    Plan:   [x] Continue per plan of care [] Alter current plan (see comments)  [] Plan of care initiated [] Hold pending MD visit [] Discharge  Plan for Next Session:  Pt has 1 more PT session on 7/8/19, plan DC then.   Pt is to continue HEP regularly as reviewed and updated on 6/21/19. Pt has reassessment with MD office on 7/11/19.     Electronically signed by:  Beth Crandall PT

## 2019-07-01 ENCOUNTER — APPOINTMENT (OUTPATIENT)
Dept: PHYSICAL THERAPY | Age: 49
End: 2019-07-01
Payer: COMMERCIAL

## 2019-07-08 ENCOUNTER — APPOINTMENT (OUTPATIENT)
Dept: PHYSICAL THERAPY | Age: 49
End: 2019-07-08
Payer: COMMERCIAL

## 2019-07-08 ENCOUNTER — HOSPITAL ENCOUNTER (OUTPATIENT)
Dept: PHYSICAL THERAPY | Age: 49
Setting detail: THERAPIES SERIES
Discharge: HOME | End: 2019-07-08
Payer: COMMERCIAL

## 2019-07-08 PROCEDURE — G0283 ELEC STIM OTHER THAN WOUND: HCPCS

## 2019-07-08 PROCEDURE — 97110 THERAPEUTIC EXERCISES: CPT

## 2019-07-08 NOTE — FLOWSHEET NOTE
Physical Therapy Daily Treatment Note  Date:  2019    Patient Name:  Autumn Nicholas    :  1970  MRN: [id number]  Restrictions/Precautions:    Pertinent Medical History:  Medical/Treatment Diagnosis Information:  · Diagnosis: s/p tennis elbow release  · Treatment Diagnosis: Decreased ADL status  Insurance/Certification information:  PT Insurance Information: [facility]  Physician Information:  Referring Practitioner: Dr. Bragg of care signed (Y/N):  Sent to in basket on 4/15/19  Visit# / total visits:   Pain level: 2/10        Progress Note: []  Yes  []  No  Next due by: Visit #10      History of Injury:Pt had R open tennis elbow release surgery on 19. Pt stated \"it was worse than they thought because they had to make a larger incision. \"  Pt has been resting his forearm this week and \"I had it all wrapped up until Saturday. \"  Pt stated his R elbow pain is rated 5-6/10- \"its not too bad, but I can't really use it. \"  Pt has been \"sleeping in a recliner and keeping it propped up all night. \"          Subjective:     19 Trying not to use arm. Some things are difficult because he is R handed  19  Arms not doing too bad. Pain going away a little bit. No pain at present. Up to 5/10 over the weekend. Elbow is still pretty swollen  19 Didn't feel too bad after last session. Has been doing pretty good  19 Sore with doing the exercises. Trying to use arm without over exerting it  19  Not really having too much pain. Still sensitive to touch. Did bump it the other day and that hurt pretty good. Carried 2 packs of pop cradled under his arm yesterday. Had some pain when he went to put it down  19  Elbow has been bothering him some today, so has been wearing wrist brace  19 elbow/forearm is feeling good today  19 Pt no showed for his last 2 PT sessions. Pt stated his R forearm has been \"not too bad.   I was using it doing some gardening and stuff, a little flex/ext/RD/UD/supination/pronation. 6/21/19 PT reviewed HEP with pt and issued another set of print outs, including the following exercises: Towel  squeeze, stretch wrist extensors and flexors, pronation/supination with hammer, dumbbell wrist flexion, extension, RD, biceps and triceps curls. PT gave pt putty for home use to practice squeezing and  strength.       Manual Treatments:      Modalities:  IFC with CP 15' R elbow    Timed Code Treatment Minutes:  30    Total Treatment Minutes:  50    Assessment  [] Patient tolerated treatment well [] Patient limited by fatigue  [x] Patient limited by pain  [] Patient limited by other medical complications  [x] Other:  Painful         Prognosis: [x] Good [] Fair  [] Poor    Patient Requires Follow-up: [x] Yes  [] No    Plan:   [] Continue per plan of care [] Alter current plan (see comments)  [] Plan of care initiated [] Hold pending MD visit [x] Discharge  Plan for Next Session:      Electronically signed by:  Pola rCuz PT

## 2019-07-08 NOTE — PROGRESS NOTES
stand and may have conflicts, but he he will have his daughter cover his work shifts at the [employer] so he can make PT sessions. 7/8/19 Pt stated his arm is \"not too bad. \"  Pt rated pain 0/10. 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 Pt stated he has not attempted any strenuous activity.        Objective:  Observation:  · 4/15/19 Quick Dash 86% dysfunction  · 7/8/19 Quick Dash 15% dysfunction  · Test measurements:  · 4/15/19    AROM and Strength R Shoulder [facility]  7/8/19  Minimal tenderness at incision, no swelling or warmth.     AROM R UE 4/15/19 6/5/19 7/8/19   Elbow flexion 130 144 WFL   Elbow extension -25, pain +2 (PROM +5) WFL   Pronation 90 WFL WFL   Supination 70, min pain [facility] WFL   Wrist flexion 52 WFL WFL   Wrist extension 60, pain [facility] WFL   RD 27 WFL WFL   UD 15 [facility] WFL   Strength         Biceps 4+/5 5/5 5/5   Triceps 4+/5, min pain 5/5 5/5   Pronators 4+/5 5/5 5/5   Supinators 4/5, min pain 5/5 5/5   Wrist flexors 4+/5 5/5 5/5   Wrist extensors 4-/5, pain 5/5 5/5   Wrist RD 4+/5 5-/5 5/5   Wrist UD 4+/5 5/5 5/5   Finger extensors 2-4 3+/5, pain 5/5 5/5   Dynomometer (pounds) R 13, L 104 65 80         Assessment:  Summary: pt met all goals and has no pain at the R elbow/forearm area. Pt has been working at a [employer]. Patient's response to treatment: good    Progress towards goals:    Short term goals  Time Frame for Short term goals: 1 week  Short term goal 1: Pt will be independent with a ROM HEP. met  Long term goals  Long term goal 1: R elbow, wrist and forearm AROM WFL and painless all directions. met  Long term goal 2: Pain R elbow will rate 0-2/10. met  Long term goal 3: Strength R biceps, triceps, wrist and finger extensors 5/5 and painless. met  Long term goal 4:  strength per dynomometer (pounds) will be 75 pounds of . met  Long term goal 5: Pt will be able to lift items greater than 5 pounds without pain.

## 2019-07-11 ENCOUNTER — OFFICE VISIT (OUTPATIENT)
Dept: ORTHOPEDIC SURGERY | Age: 49
End: 2019-07-11

## 2019-07-11 VITALS
HEIGHT: 74 IN | SYSTOLIC BLOOD PRESSURE: 112 MMHG | BODY MASS INDEX: 36.58 KG/M2 | DIASTOLIC BLOOD PRESSURE: 74 MMHG | HEART RATE: 93 BPM | WEIGHT: 285.06 LBS

## 2019-07-11 DIAGNOSIS — M25.521 RIGHT ELBOW PAIN: Primary | ICD-10-CM

## 2019-07-11 PROCEDURE — 99024 POSTOP FOLLOW-UP VISIT: CPT | Performed by: PHYSICIAN ASSISTANT

## 2019-07-11 NOTE — PROGRESS NOTES
4 MG tablet Take . 5-1 tablet by mouth bid prn pain 60 tablet 2    senna-docusate (PERICOLACE) 8.6-50 MG per tablet Take 2 tablets by mouth daily as needed for Constipation 30 tablet 0    levothyroxine (SYNTHROID) 200 MCG tablet Take 1 tablet by mouth daily 90 tablet 3    hydrochlorothiazide (HYDRODIURIL) 25 MG tablet TAKE 2 TABLETs every morning 90 tablet 3    atorvastatin (LIPITOR) 40 MG tablet TAKE 1 TABLET DAILY 90 tablet 3    lisinopril (PRINIVIL;ZESTRIL) 10 MG tablet TAKE 1 TABLET DAILY 90 tablet 3    aspirin 81 MG tablet Take 81 mg by mouth daily      omeprazole (PRILOSEC) 40 MG delayed release capsule Take 1 capsule by mouth every morning (before breakfast) 30 capsule 0    meloxicam (MOBIC) 15 MG tablet Take 15 mg by mouth daily      magnesium (MAGNESIUM-OXIDE) 250 MG TABS tablet Take 250 mg by mouth daily      DULoxetine (CYMBALTA) 30 MG extended release capsule Take 1 capsule by mouth daily 30 capsule 2     No current facility-administered medications for this visit. Allergies   Allergen Reactions    Oxycontin [Oxycodone] Nausea Only    Percocet [Oxycodone-Acetaminophen] Nausea And Vomiting       Vital signs:  /74   Pulse 93   Ht 6' 2.02\" (1.88 m)   Wt 285 lb 0.9 oz (129.3 kg)   BMI 36.58 kg/m²        Neuro: Alert & oriented x 3,  normal,  no focal deficits noted. Normal affect. Eyes: sclera clear  Ears: Normal external ear  Mouth:  No perioral lesions  Pulm: Respirations unlabored and regular  Pulse: Regular rate and rhythm   Skin: Warm, well perfused    right Elbow Examination:    Inspection:  No skin lesions, no deformity, no swelling    Palpation:  mild tenderness to palpation over the lateral epicondyle,no tenderness over medial epicondyle, olecranon. No tenderness to palpation over the mobile wad, flexor pronator mass, biceps, triceps.   No tenderness over the ulnar collateral ligament and the lateral collateral ligament complex    Range of Motion: full extension and able checked for errors. It is possible that there are still dictated errors within this office note. If so, please bring any errors to my attention for an addendum. All efforts were made to ensure that this office note is accurate.

## 2019-07-17 ENCOUNTER — TELEPHONE (OUTPATIENT)
Dept: ORTHOPEDIC SURGERY | Age: 49
End: 2019-07-17

## 2019-07-26 RX ORDER — BUPROPION HYDROCHLORIDE 100 MG/1
100 TABLET, EXTENDED RELEASE ORAL 2 TIMES DAILY
Qty: 180 TABLET | Refills: 1 | Status: SHIPPED | OUTPATIENT
Start: 2019-07-26 | End: 2019-10-14

## 2019-08-08 ENCOUNTER — OFFICE VISIT (OUTPATIENT)
Dept: ORTHOPEDIC SURGERY | Age: 49
End: 2019-08-08
Payer: COMMERCIAL

## 2019-08-08 VITALS
HEIGHT: 74 IN | DIASTOLIC BLOOD PRESSURE: 74 MMHG | HEART RATE: 101 BPM | SYSTOLIC BLOOD PRESSURE: 110 MMHG | BODY MASS INDEX: 36.58 KG/M2 | WEIGHT: 285.06 LBS

## 2019-08-08 DIAGNOSIS — M25.521 RIGHT ELBOW PAIN: Primary | ICD-10-CM

## 2019-08-08 DIAGNOSIS — M25.512 LEFT SHOULDER PAIN, UNSPECIFIED CHRONICITY: ICD-10-CM

## 2019-08-08 PROCEDURE — 99214 OFFICE O/P EST MOD 30 MIN: CPT | Performed by: ORTHOPAEDIC SURGERY

## 2019-08-08 PROCEDURE — 20610 DRAIN/INJ JOINT/BURSA W/O US: CPT | Performed by: ORTHOPAEDIC SURGERY

## 2019-08-08 NOTE — PROGRESS NOTES
Patient has had no medical changes since last evaluated    Depomedrol     NDC#: 2106-0019-40  Lot: [id number]  Expiration Date: 2/2021  Dose: 2cc  Location: injection was given in Left  shoulder      Naropin (Ropivacain)    NDC#: 56404-804-43  Lot: [id number]  Expiration Date: 09/22  Dose:8cc  Location: injection was given in Left shoulder
 JOINT REPLACEMENT Right     shoulder    [place] DECOMPRESS FOREARM,EXCIS MUSC/NERV Right 4/8/2019    RIGHT OPEN TENNIS ELBOW RELEASE performed by Mychal Castaneda MD at [address] [facility] Right 1993    right   [facility]  2006    THYROIDECTOMY  2008    UPPER GASTROINTESTINAL ENDOSCOPY N/A 1/29/2019    EGD DIAGNOSTIC ONLY performed by Ally Lacy MD at [address] History   Problem Relation Age of Onset    Diabetes Father     Heart Failure Father     Hypertension Father     High Blood Pressure Father     High Blood Pressure Mother     Stroke Maternal Grandmother     Anesth Problems Neg Hx     Malig Hyperten Neg Hx     Hypotension Neg Hx     Malig Hypertherm Neg Hx     Pseudochol. Deficiency Neg Hx        Social History     Socioeconomic History    Marital status:      Spouse name: None    Number of children: 3    Years of education: None    Highest education level: None   Occupational History    Occupation:    Social Needs    Financial resource strain: None    Food insecurity:     Worry: None     Inability: None    Transportation needs:     Medical: None     Non-medical: None   Tobacco Use    Smoking status: Never Smoker    Smokeless tobacco: Never Used   Substance and Sexual Activity    Alcohol use:  Yes     Alcohol/week: 0.0 standard drinks     Comment: occasional use    Drug use: No    Sexual activity: None   Lifestyle    Physical activity:     Days per week: None     Minutes per session: None    Stress: None   Relationships    Social connections:     Talks on phone: None     Gets together: None     Attends Voodoo service: None     Active member of club or organization: None     Attends meetings of clubs or organizations: None     Relationship status: None    Intimate partner violence:     Fear of current or ex partner: None     Emotionally abused: None     Physically abused: None     Forced sexual activity:

## 2019-08-12 ENCOUNTER — TELEPHONE (OUTPATIENT)
Dept: ORTHOPEDIC SURGERY | Age: 49
End: 2019-08-12

## 2019-08-13 ENCOUNTER — OFFICE VISIT (OUTPATIENT)
Dept: PAIN MANAGEMENT | Age: 49
End: 2019-08-13
Payer: COMMERCIAL

## 2019-08-13 VITALS
BODY MASS INDEX: 37.22 KG/M2 | WEIGHT: 290 LBS | DIASTOLIC BLOOD PRESSURE: 78 MMHG | HEART RATE: 101 BPM | SYSTOLIC BLOOD PRESSURE: 129 MMHG

## 2019-08-13 DIAGNOSIS — M96.1 FAILED BACK SURGICAL SYNDROME: ICD-10-CM

## 2019-08-13 DIAGNOSIS — M19.011 OSTEOARTHRITIS OF RIGHT GLENOHUMERAL JOINT: ICD-10-CM

## 2019-08-13 DIAGNOSIS — M50.30 DDD (DEGENERATIVE DISC DISEASE), CERVICAL: ICD-10-CM

## 2019-08-13 DIAGNOSIS — M47.819 DEGENERATIVE JOINT DISEASE OF SPINAL FACET JOINT: ICD-10-CM

## 2019-08-13 DIAGNOSIS — G89.4 CHRONIC PAIN SYNDROME: ICD-10-CM

## 2019-08-13 DIAGNOSIS — M79.604 LOW BACK PAIN RADIATING TO RIGHT LEG: ICD-10-CM

## 2019-08-13 DIAGNOSIS — M54.50 LOW BACK PAIN RADIATING TO RIGHT LEG: ICD-10-CM

## 2019-08-13 DIAGNOSIS — M54.16 LUMBAR RADICULOPATHY: ICD-10-CM

## 2019-08-13 DIAGNOSIS — M51.37 DDD (DEGENERATIVE DISC DISEASE), LUMBOSACRAL: ICD-10-CM

## 2019-08-13 PROCEDURE — 99213 OFFICE O/P EST LOW 20 MIN: CPT | Performed by: INTERNAL MEDICINE

## 2019-08-13 RX ORDER — DULOXETIN HYDROCHLORIDE 30 MG/1
30 CAPSULE, DELAYED RELEASE ORAL DAILY
Qty: 30 CAPSULE | Refills: 2 | Status: SHIPPED | OUTPATIENT
Start: 2019-08-13 | End: 2019-10-08 | Stop reason: SDUPTHER

## 2019-08-13 RX ORDER — TIZANIDINE 4 MG/1
TABLET ORAL
Qty: 60 TABLET | Refills: 2 | Status: SHIPPED | OUTPATIENT
Start: 2019-08-13 | End: 2019-10-08 | Stop reason: SDUPTHER

## 2019-08-13 RX ORDER — PREGABALIN 100 MG/1
CAPSULE ORAL
Qty: 90 CAPSULE | Refills: 2 | Status: SHIPPED | OUTPATIENT
Start: 2019-08-13 | End: 2019-10-08 | Stop reason: SDUPTHER

## 2019-08-13 NOTE — PROGRESS NOTES
Pulmonary/Chest: Effort normal. No respiratory distress. He does not have wheezes in the lung fields. He has no rales. Neurological/Psychiatric:He is alert and oriented to person, place, and time. Coordination is  normal.  His mood isAppropriate and affect is Flat/blunted and Anxious . IMPRESSION:   1. Chronic pain syndrome    2. DDD (degenerative disc disease), lumbosacral    3. Osteoarthritis of right glenohumeral joint    4. Failed back surgical syndrome    5. Degenerative joint disease of spinal facet joint    6. Low back pain radiating to right leg    7. DDD (degenerative disc disease), cervical    8. Lumbar radiculopathy        PLAN:  Informed verbal consent was obtained  -[place] record was obtained and reviewed  for the last one year and no indicators of drug misuse  were found. Any other controlled substance prescriptions  seen on the record have been accounted for, I am aware of the patient receiving these medications. Jasbir Cary [place] record will be rechecked as part of office protocol.   -continue with the current treatment regimen   -Adv Biofeedback, relaxation and meditation techniques. Referral to psychologist for CBT was also discussed with patient  -He was advised weight reduction, diet changes- 800-1200 flex diet, diet diary, exercising, nutritional  consult increased physical activity as tolerated  -back stretching exercises were given   -He was advised to increase fluids ( 5-7  glasses of fluid daily), limit caffeine, avoid cheese products, increase dietary fiber, increase activity and exercise as tolerated and relax regularly and enjoy meals   Current Outpatient Medications   Medication Sig Dispense Refill    buPROPion (WELLBUTRIN SR) 100 MG extended release tablet Take 1 tablet by mouth 2 times daily 180 tablet 1    pregabalin (LYRICA) 100 MG capsule TAKE 1 TAB IN THE AM AND 2 TABS IN THE PM 90 capsule 2    tiZANidine (ZANAFLEX) 4 MG tablet Take . 5-1 tablet by mouth bid prn pain 60 tablet 2 physical performance, general activity, work or disability,emotional distress, health care utilization and  decreased medication consumption. Will continue to monitor progress towards achieving/maintaining therapeutic goals with special emphasis on  1. Improvement in perceived interfernce  of pain with ADL's. Ability to do home exercises independently. Ability to do household chores indoor and/or outdoor work and social and leisure activities. Improve psychosocial and physical functioning. - he is showing progression towards this treatment goal with the current regimen. He was advised against drinking alcohol with the narcotic pain medicines, advised against driving or handling machinery while adjusting the dose of medicines or if having cognitive  issues related to the current medications. Risk of overdose and death, if medicines not taken as prescribed, were also discussed. If the patient develops new symptoms or if the symptoms worsen, the patient should call the office. While transcribing every attempt was made to maintain the accuracy of the note in terms of it's contents,there may have been some errors made inadvertently. Thank you for allowing me to participate in the care of this patient.     Jonas Chu MD.    Cc: [address] 1960 DO John

## 2019-08-19 ENCOUNTER — TELEPHONE (OUTPATIENT)
Dept: ORTHOPEDIC SURGERY | Age: 49
End: 2019-08-19

## 2019-08-23 ENCOUNTER — HOSPITAL ENCOUNTER (OUTPATIENT)
Dept: PHYSICAL THERAPY | Age: 49
Setting detail: THERAPIES SERIES
Discharge: HOME | End: 2019-08-23
Payer: COMMERCIAL

## 2019-08-23 PROCEDURE — 97110 THERAPEUTIC EXERCISES: CPT

## 2019-08-23 PROCEDURE — 97162 PT EVAL MOD COMPLEX 30 MIN: CPT

## 2019-08-23 NOTE — PROGRESS NOTES
(degrees)  R Shoulder Flexion 0-180: WFL  R Shoulder Extension 0-45: WFL  R Shoulder ABduction 0-180: WFL  R Shoulder Int Rotation  0-70: WFL(behind back to SI joint)  R Shoulder Ext Rotation 0-90: WFL  R Elbow Flexion 0-145: WFL  R Elbow Extension 145-0: WFL  R Forearm Pron 0-90: WFL  R Forearm Supination  0-90: WFL  R Wrist Flexion 0-80: WFL  R Wrist Extension 0-70: WFL  R Wrist Radial Deviation 0-20: WFL  R Wrist Ulnar Deviation 0-45: WFL  AROM LUE (degrees)  L Shoulder Flexion 0-180: WFL  L Shoulder Extension 0-45: WFL  L Shoulder ABduction 0-180: WFL  L Shoulder Int Rotation  0-70: WFL(behind back to SI joint)  L Shoulder Ext Rotation  0-90: WFL  L Elbow Flexion 0-145: WFL  L Elbow Extension 145-0: WFL  L Forearm Pron 0-90: WFL  L Forearm Supination  0-90: WFL  L Wrist Flexion 0-80: WFL  L Wrist Extension 0-70: WFL  L Wrist Radial Deviation 0-20: WFL  L Wrist Ulnar Deviation 0-45: WFL  Strength RUE  R Shoulder Flexion: 5/5  R Shoulder ABduction: 5/5  R Shoulder Internal Rotation: 5/5  R Shoulder External Rotation: 5/5  R Elbow Flexion: 5/5  R Elbow Extension: 5/5  R Forearm Pron: 5/5  R Forearm Sup: 5/5  R Wrist Flexion: 5/5  R Wrist Extension: 5/5  R Wrist Radial Deviation: 5/5  R Wrist Ulnar Deviation: 5/5  Strength LUE  L Shoulder Flexion: 4/5  L Shoulder ABduction: 4/5  L Shoulder Internal Rotation: 4/5  L Shoulder External Rotation: 4/5  L Elbow Flexion: 5/5  L Elbow Extension: 5/5  L Forearm Pron: 5/5  L Forearm Sup: 4+/5  L Wrist Flexion: 5/5  L Wrist Extension: 5/5  L Wrist Radial Deviation: 5/5  L Wrist Ulnar Deviation: 5/5  Other: HEP: shrugs, pinches, isometric shoulder flexion and ER, t-band IR and ER. Assessment   Conditions Requiring Skilled Therapeutic Intervention  Body structures, Functions, Activity limitations: Decreased ADL status; Decreased strength; Increased Pain  Assessment: Pt presented to PT with chronic L shoulder pain and dysfunction. PLOF: independent and active.   Treatment

## 2019-08-26 ENCOUNTER — HOSPITAL ENCOUNTER (OUTPATIENT)
Dept: PHYSICAL THERAPY | Age: 49
Setting detail: THERAPIES SERIES
Discharge: HOME | End: 2019-08-26
Payer: COMMERCIAL

## 2019-08-26 PROCEDURE — 97110 THERAPEUTIC EXERCISES: CPT

## 2019-08-26 PROCEDURE — G0283 ELEC STIM OTHER THAN WOUND: HCPCS

## 2019-08-28 ENCOUNTER — HOSPITAL ENCOUNTER (OUTPATIENT)
Dept: PHYSICAL THERAPY | Age: 49
Setting detail: THERAPIES SERIES
Discharge: HOME | End: 2019-08-28
Payer: COMMERCIAL

## 2019-08-28 PROCEDURE — G0283 ELEC STIM OTHER THAN WOUND: HCPCS

## 2019-08-28 PROCEDURE — 97110 THERAPEUTIC EXERCISES: CPT

## 2019-09-04 ENCOUNTER — HOSPITAL ENCOUNTER (OUTPATIENT)
Dept: PHYSICAL THERAPY | Age: 49
Setting detail: THERAPIES SERIES
Discharge: HOME | End: 2019-09-04
Payer: COMMERCIAL

## 2019-09-04 PROCEDURE — 97110 THERAPEUTIC EXERCISES: CPT

## 2019-09-04 PROCEDURE — G0283 ELEC STIM OTHER THAN WOUND: HCPCS

## 2019-09-06 ENCOUNTER — HOSPITAL ENCOUNTER (OUTPATIENT)
Dept: PHYSICAL THERAPY | Age: 49
Setting detail: THERAPIES SERIES
Discharge: HOME | End: 2019-09-06
Payer: COMMERCIAL

## 2019-09-06 PROCEDURE — 97110 THERAPEUTIC EXERCISES: CPT

## 2019-09-06 PROCEDURE — G0283 ELEC STIM OTHER THAN WOUND: HCPCS

## 2019-09-06 NOTE — FLOWSHEET NOTE
Physical Therapy Daily Treatment Note  Date:  2019    Patient Name:  Aryan Leija    :  1970  MRN: [id number]  Restrictions/Precautions:    Pertinent Medical History:  Medical/Treatment Diagnosis Information:  · Diagnosis: Left shoulder pain, unspecified chronicity (M25.512)  · Treatment Diagnosis: Decreased ADL status  Insurance/Certification information:  PT Insurance Information: Medical [place]- pt has had 24 PT visits in 2019 to date; insurance has unlimited number of sessions available  Physician Information:  Referring Practitioner: Dr. Becca Diaz of care signed (Y/N):  Sent to in basket on 19  Visit# / total visits:    Pain level: 3-610 L shoulder    Progress Note: []  Yes  []  No  Next due by: Visit #10      History of Injury:Pt had a flare up of L shoulder pain approximately 6 weeks ago \"that got progressively worse, there was no onset of the situation. \"  L shoulder pain was rated 5-8/10, but had cortisone shot  and currently 0-1/10. Pain was located at the top of the shoulder. Pt denied paresthesia into L arm. 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 Pt wakes at night when he rolls onto the left side. L shoulder pain was provoked by \"anything where I had to use it like lifting, carrying, anything like that. \"  Pt had pain at rest as well prior to cortisone shot. Subjective:   Gets sore on top of shoulder. Doing pretty good with exercises so far. Gets a little fatigued, but soreness isn't anything to complain about  19 L shoulder has been doing well  19 had difficulty sleeping last night without any specific reason. L shoulder has been \"not too bad. It hurts after I use it but the cortisone is still a little effective and its not hurting all the time like it did before. \"  L shoulder pain this morning pain rated 0/10; with activity pain rates up to \"4-5/10 depending on how active I am.\"  Activities that Follow-up: [x] Yes  [] No    Plan:   [x] Continue per plan of care [] Alter current plan (see comments)  [] Plan of care initiated [] Hold pending MD visit [] Discharge  Plan for Next Session:      Electronically signed by:  Grover Alejandre, PT [id number]

## 2019-09-09 ENCOUNTER — HOSPITAL ENCOUNTER (OUTPATIENT)
Dept: PHYSICAL THERAPY | Age: 49
Setting detail: THERAPIES SERIES
Discharge: HOME | End: 2019-09-09
Payer: COMMERCIAL

## 2019-09-09 PROCEDURE — G0283 ELEC STIM OTHER THAN WOUND: HCPCS

## 2019-09-09 PROCEDURE — 97110 THERAPEUTIC EXERCISES: CPT

## 2019-09-09 NOTE — FLOWSHEET NOTE
Activities that contribute to pain are lifting primarily. Pt has soreness following PT sessions and after his HEP. Soreness lasts up to 2 hours. 9/9/19 Shoulder not too bad today, but was really flared up yesterday while peeling some fruit. It was hurting like it did before the injection. LB is really hurting today. Probably from standing so much at daughter's softball games this weekend    Objective:  Observation:   Test measurements:    8/23/19  Palpation: TTP: no tenderness of B biceps, supraspinatus, infraspinatus. Cervical spine AROM WFL and painless. (-) B upper quadrant test and Spurlings test.    AROM LUE WFL  L Shoulder Int Rotation  0-70: WFL(behind back to SI joint)  Strength LUE  L Shoulder Flexion: 4/5  L Shoulder ABduction: 4/5  L Shoulder Internal Rotation: 4/5  L Shoulder External Rotation: 4/5   9/6/19  Strength LUE  L Shoulder Flexion: 4+/5  L Shoulder ABduction: 5/5  L Shoulder Internal Rotation: 5/5  L Shoulder External Rotation: 5/5      Exercises:  Exercise/Equipment Resistance/Repetitions Other comments   Swiss ball on table  Flexion  ABD   30x  30x    [place] flexion 20x    Ball on wall  Flex  CW  CCW   15x  15x  15x    T-slide  IR  ER  L unilateral rows  Lat pulls  ADD  Ext   Green, 2x15  Green, 2x15  Green, 2x15  [place], 2x15  Orange, 2x15  Orange, 2x15    T-slide  Serratus punches  ABD   Yellow, 10x3  Yellow, 10x2         Rower  Narrow  Wide   25# 2x10  25# 2x10         Hor add stretch 2x30\"    Codmans CW/CCW 15x each                     Other Therapeutic Activities:      Home Exercise Program:    8/23/19 HEP was instructed and included shrugs, pinches, isometric shoulder flexion and ER, t-band IR and ER. Pt was given written hand outs and demonstrated exercises correctly. Pt expressed understanding of exercises.   9/4/19 t-band shoulder extension, adduction, ABD      Manual Treatments:      Modalities:  IFC with CP x 20 minutes    Timed Code Treatment Minutes:  35    Total Treatment

## 2019-09-11 ENCOUNTER — HOSPITAL ENCOUNTER (OUTPATIENT)
Dept: PHYSICAL THERAPY | Age: 49
Setting detail: THERAPIES SERIES
Discharge: HOME | End: 2019-09-11
Payer: COMMERCIAL

## 2019-09-11 PROCEDURE — G0283 ELEC STIM OTHER THAN WOUND: HCPCS

## 2019-09-11 PROCEDURE — 97110 THERAPEUTIC EXERCISES: CPT

## 2019-09-11 NOTE — FLOWSHEET NOTE
Physical Therapy Daily Treatment Note  Date:  2019    Patient Name:  Jamshid Capps    :  1970  MRN: [id number]  Restrictions/Precautions:    Pertinent Medical History:  Medical/Treatment Diagnosis Information:  · Diagnosis: Left shoulder pain, unspecified chronicity (M25.512)  · Treatment Diagnosis: Decreased ADL status  Insurance/Certification information:  PT Insurance Information: Medical [place]- pt has had 24 PT visits in 2019 to date; insurance has unlimited number of sessions available  Physician Information:  Referring Practitioner: Dr. Jimmy Li of care signed (Y/N):  Sent to in basket on 19  Visit# / total visits:     Pain level: 3-10 L shoulder    Progress Note: []  Yes  []  No  Next due by: Visit #10      History of Injury:Pt had a flare up of L shoulder pain approximately 6 weeks ago \"that got progressively worse, there was no onset of the situation. \"  L shoulder pain was rated 5-8/10, but had cortisone shot  and currently 0-1/10. Pain was located at the top of the shoulder. Pt denied paresthesia into L arm. 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 Pt wakes at night when he rolls onto the left side. L shoulder pain was provoked by \"anything where I had to use it like lifting, carrying, anything like that. \"  Pt had pain at rest as well prior to cortisone shot. Subjective:   Gets sore on top of shoulder. Doing pretty good with exercises so far. Gets a little fatigued, but soreness isn't anything to complain about  19 L shoulder has been doing well  19 had difficulty sleeping last night without any specific reason. L shoulder has been \"not too bad. It hurts after I use it but the cortisone is still a little effective and its not hurting all the time like it did before. \"  L shoulder pain this morning pain rated 0/10; with activity pain rates up to \"4-5/10 depending on how active I am.\"  Activities that contribute to pain are lifting primarily. Pt has soreness following PT sessions and after his HEP. Soreness lasts up to 2 hours. 9/9/19 Shoulder not too bad today, but was really flared up yesterday while peeling some fruit. It was hurting like it did before the injection. LB is really hurting today. Probably from standing so much at daughter's softball games this weekend  9/11/19 back has been bothering pt. Pt rated shoulder pain 2-3/10. L shoulder pain wakes pt \"twice a night. \"  When asked if shoulder was progressing pt stated \"I can't tell if the cortisone shot is wearing off or not. \"  Pt stated \"I'm kind of afraid to use it (L shoulder). \"      Objective:  Observation:   Quick Dash  8/23/19 36.7% dysfunction  9/11/19 27.3% dysfunction    Test measurements:    8/23/19  Palpation: TTP: no tenderness of B biceps, supraspinatus, infraspinatus. Cervical spine AROM WFL and painless.   (-) B upper quadrant test and Spurlings test.    AROM LUE WFL  L Shoulder Int Rotation  0-70: WFL(behind back to SI joint)  Strength LUE  L Shoulder Flexion: 4/5  L Shoulder ABduction: 4/5  L Shoulder Internal Rotation: 4/5  L Shoulder External Rotation: 4/5   9/6/19  Strength LUE  L Shoulder Flexion: 4+/5  L Shoulder ABduction: 5/5  L Shoulder Internal Rotation: 5/5  L Shoulder External Rotation: 5/5      Exercises:  Exercise/Equipment Resistance/Repetitions Other comments   Swiss ball on table  Flexion  ABD   30x  30x    [place] flexion 20x    Ball on wall  Flex  CW  CCW   15x  15x  15x    T-slide  IR  ER  L unilateral rows  Lat pulls  ADD  Ext   Green, 3x15  Green, 3x15  Green, 2x15  [place], 2x15  Orange, 2x15  Orange, 2x15    T-slide  Serratus punches  ABD   Yellow, 10x3  Yellow, 10x2         Rower  Narrow  Wide   30# 2x10  30# 2x10         Hor add stretch 2x30\"    Codmans CW/CCW 15x each                     Other Therapeutic Activities:      Home Exercise Program:    8/23/19 HEP was instructed and included shrugs, pinches,

## 2019-09-16 ENCOUNTER — HOSPITAL ENCOUNTER (OUTPATIENT)
Dept: PHYSICAL THERAPY | Age: 49
Setting detail: THERAPIES SERIES
Discharge: HOME | End: 2019-09-16
Payer: COMMERCIAL

## 2019-09-16 NOTE — PROGRESS NOTES
Maryhelen Kayser, PT    If you have any questions or concerns, please don't hesitate to call.   Thank you for your referral.    Physician Signature:________________________________Date:__________________  By signing above, therapists plan is approved by physician

## 2019-09-24 ENCOUNTER — OFFICE VISIT (OUTPATIENT)
Dept: ORTHOPEDIC SURGERY | Age: 49
End: 2019-09-24
Payer: COMMERCIAL

## 2019-09-24 VITALS
WEIGHT: 289.9 LBS | HEIGHT: 74 IN | SYSTOLIC BLOOD PRESSURE: 111 MMHG | DIASTOLIC BLOOD PRESSURE: 75 MMHG | HEART RATE: 90 BPM | BODY MASS INDEX: 37.21 KG/M2

## 2019-09-24 DIAGNOSIS — M67.911 ROTATOR CUFF DYSFUNCTION, RIGHT: ICD-10-CM

## 2019-09-24 DIAGNOSIS — M25.512 LEFT SHOULDER PAIN, UNSPECIFIED CHRONICITY: Primary | ICD-10-CM

## 2019-09-24 DIAGNOSIS — M25.521 RIGHT ELBOW PAIN: ICD-10-CM

## 2019-09-24 DIAGNOSIS — M77.11 LATERAL EPICONDYLITIS, RIGHT ELBOW: ICD-10-CM

## 2019-09-24 DIAGNOSIS — Z96.611 STATUS POST TOTAL SHOULDER ARTHROPLASTY, RIGHT: ICD-10-CM

## 2019-09-24 PROCEDURE — G8417 CALC BMI ABV UP PARAM F/U: HCPCS | Performed by: ORTHOPAEDIC SURGERY

## 2019-09-24 PROCEDURE — G8427 DOCREV CUR MEDS BY ELIG CLIN: HCPCS | Performed by: ORTHOPAEDIC SURGERY

## 2019-09-24 PROCEDURE — 99213 OFFICE O/P EST LOW 20 MIN: CPT | Performed by: ORTHOPAEDIC SURGERY

## 2019-09-24 PROCEDURE — 3017F COLORECTAL CA SCREEN DOC REV: CPT | Performed by: ORTHOPAEDIC SURGERY

## 2019-09-24 PROCEDURE — 1036F TOBACCO NON-USER: CPT | Performed by: ORTHOPAEDIC SURGERY

## 2019-09-24 NOTE — PROGRESS NOTES
Chief Complaint    Shoulder Pain (F/u left shoulder)      History of Present Illness:  Jamshid Capps is a pleasant, 47 y.o., male, here today for follow up of bilateral shoulder pain. He is 2 years status post right shoulder arthroplasty. His pain is very well controlled however he has stiffness and endorses diffuse weakness to the right upper extremity. His primary complaint is his left shoulder which causes him significant pain, particularly with overhead activities and at rest/sleep. He takes oral meloxicam and has been doing his physical therapy for cuff strengthening which he says has not helped. His subacromial corticosteroid injection last visit helped for a few weeks and he feels that it is wearing off. Pain is described as sharp and aching and persistent. It is aggravated by use and lifting. It is not a result of injury and is not work-related. Currently his pain is 2 out of 10. He reports no new injuries or setbacks. Medical History:  Patient's medications, allergies, past medical, surgical, social and family histories were reviewed and updated as appropriate. Review of Systems  A 14 point review of systems was completed by the patient on 25 October 2018 and is available in the media section of the scanned medical record and was reviewed on 9/24/2019. The review is negative with the exception of those things mentioned in the HPI and Past Medical History    Vital Signs:  Vitals:    09/24/19 1325   BP: 111/75   Pulse: 90       General/Appearance: Alert and oriented and in no apparent distress. Skin:  There are no skin lesions, cellulitis, or extreme edema. The patient has warm and well-perfused Bilateral upper extremities with brisk capillary refill. Left shoulder Exam:  Inspection: Acromioclavicular prominence, no gross deformities, no signs of infection. Palpation: Tenderness over acromioclavicular joint, greater tuberosity, biceps tendon    Active Range of Motion:   Forward

## 2019-09-25 ENCOUNTER — TELEPHONE (OUTPATIENT)
Dept: ORTHOPEDIC SURGERY | Age: 49
End: 2019-09-25

## 2019-10-05 ENCOUNTER — HOSPITAL ENCOUNTER (OUTPATIENT)
Dept: MRI IMAGING | Age: 49
Discharge: HOME | End: 2019-10-05
Payer: COMMERCIAL

## 2019-10-05 DIAGNOSIS — M25.512 LEFT SHOULDER PAIN, UNSPECIFIED CHRONICITY: ICD-10-CM

## 2019-10-05 PROCEDURE — 73221 MRI JOINT UPR EXTREM W/O DYE: CPT

## 2019-10-08 ENCOUNTER — OFFICE VISIT (OUTPATIENT)
Dept: ORTHOPEDIC SURGERY | Age: 49
End: 2019-10-08
Payer: COMMERCIAL

## 2019-10-08 ENCOUNTER — OFFICE VISIT (OUTPATIENT)
Dept: PAIN MANAGEMENT | Age: 49
End: 2019-10-08
Payer: COMMERCIAL

## 2019-10-08 VITALS
DIASTOLIC BLOOD PRESSURE: 72 MMHG | HEART RATE: 97 BPM | SYSTOLIC BLOOD PRESSURE: 111 MMHG | BODY MASS INDEX: 37.21 KG/M2 | WEIGHT: 289.9 LBS | HEIGHT: 74 IN

## 2019-10-08 VITALS
HEART RATE: 101 BPM | DIASTOLIC BLOOD PRESSURE: 82 MMHG | BODY MASS INDEX: 37.86 KG/M2 | WEIGHT: 295 LBS | SYSTOLIC BLOOD PRESSURE: 124 MMHG

## 2019-10-08 DIAGNOSIS — M51.37 DDD (DEGENERATIVE DISC DISEASE), LUMBOSACRAL: ICD-10-CM

## 2019-10-08 DIAGNOSIS — M75.22 BICEPS TENDONITIS ON LEFT: ICD-10-CM

## 2019-10-08 DIAGNOSIS — M19.011 OSTEOARTHRITIS OF RIGHT GLENOHUMERAL JOINT: ICD-10-CM

## 2019-10-08 DIAGNOSIS — M54.16 LUMBAR RADICULOPATHY: ICD-10-CM

## 2019-10-08 DIAGNOSIS — M75.112 NONTRAUMATIC INCOMPLETE TEAR OF LEFT ROTATOR CUFF: ICD-10-CM

## 2019-10-08 DIAGNOSIS — M50.30 DDD (DEGENERATIVE DISC DISEASE), CERVICAL: ICD-10-CM

## 2019-10-08 DIAGNOSIS — M96.1 FAILED BACK SURGICAL SYNDROME: ICD-10-CM

## 2019-10-08 DIAGNOSIS — M79.604 LOW BACK PAIN RADIATING TO RIGHT LEG: ICD-10-CM

## 2019-10-08 DIAGNOSIS — M47.819 DEGENERATIVE JOINT DISEASE OF SPINAL FACET JOINT: ICD-10-CM

## 2019-10-08 DIAGNOSIS — M54.50 LOW BACK PAIN RADIATING TO RIGHT LEG: ICD-10-CM

## 2019-10-08 DIAGNOSIS — G89.4 CHRONIC PAIN SYNDROME: ICD-10-CM

## 2019-10-08 DIAGNOSIS — M25.512 LEFT SHOULDER PAIN, UNSPECIFIED CHRONICITY: Primary | ICD-10-CM

## 2019-10-08 DIAGNOSIS — M19.012 ARTHRITIS OF LEFT ACROMIOCLAVICULAR JOINT: ICD-10-CM

## 2019-10-08 PROCEDURE — 99213 OFFICE O/P EST LOW 20 MIN: CPT | Performed by: INTERNAL MEDICINE

## 2019-10-08 PROCEDURE — 99214 OFFICE O/P EST MOD 30 MIN: CPT | Performed by: ORTHOPAEDIC SURGERY

## 2019-10-08 RX ORDER — PREGABALIN 100 MG/1
CAPSULE ORAL
Qty: 90 CAPSULE | Refills: 1 | Status: SHIPPED | OUTPATIENT
Start: 2019-10-08 | End: 2019-12-03 | Stop reason: SDUPTHER

## 2019-10-08 RX ORDER — TIZANIDINE 4 MG/1
TABLET ORAL
Qty: 60 TABLET | Refills: 1 | Status: SHIPPED | OUTPATIENT
Start: 2019-10-08 | End: 2019-12-03 | Stop reason: SDUPTHER

## 2019-10-08 RX ORDER — DULOXETIN HYDROCHLORIDE 30 MG/1
30 CAPSULE, DELAYED RELEASE ORAL DAILY
Qty: 30 CAPSULE | Refills: 1 | Status: SHIPPED | OUTPATIENT
Start: 2019-10-08 | End: 2019-12-03 | Stop reason: SDUPTHER

## 2019-10-09 ENCOUNTER — TELEPHONE (OUTPATIENT)
Dept: ORTHOPEDIC SURGERY | Age: 49
End: 2019-10-09

## 2019-10-09 ENCOUNTER — TELEPHONE (OUTPATIENT)
Dept: PAIN MANAGEMENT | Age: 49
End: 2019-10-09

## 2019-10-10 RX ORDER — LIDOCAINE 50 MG/G
1 PATCH TOPICAL DAILY
Qty: 30 PATCH | Refills: 0 | Status: SHIPPED | OUTPATIENT
Start: 2019-10-10 | End: 2019-10-11 | Stop reason: ALTCHOICE

## 2019-10-14 ENCOUNTER — OFFICE VISIT (OUTPATIENT)
Dept: PSYCHIATRY | Age: 49
End: 2019-10-14
Payer: COMMERCIAL

## 2019-10-14 ENCOUNTER — ANESTHESIA EVENT (OUTPATIENT)
Dept: ENDOSCOPY | Age: 49
End: 2019-10-14
Payer: COMMERCIAL

## 2019-10-14 VITALS
HEART RATE: 88 BPM | WEIGHT: 305 LBS | DIASTOLIC BLOOD PRESSURE: 84 MMHG | SYSTOLIC BLOOD PRESSURE: 136 MMHG | BODY MASS INDEX: 39.14 KG/M2 | HEIGHT: 74 IN

## 2019-10-14 DIAGNOSIS — F33.1 MODERATE EPISODE OF RECURRENT MAJOR DEPRESSIVE DISORDER (HCC): Primary | ICD-10-CM

## 2019-10-14 DIAGNOSIS — F33.0 MILD EPISODE OF RECURRENT MAJOR DEPRESSIVE DISORDER (HCC): ICD-10-CM

## 2019-10-14 LAB
A/G RATIO: 1.6 (ref 1.1–2.2)
ALBUMIN SERPL-MCNC: 4.7 G/DL (ref 3.4–5)
ALP BLD-CCNC: 96 U/L (ref 40–129)
ALT SERPL-CCNC: 18 U/L (ref 10–40)
ANION GAP SERPL CALCULATED.3IONS-SCNC: 16 MMOL/L (ref 3–16)
AST SERPL-CCNC: 13 U/L (ref 15–37)
BASOPHILS ABSOLUTE: 0.1 K/UL (ref 0–0.2)
BASOPHILS RELATIVE PERCENT: 1 %
BILIRUB SERPL-MCNC: 0.3 MG/DL (ref 0–1)
BUN BLDV-MCNC: 14 MG/DL (ref 7–20)
CALCIUM SERPL-MCNC: 9.5 MG/DL (ref 8.3–10.6)
CHLORIDE BLD-SCNC: 100 MMOL/L (ref 99–110)
CHOLESTEROL, TOTAL: 169 MG/DL (ref 0–199)
CO2: 24 MMOL/L (ref 21–32)
CREAT SERPL-MCNC: 0.8 MG/DL (ref 0.9–1.3)
EOSINOPHILS ABSOLUTE: 0.2 K/UL (ref 0–0.6)
EOSINOPHILS RELATIVE PERCENT: 2.2 %
ESTIMATED AVERAGE GLUCOSE: 119.8 MG/DL
GFR AFRICAN AMERICAN: >60
GFR NON-AFRICAN AMERICAN: >60
GLOBULIN: 3 G/DL
GLUCOSE BLD-MCNC: 111 MG/DL (ref 70–99)
HBA1C MFR BLD: 5.8 %
HCT VFR BLD CALC: 43.8 % (ref 40.5–52.5)
HDLC SERPL-MCNC: 38 MG/DL (ref 40–60)
HEMOGLOBIN: 14.6 G/DL (ref 13.5–17.5)
LDL CHOLESTEROL CALCULATED: 92 MG/DL
LYMPHOCYTES ABSOLUTE: 1.6 K/UL (ref 1–5.1)
LYMPHOCYTES RELATIVE PERCENT: 19.7 %
MCH RBC QN AUTO: 29.9 PG (ref 26–34)
MCHC RBC AUTO-ENTMCNC: 33.2 G/DL (ref 31–36)
MCV RBC AUTO: 89.9 FL (ref 80–100)
MONOCYTES ABSOLUTE: 0.6 K/UL (ref 0–1.3)
MONOCYTES RELATIVE PERCENT: 7.6 %
NEUTROPHILS ABSOLUTE: 5.5 K/UL (ref 1.7–7.7)
NEUTROPHILS RELATIVE PERCENT: 69.5 %
PDW BLD-RTO: 13.7 % (ref 12.4–15.4)
PLATELET # BLD: 170 K/UL (ref 135–450)
PMV BLD AUTO: 10.9 FL (ref 5–10.5)
POTASSIUM SERPL-SCNC: 4.6 MMOL/L (ref 3.5–5.1)
RBC # BLD: 4.88 M/UL (ref 4.2–5.9)
SODIUM BLD-SCNC: 140 MMOL/L (ref 136–145)
T4 FREE: 1 NG/DL (ref 0.9–1.8)
TOTAL PROTEIN: 7.7 G/DL (ref 6.4–8.2)
TRIGL SERPL-MCNC: 194 MG/DL (ref 0–150)
TSH REFLEX FT4: 20.26 UIU/ML (ref 0.27–4.2)
VITAMIN D 25-HYDROXY: 23.6 NG/ML
VLDLC SERPL CALC-MCNC: 39 MG/DL
WBC # BLD: 7.9 K/UL (ref 4–11)

## 2019-10-14 PROCEDURE — 99204 OFFICE O/P NEW MOD 45 MIN: CPT | Performed by: NURSE PRACTITIONER

## 2019-10-14 ASSESSMENT — PATIENT HEALTH QUESTIONNAIRE - PHQ9
8. MOVING OR SPEAKING SO SLOWLY THAT OTHER PEOPLE COULD HAVE NOTICED. OR THE OPPOSITE, BEING SO FIGETY OR RESTLESS THAT YOU HAVE BEEN MOVING AROUND A LOT MORE THAN USUAL: 0
5. POOR APPETITE OR OVEREATING: 3
SUM OF ALL RESPONSES TO PHQ9 QUESTIONS 1 & 2: 4
7. TROUBLE CONCENTRATING ON THINGS, SUCH AS READING THE NEWSPAPER OR WATCHING TELEVISION: 2
3. TROUBLE FALLING OR STAYING ASLEEP: 3
2. FEELING DOWN, DEPRESSED OR HOPELESS: 2
6. FEELING BAD ABOUT YOURSELF - OR THAT YOU ARE A FAILURE OR HAVE LET YOURSELF OR YOUR FAMILY DOWN: 3
10. IF YOU CHECKED OFF ANY PROBLEMS, HOW DIFFICULT HAVE THESE PROBLEMS MADE IT FOR YOU TO DO YOUR WORK, TAKE CARE OF THINGS AT HOME, OR GET ALONG WITH OTHER PEOPLE: 0
4. FEELING TIRED OR HAVING LITTLE ENERGY: 3
SUM OF ALL RESPONSES TO PHQ QUESTIONS 1-9: 18
1. LITTLE INTEREST OR PLEASURE IN DOING THINGS: 2
SUM OF ALL RESPONSES TO PHQ QUESTIONS 1-9: 18
9. THOUGHTS THAT YOU WOULD BE BETTER OFF DEAD, OR OF HURTING YOURSELF: 0

## 2019-10-14 ASSESSMENT — ANXIETY QUESTIONNAIRES
4. TROUBLE RELAXING: 3-NEARLY EVERY DAY
7. FEELING AFRAID AS IF SOMETHING AWFUL MIGHT HAPPEN: 0-NOT AT ALL SURE
GAD7 TOTAL SCORE: 16
1. FEELING NERVOUS, ANXIOUS, OR ON EDGE: 1-SEVERAL DAYS
6. BECOMING EASILY ANNOYED OR IRRITABLE: 3-NEARLY EVERY DAY
5. BEING SO RESTLESS THAT IT IS HARD TO SIT STILL: 3-NEARLY EVERY DAY
3. WORRYING TOO MUCH ABOUT DIFFERENT THINGS: 3-NEARLY EVERY DAY
2. NOT BEING ABLE TO STOP OR CONTROL WORRYING: 3-NEARLY EVERY DAY

## 2019-10-15 ENCOUNTER — ANESTHESIA (OUTPATIENT)
Dept: ENDOSCOPY | Age: 49
End: 2019-10-15
Payer: COMMERCIAL

## 2019-10-15 ENCOUNTER — HOSPITAL ENCOUNTER (OUTPATIENT)
Age: 49
Setting detail: OUTPATIENT SURGERY
Discharge: HOME | End: 2019-10-15
Attending: INTERNAL MEDICINE | Admitting: INTERNAL MEDICINE
Payer: COMMERCIAL

## 2019-10-15 VITALS
OXYGEN SATURATION: 100 % | DIASTOLIC BLOOD PRESSURE: 103 MMHG | RESPIRATION RATE: 25 BRPM | SYSTOLIC BLOOD PRESSURE: 139 MMHG

## 2019-10-15 VITALS
HEIGHT: 74 IN | DIASTOLIC BLOOD PRESSURE: 85 MMHG | WEIGHT: 302.03 LBS | RESPIRATION RATE: 18 BRPM | BODY MASS INDEX: 38.76 KG/M2 | SYSTOLIC BLOOD PRESSURE: 124 MMHG | HEART RATE: 91 BPM | TEMPERATURE: 97 F | OXYGEN SATURATION: 99 %

## 2019-10-15 DIAGNOSIS — Z12.11 COLON CANCER SCREENING: ICD-10-CM

## 2019-10-15 DIAGNOSIS — R13.10 DYSPHAGIA, UNSPECIFIED TYPE: ICD-10-CM

## 2019-10-15 PROCEDURE — 3700000000 HC ANESTHESIA ATTENDED CARE: Performed by: INTERNAL MEDICINE

## 2019-10-15 PROCEDURE — 7100000010 HC PHASE II RECOVERY - FIRST 15 MIN: Performed by: INTERNAL MEDICINE

## 2019-10-15 PROCEDURE — 7100000001 HC PACU RECOVERY - ADDTL 15 MIN: Performed by: INTERNAL MEDICINE

## 2019-10-15 PROCEDURE — 2580000003 HC RX 258: Performed by: ANESTHESIOLOGY

## 2019-10-15 PROCEDURE — 6360000002 HC RX W HCPCS: Performed by: NURSE ANESTHETIST, CERTIFIED REGISTERED

## 2019-10-15 PROCEDURE — 7100000000 HC PACU RECOVERY - FIRST 15 MIN: Performed by: INTERNAL MEDICINE

## 2019-10-15 PROCEDURE — 2500000003 HC RX 250 WO HCPCS: Performed by: NURSE ANESTHETIST, CERTIFIED REGISTERED

## 2019-10-15 PROCEDURE — 3609017700 HC EGD DILATION GASTRIC/DUODENAL STRICTURE: Performed by: INTERNAL MEDICINE

## 2019-10-15 PROCEDURE — 3609010600 HC COLONOSCOPY POLYPECTOMY SNARE/COLD BIOPSY: Performed by: INTERNAL MEDICINE

## 2019-10-15 PROCEDURE — 3700000001 HC ADD 15 MINUTES (ANESTHESIA): Performed by: INTERNAL MEDICINE

## 2019-10-15 PROCEDURE — C1726 CATH, BAL DIL, NON-VASCULAR: HCPCS | Performed by: INTERNAL MEDICINE

## 2019-10-15 PROCEDURE — 7100000011 HC PHASE II RECOVERY - ADDTL 15 MIN: Performed by: INTERNAL MEDICINE

## 2019-10-15 PROCEDURE — 3609012400 HC EGD TRANSORAL BIOPSY SINGLE/MULTIPLE: Performed by: INTERNAL MEDICINE

## 2019-10-15 PROCEDURE — 88305 TISSUE EXAM BY PATHOLOGIST: CPT

## 2019-10-15 PROCEDURE — 2709999900 HC NON-CHARGEABLE SUPPLY: Performed by: INTERNAL MEDICINE

## 2019-10-15 PROCEDURE — 3609010300 HC COLONOSCOPY W/BIOPSY SINGLE/MULTIPLE: Performed by: INTERNAL MEDICINE

## 2019-10-15 RX ORDER — PROPOFOL 10 MG/ML
INJECTION, EMULSION INTRAVENOUS CONTINUOUS PRN
Status: DISCONTINUED | OUTPATIENT
Start: 2019-10-15 | End: 2019-10-15 | Stop reason: SDUPTHER

## 2019-10-15 RX ORDER — SODIUM CHLORIDE 0.9 % (FLUSH) 0.9 %
10 SYRINGE (ML) INJECTION PRN
Status: DISCONTINUED | OUTPATIENT
Start: 2019-10-15 | End: 2019-10-15 | Stop reason: HOSPADM

## 2019-10-15 RX ORDER — OXYCODONE HYDROCHLORIDE 5 MG/1
5 TABLET ORAL PRN
Status: DISCONTINUED | OUTPATIENT
Start: 2019-10-15 | End: 2019-10-15 | Stop reason: HOSPADM

## 2019-10-15 RX ORDER — SODIUM CHLORIDE 9 MG/ML
INJECTION, SOLUTION INTRAVENOUS CONTINUOUS
Status: DISCONTINUED | OUTPATIENT
Start: 2019-10-15 | End: 2019-10-15 | Stop reason: HOSPADM

## 2019-10-15 RX ORDER — ONDANSETRON 2 MG/ML
4 INJECTION INTRAMUSCULAR; INTRAVENOUS
Status: DISCONTINUED | OUTPATIENT
Start: 2019-10-15 | End: 2019-10-15 | Stop reason: HOSPADM

## 2019-10-15 RX ORDER — MEPERIDINE HYDROCHLORIDE 25 MG/ML
12.5 INJECTION INTRAMUSCULAR; INTRAVENOUS; SUBCUTANEOUS EVERY 5 MIN PRN
Status: DISCONTINUED | OUTPATIENT
Start: 2019-10-15 | End: 2019-10-15 | Stop reason: HOSPADM

## 2019-10-15 RX ORDER — FENTANYL CITRATE 50 UG/ML
25 INJECTION, SOLUTION INTRAMUSCULAR; INTRAVENOUS EVERY 5 MIN PRN
Status: DISCONTINUED | OUTPATIENT
Start: 2019-10-15 | End: 2019-10-15 | Stop reason: HOSPADM

## 2019-10-15 RX ORDER — LIDOCAINE HYDROCHLORIDE 20 MG/ML
INJECTION, SOLUTION EPIDURAL; INFILTRATION; INTRACAUDAL; PERINEURAL PRN
Status: DISCONTINUED | OUTPATIENT
Start: 2019-10-15 | End: 2019-10-15 | Stop reason: SDUPTHER

## 2019-10-15 RX ORDER — PROPOFOL 10 MG/ML
INJECTION, EMULSION INTRAVENOUS PRN
Status: DISCONTINUED | OUTPATIENT
Start: 2019-10-15 | End: 2019-10-15 | Stop reason: SDUPTHER

## 2019-10-15 RX ORDER — SODIUM CHLORIDE 0.9 % (FLUSH) 0.9 %
10 SYRINGE (ML) INJECTION EVERY 12 HOURS SCHEDULED
Status: DISCONTINUED | OUTPATIENT
Start: 2019-10-15 | End: 2019-10-15 | Stop reason: HOSPADM

## 2019-10-15 RX ORDER — OXYCODONE HYDROCHLORIDE 5 MG/1
10 TABLET ORAL PRN
Status: DISCONTINUED | OUTPATIENT
Start: 2019-10-15 | End: 2019-10-15 | Stop reason: HOSPADM

## 2019-10-15 RX ORDER — MORPHINE SULFATE 2 MG/ML
1 INJECTION, SOLUTION INTRAMUSCULAR; INTRAVENOUS EVERY 5 MIN PRN
Status: DISCONTINUED | OUTPATIENT
Start: 2019-10-15 | End: 2019-10-15 | Stop reason: HOSPADM

## 2019-10-15 RX ORDER — MORPHINE SULFATE 2 MG/ML
2 INJECTION, SOLUTION INTRAMUSCULAR; INTRAVENOUS EVERY 5 MIN PRN
Status: DISCONTINUED | OUTPATIENT
Start: 2019-10-15 | End: 2019-10-15 | Stop reason: HOSPADM

## 2019-10-15 RX ORDER — FENTANYL CITRATE 50 UG/ML
50 INJECTION, SOLUTION INTRAMUSCULAR; INTRAVENOUS EVERY 5 MIN PRN
Status: DISCONTINUED | OUTPATIENT
Start: 2019-10-15 | End: 2019-10-15 | Stop reason: HOSPADM

## 2019-10-15 RX ADMIN — PROPOFOL 140 MCG/KG/MIN: 10 INJECTION, EMULSION INTRAVENOUS at 10:52

## 2019-10-15 RX ADMIN — PROPOFOL 140 MG: 10 INJECTION, EMULSION INTRAVENOUS at 10:49

## 2019-10-15 RX ADMIN — SODIUM CHLORIDE: 9 INJECTION, SOLUTION INTRAVENOUS at 09:49

## 2019-10-15 RX ADMIN — LIDOCAINE HYDROCHLORIDE 100 MG: 20 INJECTION, SOLUTION EPIDURAL; INFILTRATION; INTRACAUDAL; PERINEURAL at 10:50

## 2019-10-15 RX ADMIN — PROPOFOL 60 MG: 10 INJECTION, EMULSION INTRAVENOUS at 10:56

## 2019-10-15 ASSESSMENT — PULMONARY FUNCTION TESTS
PIF_VALUE: 0
PIF_VALUE: 1
PIF_VALUE: 0

## 2019-10-15 ASSESSMENT — PAIN - FUNCTIONAL ASSESSMENT: PAIN_FUNCTIONAL_ASSESSMENT: 0-10

## 2019-10-15 ASSESSMENT — LIFESTYLE VARIABLES: SMOKING_STATUS: 0

## 2019-10-15 ASSESSMENT — PAIN SCALES - GENERAL
PAINLEVEL_OUTOF10: 0

## 2019-10-15 ASSESSMENT — ENCOUNTER SYMPTOMS: SHORTNESS OF BREATH: 0

## 2019-10-16 LAB
SEX HORMONE BINDING GLOBULIN: 17 NMOL/L (ref 11–80)
TESTOSTERONE FREE-NONMALE: 50.1 PG/ML (ref 47–244)
TESTOSTERONE TOTAL: 190 NG/DL (ref 220–1000)

## 2019-10-18 ENCOUNTER — OFFICE VISIT (OUTPATIENT)
Dept: ORTHOPEDIC SURGERY | Age: 49
End: 2019-10-18
Payer: COMMERCIAL

## 2019-10-18 VITALS
DIASTOLIC BLOOD PRESSURE: 77 MMHG | HEART RATE: 96 BPM | HEIGHT: 74 IN | BODY MASS INDEX: 38.5 KG/M2 | WEIGHT: 300 LBS | SYSTOLIC BLOOD PRESSURE: 129 MMHG

## 2019-10-18 DIAGNOSIS — M79.671 BILATERAL FOOT PAIN: ICD-10-CM

## 2019-10-18 DIAGNOSIS — M72.2 PLANTAR FASCIITIS, BILATERAL: ICD-10-CM

## 2019-10-18 DIAGNOSIS — M79.672 BILATERAL FOOT PAIN: ICD-10-CM

## 2019-10-18 DIAGNOSIS — M19.072 ARTHRITIS OF BOTH FEET: Primary | ICD-10-CM

## 2019-10-18 DIAGNOSIS — M19.071 ARTHRITIS OF BOTH FEET: Primary | ICD-10-CM

## 2019-10-18 PROCEDURE — 99213 OFFICE O/P EST LOW 20 MIN: CPT | Performed by: ORTHOPAEDIC SURGERY

## 2019-10-21 ENCOUNTER — TELEPHONE (OUTPATIENT)
Dept: FAMILY MEDICINE CLINIC | Age: 49
End: 2019-10-21

## 2019-10-23 ENCOUNTER — OFFICE VISIT (OUTPATIENT)
Dept: ORTHOPEDIC SURGERY | Age: 49
End: 2019-10-23
Payer: COMMERCIAL

## 2019-10-23 ENCOUNTER — TELEPHONE (OUTPATIENT)
Dept: ORTHOPEDIC SURGERY | Age: 49
End: 2019-10-23

## 2019-10-23 VITALS
DIASTOLIC BLOOD PRESSURE: 74 MMHG | HEIGHT: 74 IN | BODY MASS INDEX: 38.5 KG/M2 | WEIGHT: 300 LBS | HEART RATE: 93 BPM | SYSTOLIC BLOOD PRESSURE: 118 MMHG

## 2019-10-23 DIAGNOSIS — M25.552 HIP PAIN, BILATERAL: ICD-10-CM

## 2019-10-23 DIAGNOSIS — M76.31 ILIOTIBIAL BAND SYNDROME OF BOTH SIDES: ICD-10-CM

## 2019-10-23 DIAGNOSIS — M25.561 CHRONIC PAIN OF BOTH KNEES: ICD-10-CM

## 2019-10-23 DIAGNOSIS — M25.551 HIP PAIN, BILATERAL: ICD-10-CM

## 2019-10-23 DIAGNOSIS — M76.32 ILIOTIBIAL BAND SYNDROME OF BOTH SIDES: ICD-10-CM

## 2019-10-23 DIAGNOSIS — M22.2X2 PATELLOFEMORAL PAIN SYNDROME OF BOTH KNEES: Primary | ICD-10-CM

## 2019-10-23 DIAGNOSIS — M22.2X1 PATELLOFEMORAL PAIN SYNDROME OF BOTH KNEES: Primary | ICD-10-CM

## 2019-10-23 DIAGNOSIS — M25.562 CHRONIC PAIN OF BOTH KNEES: ICD-10-CM

## 2019-10-23 DIAGNOSIS — G89.29 CHRONIC PAIN OF BOTH KNEES: ICD-10-CM

## 2019-10-23 PROCEDURE — 99213 OFFICE O/P EST LOW 20 MIN: CPT | Performed by: ORTHOPAEDIC SURGERY

## 2019-10-24 RX ORDER — ERGOCALCIFEROL 1.25 MG/1
50000 CAPSULE ORAL WEEKLY
Qty: 12 CAPSULE | Refills: 1 | Status: SHIPPED | OUTPATIENT
Start: 2019-10-24 | End: 2020-03-19

## 2019-10-25 ENCOUNTER — TELEPHONE (OUTPATIENT)
Dept: ORTHOPEDIC SURGERY | Age: 49
End: 2019-10-25

## 2019-10-28 ENCOUNTER — TELEPHONE (OUTPATIENT)
Dept: ORTHOPEDIC SURGERY | Age: 49
End: 2019-10-28

## 2019-10-29 ENCOUNTER — HOSPITAL ENCOUNTER (OUTPATIENT)
Dept: PHYSICAL THERAPY | Age: 49
Setting detail: THERAPIES SERIES
Discharge: HOME | End: 2019-10-29
Payer: COMMERCIAL

## 2019-10-29 PROCEDURE — 97530 THERAPEUTIC ACTIVITIES: CPT

## 2019-10-29 PROCEDURE — 97162 PT EVAL MOD COMPLEX 30 MIN: CPT

## 2019-10-29 PROCEDURE — 97110 THERAPEUTIC EXERCISES: CPT

## 2019-10-31 ENCOUNTER — HOSPITAL ENCOUNTER (OUTPATIENT)
Dept: PHYSICAL THERAPY | Age: 49
Setting detail: THERAPIES SERIES
Discharge: HOME | End: 2019-10-31
Payer: COMMERCIAL

## 2019-10-31 PROCEDURE — 97110 THERAPEUTIC EXERCISES: CPT

## 2019-11-01 ENCOUNTER — TELEPHONE (OUTPATIENT)
Dept: ORTHOPEDIC SURGERY | Age: 49
End: 2019-11-01

## 2019-11-05 ENCOUNTER — OFFICE VISIT (OUTPATIENT)
Dept: FAMILY MEDICINE CLINIC | Age: 49
End: 2019-11-05
Payer: COMMERCIAL

## 2019-11-05 VITALS
HEIGHT: 74 IN | BODY MASS INDEX: 38.78 KG/M2 | TEMPERATURE: 97.6 F | SYSTOLIC BLOOD PRESSURE: 110 MMHG | DIASTOLIC BLOOD PRESSURE: 72 MMHG | WEIGHT: 302.2 LBS

## 2019-11-05 DIAGNOSIS — F33.41 RECURRENT MAJOR DEPRESSIVE DISORDER, IN PARTIAL REMISSION (HCC): ICD-10-CM

## 2019-11-05 DIAGNOSIS — E66.9 CLASS 2 OBESITY WITH BODY MASS INDEX (BMI) OF 38.0 TO 38.9 IN ADULT, UNSPECIFIED OBESITY TYPE, UNSPECIFIED WHETHER SERIOUS COMORBIDITY PRESENT: ICD-10-CM

## 2019-11-05 DIAGNOSIS — M75.52 BURSITIS OF LEFT SHOULDER: ICD-10-CM

## 2019-11-05 DIAGNOSIS — E78.00 HYPERCHOLESTEROLEMIA: ICD-10-CM

## 2019-11-05 DIAGNOSIS — E03.9 HYPOTHYROIDISM (ACQUIRED): ICD-10-CM

## 2019-11-05 DIAGNOSIS — K21.9 GASTROESOPHAGEAL REFLUX DISEASE, ESOPHAGITIS PRESENCE NOT SPECIFIED: ICD-10-CM

## 2019-11-05 DIAGNOSIS — Z01.818 PRE-OP EVALUATION: Primary | ICD-10-CM

## 2019-11-05 DIAGNOSIS — G89.4 CHRONIC PAIN SYNDROME: ICD-10-CM

## 2019-11-05 DIAGNOSIS — M19.012 PRIMARY OSTEOARTHRITIS OF LEFT SHOULDER: ICD-10-CM

## 2019-11-05 PROBLEM — E66.812 CLASS 2 OBESITY WITH BODY MASS INDEX (BMI) OF 38.0 TO 38.9 IN ADULT: Status: ACTIVE | Noted: 2019-11-05

## 2019-11-05 PROBLEM — M19.011 OSTEOARTHRITIS OF RIGHT SHOULDER: Status: ACTIVE | Noted: 2019-11-05

## 2019-11-05 PROCEDURE — 99243 OFF/OP CNSLTJ NEW/EST LOW 30: CPT | Performed by: FAMILY MEDICINE

## 2019-11-05 PROCEDURE — 93000 ELECTROCARDIOGRAM COMPLETE: CPT | Performed by: FAMILY MEDICINE

## 2019-11-05 ASSESSMENT — ENCOUNTER SYMPTOMS
RESPIRATORY NEGATIVE: 1
EYES NEGATIVE: 1
ALLERGIC/IMMUNOLOGIC NEGATIVE: 1
GASTROINTESTINAL NEGATIVE: 1

## 2019-11-08 ENCOUNTER — OFFICE VISIT (OUTPATIENT)
Dept: FAMILY MEDICINE CLINIC | Age: 49
End: 2019-11-08
Payer: COMMERCIAL

## 2019-11-08 VITALS
TEMPERATURE: 98.3 F | HEIGHT: 74 IN | BODY MASS INDEX: 39.42 KG/M2 | WEIGHT: 307.2 LBS | SYSTOLIC BLOOD PRESSURE: 110 MMHG | DIASTOLIC BLOOD PRESSURE: 70 MMHG

## 2019-11-08 DIAGNOSIS — I10 ESSENTIAL HYPERTENSION: ICD-10-CM

## 2019-11-08 DIAGNOSIS — K21.9 GASTROESOPHAGEAL REFLUX DISEASE, ESOPHAGITIS PRESENCE NOT SPECIFIED: ICD-10-CM

## 2019-11-08 DIAGNOSIS — E66.01 CLASS 2 SEVERE OBESITY DUE TO EXCESS CALORIES WITH SERIOUS COMORBIDITY AND BODY MASS INDEX (BMI) OF 38.0 TO 38.9 IN ADULT (HCC): ICD-10-CM

## 2019-11-08 DIAGNOSIS — M51.37 DDD (DEGENERATIVE DISC DISEASE), LUMBOSACRAL: ICD-10-CM

## 2019-11-08 DIAGNOSIS — E03.9 ACQUIRED HYPOTHYROIDISM: ICD-10-CM

## 2019-11-08 DIAGNOSIS — Z23 NEEDS FLU SHOT: ICD-10-CM

## 2019-11-08 DIAGNOSIS — Z00.00 ROUTINE GENERAL MEDICAL EXAMINATION AT A HEALTH CARE FACILITY: Primary | ICD-10-CM

## 2019-11-08 DIAGNOSIS — F33.1 MODERATE EPISODE OF RECURRENT MAJOR DEPRESSIVE DISORDER (HCC): ICD-10-CM

## 2019-11-08 PROCEDURE — 90471 IMMUNIZATION ADMIN: CPT | Performed by: FAMILY MEDICINE

## 2019-11-08 PROCEDURE — 90686 IIV4 VACC NO PRSV 0.5 ML IM: CPT | Performed by: FAMILY MEDICINE

## 2019-11-08 PROCEDURE — 99396 PREV VISIT EST AGE 40-64: CPT | Performed by: FAMILY MEDICINE

## 2019-11-08 RX ORDER — ROSUVASTATIN CALCIUM 40 MG/1
40 TABLET, COATED ORAL DAILY
Qty: 90 TABLET | Refills: 1 | Status: SHIPPED | OUTPATIENT
Start: 2019-11-08 | End: 2020-02-04 | Stop reason: SDUPTHER

## 2019-11-08 RX ORDER — LEVOTHYROXINE SODIUM 0.12 MG/1
TABLET ORAL
Qty: 180 TABLET | Refills: 1 | Status: SHIPPED | OUTPATIENT
Start: 2019-11-08 | End: 2019-12-20 | Stop reason: DRUGHIGH

## 2019-11-08 ASSESSMENT — ENCOUNTER SYMPTOMS
EYE PAIN: 0
BLOOD IN STOOL: 0
WHEEZING: 0
CONSTIPATION: 0
DIARRHEA: 0
NAUSEA: 0
VOMITING: 0
ABDOMINAL PAIN: 0
TROUBLE SWALLOWING: 0
SHORTNESS OF BREATH: 0

## 2019-11-11 ENCOUNTER — TELEPHONE (OUTPATIENT)
Dept: PSYCHIATRY | Age: 49
End: 2019-11-11

## 2019-11-12 RX ORDER — ARIPIPRAZOLE 5 MG/1
5 TABLET ORAL DAILY
Qty: 30 TABLET | Refills: 3 | Status: SHIPPED | OUTPATIENT
Start: 2019-11-12 | End: 2019-12-09

## 2019-11-22 ENCOUNTER — OFFICE VISIT (OUTPATIENT)
Dept: FAMILY MEDICINE CLINIC | Age: 49
End: 2019-11-22
Payer: COMMERCIAL

## 2019-11-22 VITALS
SYSTOLIC BLOOD PRESSURE: 114 MMHG | HEIGHT: 74 IN | DIASTOLIC BLOOD PRESSURE: 70 MMHG | BODY MASS INDEX: 38.37 KG/M2 | TEMPERATURE: 98 F | WEIGHT: 299 LBS

## 2019-11-22 DIAGNOSIS — E66.01 CLASS 2 SEVERE OBESITY DUE TO EXCESS CALORIES WITH SERIOUS COMORBIDITY AND BODY MASS INDEX (BMI) OF 38.0 TO 38.9 IN ADULT (HCC): ICD-10-CM

## 2019-11-22 DIAGNOSIS — I10 ESSENTIAL HYPERTENSION: Primary | ICD-10-CM

## 2019-11-22 PROCEDURE — 99213 OFFICE O/P EST LOW 20 MIN: CPT | Performed by: FAMILY MEDICINE

## 2019-11-26 PROBLEM — I10 ESSENTIAL HYPERTENSION: Status: ACTIVE | Noted: 2019-11-26

## 2019-11-26 PROBLEM — E66.01 CLASS 2 SEVERE OBESITY DUE TO EXCESS CALORIES WITH SERIOUS COMORBIDITY AND BODY MASS INDEX (BMI) OF 38.0 TO 38.9 IN ADULT (HCC): Status: ACTIVE | Noted: 2019-11-05

## 2019-11-26 ASSESSMENT — ENCOUNTER SYMPTOMS: SHORTNESS OF BREATH: 0

## 2019-12-03 ENCOUNTER — OFFICE VISIT (OUTPATIENT)
Dept: PAIN MANAGEMENT | Age: 49
End: 2019-12-03
Payer: COMMERCIAL

## 2019-12-03 VITALS
BODY MASS INDEX: 37.75 KG/M2 | DIASTOLIC BLOOD PRESSURE: 84 MMHG | SYSTOLIC BLOOD PRESSURE: 129 MMHG | HEART RATE: 101 BPM | WEIGHT: 294 LBS

## 2019-12-03 DIAGNOSIS — M47.819 DEGENERATIVE JOINT DISEASE OF SPINAL FACET JOINT: ICD-10-CM

## 2019-12-03 DIAGNOSIS — M50.30 DDD (DEGENERATIVE DISC DISEASE), CERVICAL: ICD-10-CM

## 2019-12-03 DIAGNOSIS — F33.41 RECURRENT MAJOR DEPRESSIVE DISORDER, IN PARTIAL REMISSION (HCC): ICD-10-CM

## 2019-12-03 DIAGNOSIS — G89.4 CHRONIC PAIN SYNDROME: ICD-10-CM

## 2019-12-03 DIAGNOSIS — M96.1 FAILED BACK SURGICAL SYNDROME: ICD-10-CM

## 2019-12-03 DIAGNOSIS — M79.604 LOW BACK PAIN RADIATING TO RIGHT LEG: ICD-10-CM

## 2019-12-03 DIAGNOSIS — M19.011 OSTEOARTHRITIS OF RIGHT GLENOHUMERAL JOINT: ICD-10-CM

## 2019-12-03 DIAGNOSIS — M54.50 LOW BACK PAIN RADIATING TO RIGHT LEG: ICD-10-CM

## 2019-12-03 DIAGNOSIS — M51.37 DDD (DEGENERATIVE DISC DISEASE), LUMBOSACRAL: ICD-10-CM

## 2019-12-03 DIAGNOSIS — M54.16 LUMBAR RADICULOPATHY: ICD-10-CM

## 2019-12-03 PROCEDURE — 99214 OFFICE O/P EST MOD 30 MIN: CPT | Performed by: INTERNAL MEDICINE

## 2019-12-03 RX ORDER — DULOXETIN HYDROCHLORIDE 30 MG/1
30 CAPSULE, DELAYED RELEASE ORAL DAILY
Qty: 30 CAPSULE | Refills: 1 | Status: SHIPPED | OUTPATIENT
Start: 2019-12-03 | End: 2020-01-29 | Stop reason: DRUGHIGH

## 2019-12-03 RX ORDER — PREGABALIN 100 MG/1
CAPSULE ORAL
Qty: 90 CAPSULE | Refills: 1 | Status: SHIPPED | OUTPATIENT
Start: 2019-12-03 | End: 2020-01-29 | Stop reason: SDUPTHER

## 2019-12-03 RX ORDER — TIZANIDINE 4 MG/1
TABLET ORAL
Qty: 60 TABLET | Refills: 1 | Status: SHIPPED | OUTPATIENT
Start: 2019-12-03 | End: 2020-01-29 | Stop reason: SDUPTHER

## 2019-12-03 RX ORDER — TRAZODONE HYDROCHLORIDE 50 MG/1
50-100 TABLET ORAL NIGHTLY
Qty: 60 TABLET | Refills: 0 | Status: SHIPPED | OUTPATIENT
Start: 2019-12-03 | End: 2020-01-29 | Stop reason: ALTCHOICE

## 2019-12-04 ENCOUNTER — TELEPHONE (OUTPATIENT)
Dept: ORTHOPEDIC SURGERY | Age: 49
End: 2019-12-04

## 2019-12-05 PROBLEM — Z01.818 PRE-OP EVALUATION: Status: RESOLVED | Noted: 2019-11-05 | Resolved: 2019-12-05

## 2019-12-09 ENCOUNTER — OFFICE VISIT (OUTPATIENT)
Dept: PSYCHIATRY | Age: 49
End: 2019-12-09
Payer: COMMERCIAL

## 2019-12-09 VITALS
BODY MASS INDEX: 37.83 KG/M2 | SYSTOLIC BLOOD PRESSURE: 146 MMHG | WEIGHT: 294.8 LBS | DIASTOLIC BLOOD PRESSURE: 88 MMHG | HEART RATE: 88 BPM | HEIGHT: 74 IN

## 2019-12-09 DIAGNOSIS — E03.9 HYPOTHYROIDISM (ACQUIRED): ICD-10-CM

## 2019-12-09 DIAGNOSIS — E03.9 ACQUIRED HYPOTHYROIDISM: ICD-10-CM

## 2019-12-09 DIAGNOSIS — F33.1 MODERATE EPISODE OF RECURRENT MAJOR DEPRESSIVE DISORDER (HCC): Primary | ICD-10-CM

## 2019-12-09 LAB
T4 FREE: 2.1 NG/DL (ref 0.9–1.8)
TSH REFLEX: 0.12 UIU/ML (ref 0.27–4.2)

## 2019-12-09 PROCEDURE — 99214 OFFICE O/P EST MOD 30 MIN: CPT | Performed by: NURSE PRACTITIONER

## 2019-12-09 RX ORDER — BUPROPION HYDROCHLORIDE 150 MG/1
150 TABLET ORAL EVERY MORNING
Qty: 30 TABLET | Refills: 3 | Status: SHIPPED | OUTPATIENT
Start: 2019-12-09 | End: 2019-12-26 | Stop reason: SDUPTHER

## 2019-12-09 ASSESSMENT — PATIENT HEALTH QUESTIONNAIRE - PHQ9
1. LITTLE INTEREST OR PLEASURE IN DOING THINGS: 2
7. TROUBLE CONCENTRATING ON THINGS, SUCH AS READING THE NEWSPAPER OR WATCHING TELEVISION: 0
SUM OF ALL RESPONSES TO PHQ9 QUESTIONS 1 & 2: 4
6. FEELING BAD ABOUT YOURSELF - OR THAT YOU ARE A FAILURE OR HAVE LET YOURSELF OR YOUR FAMILY DOWN: 3
8. MOVING OR SPEAKING SO SLOWLY THAT OTHER PEOPLE COULD HAVE NOTICED. OR THE OPPOSITE, BEING SO FIGETY OR RESTLESS THAT YOU HAVE BEEN MOVING AROUND A LOT MORE THAN USUAL: 0
SUM OF ALL RESPONSES TO PHQ QUESTIONS 1-9: 16
3. TROUBLE FALLING OR STAYING ASLEEP: 3
4. FEELING TIRED OR HAVING LITTLE ENERGY: 3
SUM OF ALL RESPONSES TO PHQ QUESTIONS 1-9: 16
10. IF YOU CHECKED OFF ANY PROBLEMS, HOW DIFFICULT HAVE THESE PROBLEMS MADE IT FOR YOU TO DO YOUR WORK, TAKE CARE OF THINGS AT HOME, OR GET ALONG WITH OTHER PEOPLE: 0
2. FEELING DOWN, DEPRESSED OR HOPELESS: 2
9. THOUGHTS THAT YOU WOULD BE BETTER OFF DEAD, OR OF HURTING YOURSELF: 0
5. POOR APPETITE OR OVEREATING: 3

## 2019-12-09 ASSESSMENT — ANXIETY QUESTIONNAIRES
2. NOT BEING ABLE TO STOP OR CONTROL WORRYING: 3-NEARLY EVERY DAY
4. TROUBLE RELAXING: 0-NOT AT ALL
6. BECOMING EASILY ANNOYED OR IRRITABLE: 3-NEARLY EVERY DAY
3. WORRYING TOO MUCH ABOUT DIFFERENT THINGS: 3-NEARLY EVERY DAY
7. FEELING AFRAID AS IF SOMETHING AWFUL MIGHT HAPPEN: 0-NOT AT ALL
GAD7 TOTAL SCORE: 9
5. BEING SO RESTLESS THAT IT IS HARD TO SIT STILL: 0-NOT AT ALL
1. FEELING NERVOUS, ANXIOUS, OR ON EDGE: 0-NOT AT ALL

## 2019-12-10 ENCOUNTER — OFFICE VISIT (OUTPATIENT)
Dept: ORTHOPEDIC SURGERY | Age: 49
End: 2019-12-10
Payer: COMMERCIAL

## 2019-12-10 VITALS
HEIGHT: 74 IN | HEART RATE: 101 BPM | WEIGHT: 293 LBS | SYSTOLIC BLOOD PRESSURE: 124 MMHG | DIASTOLIC BLOOD PRESSURE: 83 MMHG | BODY MASS INDEX: 37.6 KG/M2

## 2019-12-10 DIAGNOSIS — M22.2X1 PATELLOFEMORAL SYNDROME OF RIGHT KNEE: Primary | ICD-10-CM

## 2019-12-10 PROCEDURE — 20610 DRAIN/INJ JOINT/BURSA W/O US: CPT | Performed by: ORTHOPAEDIC SURGERY

## 2019-12-10 PROCEDURE — 99213 OFFICE O/P EST LOW 20 MIN: CPT | Performed by: ORTHOPAEDIC SURGERY

## 2019-12-10 RX ORDER — METHYLPREDNISOLONE ACETATE 40 MG/ML
80 INJECTION, SUSPENSION INTRA-ARTICULAR; INTRALESIONAL; INTRAMUSCULAR; SOFT TISSUE ONCE
Status: COMPLETED | OUTPATIENT
Start: 2019-12-10 | End: 2019-12-10

## 2019-12-10 RX ADMIN — METHYLPREDNISOLONE ACETATE 80 MG: 40 INJECTION, SUSPENSION INTRA-ARTICULAR; INTRALESIONAL; INTRAMUSCULAR; SOFT TISSUE at 17:24

## 2019-12-17 ENCOUNTER — TELEPHONE (OUTPATIENT)
Dept: ORTHOPEDIC SURGERY | Age: 49
End: 2019-12-17

## 2019-12-17 DIAGNOSIS — S83.241A TEAR OF MEDIAL MENISCUS OF RIGHT KNEE, UNSPECIFIED TEAR TYPE, UNSPECIFIED WHETHER OLD OR CURRENT TEAR, INITIAL ENCOUNTER: Primary | ICD-10-CM

## 2019-12-17 DIAGNOSIS — M76.31 ILIOTIBIAL BAND SYNDROME OF BOTH SIDES: ICD-10-CM

## 2019-12-17 DIAGNOSIS — M76.32 ILIOTIBIAL BAND SYNDROME OF BOTH SIDES: ICD-10-CM

## 2019-12-18 ENCOUNTER — TELEPHONE (OUTPATIENT)
Dept: ORTHOPEDIC SURGERY | Age: 49
End: 2019-12-18

## 2019-12-20 RX ORDER — LEVOTHYROXINE SODIUM 112 UG/1
TABLET ORAL
Qty: 180 TABLET | Refills: 1 | Status: SHIPPED | OUTPATIENT
Start: 2019-12-20 | End: 2019-12-30 | Stop reason: SDUPTHER

## 2019-12-26 RX ORDER — BUPROPION HYDROCHLORIDE 150 MG/1
150 TABLET ORAL EVERY MORNING
Qty: 30 TABLET | Refills: 3 | Status: SHIPPED | OUTPATIENT
Start: 2019-12-26 | End: 2020-02-17

## 2019-12-30 ENCOUNTER — HOSPITAL ENCOUNTER (OUTPATIENT)
Dept: MRI IMAGING | Age: 49
Discharge: HOME | End: 2019-12-30
Payer: COMMERCIAL

## 2019-12-30 DIAGNOSIS — S83.241A TEAR OF MEDIAL MENISCUS OF RIGHT KNEE, UNSPECIFIED TEAR TYPE, UNSPECIFIED WHETHER OLD OR CURRENT TEAR, INITIAL ENCOUNTER: ICD-10-CM

## 2019-12-30 PROCEDURE — 73721 MRI JNT OF LWR EXTRE W/O DYE: CPT

## 2019-12-30 RX ORDER — LEVOTHYROXINE SODIUM 112 UG/1
TABLET ORAL
Qty: 180 TABLET | Refills: 1 | Status: SHIPPED | OUTPATIENT
Start: 2019-12-30 | End: 2020-03-23

## 2019-12-30 ASSESSMENT — ENCOUNTER SYMPTOMS: BACK PAIN: 1

## 2020-01-07 ENCOUNTER — TELEPHONE (OUTPATIENT)
Dept: ORTHOPEDIC SURGERY | Age: 50
End: 2020-01-07

## 2020-01-07 ENCOUNTER — OFFICE VISIT (OUTPATIENT)
Dept: ORTHOPEDIC SURGERY | Age: 50
End: 2020-01-07
Payer: COMMERCIAL

## 2020-01-07 VITALS
SYSTOLIC BLOOD PRESSURE: 115 MMHG | DIASTOLIC BLOOD PRESSURE: 72 MMHG | HEIGHT: 74 IN | HEART RATE: 89 BPM | WEIGHT: 293 LBS | BODY MASS INDEX: 37.6 KG/M2

## 2020-01-07 PROCEDURE — 99213 OFFICE O/P EST LOW 20 MIN: CPT | Performed by: ORTHOPAEDIC SURGERY

## 2020-01-07 NOTE — PATIENT INSTRUCTIONS
Impression:   1. Radial tear of medial meniscus appears to be causing his current sharp right knee pain. 2.  In addition he does have patellar tendinitis at the patellar insertion likely related to his weight. Plan/Treatment:   1. Because his radial tear of medial meniscus is symptomatic I have recommended that he be referred for arthroscopic surgery to Dr. Aryan Garber. 2.  He was told that his patellar tendinitis will be treated with weight loss and exercise. 3.  He will return here as needed.     Rene Apodaca MD  1/7/2020

## 2020-01-09 ENCOUNTER — OFFICE VISIT (OUTPATIENT)
Dept: ORTHOPEDIC SURGERY | Age: 50
End: 2020-01-09
Payer: COMMERCIAL

## 2020-01-09 VITALS — HEIGHT: 74 IN | BODY MASS INDEX: 37.6 KG/M2 | RESPIRATION RATE: 16 BRPM | WEIGHT: 293 LBS

## 2020-01-09 PROCEDURE — 99214 OFFICE O/P EST MOD 30 MIN: CPT | Performed by: ORTHOPAEDIC SURGERY

## 2020-01-09 NOTE — PROGRESS NOTES
Yara Sawyer  [id number]  January 9, 2020    Chief Complaint   Patient presents with    Knee Pain     right knee pain        History: The patient is a 76-year-old gentleman who is here for evaluation of his right knee. He is being referred by Dr. Drew Camargo. The patient has had a right knee pain since August.  He cannot recall a specific injury. He has difficulty sleeping due to the pain. He did receive an injection back in early December which provided mild relief. He has had an MRI scan. He does have some popping and catching in the knee. He did work for [employer] and is currently not working. He did have a right total shoulder arthroplasty and has been unable to return to work. The patient's  past medical history, medications, allergies,  family history, social history, and have been reviewed, and dated and are recorded in the chart. Pertinent items are noted in HPI. Review of systems reviewed from Pertinent History Form dated on 10/18/19 and available in the patient's chart under the Media tab. Vitals:  Resp 16   Ht 6' 2\" (1.88 m)   Wt 293 lb (132.9 kg)   BMI 37.62 kg/m²     Physical: Mr. Yara Sawyer appears well, he is in no apparent distress, he demonstrates appropriate mood & affect. He is alert and oriented to person, place and time. Examination of the right knee reveals medial joint line tenderness. There is a small effusion. Ariana's is positive medially. Range of motion is from 0 degrees extension to 120 degrees of flexion. Range of motion of the opposite knee is full. Anterior drawer and Lachman are negative. There is no instability with varus or valgus stressing of the knee. There is no pain with range of motion of the hips. Sensation is intact to light touch in the tibial, peroneal, sural, and saphenous nerve distributions bilaterally. Both feet are well perfused and sensory is intact to feet equal and symmetric. Examination of the skin reveals no lesions or ulcerations.   There is

## 2020-01-10 ENCOUNTER — TELEPHONE (OUTPATIENT)
Dept: ORTHOPEDIC SURGERY | Age: 50
End: 2020-01-10

## 2020-01-13 ENCOUNTER — TELEPHONE (OUTPATIENT)
Dept: ORTHOPEDIC SURGERY | Age: 50
End: 2020-01-13

## 2020-01-13 NOTE — TELEPHONE ENCOUNTER
I spoke with the patients wife. She is asking for the patient to be off from now until surgery and for the post op time after surgery. She states the patient \"banged up\" his knee over the weekend and has an apt to see Dr. Diego Vasquez tomorrow. She will discuss this with Dr. Diego Vasquez tomorrow. The patients wife will call [employer] to get forms faxed to our office.

## 2020-01-14 ENCOUNTER — OFFICE VISIT (OUTPATIENT)
Dept: ORTHOPEDIC SURGERY | Age: 50
End: 2020-01-14
Payer: COMMERCIAL

## 2020-01-14 VITALS — WEIGHT: 293 LBS | BODY MASS INDEX: 37.6 KG/M2 | HEIGHT: 74 IN

## 2020-01-14 PROCEDURE — 99213 OFFICE O/P EST LOW 20 MIN: CPT | Performed by: ORTHOPAEDIC SURGERY

## 2020-01-14 NOTE — LETTER
[facility]  [place] [address] [address] [address]  Phone: [phone]  Fax: [phone]    Brittni Ya MD        January 14, 2020     Patient: Yara Sawyer   YOB: 1970   Date of Visit: 1/14/2020   Claim number: [id number]    To Whom It May Concern: It is my medical opinion that Nhung Mccabe should remain off work. He is having right knee surgery on 2/26/20. He is currently nonweightbearing bearing with crutches. If you have any questions or concerns, please don't hesitate to call.     Sincerely,          Brittni Ya MD

## 2020-01-14 NOTE — PROGRESS NOTES
involving the medial meniscus body. Impression: Right knee medial meniscal tear    Plan: This time, we will continue with our current plan. We will proceed with right knee arthroscopy on 2/. The patient is aware that he certainly could have aggravated the pre-existing medial meniscal tear. At this time I do not feel there is any need for repeat MRI scan.

## 2020-01-15 RX ORDER — DULOXETIN HYDROCHLORIDE 30 MG/1
CAPSULE, DELAYED RELEASE ORAL
Qty: 30 CAPSULE | Refills: 2 | OUTPATIENT
Start: 2020-01-15

## 2020-01-16 ENCOUNTER — TELEPHONE (OUTPATIENT)
Dept: ORTHOPEDIC SURGERY | Age: 50
End: 2020-01-16

## 2020-01-16 NOTE — TELEPHONE ENCOUNTER
I called patient and left message letting him know that disability forms were faxed to [facility] today.

## 2020-01-29 ENCOUNTER — OFFICE VISIT (OUTPATIENT)
Dept: PAIN MANAGEMENT | Age: 50
End: 2020-01-29
Payer: COMMERCIAL

## 2020-01-29 VITALS
SYSTOLIC BLOOD PRESSURE: 123 MMHG | HEART RATE: 90 BPM | WEIGHT: 285 LBS | DIASTOLIC BLOOD PRESSURE: 78 MMHG | BODY MASS INDEX: 36.59 KG/M2

## 2020-01-29 PROCEDURE — 99214 OFFICE O/P EST MOD 30 MIN: CPT | Performed by: INTERNAL MEDICINE

## 2020-01-29 RX ORDER — NORTRIPTYLINE HYDROCHLORIDE 25 MG/1
25-50 CAPSULE ORAL NIGHTLY
Qty: 60 CAPSULE | Refills: 0 | Status: SHIPPED | OUTPATIENT
Start: 2020-01-29 | End: 2020-04-21

## 2020-01-29 RX ORDER — PREGABALIN 100 MG/1
CAPSULE ORAL
Qty: 90 CAPSULE | Refills: 1 | Status: SHIPPED | OUTPATIENT
Start: 2020-01-29 | End: 2020-03-25 | Stop reason: SDUPTHER

## 2020-01-29 RX ORDER — DULOXETIN HYDROCHLORIDE 60 MG/1
60 CAPSULE, DELAYED RELEASE ORAL DAILY
Qty: 30 CAPSULE | Refills: 1 | Status: SHIPPED | OUTPATIENT
Start: 2020-01-29 | End: 2020-02-17

## 2020-01-29 RX ORDER — TIZANIDINE 4 MG/1
TABLET ORAL
Qty: 60 TABLET | Refills: 1 | Status: SHIPPED | OUTPATIENT
Start: 2020-01-29 | End: 2020-03-25 | Stop reason: SDUPTHER

## 2020-01-29 NOTE — PROGRESS NOTES
Lana Ta  1970  [id number]    HISTORY OF PRESENT ILLNESS:  Mr. Minnie Jordan is a 47 y.o. male returns for a follow up visit for multiple medical problems. His current presenting problems are   1. Chronic pain syndrome    2. DDD (degenerative disc disease), cervical    3. Lumbar radiculopathy    4. Failed back surgical syndrome    5. Low back pain radiating to right leg    6. Degenerative joint disease of spinal facet joint    7. DDD (degenerative disc disease), lumbosacral    8. Recurrent major depressive disorder, in partial remission ([facility] [address]    . As per information/history obtained from the PADT(patient assessment and documentation tool) - He complains of pain in the mid back and lower back with radiation to the knee right, buttocks and hips Bilateral He rates the pain 4/10 and describes it as throbbing. Pain is made worse by: nothing, movement, walking, standing, sitting, bending, lifting. Current treatment regimen has helped relieve about 40% of the pain. He denies side effects from the current pain regimen. Patient reports that since the last follow up visit the physical functioning is worse, family/social relationships are worse, mood is worse and sleep patterns are worse, and that the overall functioning is worse. Patient denies neurological bowel or bladder. Patient denies misusing/abusing his narcotic pain medications or using any illegal drugs. There are No indicators for possible drug abuse, addiction or diversion problems. Upon obtaining the medical history from Mr. Minnie Jordan regarding today's office visit for his presenting problems, Mr. Minnie Jordan states he has been having increase pain in the back. He says he is going for right knee surgery next month, he is having mensicus repair. He mentions he is using Nucynta 1-2 lately due too increase pain. Patient has been compliant with his adjuvant medications. Sleep is poor,  poor sleep latency, averages 2-3 hours of sleep at night.  Intermittent, non clubs or organizations: Not on file     Relationship status: Not on file    Intimate partner violence:     Fear of current or ex partner: Not on file     Emotionally abused: Not on file     Physically abused: Not on file     Forced sexual activity: Not on file   Other Topics Concern    Not on file   Social History Narrative    Not on file       Mr. Sancho Galvin current medications are   Outpatient Medications Prior to Visit   Medication Sig Dispense Refill    tapentadol (NUCYNTA) 50 MG TABS Take 1 tablet by mouth every 6 hours as needed for Pain (max 1-2/day) for up to 15 days. 30 tablet 0    levothyroxine (SYNTHROID) 112 MCG tablet 2 p.o. every morning at least 30 minutes before food 180 tablet 1    buPROPion (WELLBUTRIN XL) 150 MG extended release tablet Take 1 tablet by mouth every morning 30 tablet 3    pregabalin (LYRICA) 100 MG capsule TAKE 1 TAB IN THE AM AND 2 TABS IN THE PM 90 capsule 1    tiZANidine (ZANAFLEX) 4 MG tablet Take . 5-1 tablet by mouth bid prn pain 60 tablet 1    DULoxetine (CYMBALTA) 30 MG extended release capsule Take 1 capsule by mouth daily 30 capsule 1    traZODone (DESYREL) 50 MG tablet Take 1-2 tablets by mouth nightly 60 tablet 0    rosuvastatin (CRESTOR) 40 MG tablet Take 1 tablet by mouth daily 90 tablet 1    vitamin D (ERGOCALCIFEROL) 1.25 MG (45153 UT) CAPS capsule Take 1 capsule by mouth once a week 12 capsule 1    hydrochlorothiazide (HYDRODIURIL) 25 MG tablet TAKE 2 TABLETs every morning 90 tablet 3    lisinopril (PRINIVIL;ZESTRIL) 10 MG tablet TAKE 1 TABLET DAILY 90 tablet 3    aspirin 81 MG tablet Take 81 mg by mouth daily      omeprazole (PRILOSEC) 40 MG delayed release capsule Take 1 capsule by mouth every morning (before breakfast) 30 capsule 0    magnesium (MAGNESIUM-OXIDE) 250 MG TABS tablet Take 250 mg by mouth daily       No facility-administered medications prior to visit.         REVIEW OF SYSTEMS:   Constitutional: Negative for fatigue and unexpected weight 4. Failed back surgical syndrome    5. Low back pain radiating to right leg    6. Degenerative joint disease of spinal facet joint    7. DDD (degenerative disc disease), lumbosacral    8. Recurrent major depressive disorder, in partial remission ([facility] [address]        PLAN:  Informed verbal consent was obtained. -records from Ortho reviewed  -continue with Nucynta 1-2 per day PRN  -he was advised proper sleep hygiene-told to avoid:use of caffeine or other stimulants after noon, alcohol use near bedtime, long or frequent naps during the day, erratic sleep schedule, heavy meals near bedtime, vigorous exercise near bedtime and use of electronic devices near bedtime, d/c Pamelor and start Pamelor 25 mg 1-2 per day  -will increase Cymbalta to 60 mg to help with moods/pain  -continue with Lyrica same dose  -he has a history of possible PUD/gastritis, will avoid po NSAID's  -try V. Gel 2 grams BID  -UDS negative, ok. He is using Nucynta PRN, negative for illicit drugs    Mr. Cherie Gutierrez will be prescribed  the medications  listed below which are for treatment of his presenting  medical problems which for this visit include:   Diagnoses of Chronic pain syndrome, DDD (degenerative disc disease), cervical, Lumbar radiculopathy, Failed back surgical syndrome, Low back pain radiating to right leg, Degenerative joint disease of spinal facet joint, DDD (degenerative disc disease), lumbosacral, and Recurrent major depressive disorder, in partial remission ([facility] [address] were pertinent to this visit. Medications/orders associated with this visit:    Current Outpatient Medications   Medication Sig Dispense Refill    tapentadol (NUCYNTA) 50 MG TABS Take 1 tablet by mouth every 6 hours as needed for Pain (max 1-2/day) for up to 15 days.  30 tablet 0    levothyroxine (SYNTHROID) 112 MCG tablet 2 p.o. every morning at least 30 minutes before food 180 tablet 1    buPROPion (WELLBUTRIN XL) 150 MG extended release tablet Take 1 tablet by mouth every morning 30 tablet 3    pregabalin (LYRICA) 100 MG capsule TAKE 1 TAB IN THE AM AND 2 TABS IN THE PM 90 capsule 1    tiZANidine (ZANAFLEX) 4 MG tablet Take . 5-1 tablet by mouth bid prn pain 60 tablet 1    DULoxetine (CYMBALTA) 30 MG extended release capsule Take 1 capsule by mouth daily 30 capsule 1    traZODone (DESYREL) 50 MG tablet Take 1-2 tablets by mouth nightly 60 tablet 0    rosuvastatin (CRESTOR) 40 MG tablet Take 1 tablet by mouth daily 90 tablet 1    vitamin D (ERGOCALCIFEROL) 1.25 MG (71277 UT) CAPS capsule Take 1 capsule by mouth once a week 12 capsule 1    hydrochlorothiazide (HYDRODIURIL) 25 MG tablet TAKE 2 TABLETs every morning 90 tablet 3    lisinopril (PRINIVIL;ZESTRIL) 10 MG tablet TAKE 1 TABLET DAILY 90 tablet 3    aspirin 81 MG tablet Take 81 mg by mouth daily      omeprazole (PRILOSEC) 40 MG delayed release capsule Take 1 capsule by mouth every morning (before breakfast) 30 capsule 0    magnesium (MAGNESIUM-OXIDE) 250 MG TABS tablet Take 250 mg by mouth daily       No current facility-administered medications for this visit. Goals of current treatment regimen include improvement in pain, restoration of functioning- with focus on improvement in physical performance, general activity, work or disability,emotional distress, health care utilization and  decreased medication consumption. Will continue to monitor progress towards achieving/maintaining therapeutic goals with special emphasis on  1. Improvement in perceived interfernce  of pain with ADL's. Ability to do home exercises independently. Ability to do household chores indoor and/or outdoor work and social and leisure activities. To increase flexibility/ROM, strength and endurance. Improve psychosocial and physical functioning.- he is not showing any significant progress/or showing regression  towards this goal and reassessment and adjustment of goals/treatment have been made. 2. Improving sleep to 6-7 hours a night.  Improve

## 2020-02-04 ENCOUNTER — OFFICE VISIT (OUTPATIENT)
Dept: FAMILY MEDICINE CLINIC | Age: 50
End: 2020-02-04
Payer: COMMERCIAL

## 2020-02-04 VITALS
DIASTOLIC BLOOD PRESSURE: 80 MMHG | TEMPERATURE: 98.4 F | BODY MASS INDEX: 36.86 KG/M2 | HEIGHT: 74 IN | WEIGHT: 287.2 LBS | SYSTOLIC BLOOD PRESSURE: 124 MMHG

## 2020-02-04 PROCEDURE — 99243 OFF/OP CNSLTJ NEW/EST LOW 30: CPT | Performed by: FAMILY MEDICINE

## 2020-02-04 RX ORDER — ROSUVASTATIN CALCIUM 40 MG/1
40 TABLET, COATED ORAL DAILY
Qty: 90 TABLET | Refills: 3 | Status: SHIPPED | OUTPATIENT
Start: 2020-02-04 | End: 2021-03-25 | Stop reason: SDUPTHER

## 2020-02-04 ASSESSMENT — ENCOUNTER SYMPTOMS
ROS SKIN COMMENTS: NO SKIN WOUNDS.
VOMITING: 0
COUGH: 0
EYE ITCHING: 0
EYE DISCHARGE: 0
EYE PAIN: 0
WHEEZING: 0
SHORTNESS OF BREATH: 0
TROUBLE SWALLOWING: 0
BLOOD IN STOOL: 0
BACK PAIN: 1
ABDOMINAL PAIN: 0
DIARRHEA: 0

## 2020-02-04 NOTE — PROGRESS NOTES
2/4/2020    Adolema Stoney is a 45 y.o. male    Chief Complaint   Patient presents with   [place] Pre-op Exam     02- surgery right knee scoped meniscus [facility]   HPI     Adtye Stoney is a 45 y.o. male presenting today with a chief complaint of needing pre op clearance for RIGHT KNEE ARTHROSCOPY POSSIBLE MEDIAL MENISCAL REPAIR VERUS MENISCECTOMY AND CHONDROPLASTY. Review of Systems   Constitutional: Negative for chills, fever and unexpected weight change. HENT: Negative for ear pain, mouth sores, nosebleeds and trouble swallowing. Eyes: Negative for pain, discharge and itching. Respiratory: Negative for cough, shortness of breath and wheezing. Cardiovascular: Negative for chest pain and leg swelling. Gastrointestinal: Negative for abdominal pain, blood in stool, diarrhea and vomiting. Genitourinary: Negative for dysuria, hematuria and urgency. Musculoskeletal: Positive for arthralgias, back pain and gait problem. Negative for joint swelling and neck pain. Skin: Negative for rash and wound. No skin wounds. Neurological: Negative for dizziness, tremors, seizures, syncope and weakness. Hematological: Negative for adenopathy. Does not bruise/bleed easily. Psychiatric/Behavioral: Negative for self-injury and suicidal ideas.        Past Medical History:   Diagnosis Date    Chronic pain syndrome     DDD (degenerative disc disease), cervical     Degenerative joint disease of spinal facet joint     Depressive disorder, not elsewhere classified     Dizziness     Failed back surgical syndrome     GERD (gastroesophageal reflux disease)     Headache(784.0)     HTN (hypertension)     Hyperlipidemia     Lumbar radiculopathy     Sleep apnea     does use CPAP    thyroid cancer     thyroid    Thyroid disease     Wears glasses      Past Surgical History:   Procedure Laterality Date    CARPAL TUNNEL RELEASE Left 3-5-2013    CARPAL TUNNEL RELEASE Right 4-    COLONOSCOPY N/A 10/15/2019    COLONOSCOPY WITH BIOPSY performed by Deann Paz MD at [address] COLONOSCOPY N/A 10/15/2019    COLONOSCOPY POLYPECTOMY SNARE/COLD BIOPSY performed by Deann Paz MD at [place] [address] Left 1-5-2016    First dorsal extensor compartment tendon release.  JOINT REPLACEMENT Right     shoulder    [place] DECOMPRESS FOREARM,EXCIS MUSC/NERV Right 4/8/2019    RIGHT OPEN TENNIS ELBOW RELEASE performed by Simeon Benavides MD at [address] Right 1993    right    SPINAL FUSION  2006    THYROIDECTOMY  2008    UPPER GASTROINTESTINAL ENDOSCOPY N/A 1/29/2019    EGD DIAGNOSTIC ONLY performed by Doug Coats MD at [facility] N/A 10/15/2019    EGD DILATION BALLOON performed by Deann Paz MD at [facility] N/A 10/15/2019    EGD BIOPSY performed by Deann Paz MD at [address] History   Problem Relation Age of Onset    Diabetes Father     Heart Failure Father     Hypertension Father     High Blood Pressure Father     High Blood Pressure Mother     Stroke Maternal Grandmother     Anesth Problems Neg Hx     Malig Hyperten Neg Hx     Hypotension Neg Hx     Malig Hypertherm Neg Hx     Pseudochol.  Deficiency Neg Hx      Social History     Tobacco Use   Smoking Status Never Smoker   Smokeless Tobacco Never Used      Patient Active Problem List   Diagnosis    Chronic pain syndrome    Degenerative joint disease of spinal facet joint    Recurrent major depressive disorder ([facility] [address]    DDD (degenerative disc disease), cervical    Lumbar radiculopathy    Hyperlipidemia    HTN (hypertension)    CTS (carpal tunnel syndrome)    Sinusitis    Back pain    Rt flank pain    Microscopic hematuria    Low back pain radiating to right leg    Failed back surgical syndrome    DDD (degenerative disc disease), lumbosacral    History Thought Content: Thought content normal.         Judgment: Judgment normal.         allergies  Oxycontin [oxycodone] and Percocet [oxycodone-acetaminophen]       Current Outpatient Medications   Medication Sig Dispense Refill    rosuvastatin (CRESTOR) 40 MG tablet Take 1 tablet by mouth daily 90 tablet 3    tapentadol (NUCYNTA) 50 MG TABS Take 1 tablet by mouth every 6 hours as needed for Pain (max 1-2/day) for up to 42 days. 50 tablet 0    pregabalin (LYRICA) 100 MG capsule TAKE 1 TAB IN THE AM AND 2 TABS IN THE PM 90 capsule 1    tiZANidine (ZANAFLEX) 4 MG tablet Take . 5-1 tablet by mouth bid prn pain 60 tablet 1    nortriptyline (PAMELOR) 25 MG capsule Take 1-2 capsules by mouth nightly 60 capsule 0    diclofenac sodium (VOLTAREN) 1 % GEL Apply 2 grams to affected area 5 Tube 1    levothyroxine (SYNTHROID) 112 MCG tablet 2 p.o. every morning at least 30 minutes before food 180 tablet 1    vitamin D (ERGOCALCIFEROL) 1.25 MG (84827 UT) CAPS capsule Take 1 capsule by mouth once a week 12 capsule 1    hydrochlorothiazide (HYDRODIURIL) 25 MG tablet TAKE 2 TABLETs every morning 90 tablet 3    lisinopril (PRINIVIL;ZESTRIL) 10 MG tablet TAKE 1 TABLET DAILY 90 tablet 3    aspirin 81 MG tablet Take 81 mg by mouth daily      omeprazole (PRILOSEC) 40 MG delayed release capsule Take 1 capsule by mouth every morning (before breakfast) 30 capsule 0    magnesium (MAGNESIUM-OXIDE) 250 MG TABS tablet Take 250 mg by mouth daily      buPROPion (WELLBUTRIN XL) 150 MG extended release tablet Take 1 tablet by mouth every morning 90 tablet 1    DULoxetine (CYMBALTA) 60 MG extended release capsule Take 1 capsule by mouth daily 90 capsule 1     No current facility-administered medications for this visit. Vitals:    02/04/20 1033   BP: 124/80   Temp: 98.4 °F (36.9 °C)     Body mass index is 36.87 kg/m².   Immunization History   Administered Date(s) Administered    Influenza Virus Vaccine 11/24/2015    Influenza Whole

## 2020-02-17 ENCOUNTER — OFFICE VISIT (OUTPATIENT)
Dept: PSYCHIATRY | Age: 50
End: 2020-02-17
Payer: COMMERCIAL

## 2020-02-17 VITALS
DIASTOLIC BLOOD PRESSURE: 98 MMHG | HEART RATE: 98 BPM | SYSTOLIC BLOOD PRESSURE: 138 MMHG | BODY MASS INDEX: 37.5 KG/M2 | WEIGHT: 292.2 LBS | HEIGHT: 74 IN

## 2020-02-17 PROCEDURE — 99214 OFFICE O/P EST MOD 30 MIN: CPT | Performed by: NURSE PRACTITIONER

## 2020-02-17 RX ORDER — BUPROPION HYDROCHLORIDE 150 MG/1
150 TABLET ORAL EVERY MORNING
Qty: 90 TABLET | Refills: 1 | Status: SHIPPED | OUTPATIENT
Start: 2020-02-17 | End: 2021-03-25

## 2020-02-17 RX ORDER — DULOXETIN HYDROCHLORIDE 60 MG/1
60 CAPSULE, DELAYED RELEASE ORAL DAILY
Qty: 90 CAPSULE | Refills: 1 | Status: SHIPPED | OUTPATIENT
Start: 2020-02-17 | End: 2020-02-24 | Stop reason: ALTCHOICE

## 2020-02-17 ASSESSMENT — ANXIETY QUESTIONNAIRES
2. NOT BEING ABLE TO STOP OR CONTROL WORRYING: 3-NEARLY EVERY DAY
4. TROUBLE RELAXING: 1-SEVERAL DAYS
6. BECOMING EASILY ANNOYED OR IRRITABLE: 3-NEARLY EVERY DAY
5. BEING SO RESTLESS THAT IT IS HARD TO SIT STILL: 3-NEARLY EVERY DAY
GAD7 TOTAL SCORE: 14
1. FEELING NERVOUS, ANXIOUS, OR ON EDGE: 1-SEVERAL DAYS
3. WORRYING TOO MUCH ABOUT DIFFERENT THINGS: 3-NEARLY EVERY DAY
7. FEELING AFRAID AS IF SOMETHING AWFUL MIGHT HAPPEN: 0-NOT AT ALL

## 2020-02-17 ASSESSMENT — PATIENT HEALTH QUESTIONNAIRE - PHQ9
3. TROUBLE FALLING OR STAYING ASLEEP: 3
8. MOVING OR SPEAKING SO SLOWLY THAT OTHER PEOPLE COULD HAVE NOTICED. OR THE OPPOSITE, BEING SO FIGETY OR RESTLESS THAT YOU HAVE BEEN MOVING AROUND A LOT MORE THAN USUAL: 0
2. FEELING DOWN, DEPRESSED OR HOPELESS: 2
SUM OF ALL RESPONSES TO PHQ QUESTIONS 1-9: 15
7. TROUBLE CONCENTRATING ON THINGS, SUCH AS READING THE NEWSPAPER OR WATCHING TELEVISION: 0
5. POOR APPETITE OR OVEREATING: 2
4. FEELING TIRED OR HAVING LITTLE ENERGY: 3
6. FEELING BAD ABOUT YOURSELF - OR THAT YOU ARE A FAILURE OR HAVE LET YOURSELF OR YOUR FAMILY DOWN: 3
1. LITTLE INTEREST OR PLEASURE IN DOING THINGS: 2
9. THOUGHTS THAT YOU WOULD BE BETTER OFF DEAD, OR OF HURTING YOURSELF: 0
SUM OF ALL RESPONSES TO PHQ QUESTIONS 1-9: 15
SUM OF ALL RESPONSES TO PHQ9 QUESTIONS 1 & 2: 4
10. IF YOU CHECKED OFF ANY PROBLEMS, HOW DIFFICULT HAVE THESE PROBLEMS MADE IT FOR YOU TO DO YOUR WORK, TAKE CARE OF THINGS AT HOME, OR GET ALONG WITH OTHER PEOPLE: 2

## 2020-02-17 NOTE — PROGRESS NOTES
same.  Ups and downs, even keel. Good days and bad days. Think the medication is doing alright. Everything going alright, as best as it can. \"    Still off work. To have surgery on knee next week, then shoulder operated on at some point, not scheduled yet. Haven't been to work in 3 years. Everything just fucking zayda. Knee is just out of the blue. Torn miniscus. Doesn't know how did it or what caused    Pain management increased cymbalta to 60mg/day and started on pamelor at hs. Took pamelor couple times. No effect with one, but 2 tabs does put to sleep but not taking consistently. I try to read at night to make me fall asleep. But good book and don't want to put it down. Was up until 246am reading. Plans to take 2 of nortiptyline at 10pm.    On 2/26/20, to have  RIGHT KNEE ARTHROSCOPY POSSIBLE MEDIAL MENISCAL REPAIR VERUS MENISCECTOMY AND CHONDROPLASTY    Taking wellbutrin and cymbalta consistently. Denies SI. \"never been an option\". There are people in the world worse off than I am.  As much as things are going bad, doing something crazy isn't going to make it better. i'm An[employer]ism, against my [employer]ism. Don't own gun. Got last disability check December 8th. Haven't worked in past 2.5 years    Substance:   ETOH:  socially   Illicits:  Denies    Caffeine:  Tries to limit pop to 1/day,averages 5/week   Tobacco:    denies        Psych ROS:      Depression: struggles to rate, then 5/10 (10 best). If things were better medically or baseball season, would be better;  easily irritated; my mood is annoying, used to be laid back, now easily agitated. decreased/increased sleep (gets 3-4 hours/night.   Denies naps during day; can't sleep, my knee is bothering the shit out of me, keeping me awake; stays up late to 2-3am, most nights tossing and turning; sleep also poor d/t shoulder pain), poor memory, \"forget shit constantly\"; poor concentration; variable appetite, if I could start losing treated for depression; doesn't know what they're taking    PCP: RACHEL MCKAY, DO      Allergies: Allergies   Allergen Reactions    Oxycontin [Oxycodone] Nausea Only    Percocet [Oxycodone-Acetaminophen] Nausea And Vomiting         Current Medications:   Current Outpatient Medications on File Prior to Visit   Medication Sig Dispense Refill    rosuvastatin (CRESTOR) 40 MG tablet Take 1 tablet by mouth daily 90 tablet 3    tapentadol (NUCYNTA) 50 MG TABS Take 1 tablet by mouth every 6 hours as needed for Pain (max 1-2/day) for up to 42 days. 50 tablet 0    pregabalin (LYRICA) 100 MG capsule TAKE 1 TAB IN THE AM AND 2 TABS IN THE PM 90 capsule 1    tiZANidine (ZANAFLEX) 4 MG tablet Take . 5-1 tablet by mouth bid prn pain 60 tablet 1    nortriptyline (PAMELOR) 25 MG capsule Take 1-2 capsules by mouth nightly 60 capsule 0    diclofenac sodium (VOLTAREN) 1 % GEL Apply 2 grams to affected area 5 Tube 1    levothyroxine (SYNTHROID) 112 MCG tablet 2 p.o. every morning at least 30 minutes before food 180 tablet 1    vitamin D (ERGOCALCIFEROL) 1.25 MG (44555 UT) CAPS capsule Take 1 capsule by mouth once a week 12 capsule 1    hydrochlorothiazide (HYDRODIURIL) 25 MG tablet TAKE 2 TABLETs every morning 90 tablet 3    lisinopril (PRINIVIL;ZESTRIL) 10 MG tablet TAKE 1 TABLET DAILY 90 tablet 3    aspirin 81 MG tablet Take 81 mg by mouth daily      omeprazole (PRILOSEC) 40 MG delayed release capsule Take 1 capsule by mouth every morning (before breakfast) 30 capsule 0    magnesium (MAGNESIUM-OXIDE) 250 MG TABS tablet Take 250 mg by mouth daily       No current facility-administered medications on file prior to visit. Controlled Substance Monitoring:    Acute and Chronic Pain Monitoring:   RX Monitoring 2/17/2020   Attestation -   Periodic Controlled Substance Monitoring Possible medication side effects, risk of tolerance/dependence & alternative treatments discussed.      01/18/2020  1   01/14/2020 Nucynta 50 MG Tablet  30.00 15 Ra Min   [id number]   Claire ([id number])   0  40.00 MME  [place]   [place]   01/13/2020  1   12/03/2019  Pregabalin 100 MG Capsule  90.00 30 Ra Min   [id number]   Claire ([id number])   0  2.01 LME  [place]   11/25/2019  1   10/08/2019  Pregabalin 100 MG Capsule  90.00 30 Ra Min   [id number]   Claire ([id number])   0  2.01 LME  [place]   09/16/2019  1   03/06/2019  Pregabalin 100 MG Capsule  90.00 30 Ra Min   [id number]   Claire ([id number])   2  2.01 LME  [place]   06/12/2019  1   03/06/2019  Lyrica 100 MG Capsule  90.00 30 Ra Min   [id number]   Claire ([id number])   1  2.01 LME  [place]   04/08/2019  1   04/08/2019  Hydrocodone-Acetamin 5-325 MG  28.00 [address]   [id number]   Tara ([id number])   0  20.00 MME  [place]   03/19/2019  1   03/06/2019  Lyrica 100 MG Capsule  90.00 30 Ra Min   [id number]   Claire ([id number])   0  2.01 LME  [place]   11/27/2018  1   11/06/2018  Lyrica 100 MG Capsule  90.00 30 Ra Min   [id number]   Wal ([id number])   0  2.01 LME  [place]   02/23/2018  1   02/13/2018  Lyrica 100 MG Capsule  90.00 30 Ra Min   [id number]   Wal ([id number])   0  2.01 LME  [place]           OBJECTIVE:  Vitals: Wt Readings from Last 3 Encounters:   02/17/20 292 lb 3.2 oz (132.5 kg)   02/04/20 287 lb 3.2 oz (130.3 kg)   01/29/20 285 lb (129.3 kg)       Vitals:    02/17/20 0835   BP: (!) 138/98   Site: Right Upper Arm   Position: Sitting   Cuff Size: Large Adult   Pulse: 98   Weight: 292 lb 3.2 oz (132.5 kg)   Height: 6' 2\" (1.88 m)           ROS: Denies trouble with fever, rash, headache, vision changes, chest pain, shortness of breath, nausea, extremity pain, weakness, dysuria.  Chronic pain      Mental Status Exam:      Appearance    alert, fair hygiene, wearing sunglasses and shorts, obese  Muscle strength/tone: no atrophy or abnormal movements  Gait/station: normal  Speech    spontaneous, normal rate and loud  Mood    Depressed  Worthless  Low self-esteem  Irritability  Emptiness  [place]  Demoralization  Affect    depressed affect with irritable edge Congruent to thought content and mood  Thought Content    hopelessness, helplessness, excessive guilt and excessive preoccupations, no delusions voiced  Thought Process    coherent   Associations    logical connections  Perceptions: denies AH/VH, does not appear preoccupied with the internal environment  [address]  Orientation    oriented to person, place, time, and general circumstances  Memory    recent and remote memory intact  Attention/Concentration    impaired  Ability to understand instructions Yes  Ability to respond meaningfully Yes  Language: [address] of knowledge/Intellect: Average  SI:   no suicidal ideation  HI: Denies HI      Labs:     Orders Only on 12/09/2019   Component Date Value    TSH 12/09/2019 0.12*    T4 Free 12/09/2019 2.1*   Orders Only on 10/14/2019   Component Date Value    Testosterone 10/14/2019 190*    Sex Hormone Binding 10/14/2019 17     Testosterone, Free 10/14/2019 50.1     TSH Reflex FT4 10/14/2019 20.26*    Vit D, 25-Hydroxy 10/14/2019 23.6*    WBC 10/14/2019 7.9     RBC 10/14/2019 4.88     Hemoglobin 10/14/2019 14.6     Hematocrit 10/14/2019 43.8     MCV 10/14/2019 89.9     MCH 10/14/2019 29.9     MCHC 10/14/2019 33.2     RDW 10/14/2019 13.7     Platelets 77/32/1728 170     MPV 10/14/2019 10.9*    Neutrophils % 10/14/2019 69.5     Lymphocytes % 10/14/2019 19.7     Monocytes % 10/14/2019 7.6     Eosinophils % 10/14/2019 2.2     Basophils % 10/14/2019 1.0     Neutrophils Absolute 10/14/2019 5.5     Lymphocytes Absolute 10/14/2019 1.6     Monocytes Absolute 10/14/2019 0.6     Eosinophils Absolute 10/14/2019 0.2     Basophils Absolute 10/14/2019 0.1     Sodium 10/14/2019 140     Potassium 10/14/2019 4.6     Chloride 10/14/2019 100     CO2 10/14/2019 24     Anion Gap 10/14/2019 16     Glucose 10/14/2019 111*    BUN 10/14/2019 14     CREATININE 10/14/2019 0.8*    GFR Non- 10/14/2019 >60 issues that I need to talk to someone     -OARRS reviewed, c/w history  -R/b/se/a d/w pt who consents.     3. Medical  -Following with [id number], DO. Unsure who is going to transfer care to since pcp left this office  - chronic pain  - mild sree, wears cpap  - h/o thyroid cancer, thyroidectomy 2008  - postponed left shoulder surgery, was orig scheduled 11/15/19,   - to have knee surgery 2/26/20     4. Substance   -No active issues.     5.  RTC - 8-12 weeks      Felicita Arevalo, [facility] [facility]  Psychiatric Nurse Practitioner

## 2020-02-18 PROBLEM — M25.561 ACUTE PAIN OF RIGHT KNEE: Status: ACTIVE | Noted: 2020-02-18

## 2020-02-18 PROBLEM — G47.33 OSA (OBSTRUCTIVE SLEEP APNEA): Status: ACTIVE | Noted: 2020-02-18

## 2020-02-18 PROBLEM — R79.89 LOW VITAMIN D LEVEL: Status: ACTIVE | Noted: 2020-02-18

## 2020-02-18 PROBLEM — M19.012 PRIMARY OSTEOARTHRITIS OF LEFT SHOULDER: Status: ACTIVE | Noted: 2020-02-18

## 2020-02-24 RX ORDER — DULOXETIN HYDROCHLORIDE 30 MG/1
30 CAPSULE, DELAYED RELEASE ORAL NIGHTLY
COMMUNITY
End: 2020-04-21

## 2020-02-24 NOTE — PROGRESS NOTES
[address] time___1100_________        Surgery time____1300________    Take the following medications with a sip of water: Follow your MD/Surgeons pre-procedure instructions regarding your medications    Do not eat or drink anything after 12:00 midnight prior to your surgery. This includes water chewing gum, mints and ice chips. You may brush your teeth and gargle the morning of your surgery, but do not swallow the water     Please see your family doctor/pediatrician for a history and physical and/or concerning medications. Bring any test results/reports from your physicians office. If you are under the care of a heart doctor or specialist doctor, please be aware that you may be asked to them for clearance    You may be asked to stop blood thinners such as Coumadin, Plavix, Fragmin, Lovenox, etc., or any anti-inflammatories such as:  Aspirin, Ibuprofen, Advil, Naproxen prior to your surgery. We also ask that you stop any OTC medications such as fish oil, vitamin E, glucosamine, garlic, Multivitamins, COQ 10, etc. MAY TAKE TYLENOL    We ask that you do not smoke 24 hours prior to surgery  We ask that you do not  drink any alcoholic beverages 24 hours prior to surgery     You must make arrangements for a responsible adult to take you home after your surgery. For your safety you will not be allowed to leave alone or drive yourself home. Your surgery will be cancelled if you do not have a ride home. Also for your safety, it is strongly suggested that someone stay with you the first 24 hours after your surgery. A parent or legal guardian must accompany a child scheduled for surgery and plan to stay at the hospital until the child is discharged. Please do not bring other children with you. For your comfort, please wear simple loose fitting clothing to the hospital.  Please do not bring valuables.     Do not wear any make-up or nail polish on your fingers or toes      For your safety, please do not wear any jewelry or body piercing's on the day of surgery. All jewelry must be removed. If you have dentures, they will be removed before going to operating room. For your convenience, we will provide you with a container. If you wear contact lenses or glasses, they will be removed, please bring a case for them. If you have a living will and a durable power of  for healthcare, please bring in a copy. As part of our patient safety program to minimize surgical site infections, we ask you to do the following:    · Please notify your surgeon if you develop any illness between         now and the  day of your surgery. · This includes a cough, cold, fever, sore throat, nausea,         or vomiting, and diarrhea, etc.  ·  Please notify your surgeon if you experience dizziness, shortness         of breath or blurred vision between now and the time of your surgery. Do not shave your operative site 96 hours prior to surgery. For face and neck surgery, men may use an electric razor 48 hours   prior to surgery. You may shower the night before surgery or the morning of   your surgery with an antibacterial soap. You will need to bring a photo ID and insurance card    [facility] has an onsite pharmacy, would you like to utilize our pharmacy     If you will be staying overnight and use a C-pap machine, please bring   your C-pap to hospital     Our goal is to provide you with excellent care, therefore, visitors will be limited to two(2) in the room at a time so that we may focus on providing this care for you. Please contact pre-admission testing if you have any further questions. [facility] phone number:  [phone] PAT fax number:  [phone]  Please note these are generalized instructions for all surgical cases, you may be provided with more specific instructions according to your surgery.

## 2020-02-25 ENCOUNTER — ANESTHESIA EVENT (OUTPATIENT)
Dept: OPERATING ROOM | Age: 50
End: 2020-02-25
Payer: COMMERCIAL

## 2020-02-26 ENCOUNTER — ANESTHESIA (OUTPATIENT)
Dept: OPERATING ROOM | Age: 50
End: 2020-02-26
Payer: COMMERCIAL

## 2020-02-26 ENCOUNTER — HOSPITAL ENCOUNTER (OUTPATIENT)
Age: 50
Setting detail: OUTPATIENT SURGERY
Discharge: HOME | End: 2020-02-26
Attending: ORTHOPAEDIC SURGERY | Admitting: ORTHOPAEDIC SURGERY
Payer: COMMERCIAL

## 2020-02-26 VITALS
HEIGHT: 74 IN | OXYGEN SATURATION: 98 % | DIASTOLIC BLOOD PRESSURE: 83 MMHG | RESPIRATION RATE: 18 BRPM | TEMPERATURE: 96.9 F | WEIGHT: 286 LBS | HEART RATE: 98 BPM | BODY MASS INDEX: 36.7 KG/M2 | SYSTOLIC BLOOD PRESSURE: 143 MMHG

## 2020-02-26 VITALS
SYSTOLIC BLOOD PRESSURE: 131 MMHG | TEMPERATURE: 96.8 F | DIASTOLIC BLOOD PRESSURE: 83 MMHG | RESPIRATION RATE: 3 BRPM | OXYGEN SATURATION: 99 %

## 2020-02-26 PROCEDURE — 7100000000 HC PACU RECOVERY - FIRST 15 MIN: Performed by: ORTHOPAEDIC SURGERY

## 2020-02-26 PROCEDURE — 7100000010 HC PHASE II RECOVERY - FIRST 15 MIN: Performed by: ORTHOPAEDIC SURGERY

## 2020-02-26 PROCEDURE — 6360000002 HC RX W HCPCS: Performed by: NURSE ANESTHETIST, CERTIFIED REGISTERED

## 2020-02-26 PROCEDURE — 7100000001 HC PACU RECOVERY - ADDTL 15 MIN: Performed by: ORTHOPAEDIC SURGERY

## 2020-02-26 PROCEDURE — 3600000004 HC SURGERY LEVEL 4 BASE: Performed by: ORTHOPAEDIC SURGERY

## 2020-02-26 PROCEDURE — 2580000003 HC RX 258: Performed by: ANESTHESIOLOGY

## 2020-02-26 PROCEDURE — 2720000010 HC SURG SUPPLY STERILE: Performed by: ORTHOPAEDIC SURGERY

## 2020-02-26 PROCEDURE — 2500000003 HC RX 250 WO HCPCS: Performed by: NURSE ANESTHETIST, CERTIFIED REGISTERED

## 2020-02-26 PROCEDURE — 2500000003 HC RX 250 WO HCPCS: Performed by: ORTHOPAEDIC SURGERY

## 2020-02-26 PROCEDURE — 6370000000 HC RX 637 (ALT 250 FOR IP): Performed by: ANESTHESIOLOGY

## 2020-02-26 PROCEDURE — 2580000003 HC RX 258: Performed by: ORTHOPAEDIC SURGERY

## 2020-02-26 PROCEDURE — 2709999900 HC NON-CHARGEABLE SUPPLY: Performed by: ORTHOPAEDIC SURGERY

## 2020-02-26 PROCEDURE — 3600000014 HC SURGERY LEVEL 4 ADDTL 15MIN: Performed by: ORTHOPAEDIC SURGERY

## 2020-02-26 PROCEDURE — 6360000002 HC RX W HCPCS: Performed by: ORTHOPAEDIC SURGERY

## 2020-02-26 PROCEDURE — 6360000002 HC RX W HCPCS: Performed by: ANESTHESIOLOGY

## 2020-02-26 PROCEDURE — 7100000011 HC PHASE II RECOVERY - ADDTL 15 MIN: Performed by: ORTHOPAEDIC SURGERY

## 2020-02-26 PROCEDURE — 3700000000 HC ANESTHESIA ATTENDED CARE: Performed by: ORTHOPAEDIC SURGERY

## 2020-02-26 PROCEDURE — 3700000001 HC ADD 15 MINUTES (ANESTHESIA): Performed by: ORTHOPAEDIC SURGERY

## 2020-02-26 RX ORDER — SODIUM CHLORIDE 9 MG/ML
INJECTION, SOLUTION INTRAVENOUS CONTINUOUS
Status: DISCONTINUED | OUTPATIENT
Start: 2020-02-26 | End: 2020-02-26 | Stop reason: HOSPADM

## 2020-02-26 RX ORDER — MAGNESIUM HYDROXIDE 1200 MG/15ML
LIQUID ORAL CONTINUOUS PRN
Status: COMPLETED | OUTPATIENT
Start: 2020-02-26 | End: 2020-02-26

## 2020-02-26 RX ORDER — FENTANYL CITRATE 50 UG/ML
25 INJECTION, SOLUTION INTRAMUSCULAR; INTRAVENOUS EVERY 5 MIN PRN
Status: DISCONTINUED | OUTPATIENT
Start: 2020-02-26 | End: 2020-02-26 | Stop reason: HOSPADM

## 2020-02-26 RX ORDER — FENTANYL CITRATE 50 UG/ML
50 INJECTION, SOLUTION INTRAMUSCULAR; INTRAVENOUS EVERY 5 MIN PRN
Status: DISCONTINUED | OUTPATIENT
Start: 2020-02-26 | End: 2020-02-26 | Stop reason: HOSPADM

## 2020-02-26 RX ORDER — MIDAZOLAM HYDROCHLORIDE 1 MG/ML
INJECTION INTRAMUSCULAR; INTRAVENOUS PRN
Status: DISCONTINUED | OUTPATIENT
Start: 2020-02-26 | End: 2020-02-26 | Stop reason: SDUPTHER

## 2020-02-26 RX ORDER — ONDANSETRON 2 MG/ML
4 INJECTION INTRAMUSCULAR; INTRAVENOUS
Status: DISCONTINUED | OUTPATIENT
Start: 2020-02-26 | End: 2020-02-26 | Stop reason: HOSPADM

## 2020-02-26 RX ORDER — SODIUM CHLORIDE 0.9 % (FLUSH) 0.9 %
10 SYRINGE (ML) INJECTION EVERY 12 HOURS SCHEDULED
Status: DISCONTINUED | OUTPATIENT
Start: 2020-02-26 | End: 2020-02-26 | Stop reason: HOSPADM

## 2020-02-26 RX ORDER — DEXAMETHASONE SODIUM PHOSPHATE 4 MG/ML
INJECTION, SOLUTION INTRA-ARTICULAR; INTRALESIONAL; INTRAMUSCULAR; INTRAVENOUS; SOFT TISSUE PRN
Status: DISCONTINUED | OUTPATIENT
Start: 2020-02-26 | End: 2020-02-26 | Stop reason: SDUPTHER

## 2020-02-26 RX ORDER — MEPERIDINE HYDROCHLORIDE 25 MG/ML
12.5 INJECTION INTRAMUSCULAR; INTRAVENOUS; SUBCUTANEOUS ONCE
Status: DISCONTINUED | OUTPATIENT
Start: 2020-02-26 | End: 2020-02-26 | Stop reason: HOSPADM

## 2020-02-26 RX ORDER — HYDROCODONE BITARTRATE AND ACETAMINOPHEN 5; 325 MG/1; MG/1
1 TABLET ORAL EVERY 6 HOURS PRN
Qty: 28 TABLET | Refills: 0 | Status: SHIPPED | OUTPATIENT
Start: 2020-02-26 | End: 2020-03-04

## 2020-02-26 RX ORDER — HYDRALAZINE HYDROCHLORIDE 20 MG/ML
5 INJECTION INTRAMUSCULAR; INTRAVENOUS EVERY 10 MIN PRN
Status: DISCONTINUED | OUTPATIENT
Start: 2020-02-26 | End: 2020-02-26 | Stop reason: HOSPADM

## 2020-02-26 RX ORDER — SODIUM CHLORIDE 0.9 % (FLUSH) 0.9 %
10 SYRINGE (ML) INJECTION PRN
Status: DISCONTINUED | OUTPATIENT
Start: 2020-02-26 | End: 2020-02-26 | Stop reason: HOSPADM

## 2020-02-26 RX ORDER — PROPOFOL 10 MG/ML
INJECTION, EMULSION INTRAVENOUS PRN
Status: DISCONTINUED | OUTPATIENT
Start: 2020-02-26 | End: 2020-02-26 | Stop reason: SDUPTHER

## 2020-02-26 RX ORDER — MORPHINE SULFATE 0.5 MG/ML
INJECTION, SOLUTION EPIDURAL; INTRATHECAL; INTRAVENOUS
Status: COMPLETED | OUTPATIENT
Start: 2020-02-26 | End: 2020-02-26

## 2020-02-26 RX ORDER — HYDROCODONE BITARTRATE AND ACETAMINOPHEN 5; 325 MG/1; MG/1
1 TABLET ORAL PRN
Status: COMPLETED | OUTPATIENT
Start: 2020-02-26 | End: 2020-02-26

## 2020-02-26 RX ORDER — HYDROCODONE BITARTRATE AND ACETAMINOPHEN 5; 325 MG/1; MG/1
2 TABLET ORAL PRN
Status: COMPLETED | OUTPATIENT
Start: 2020-02-26 | End: 2020-02-26

## 2020-02-26 RX ORDER — ONDANSETRON 2 MG/ML
INJECTION INTRAMUSCULAR; INTRAVENOUS PRN
Status: DISCONTINUED | OUTPATIENT
Start: 2020-02-26 | End: 2020-02-26 | Stop reason: SDUPTHER

## 2020-02-26 RX ORDER — FENTANYL CITRATE 50 UG/ML
INJECTION, SOLUTION INTRAMUSCULAR; INTRAVENOUS PRN
Status: DISCONTINUED | OUTPATIENT
Start: 2020-02-26 | End: 2020-02-26 | Stop reason: SDUPTHER

## 2020-02-26 RX ORDER — BUPIVACAINE HYDROCHLORIDE AND EPINEPHRINE 5; 5 MG/ML; UG/ML
INJECTION, SOLUTION EPIDURAL; INTRACAUDAL; PERINEURAL
Status: COMPLETED | OUTPATIENT
Start: 2020-02-26 | End: 2020-02-26

## 2020-02-26 RX ORDER — PROMETHAZINE HYDROCHLORIDE 25 MG/ML
6.25 INJECTION, SOLUTION INTRAMUSCULAR; INTRAVENOUS
Status: DISCONTINUED | OUTPATIENT
Start: 2020-02-26 | End: 2020-02-26 | Stop reason: HOSPADM

## 2020-02-26 RX ORDER — OXYCODONE HYDROCHLORIDE AND ACETAMINOPHEN 5; 325 MG/1; MG/1
1 TABLET ORAL EVERY 6 HOURS PRN
Qty: 28 TABLET | Refills: 0 | Status: SHIPPED | OUTPATIENT
Start: 2020-02-26 | End: 2020-02-26 | Stop reason: HOSPADM

## 2020-02-26 RX ORDER — LIDOCAINE HYDROCHLORIDE 20 MG/ML
INJECTION, SOLUTION EPIDURAL; INFILTRATION; INTRACAUDAL; PERINEURAL PRN
Status: DISCONTINUED | OUTPATIENT
Start: 2020-02-26 | End: 2020-02-26 | Stop reason: SDUPTHER

## 2020-02-26 RX ADMIN — SODIUM CHLORIDE: 9 INJECTION, SOLUTION INTRAVENOUS at 12:21

## 2020-02-26 RX ADMIN — PROPOFOL 250 MG: 10 INJECTION, EMULSION INTRAVENOUS at 12:55

## 2020-02-26 RX ADMIN — FENTANYL CITRATE 50 MCG: 50 INJECTION INTRAMUSCULAR; INTRAVENOUS at 13:00

## 2020-02-26 RX ADMIN — PROPOFOL 50 MG: 10 INJECTION, EMULSION INTRAVENOUS at 12:59

## 2020-02-26 RX ADMIN — MIDAZOLAM 2 MG: 1 INJECTION INTRAMUSCULAR; INTRAVENOUS at 12:48

## 2020-02-26 RX ADMIN — FENTANYL CITRATE 25 MCG: 50 INJECTION, SOLUTION INTRAMUSCULAR; INTRAVENOUS at 14:19

## 2020-02-26 RX ADMIN — FENTANYL CITRATE 50 MCG: 50 INJECTION INTRAMUSCULAR; INTRAVENOUS at 12:55

## 2020-02-26 RX ADMIN — SODIUM CHLORIDE: 9 INJECTION, SOLUTION INTRAVENOUS at 13:37

## 2020-02-26 RX ADMIN — LIDOCAINE HYDROCHLORIDE 50 MG: 20 INJECTION, SOLUTION EPIDURAL; INFILTRATION; INTRACAUDAL; PERINEURAL at 12:55

## 2020-02-26 RX ADMIN — ONDANSETRON 4 MG: 2 INJECTION INTRAMUSCULAR; INTRAVENOUS at 13:04

## 2020-02-26 RX ADMIN — HYDROCODONE BITARTRATE AND ACETAMINOPHEN 1 TABLET: 5; 325 TABLET ORAL at 15:18

## 2020-02-26 RX ADMIN — Medication 3 G: at 12:56

## 2020-02-26 RX ADMIN — DEXAMETHASONE SODIUM PHOSPHATE 4 MG: 4 INJECTION, SOLUTION INTRAMUSCULAR; INTRAVENOUS at 13:04

## 2020-02-26 ASSESSMENT — PULMONARY FUNCTION TESTS
PIF_VALUE: 19
PIF_VALUE: 9
PIF_VALUE: 4
PIF_VALUE: 8
PIF_VALUE: 16
PIF_VALUE: 4
PIF_VALUE: 22
PIF_VALUE: 11
PIF_VALUE: 8
PIF_VALUE: 18
PIF_VALUE: 1
PIF_VALUE: 19
PIF_VALUE: 0
PIF_VALUE: 16
PIF_VALUE: 1
PIF_VALUE: 5
PIF_VALUE: 4
PIF_VALUE: 15
PIF_VALUE: 3
PIF_VALUE: 0
PIF_VALUE: 5
PIF_VALUE: 0
PIF_VALUE: 20
PIF_VALUE: 3
PIF_VALUE: 11
PIF_VALUE: 4
PIF_VALUE: 0
PIF_VALUE: 4
PIF_VALUE: 14
PIF_VALUE: 16
PIF_VALUE: 0
PIF_VALUE: 17
PIF_VALUE: 8
PIF_VALUE: 21
PIF_VALUE: 0
PIF_VALUE: 4
PIF_VALUE: 21
PIF_VALUE: 3
PIF_VALUE: 7
PIF_VALUE: 16
PIF_VALUE: 0
PIF_VALUE: 3
PIF_VALUE: 4
PIF_VALUE: 1
PIF_VALUE: 12
PIF_VALUE: 0
PIF_VALUE: 2
PIF_VALUE: 4
PIF_VALUE: 18
PIF_VALUE: 11

## 2020-02-26 ASSESSMENT — PAIN DESCRIPTION - PROGRESSION
CLINICAL_PROGRESSION: GRADUALLY WORSENING
CLINICAL_PROGRESSION: NOT CHANGED
CLINICAL_PROGRESSION: GRADUALLY IMPROVING

## 2020-02-26 ASSESSMENT — PAIN SCALES - GENERAL
PAINLEVEL_OUTOF10: 6
PAINLEVEL_OUTOF10: 5
PAINLEVEL_OUTOF10: 0
PAINLEVEL_OUTOF10: 5
PAINLEVEL_OUTOF10: 2

## 2020-02-26 ASSESSMENT — PAIN DESCRIPTION - ONSET
ONSET: ON-GOING
ONSET: GRADUAL
ONSET: ON-GOING

## 2020-02-26 ASSESSMENT — PAIN DESCRIPTION - DESCRIPTORS
DESCRIPTORS: DISCOMFORT
DESCRIPTORS: SORE
DESCRIPTORS: DISCOMFORT
DESCRIPTORS: ACHING
DESCRIPTORS: DISCOMFORT

## 2020-02-26 ASSESSMENT — PAIN DESCRIPTION - FREQUENCY
FREQUENCY: CONTINUOUS

## 2020-02-26 ASSESSMENT — PAIN DESCRIPTION - PAIN TYPE
TYPE: SURGICAL PAIN

## 2020-02-26 ASSESSMENT — PAIN - FUNCTIONAL ASSESSMENT
PAIN_FUNCTIONAL_ASSESSMENT: 0-10
PAIN_FUNCTIONAL_ASSESSMENT: PREVENTS OR INTERFERES SOME ACTIVE ACTIVITIES AND ADLS
PAIN_FUNCTIONAL_ASSESSMENT: PREVENTS OR INTERFERES WITH ALL ACTIVE AND SOME PASSIVE ACTIVITIES

## 2020-02-26 ASSESSMENT — PAIN DESCRIPTION - ORIENTATION
ORIENTATION: RIGHT

## 2020-02-26 ASSESSMENT — PAIN DESCRIPTION - LOCATION
LOCATION: KNEE

## 2020-02-26 ASSESSMENT — ENCOUNTER SYMPTOMS: SHORTNESS OF BREATH: 0

## 2020-02-26 ASSESSMENT — LIFESTYLE VARIABLES: SMOKING_STATUS: 0

## 2020-02-26 NOTE — PROGRESS NOTES
Patient admitted to PACU from OR. Patient opens eyes to name. Resp easy unlabored on 4LNC with SAO2 98%. Monitor in ST-SR. Right knee ace wrap dressing dry and intact with ice pack on. VSS. IV patent to right hand. Patient denies C/O pain or nausea.

## 2020-02-26 NOTE — PROGRESS NOTES
Patient resting quietly. VSS. IV patent. Patient states surgical discomfort to right knee 1-2 of 10 and tolerable. Patient denies C/O nausea. Resp easy unlabored on room air O2 with SaO2 93%.

## 2020-02-26 NOTE — ANESTHESIA PRE PROCEDURE
Department of Anesthesiology  Preprocedure Note       Name:  Trey Barfield   Age:  45 y.o.  :  1970                                          MRN:  [id number]         Date:  2020      Surgeon: Sneha Aragon):  Keara Blank MD    Procedure: RIGHT KNEE ARTHROSCOPY POSSIBLE MEDIAL MENISCAL REPAIR VERUS MENISCECTOMY AND CHONDROPLASTY (Right Knee)    Medications prior to admission:   Prior to Admission medications    Medication Sig Start Date End Date Taking? Authorizing Provider   DULoxetine (CYMBALTA) 30 MG extended release capsule Take 30 mg by mouth nightly     Historical Provider, MD   buPROPion (WELLBUTRIN XL) 150 MG extended release tablet Take 1 tablet by mouth every morning 20   HECTOR Stinson CNP   rosuvastatin (CRESTOR) 40 MG tablet Take 1 tablet by mouth daily 20   Paulie Began, DO   tapentadol (NUCYNTA) 50 MG TABS Take 1 tablet by mouth every 6 hours as needed for Pain (max 1-2/day) for up to 42 days. 1/29/20 3/11/20  Beth Gary MD   pregabalin (LYRICA) 100 MG capsule TAKE 1 TAB IN THE AM AND 2 TABS IN THE PM 20  Beth Gary MD   tiZANidine (ZANAFLEX) 4 MG tablet Take . 5-1 tablet by mouth bid prn pain 20   Beth Gary MD   nortriptyline (PAMELOR) 25 MG capsule Take 1-2 capsules by mouth nightly 20   Beth Gary MD   diclofenac sodium (VOLTAREN) 1 % GEL Apply 2 grams to affected area 20   Beth Gary MD   levothyroxine (SYNTHROID) 112 MCG tablet 2 p.o. every morning at least 30 minutes before food 19   Paulie Began, DO   vitamin D (ERGOCALCIFEROL) 1.25 MG (97906 UT) CAPS capsule Take 1 capsule by mouth once a week 10/24/19   HECTOR Stinson CNP   hydrochlorothiazide (HYDRODIURIL) 25 MG tablet TAKE 2 TABLETs every morning  Patient taking differently: TAKE 2 TABLETs every morning-PATIENT TAKES 1 TAB-WILL CLARIFY WITH MD 3/13/19   Gara Began, DO   lisinopril (PRINIVIL;ZESTRIL) 10 MG tablet TAKE 1 TABLET DAILY 3/4/19   Rayshawn Faust DO   omeprazole (PRILOSEC) 40 MG delayed release capsule Take 1 capsule by mouth every morning (before breakfast) 1/29/19   Roc Dewitt MD   magnesium (MAGNESIUM-OXIDE) 250 MG TABS tablet Take 250 mg by mouth daily    Historical Provider, MD       Current medications:    No current facility-administered medications for this visit. No current outpatient medications on file. Facility-Administered Medications Ordered in Other Visits   Medication Dose Route Frequency Provider Last Rate Last Dose    0.9 % sodium chloride infusion   Intravenous Continuous Adriana Long MD        sodium chloride flush 0.9 % injection 10 mL  10 mL Intravenous 2 times per day Adriana Long MD        sodium chloride flush 0.9 % injection 10 mL  10 mL Intravenous PRN Adriana Long MD        ceFAZolin (ANCEF) 3 g in dextrose 5 % 100 mL IVPB  3 g Intravenous Once Roland Diaz MD           Allergies:     Allergies   Allergen Reactions    Oxycontin [Oxycodone] Nausea And Vomiting and Nausea Only    Percocet [Oxycodone-Acetaminophen] Nausea And Vomiting       Problem List:    Patient Active Problem List   Diagnosis Code    Chronic pain syndrome G89.4    Degenerative joint disease of spinal facet joint M47.819    Recurrent major depressive disorder ([facility] [address] F33.9    DDD (degenerative disc disease), cervical M50.30    Lumbar radiculopathy M54.16    Hyperlipidemia E78.5    HTN (hypertension) I10    CTS (carpal tunnel syndrome) G56.00    Sinusitis J32.9    Back pain M54.9    Rt flank pain R10.9    Microscopic hematuria R31.29    Low back pain radiating to right leg M54.5    Failed back surgical syndrome M96.1    DDD (degenerative disc disease), lumbosacral M51.37    History of thyroid cancer Z85.850    De Quervain's disease (radial styloid tenosynovitis) M65.4    Rotator cuff tendinitis M75.80    Glenohumeral arthritis M19.019    Hypercholesterolemia E78.00    Acute cystitis without hematuria N30.00    S/P shoulder replacement Z96.619    Major depressive disorder in partial remission (HCC) F32.4    Osteoarthritis of right shoulder M19.011    Acquired hypothyroidism E03.9    Gastroesophageal reflux disease K21.9    BMI 38.0-38.9,adult Z68.38    Class 2 severe obesity due to excess calories with serious comorbidity and body mass index (BMI) of 38.0 to 38.9 in adult (HCC) E66.01, Z68.38    Essential hypertension I10    KAR (obstructive sleep apnea) G47.33    Low vitamin D level R79.89    Primary osteoarthritis of left shoulder M19.012    Acute pain of right knee M25.561       Past Medical History:        Diagnosis Date    Chronic pain syndrome     DDD (degenerative disc disease), cervical     Degenerative joint disease of spinal facet joint     Depressive disorder, not elsewhere classified     Dizziness     Failed back surgical syndrome     GERD (gastroesophageal reflux disease)     Headache(784.0)     HTN (hypertension)     Hyperlipidemia     Lumbar radiculopathy     Sleep apnea     does use CPAP    thyroid cancer     thyroid    Thyroid disease     Wears glasses        Past Surgical History:        Procedure Laterality Date    CARPAL TUNNEL RELEASE Left 3-5-2013    CARPAL TUNNEL RELEASE Right 4-    COLONOSCOPY N/A 10/15/2019    COLONOSCOPY WITH BIOPSY performed by Sandip Mckeon MD at [address] COLONOSCOPY N/A 10/15/2019    COLONOSCOPY POLYPECTOMY SNARE/COLD BIOPSY performed by Sandip Mckeon MD at [address] HAND SURGERY Left 1-5-2016    First dorsal extensor compartment tendon release.     JOINT REPLACEMENT Right     shoulder    AR DECOMPRESS FOREARM,EXCIS MUSC/NERV Right 4/8/2019    RIGHT OPEN TENNIS ELBOW RELEASE performed by Jb Sanchez MD at [address] [place] [address] Right 1993    right    SPINAL FUSION  2006    THYROIDECTOMY  2008    UPPER GASTROINTESTINAL ENDOSCOPY N/A PROTIME 10.5 06/21/2017    INR 0.93 06/21/2017    APTT 29.0 06/21/2017       HCG (If Applicable): No results found for: PREGTESTUR, PREGSERUM, HCG, HCGQUANT     ABGs: No results found for: PHART, PO2ART, VAV7RQT, UMB6TGB, BEART, R3XWUHQQ     Type & Screen (If Applicable):  No results found for: LABABO, [address]    Anesthesia Evaluation  Patient summary reviewed and Nursing notes reviewed no history of anesthetic complications:   Airway: Mallampati: III  TM distance: >3 FB   Neck ROM: full  Mouth opening: > = 3 FB Dental: normal exam     Comment: No loose teeth    Pulmonary: breath sounds clear to auscultation  (+) sleep apnea: on CPAP,      (-) pneumonia, COPD, asthma, shortness of breath, recent URI and not a current smoker                           Cardiovascular:  Exercise tolerance: good (>4 METS),   (+) hypertension:, hyperlipidemia    (-) pacemaker, valvular problems/murmurs, past MI, CAD, CABG/stent, dysrhythmias,  angina,  CHF and orthopnea      Rhythm: regular                      Neuro/Psych:   (+) neuromuscular disease:, headaches:, psychiatric history:   (-) TIA           GI/Hepatic/Renal:   (+) GERD:, morbid obesity     (-) PUD, hepatitis, liver disease and no renal disease       Endo/Other:    (+) malignancy/cancer (h/o thyroid cancer). (-) diabetes mellitus, hypothyroidism, hyperthyroidism, blood dyscrasia               Abdominal:           Vascular:                                          Anesthesia Plan      MAC     ASA 3       Induction: intravenous. Anesthetic plan and risks discussed with patient. Plan discussed with CRNA. Jonathan Luke MD   2/26/2020  This pre-anesthesia assessment may be used as a history and physical.    DOS STAFF ADDENDUM:    Pt seen and examined, chart reviewed (including anesthesia, drug and allergy history). No interval changes to history and physical examination.   Anesthetic plan, risks, benefits, alternatives, and personnel involved discussed

## 2020-02-27 ENCOUNTER — TELEPHONE (OUTPATIENT)
Dept: ORTHOPEDIC SURGERY | Age: 50
End: 2020-02-27

## 2020-02-27 NOTE — TELEPHONE ENCOUNTER
Chidi Summers wife is calling about her  that is having muscle spasms.   She wanted to know if this is normal.  She said he is in a lot of pain and she has never seen him cry please call her

## 2020-03-04 ENCOUNTER — TELEPHONE (OUTPATIENT)
Dept: FAMILY MEDICINE CLINIC | Age: 50
End: 2020-03-04

## 2020-03-05 ENCOUNTER — OFFICE VISIT (OUTPATIENT)
Dept: ORTHOPEDIC SURGERY | Age: 50
End: 2020-03-05

## 2020-03-05 PROCEDURE — 99024 POSTOP FOLLOW-UP VISIT: CPT | Performed by: PHYSICIAN ASSISTANT

## 2020-03-05 NOTE — TELEPHONE ENCOUNTER
I prefer to see the patient first to ensure I do not need additional blood work performed other than what he is requesting

## 2020-03-09 ENCOUNTER — HOSPITAL ENCOUNTER (OUTPATIENT)
Dept: PHYSICAL THERAPY | Age: 50
Setting detail: THERAPIES SERIES
Discharge: HOME | End: 2020-03-09
Payer: COMMERCIAL

## 2020-03-09 PROCEDURE — 97162 PT EVAL MOD COMPLEX 30 MIN: CPT

## 2020-03-09 PROCEDURE — 97110 THERAPEUTIC EXERCISES: CPT

## 2020-03-09 PROCEDURE — G0283 ELEC STIM OTHER THAN WOUND: HCPCS

## 2020-03-09 NOTE — PROGRESS NOTES
Outpatient Physical Therapy  [] [facility]    Phone: [phone]   Fax: [phone]   [] [facility]  Phone: [phone]              Fax: [phone]  [x] [facility]   Phone: [phone]   Fax: [phone]     To: Referring Practitioner: Emeka Chaudhari PA-C      Patient: Jana Choudhury   : 1970   MRN: [id number]  Evaluation Date: 3/9/2020      Diagnosis Information:  · Diagnosis: s/p right knee arthroscopy (N45.544)   · Treatment Diagnosis: Decreased functional mobility     Physical Therapy Certification/Re-Certification Form  Dear Emeka Chaudhari PA-C,  The following patient has been evaluated for physical therapy services and for therapy to continue, Medicare requires monthly physician review of the treatment plan. Please review the attached evaluation and/or summary of the patient's plan of care, and verify that you agree therapy should continue by signing the attached document and sending it back to our office. Plan of Care/Treatment to date:  [x] Therapeutic Exercise    [x] Modalities:  [] Therapeutic Activity     [] Ultrasound  [] Electrical Stimulation  [] Gait Training      [] Cervical Traction [] Lumbar Traction  [] Neuromuscular Re-education    [] Cold/hotpack [] Iontophoresis   [x] Instruction in HEP     Other:  [x] Manual Therapy      []             [] Aquatic Therapy      []           ? Frequency/Duration:  # Days per week: [] 1 day # Weeks: [] 1 week [] 5 weeks     [x] 2 days? [] 2 weeks [x] 6 weeks     [] 3 days   [] 3 weeks [] 7 weeks     [] 4 days   [x] 4 weeks [] 8 weeks    Rehab Potential: [] Excellent [x] Good [] Fair  [] Poor       Electronically signed by:  Priscilla Childers PT      If you have any questions or concerns, please don't hesitate to call.   Thank you for your referral.      Physician Signature:________________________________Date:__________________  By signing above, therapists plan is approved by physician

## 2020-03-09 NOTE — PROGRESS NOTES
Physical Therapy  Initial Assessment  Date: 3/9/2020  Patient Name: Hoang Mi  MRN: [id number]  : 1970     Treatment Diagnosis: Decreased functional mobility    Restrictions  Restrictions/Precautions  Restrictions/Precautions: Fall Risk(minimal)    Subjective   General  Chart Reviewed: Yes  Referring Practitioner: Viviana Chapa PA-C  Referral Date : 20  Diagnosis: s/p right knee arthroscopy (Z98.890)  Other (Comment): Pt had partial medial meniscectomy, chondroplasty of the medical femoral condyle and patella and excision of medial plica  PT Visit Information  Onset Date: 20  PT Insurance Information: [employer] [place]  Total # of Visits Approved: 12  Total # of Visits to Date: 1  Subjective  Subjective: Pt had a R knee scope on 20. Pt stated his R knee is \"getting better, I still have instability. .. I've had it buckle a few times. .. its still swollen and its aggravating. The more I walk on it and stand on it the more fatigued\" it gets. Pt rated R knee pain 3-6/10. As recently as 2 days ago pt still used R UE to help get the R LE into bed. R knee pain wakes him at times with rolling. Social/Functional History  Social/Functional History  Type of occupation: Employed by [employer], has not worked at [employer] in a while  Leisure & Hobbies: Softball, music  IADL Comments: Pt as not yet walked on his stairs. Objective  AROM RLE (degrees)  R Knee Flexion 0-145: 110 (PROM 117)  R Knee Extension 0: +5  Joint Mobility  ROM RLE: limited accessory motion R PF joint  ROM LLE: Swelling circumference R LE (cm)- sup pat pole 48.0, inf pat pole  41.7   ROM RUE: Tone: R quads and VMO fair  Strength RLE  R Hip Flexion: 4/5  R Hip ABduction: 4+/5  R Hip ADduction: 3-/5  R Knee Flexion: 4+/5  R Knee Extension: 4/5(minimal pain anterior knee)    Assessment   Conditions Requiring Skilled Therapeutic Intervention  Body structures, Functions, Activity limitations: Decreased functional mobility ; Decreased ADL status; Decreased ROM; Decreased strength; Increased pain  Assessment: Pt presented to PT with L knee pain and dysfunction following R knee scope 2/26/20. PLOF: independent and active  Treatment Diagnosis: Decreased functional mobility  Prognosis: Good  Decision Making: Medium Complexity  REQUIRES PT FOLLOW UP: Yes  Activity Tolerance  Activity Tolerance: Patient limited by pain         Plan   Plan  Times per week: PT 2x/week for 4 to 6 weeks  Current Treatment Recommendations: Strengthening, ROM, Stair training, Gait Training, Manual Therapy - Soft Tissue Mobilization, Manual Therapy - Joint Manipulation, Home Exercise Program    OutComes Score  LEFS Total Score: 13 (03/09/20 1632)    Goals  Long term goals  Time Frame for Long term goals : 6 weeks  Long term goal 1: Pt will be independent with HEP. Long term goal 2: AROM R knee flexion 0-130 degrees. Long term goal 3: Strength R quads 5/5. Long term goal 4: Pt will be able to walk community distances and community surfaces without A device. Long term goal 5: Pain R knee will rate 0-3/10. Patient Goals   Patient goals : \"To get healthy\", \"I just want to get back to what I was doing before. \"       Therapy Time   Individual Concurrent Group Co-treatment   Time In 1630         Time Out 1730         Minutes 60         Timed Code Treatment Minutes: [address]

## 2020-03-09 NOTE — FLOWSHEET NOTE
Other Therapeutic Activities:    3/9/20 PT educated pt how to walk with 1 crutch on level surfaces and on stairs (with 1 rail)    Home Exercise Program:     3/9/20 HEP was instructed and included quad set, add set, SLR, heel slide, supine ABD. Pt was given written hand outs and demonstrated exercises correctly. Pt expressed understanding of exercises. Manual Treatments:     Modalities: IFC R knee with CP 15'    Progression Towards Functional goals:  [] Patient is progressing as expected towards functional goals listed. [] Progression is slowed due to complexities listed. [] Progression has been slowed due to co-morbidities. [x] Plan just implemented, too soon to assess goals progression  [] Other:    Charges: Therapeutic Exercise:  [x] (63019) Provided verbal/tactile cueing for activities to restore or maintain strength, flexibility, endurance, ROM for improvements with self-care, mobility, lifting and ambulation. Neuromuscular Re-Education  [] (06847) Provided verbal/tactile cueing for activities to restore or maintain balance, coordination, kinesthetic sense, posture, motor skill, proprioception for self-care, mobility, lifting, and ambulation. Therapeutic Activities:    [] (09871) Provided verbal/tactile cueing to address functional limitations related to loss of mobility, strength, balance, and coordination.      Gait Training:  [] (43923) Provided training and instruction to the patient for proper postural muscle recruitment and positioning with ambulation re-education     Home Exercise Program:    [x] (18316) Reviewed/Progressed HEP activities related to strengthening, flexibility, endurance, ROM for functional self-care, mobility, lifting and ambulation   [] (46412) Reviewed/Progressed HEP activities related to improving balance, coordination, kinesthetic sense, posture, motor skill, proprioception for self-care, mobility, lifting, and ambulation      Manual Treatments:  MFR / STM / Oscillations-Mobs:  G-I, II, III, IV / Amparo Ivportillo / MLD  [] (34820) Provided manual therapy to mobilize  soft tissue/joints/fluid for the purpose of modulating pain, promoting relaxation, increasing ROM, reducing/eliminating soft tissue swelling/inflammation/restriction, improving soft tissue extensibility and allowing for proper ROM for normal function with self- care, mobility, lifting and ambulation. Timed Code Treatment Minutes: 30   Total Treatment Minutes: 60     [x] EVAL (LOW) 29804   [] EVAL (MOD) 69685   [] EVAL (HIGH) 67408   [] RE-EVAL   [x] TE (17409) x   2  [] Aquatic (03544) x  [] NMR (34356)   x  [] Aquatic Group (11931) x  [] Manual (31044) x    [] Ultrasound (66940) x  [] TA (25901) x  [] Mech Traction (98751)  [] Ionto (25820)           [x] ES (un) (30347):   [] Vasopump (20989) [] Other:      Assessment  [] Patient tolerated treatment well [] Patient limited by fatigue  [x] Patient limited by pain  [] Patient limited by other medical complications  [] Other:     Prognosis: [x] Good [] Fair  [] Poor    Goals:           Long term goals  Time Frame for Long term goals : 6 weeks  Long term goal 1: Pt will be independent with HEP. Long term goal 2: AROM R knee flexion 0-130 degrees. Long term goal 3: Strength R quads 5/5. Long term goal 4: Pt will be able to walk community distances and community surfaces without A device. Long term goal 5: Pain R knee will rate 0-3/10.      Patient Requires Follow-up: [] Yes  [] No    Plan:   [] Continue per plan of care [] Alter current plan (see comments)  [x] Plan of care initiated [] Hold pending MD visit [] Discharge    Plan for Next Session:  Begin Eboni Koenig MAI    Electronically signed by:  Carter Ndiaye, PT

## 2020-03-11 ENCOUNTER — HOSPITAL ENCOUNTER (OUTPATIENT)
Dept: PHYSICAL THERAPY | Age: 50
Setting detail: THERAPIES SERIES
Discharge: HOME | End: 2020-03-11
Payer: COMMERCIAL

## 2020-03-11 PROCEDURE — 97140 MANUAL THERAPY 1/> REGIONS: CPT

## 2020-03-11 PROCEDURE — 97110 THERAPEUTIC EXERCISES: CPT

## 2020-03-11 PROCEDURE — G0283 ELEC STIM OTHER THAN WOUND: HCPCS

## 2020-03-11 NOTE — FLOWSHEET NOTE
Physical Therapy Daily Treatment Note  Date:  3/11/2020    Patient Name:  Chris Marquez    :  1970  MRN: [id number]    Restrictions/Precautions:  Restrictions/Precautions  Restrictions/Precautions: Fall Risk(minimal)  Pertinent Medical History:      Medical/Treatment Diagnosis Information:  · Diagnosis: s/p right knee arthroscopy (Z98.890)  · Treatment Diagnosis: Decreased functional mobility    Insurance/Certification information:  PT Insurance Information: Medical [place]  Physician Information:  Referring Practitioner: Evita Ferrari PA-C  Plan of care signed (Y/N):  Sent to in basket on 3/9/20    Visit# / total visits:    Pain level: 10     Functional Outcomes Measure:  Test:   Score:    Progress Note: []  Yes  []  No  Next due by: Visit #10      History of Injury: Pt had a R knee scope on 20. Pt stated his R knee is \"getting better, I still have instability. .. I've had it buckle a few times. .. its still swollen and its aggravating. The more I walk on it and stand on it the more fatigued\" it gets. Pt rated R knee pain 3-6/10. As recently as 2 days ago pt still used R UE to help get the R LE into bed. R knee pain wakes him at times with rolling.       Subjective:       Objective:   Observation:    Test measurements:     3/9/20  AROM RLE (degrees)  R Knee Flexion 0-145: 110 (PROM 117)  R Knee Extension 0: +5  Joint Mobility  ROM RLE: limited accessory motion R PF joint  ROM LLE: Swelling circumference R LE (cm)- sup pat pole 48.0, inf pat pole  41.7   ROM RUE: Tone: R quads and VMO fair  Strength RLE  R Hip Flexion: 4/5  R Hip ABduction: 4+/5  R Hip ADduction: 3-/5  R Knee Flexion: 4+/5  R Knee Extension: 4/5(minimal pain anterior knee)    Exercises:  Exercise/Equipment Resistance/Repetitions Other comments   Nu Step 6'    JENNIFER   Knee flexion  Knee extension Settings 8 and 9, 10x each              Stretches  Gastroc incline  Hamstring   2x30\"  2x30\" lifting, and ambulation      Manual Treatments:  MFR / STM / Oscillations-Mobs:  G-I, II, III, IV / Manipulation / MLD  [] (56002) Provided manual therapy to mobilize  soft tissue/joints/fluid for the purpose of modulating pain, promoting relaxation, increasing ROM, reducing/eliminating soft tissue swelling/inflammation/restriction, improving soft tissue extensibility and allowing for proper ROM for normal function with self- care, mobility, lifting and ambulation. Timed Code Treatment Minutes: 43   Total Treatment Minutes: 63     [x] EVAL (LOW) 51343   [] EVAL (MOD) 50694   [] EVAL (HIGH) 04182   [] RE-EVAL   [x] TE (00556) x   2  [] Aquatic (74354) x  [] NMR (36382)   x  [] Aquatic Group (66775) x  [x] Manual (30391) x    [] Ultrasound (72464) x  [] TA (21718) x  [] Mech Traction (21347)  [] Ionto (80267)           [x] ES (un) (13299):   [] Vasopump (89885) [] Other:      Assessment  [] Patient tolerated treatment well [] Patient limited by fatigue  [x] Patient limited by pain  [] Patient limited by other medical complications  [] Other:     Prognosis: [x] Good [] Fair  [] Poor    Goals:           Long term goals  Time Frame for Long term goals : 6 weeks  Long term goal 1: Pt will be independent with HEP. Long term goal 2: AROM R knee flexion 0-130 degrees. Long term goal 3: Strength R quads 5/5. Long term goal 4: Pt will be able to walk community distances and community surfaces without A device. Long term goal 5: Pain R knee will rate 0-3/10.      Patient Requires Follow-up: [] Yes  [] No    Plan:   [] Continue per plan of care [] Alter current plan (see comments)  [x] Plan of care initiated [] Hold pending MD visit [] Discharge    Plan for Next Session:  Begin Eboni Koenig MAI    Electronically signed by:  Skip Villalba, PT

## 2020-03-16 ENCOUNTER — HOSPITAL ENCOUNTER (OUTPATIENT)
Dept: PHYSICAL THERAPY | Age: 50
Setting detail: THERAPIES SERIES
Discharge: HOME | End: 2020-03-16
Payer: COMMERCIAL

## 2020-03-16 PROCEDURE — 97140 MANUAL THERAPY 1/> REGIONS: CPT

## 2020-03-16 PROCEDURE — 97110 THERAPEUTIC EXERCISES: CPT

## 2020-03-16 PROCEDURE — G0283 ELEC STIM OTHER THAN WOUND: HCPCS

## 2020-03-16 NOTE — FLOWSHEET NOTE
2x30\"  2x30\"    Closed chain TKE Purple, 30x              Prone knee flexion 3#, 20x                                Other Therapeutic Activities:    3/9/20 PT educated pt how to walk with 1 crutch on level surfaces and on stairs (with 1 rail)    Home Exercise Program:     3/9/20 HEP was instructed and included quad set, add set, SLR, heel slide, supine ABD. Pt was given written hand outs and demonstrated exercises correctly. Pt expressed understanding of exercises. 3/11/20 reviewed HEP. Manual Treatments: STM  posterior knee 12'    Modalities: IFC R knee with CP 15'    Progression Towards Functional goals:  [] Patient is progressing as expected towards functional goals listed. [] Progression is slowed due to complexities listed. [] Progression has been slowed due to co-morbidities. [x] Plan just implemented, too soon to assess goals progression  [] Other:    Charges: Therapeutic Exercise:  [x] (35724) Provided verbal/tactile cueing for activities to restore or maintain strength, flexibility, endurance, ROM for improvements with self-care, mobility, lifting and ambulation. Neuromuscular Re-Education  [] (63983) Provided verbal/tactile cueing for activities to restore or maintain balance, coordination, kinesthetic sense, posture, motor skill, proprioception for self-care, mobility, lifting, and ambulation. Therapeutic Activities:    [] (49488) Provided verbal/tactile cueing to address functional limitations related to loss of mobility, strength, balance, and coordination.      Gait Training:  [] (17546) Provided training and instruction to the patient for proper postural muscle recruitment and positioning with ambulation re-education     Home Exercise Program:    [x] (55245) Reviewed/Progressed HEP activities related to strengthening, flexibility, endurance, ROM for functional self-care, mobility, lifting and ambulation   [] (19599) Reviewed/Progressed HEP activities related to improving balance, coordination, kinesthetic sense, posture, motor skill, proprioception for self-care, mobility, lifting, and ambulation      Manual Treatments:  MFR / STM / Oscillations-Mobs:  G-I, II, III, IV / Manipulation / MLD  [] (24984) Provided manual therapy to mobilize  soft tissue/joints/fluid for the purpose of modulating pain, promoting relaxation, increasing ROM, reducing/eliminating soft tissue swelling/inflammation/restriction, improving soft tissue extensibility and allowing for proper ROM for normal function with self- care, mobility, lifting and ambulation. Timed Code Treatment Minutes: 43   Total Treatment Minutes: 63     [x] EVAL (LOW) 12273   [] EVAL (MOD) 76114   [] EVAL (HIGH) 38207   [] RE-EVAL   [x] TE (36927) x   2  [] Aquatic (22688) x  [] NMR (79043)   x  [] Aquatic Group (49765) x  [x] Manual (01430) x    [] Ultrasound (19310) x  [] TA (66852) x  [] Mech Traction (57602)  [] Ionto (08768)           [x] ES (un) (90023):   [] Vasopump (43913) [] Other:      Assessment  [] Patient tolerated treatment well [] Patient limited by fatigue  [x] Patient limited by pain  [] Patient limited by other medical complications  [] Other:     Prognosis: [x] Good [] Fair  [] Poor    Goals:           Long term goals  Time Frame for Long term goals : 6 weeks  Long term goal 1: Pt will be independent with HEP. Long term goal 2: AROM R knee flexion 0-130 degrees. Long term goal 3: Strength R quads 5/5. Long term goal 4: Pt will be able to walk community distances and community surfaces without A device. Long term goal 5: Pain R knee will rate 0-3/10.      Patient Requires Follow-up: [] Yes  [] No    Plan:   [] Continue per plan of care [] Alter current plan (see comments)  [x] Plan of care initiated [] Hold pending MD visit [] Discharge    Plan for Next Session: begin leg press with light weight    Electronically signed by:  Cherelle Fish PT

## 2020-03-19 ENCOUNTER — OFFICE VISIT (OUTPATIENT)
Dept: FAMILY MEDICINE CLINIC | Age: 50
End: 2020-03-19
Payer: COMMERCIAL

## 2020-03-19 ENCOUNTER — HOSPITAL ENCOUNTER (OUTPATIENT)
Dept: PHYSICAL THERAPY | Age: 50
Setting detail: THERAPIES SERIES
Discharge: HOME | End: 2020-03-19
Payer: COMMERCIAL

## 2020-03-19 VITALS
OXYGEN SATURATION: 96 % | BODY MASS INDEX: 36.96 KG/M2 | HEIGHT: 74 IN | SYSTOLIC BLOOD PRESSURE: 110 MMHG | DIASTOLIC BLOOD PRESSURE: 76 MMHG | HEART RATE: 109 BPM | WEIGHT: 288 LBS

## 2020-03-19 PROBLEM — M19.011 OSTEOARTHRITIS OF RIGHT SHOULDER: Status: RESOLVED | Noted: 2019-11-05 | Resolved: 2020-03-19

## 2020-03-19 PROBLEM — Z96.619 S/P SHOULDER REPLACEMENT: Status: RESOLVED | Noted: 2017-08-22 | Resolved: 2020-03-19

## 2020-03-19 PROBLEM — M25.561 ACUTE PAIN OF RIGHT KNEE: Status: RESOLVED | Noted: 2020-02-18 | Resolved: 2020-03-19

## 2020-03-19 PROBLEM — R79.89 LOW VITAMIN D LEVEL: Status: RESOLVED | Noted: 2020-02-18 | Resolved: 2020-03-19

## 2020-03-19 PROBLEM — E55.9 VITAMIN D DEFICIENCY: Status: ACTIVE | Noted: 2020-03-19

## 2020-03-19 PROBLEM — R73.03 PREDIABETES: Status: ACTIVE | Noted: 2020-03-19

## 2020-03-19 PROBLEM — N30.00 ACUTE CYSTITIS WITHOUT HEMATURIA: Status: RESOLVED | Noted: 2017-06-27 | Resolved: 2020-03-19

## 2020-03-19 PROBLEM — M19.012 PRIMARY OSTEOARTHRITIS OF LEFT SHOULDER: Status: RESOLVED | Noted: 2020-02-18 | Resolved: 2020-03-19

## 2020-03-19 PROBLEM — E66.01 CLASS 2 SEVERE OBESITY DUE TO EXCESS CALORIES WITH SERIOUS COMORBIDITY AND BODY MASS INDEX (BMI) OF 38.0 TO 38.9 IN ADULT (HCC): Status: RESOLVED | Noted: 2019-11-05 | Resolved: 2020-03-19

## 2020-03-19 PROBLEM — E78.00 HYPERCHOLESTEROLEMIA: Status: RESOLVED | Noted: 2017-01-27 | Resolved: 2020-03-19

## 2020-03-19 PROBLEM — E66.812 CLASS 2 SEVERE OBESITY DUE TO EXCESS CALORIES WITH SERIOUS COMORBIDITY AND BODY MASS INDEX (BMI) OF 38.0 TO 38.9 IN ADULT: Status: RESOLVED | Noted: 2019-11-05 | Resolved: 2020-03-19

## 2020-03-19 LAB
PROSTATE SPECIFIC ANTIGEN: 0.36 NG/ML (ref 0–4)
T3 TOTAL: 1.37 NG/ML (ref 0.8–2)
T4 FREE: 1.6 NG/DL (ref 0.9–1.8)
TSH REFLEX: 0.21 UIU/ML (ref 0.27–4.2)
VITAMIN D 25-HYDROXY: 35.9 NG/ML

## 2020-03-19 PROCEDURE — G0283 ELEC STIM OTHER THAN WOUND: HCPCS

## 2020-03-19 PROCEDURE — 36415 COLL VENOUS BLD VENIPUNCTURE: CPT | Performed by: INTERNAL MEDICINE

## 2020-03-19 PROCEDURE — 97140 MANUAL THERAPY 1/> REGIONS: CPT

## 2020-03-19 PROCEDURE — 99204 OFFICE O/P NEW MOD 45 MIN: CPT | Performed by: INTERNAL MEDICINE

## 2020-03-19 PROCEDURE — 97110 THERAPEUTIC EXERCISES: CPT

## 2020-03-19 RX ORDER — TAPENTADOL HYDROCHLORIDE 50 MG/1
50 TABLET, FILM COATED ORAL EVERY 6 HOURS PRN
COMMUNITY
End: 2020-04-21

## 2020-03-19 NOTE — PROGRESS NOTES
[facility]   1970      Reason for visit:   Chief Complaint   Patient presents with   Manuel     pt wants vit D & Thyroid        HPI:  Patient presents to establish care. Former patient of Dr. Sena Meyer. Following with Dr. Maximino Oglesby for chronic pain. On nucynta, lyrica, nortriptyline, cymabalta, tizanidine. degenerative disc disease with prior h/o surgery. Recently had a knee scope done for meniscal tear. Prior h/o shoulder replacement. Out of work since 2017 due to his degenerative joints - had a shoulder replacement at that time. Also on wellbutrin for depression. Seeing a psychiatry NP with [facility]. Feels his mood is stable. Has a h/o HTN on ace-I and diuretic therapy. No recent cp,sob, edema, LH/dizziness. Also has HL. On statin. Takes PPI for gerd. Well controlled. Recent PUD from [employer], had been on for 10 years ago. Had a EGD/cscope last year, told to return in 5 years. Has hypothyroidism on replacement s/p thyroidectomy for thyroid cancer in 2008. Also had radioactive iodine. No longer following with anyone for that. Recent dose adjustment in december. Prior h/o vit d deficiency but states he never received Rx. Has been taking OTC therapy. Has a h/o KAR and wears his CPAP consistently. Nonsmoker.       Past Medical History:   Diagnosis Date    Chronic pain syndrome     DDD (degenerative disc disease), cervical     Degenerative joint disease of spinal facet joint     Depressive disorder, not elsewhere classified     Dizziness     Failed back surgical syndrome     GERD (gastroesophageal reflux disease)     Headache(784.0)     HTN (hypertension)     Hyperlipidemia     Lumbar radiculopathy     Sleep apnea     does use CPAP    thyroid cancer     thyroid    Thyroid disease     Wears glasses            Current Outpatient Medications:     tapentadol (NUCYNTA) 50 MG TABS, Take 50 mg by mouth every 6 hours as needed for Pain., Disp: , Rfl:   DULoxetine (CYMBALTA) 30 MG extended release capsule, Take 30 mg by mouth nightly , Disp: , Rfl:     buPROPion (WELLBUTRIN XL) 150 MG extended release tablet, Take 1 tablet by mouth every morning, Disp: 90 tablet, Rfl: 1    rosuvastatin (CRESTOR) 40 MG tablet, Take 1 tablet by mouth daily, Disp: 90 tablet, Rfl: 3    pregabalin (LYRICA) 100 MG capsule, TAKE 1 TAB IN THE AM AND 2 TABS IN THE PM, Disp: 90 capsule, Rfl: 1    tiZANidine (ZANAFLEX) 4 MG tablet, Take . 5-1 tablet by mouth bid prn pain, Disp: 60 tablet, Rfl: 1    nortriptyline (PAMELOR) 25 MG capsule, Take 1-2 capsules by mouth nightly, Disp: 60 capsule, Rfl: 0    diclofenac sodium (VOLTAREN) 1 % GEL, Apply 2 grams to affected area, Disp: 5 Tube, Rfl: 1    levothyroxine (SYNTHROID) 112 MCG tablet, 2 p.o. every morning at least 30 minutes before food, Disp: 180 tablet, Rfl: 1    hydrochlorothiazide (HYDRODIURIL) 25 MG tablet, TAKE 2 TABLETs every morning (Patient taking differently: TAKE 2 TABLETs every morning-PATIENT TAKES 1 TAB-WILL CLARIFY WITH MD), Disp: 90 tablet, Rfl: 3    lisinopril (PRINIVIL;ZESTRIL) 10 MG tablet, TAKE 1 TABLET DAILY, Disp: 90 tablet, Rfl: 3    omeprazole (PRILOSEC) 40 MG delayed release capsule, Take 1 capsule by mouth every morning (before breakfast), Disp: 30 capsule, Rfl: 0    magnesium (MAGNESIUM-OXIDE) 250 MG TABS tablet, Take 250 mg by mouth daily, Disp: , Rfl:      Allergies   Allergen Reactions    Oxycontin [Oxycodone] Nausea And Vomiting and Nausea Only    Percocet [Oxycodone-Acetaminophen] Nausea And Vomiting       Past Surgical History:   Procedure Laterality Date    CARPAL TUNNEL RELEASE Left 3-5-2013    CARPAL TUNNEL RELEASE Right 4-    COLONOSCOPY N/A 10/15/2019    COLONOSCOPY WITH BIOPSY performed by Fransico Haque MD at  [address] COLONOSCOPY N/A 10/15/2019    COLONOSCOPY POLYPECTOMY SNARE/COLD BIOPSY performed by Fransico Haque MD at  [address] HAND SURGERY Left 1-5-2016 First dorsal extensor compartment tendon release.  JOINT REPLACEMENT Right     shoulder    KNEE ARTHROSCOPY Right 2/26/2020    RIGHT KNEE ARTHROSCOPY MEDIAL MENISCECTOMY AND CHONDROPLASTY performed by Yumiko Morrison MD at [place] [address] [employer],EXCIS MUSC/NERV Right 4/8/2019    RIGHT OPEN TENNIS ELBOW RELEASE performed by Abbe Aguilar MD at [address] [place] [address] Right 1993    right   [facility]  2006    THYROIDECTOMY  2008    UPPER GASTROINTESTINAL ENDOSCOPY N/A 1/29/2019    EGD DIAGNOSTIC ONLY performed by Octavia Boucher MD at [facility] N/A 10/15/2019    EGD DILATION BALLOON performed by Mukesh Archuleta MD at [facility] N/A 10/15/2019    EGD BIOPSY performed by Mukesh Archuleta MD at [address] [place] [address] History   Problem Relation Age of Onset    Diabetes Father     Heart Failure Father     Hypertension Father     High Blood Pressure Father     High Blood Pressure Mother     Stroke Maternal Grandmother         Social History     Socioeconomic History    Marital status:      Spouse name: Not on file    Number of children: 3    Years of education: Not on file    Highest education level: Not on file   Occupational History    Occupation:    Social Needs    Financial resource strain: Not on file    Food insecurity     Worry: Not on file     Inability: Not on file   [employer] needs     Medical: Not on file     Non-medical: Not on file   Tobacco Use    Smoking status: Never Smoker    Smokeless tobacco: Never Used   Substance and Sexual Activity    Alcohol use:  Yes     Alcohol/week: 0.0 standard drinks     Comment: occasional use    Drug use: Never    Sexual activity: Yes     Partners: Female   Lifestyle    Physical activity     Days per week: Not on file     Minutes per session: Not on file    Stress: Not on file   Relationships    Social connections and moist.   Cardiovascular: Normal rate, regular rhythm and normal heart sounds. No murmur heard. Pulmonary/Chest: Effort normal. No respiratory distress. No wheezes, rhonchi, or crackles  Abdominal: Soft. Bowel sounds are normal. No distension. There is no tenderness. Musculoskeletal: Normal range of motion. No edema, tenderness or deformity. Lymphadenopathy: No cervical adenopathy. Neurological: alert. No cranial nerve deficit. Skin: Skin is warm and dry. Capillary refill takes less than 2 seconds. No rash noted. Psychiatric: Normal mood and affect. Assessment and Plan: Stephanie Garcia is a 45 y.o. male presenting today to establish care. Amy Ann was seen today for establish care and discuss labs. Diagnoses and all orders for this visit:    Acquired hypothyroidism  Follow-up labs today after recent dose adjustment  -     TSH with Reflex  -     T4, FREE    Essential hypertension  At goal, continue present management  History of thyroid cancer  Follow-up thyroid function as above. No longer following with endocrinology    Hypercholesterolemia  Last laboratory work reviewed.   Continue statin therapy  The 10-year ASCVD risk score (Dolores Rhodes., et al., 2013) is: 3.6%    Values used to calculate the score:      Age: 45 years      Sex: Male      Is Non- : No      Diabetic: No      Tobacco smoker: No      Systolic Blood Pressure: 981 mmHg      Is BP treated: Yes      HDL Cholesterol: 38 mg/dL      Total Cholesterol: 169 mg/dL    Recurrent major depressive disorder, in partial remission ([address]  Stable on present management, continue to follow with nurse practitioner  KAR (obstructive sleep apnea)  Previously followed with [facility] and needs a new physician  Encounter for prostate cancer screening  -     Psa screening    Vitamin D deficiency  We will follow-up vitamin D level to see if he needs to feel the high-dose prescription  -     VITAMIN D 25

## 2020-03-19 NOTE — PROGRESS NOTES
Outpatient Physical Therapy  [] [facility]    Phone: [phone]   Fax: [phone]   [] [facility]  Phone: [phone]   Fax: [phone]  [x] [facility]              Phone: [phone]   Fax: [phone]     Physical Therapy Discharge Note  Date: 3/19/2020        Patient Name:   Tavia Orlando                      :  1970                     MRN: [id number]     Restrictions/Precautions:  Restrictions/Precautions  Restrictions/Precautions: Fall Risk(minimal)  Pertinent Medical History:       Medical/Treatment Diagnosis Information:  · Diagnosis: s/p right knee arthroscopy (Z98.890)  · Treatment Diagnosis: Decreased functional mobility     Insurance/Certification information:  PT Insurance Information: Medical [place]  Physician Information:  Referring Practitioner: Eloisa Hauser PA-C  Plan of care signed (Y/N):  Sent to in basket on 3/9/20     Visit# / total visits:    Pain level:     4 /10      Time Period for Report:  3/9/20-3/19/20  Cancels/No-shows to date:  0    Plan of Care/Treatment to date:  [x] Therapeutic Exercise    [x] Modalities:  [] Therapeutic Activity     [] Ultrasound  [] Electrical Stimulation  [] Gait Training      [] Cervical Traction    [] Lumbar Traction  [] Neuromuscular Re-education  [] Cold/hotpack [] Iontophoresis  [x] Instruction in HEP      Other:  [x] Manual Therapy       []    [] Aquatic Therapy       []                          Significant Findings At Last Visit/Comments:    Subjective:     3/19/20 knee pain is rated 4/10. Pt stated his R leg is weak as he has difficulty \"putting pressure on it, I can't stand on it with one leg. \"  Pt stated he is doing his HEP \"pretty much every day. \"  Pt stated he has been up on his feet more regularly, has been walking without using his crutch, \"but my knee hurts and it makes it difficulty some times. \"  Pt ices knee daily per pt report     Objective:   Observation:   3/19/20 Gait: no A device, wide KARLA, decreased R weight

## 2020-03-19 NOTE — FLOWSHEET NOTE
PROM 117 NT   Strength     Quads 4/5, pain 5/5, pain   Hamstrings 4+/5 5/5   ADD 3-/5 5/5   Tone     quads fair Fair/good   VMO fair Fair/good   Swelling (cm)     Superior patellar pole 48.0 49.3   Inferior patellar pole 41.7 42.8           Exercises:  Exercise/Equipment Resistance/Repetitions Other comments   Nu Step 6'    JENNIFER   Knee flexion  Knee extension Settings 8 and 9, 10x each    Leg press 90#, 2x15 Sled 3        Stretches  Gastroc incline  Hamstring   2x30\"  2x30\"    Closed chain TKE Purple, 30x              Prone knee flexion 3#, 20x          Minisquats  Unilat R balance  Heel raises  R knee flexion, standing 10x  10x, 5\" hold  10x  20x                     Other Therapeutic Activities:    3/9/20 PT educated pt how to walk with 1 crutch on level surfaces and on stairs (with 1 rail)    Home Exercise Program:     3/9/20 HEP was instructed and included quad set, add set, SLR, heel slide, supine ABD. Pt was given written hand outs and demonstrated exercises correctly. Pt expressed understanding of exercises. 3/11/20 reviewed HEP.  3/19/20 minisquats, R unilateral balance, closed chain TKE, heel raises, standing knee flexion    Manual Treatments: STM  posterior knee 12'     Modalities: IFC R knee with CP 15'    Progression Towards Functional goals:  [] Patient is progressing as expected towards functional goals listed. [] Progression is slowed due to complexities listed. [] Progression has been slowed due to co-morbidities. [x] Plan just implemented, too soon to assess goals progression  [] Other:    Charges: Therapeutic Exercise:  [x] (08356) Provided verbal/tactile cueing for activities to restore or maintain strength, flexibility, endurance, ROM for improvements with self-care, mobility, lifting and ambulation.     Neuromuscular Re-Education  [] (38774) Provided verbal/tactile cueing for activities to restore or maintain balance, coordination, kinesthetic sense, posture, motor skill, proprioception for self-care, mobility, lifting, and ambulation. Therapeutic Activities:    [] (23239) Provided verbal/tactile cueing to address functional limitations related to loss of mobility, strength, balance, and coordination. Gait Training:  [] (47528) Provided training and instruction to the patient for proper postural muscle recruitment and positioning with ambulation re-education     Home Exercise Program:    [x] (74267) Reviewed/Progressed HEP activities related to strengthening, flexibility, endurance, ROM for functional self-care, mobility, lifting and ambulation   [] (08796) Reviewed/Progressed HEP activities related to improving balance, coordination, kinesthetic sense, posture, motor skill, proprioception for self-care, mobility, lifting, and ambulation      Manual Treatments:  MFR / STM / Oscillations-Mobs:  G-I, II, III, IV / Manipulation / MLD  [] (47698) Provided manual therapy to mobilize  soft tissue/joints/fluid for the purpose of modulating pain, promoting relaxation, increasing ROM, reducing/eliminating soft tissue swelling/inflammation/restriction, improving soft tissue extensibility and allowing for proper ROM for normal function with self- care, mobility, lifting and ambulation.         Timed Code Treatment Minutes: 43   Total Treatment Minutes: 63     [x] EVAL (LOW) 31680   [] EVAL (MOD) 52867   [] EVAL (HIGH) 88810   [] RE-EVAL   [x] TE (59825) x   2  [] Aquatic (47704) x  [] NMR (99559)   x  [] Aquatic Group (03582) x  [x] Manual (43989) x    [] Ultrasound (30532) x  [] TA (85809) x  [] Mech Traction (21623)  [] Ionto (19104)           [x] ES (un) (26514):   [] Vasopump (12927) [] Other:      Assessment  [] Patient tolerated treatment well [] Patient limited by fatigue  [x] Patient limited by pain  [] Patient limited by other medical complications  [] Other:     Prognosis: [x] Good [] Fair  [] Poor    Goals:           Long term goals  Time Frame for Long term goals : 6

## 2020-03-20 ENCOUNTER — TELEPHONE (OUTPATIENT)
Dept: FAMILY MEDICINE CLINIC | Age: 50
End: 2020-03-20

## 2020-03-20 LAB
ESTIMATED AVERAGE GLUCOSE: 116.9 MG/DL
HBA1C MFR BLD: 5.7 %

## 2020-03-20 NOTE — TELEPHONE ENCOUNTER
PT called in regarding message left. Informed PT of Dr. Renetta Greene note. PT says that he is taking two 112 mcg of Synthroid.

## 2020-03-23 ENCOUNTER — APPOINTMENT (OUTPATIENT)
Dept: PHYSICAL THERAPY | Age: 50
End: 2020-03-23
Payer: COMMERCIAL

## 2020-03-23 RX ORDER — LEVOTHYROXINE SODIUM 0.1 MG/1
TABLET ORAL
Qty: 60 TABLET | Refills: 3 | Status: SHIPPED | OUTPATIENT
Start: 2020-03-23 | End: 2020-07-27 | Stop reason: SDUPTHER

## 2020-03-25 ENCOUNTER — OFFICE VISIT (OUTPATIENT)
Dept: PAIN MANAGEMENT | Age: 50
End: 2020-03-25
Payer: COMMERCIAL

## 2020-03-25 ENCOUNTER — APPOINTMENT (OUTPATIENT)
Dept: PHYSICAL THERAPY | Age: 50
End: 2020-03-25
Payer: COMMERCIAL

## 2020-03-25 VITALS
TEMPERATURE: 97.6 F | SYSTOLIC BLOOD PRESSURE: 130 MMHG | DIASTOLIC BLOOD PRESSURE: 94 MMHG | HEART RATE: 116 BPM | BODY MASS INDEX: 36.98 KG/M2 | WEIGHT: 288 LBS

## 2020-03-25 PROCEDURE — 99213 OFFICE O/P EST LOW 20 MIN: CPT | Performed by: INTERNAL MEDICINE

## 2020-03-25 RX ORDER — PREGABALIN 100 MG/1
CAPSULE ORAL
Qty: 90 CAPSULE | Refills: 1 | Status: SHIPPED | OUTPATIENT
Start: 2020-03-25 | End: 2020-04-21 | Stop reason: SDUPTHER

## 2020-03-25 RX ORDER — TIZANIDINE 4 MG/1
TABLET ORAL
Qty: 60 TABLET | Refills: 1 | Status: SHIPPED | OUTPATIENT
Start: 2020-03-25 | End: 2020-04-21 | Stop reason: SDUPTHER

## 2020-03-25 NOTE — PROGRESS NOTES
Caroline Dooley  1970  [id number]      HISTORY OF PRESENT ILLNESS:  Mr. Wilian Rose is a 45 y.o. male returns for a follow up visit for pain management  He has a diagnosis of   1. Chronic pain syndrome    2. DDD (degenerative disc disease), cervical    3. Lumbar radiculopathy    4. Failed back surgical syndrome    5. Degenerative joint disease of spinal facet joint    6. DDD (degenerative disc disease), lumbosacral    7. Recurrent major depressive disorder, in partial remission ([facility] [address]    . He complains of pain in the Low back  He rates the pain 4/10 and describes it as aching. Current treatment regimen has helped relieve about 60% of the pain. He denies any side effects from the current pain regimen. Patient reports that since the last follow up visit the physical functioning is unchanged, family/social relationships are unchanged, mood is unchanged sleep patterns are unchanged, and that the overall functioning is unchanged. Patient denies misusing/abusing his narcotic pain medications or using any illegal drugs. There are No indicators for possible drug abuse, addiction or diversion problems. Patient states he has been doing fair, pain has been manageable with the medications. He says he is not working currently. He complains he has right knee surgery for Meniscal tear, arthroscopic. He mentions he is till hurting. He reports he was given Vicodin, which did not fill it. He states using Nucynta as needed. ALLERGIES: Patients list of allergies were reviewed     MEDICATIONS: Mr. Wilian Rose list of medications were reviewed. His current medications are   Outpatient Medications Prior to Visit   Medication Sig Dispense Refill    levothyroxine (SYNTHROID) 100 MCG tablet 2 p.o. every morning at least 30 minutes before food 60 tablet 3    tapentadol (NUCYNTA) 50 MG TABS Take 50 mg by mouth every 6 hours as needed for Pain.       DULoxetine (CYMBALTA) 30 MG extended release capsule Take 30 mg by mouth nightly  buPROPion (WELLBUTRIN XL) 150 MG extended release tablet Take 1 tablet by mouth every morning 90 tablet 1    rosuvastatin (CRESTOR) 40 MG tablet Take 1 tablet by mouth daily 90 tablet 3    pregabalin (LYRICA) 100 MG capsule TAKE 1 TAB IN THE AM AND 2 TABS IN THE PM 90 capsule 1    tiZANidine (ZANAFLEX) 4 MG tablet Take . 5-1 tablet by mouth bid prn pain 60 tablet 1    nortriptyline (PAMELOR) 25 MG capsule Take 1-2 capsules by mouth nightly 60 capsule 0    diclofenac sodium (VOLTAREN) 1 % GEL Apply 2 grams to affected area 5 Tube 1    hydrochlorothiazide (HYDRODIURIL) 25 MG tablet TAKE 2 TABLETs every morning (Patient taking differently: TAKE 2 TABLETs every morning-PATIENT TAKES 1 TAB-WILL CLARIFY WITH MD) 90 tablet 3    lisinopril (PRINIVIL;ZESTRIL) 10 MG tablet TAKE 1 TABLET DAILY 90 tablet 3    omeprazole (PRILOSEC) 40 MG delayed release capsule Take 1 capsule by mouth every morning (before breakfast) 30 capsule 0    magnesium (MAGNESIUM-OXIDE) 250 MG TABS tablet Take 250 mg by mouth daily       No facility-administered medications prior to visit. SOCIAL/FAMILY/PAST MEDICAL HISTORY: Mr. Chan Johnson, family and past medical history was reviewed. REVIEW OF SYSTEMS:    Respiratory: Negative for apnea, chest tightness and shortness of breath or change in baseline breathing. Gastrointestinal: Negative for nausea, vomiting, abdominal pain, diarrhea, constipation, blood in stool and abdominal distention. PHYSICAL EXAM:   Nursing note and vitals reviewed. BP (!) 130/94   Pulse 116   Temp 97.6 °F (36.4 °C)   Wt 288 lb (130.6 kg)   BMI 36.98 kg/m²   Constitutional: He appears well-developed and well-nourished. No acute distress. Skin: Skin is warm and dry, good turgor. No rash noted. He is not diaphoretic. Cardiovascular: Normal rate, regular rhythm, normal heart sounds, and does not have murmur. Pulmonary/Chest: Effort normal. No respiratory distress.  He does not have wheezes in the lung fields. He has no rales. Neurological/Psychiatric:He is alert and oriented to person, place, and time. Coordination is  normal.  His mood isAppropriate and affect is Neutral/Euthymic(normal) . His    IMPRESSION:   1. Chronic pain syndrome    2. DDD (degenerative disc disease), cervical    3. Lumbar radiculopathy    4. Failed back surgical syndrome    5. Degenerative joint disease of spinal facet joint    6. DDD (degenerative disc disease), lumbosacral    7. Osteoarthritis of right glenohumeral joint        PLAN:  Informed verbal consent was obtained  -Continue with current opioid regimen Nucynta as needed 1 per day   -He was advised to increase fluids ( 5-7  glasses of fluid daily), limit caffeine, avoid cheese products, increase dietary fiber, increase activity and exercise as tolerated and relax regularly and enjoy meals   -Interim history reviewed   -Records from ortho reviewed   -Continue all other adjuvants before  -ORT score is 11      Current Outpatient Medications   Medication Sig Dispense Refill    levothyroxine (SYNTHROID) 100 MCG tablet 2 p.o. every morning at least 30 minutes before food 60 tablet 3    tapentadol (NUCYNTA) 50 MG TABS Take 50 mg by mouth every 6 hours as needed for Pain.  DULoxetine (CYMBALTA) 30 MG extended release capsule Take 30 mg by mouth nightly       buPROPion (WELLBUTRIN XL) 150 MG extended release tablet Take 1 tablet by mouth every morning 90 tablet 1    rosuvastatin (CRESTOR) 40 MG tablet Take 1 tablet by mouth daily 90 tablet 3    pregabalin (LYRICA) 100 MG capsule TAKE 1 TAB IN THE AM AND 2 TABS IN THE PM 90 capsule 1    tiZANidine (ZANAFLEX) 4 MG tablet Take . 5-1 tablet by mouth bid prn pain 60 tablet 1    nortriptyline (PAMELOR) 25 MG capsule Take 1-2 capsules by mouth nightly 60 capsule 0    diclofenac sodium (VOLTAREN) 1 % GEL Apply 2 grams to affected area 5 Tube 1    hydrochlorothiazide (HYDRODIURIL) 25 MG tablet TAKE 2 TABLETs every morning (Patient taking differently: TAKE 2 TABLETs every morning-PATIENT TAKES 1 TAB-WILL CLARIFY WITH MD) 90 tablet 3    lisinopril (PRINIVIL;ZESTRIL) 10 MG tablet TAKE 1 TABLET DAILY 90 tablet 3    omeprazole (PRILOSEC) 40 MG delayed release capsule Take 1 capsule by mouth every morning (before breakfast) 30 capsule 0    magnesium (MAGNESIUM-OXIDE) 250 MG TABS tablet Take 250 mg by mouth daily       No current facility-administered medications for this visit. I will continue his current medication regimen  which is part of the above treatment schedule. It has been helping with Mr. Luis Reyna chronic  medical problems which for this visit include:   Diagnoses of Chronic pain syndrome, DDD (degenerative disc disease), cervical, Lumbar radiculopathy, Failed back surgical syndrome, Degenerative joint disease of spinal facet joint, DDD (degenerative disc disease), lumbosacral, and Recurrent major depressive disorder, in partial remission ([facility] [address] were pertinent to this visit. Risks and benefits of the medications and other alternative treatments  including no treatment were discussed with the patient. The common side effects of these medications were also explained to the patient. Informed verbal consent was obtained. Goals of current treatment regimen include improvement in pain, restoration of functioning- with focus on improvement in physical performance, general activity, work or disability,emotional distress, health care utilization and  decreased medication consumption. Will continue to monitor progress towards achieving/maintaining therapeutic goals with special emphasis on  1. Improvement in perceived interfernce  of pain with ADL's. Ability to do home exercises independently. Ability to do household chores indoor and/or outdoor work and social and leisure activities. Improve psychosocial and physical functioning. - he is showing progression towards this treatment goal with the current regimen.        He

## 2020-03-30 ENCOUNTER — APPOINTMENT (OUTPATIENT)
Dept: PHYSICAL THERAPY | Age: 50
End: 2020-03-30
Payer: COMMERCIAL

## 2020-03-30 RX ORDER — HYDROCHLOROTHIAZIDE 25 MG/1
TABLET ORAL
Qty: 90 TABLET | Refills: 3 | Status: SHIPPED | OUTPATIENT
Start: 2020-03-30 | End: 2020-04-02 | Stop reason: SDUPTHER

## 2020-03-30 RX ORDER — LISINOPRIL 10 MG/1
TABLET ORAL
Qty: 90 TABLET | Refills: 3 | Status: SHIPPED | OUTPATIENT
Start: 2020-03-30 | End: 2020-04-02 | Stop reason: SDUPTHER

## 2020-04-02 ENCOUNTER — TELEPHONE (OUTPATIENT)
Dept: FAMILY MEDICINE CLINIC | Age: 50
End: 2020-04-02

## 2020-04-02 RX ORDER — HYDROCHLOROTHIAZIDE 25 MG/1
TABLET ORAL
Qty: 90 TABLET | Refills: 3 | Status: SHIPPED | OUTPATIENT
Start: 2020-04-02 | End: 2020-04-06 | Stop reason: SDUPTHER

## 2020-04-02 RX ORDER — LISINOPRIL 10 MG/1
TABLET ORAL
Qty: 90 TABLET | Refills: 3 | Status: SHIPPED | OUTPATIENT
Start: 2020-04-02 | End: 2020-04-06 | Stop reason: SDUPTHER

## 2020-04-03 NOTE — TELEPHONE ENCOUNTER
PT's wife called in regarding refills. She states they went to [place] and were supposed to go to [place] [facility]. Updated pharmacy preferences. Please send to correct pharmacy.      Best call back number: [phone]

## 2020-04-06 ENCOUNTER — OFFICE VISIT (OUTPATIENT)
Dept: PSYCHIATRY | Age: 50
End: 2020-04-06
Payer: COMMERCIAL

## 2020-04-06 PROCEDURE — 99443 PR PHYS/QHP TELEPHONE EVALUATION 21-30 MIN: CPT | Performed by: NURSE PRACTITIONER

## 2020-04-06 RX ORDER — LISINOPRIL 10 MG/1
TABLET ORAL
Qty: 90 TABLET | Refills: 3 | Status: SHIPPED | OUTPATIENT
Start: 2020-04-06 | End: 2021-03-25 | Stop reason: SDUPTHER

## 2020-04-06 RX ORDER — HYDROCHLOROTHIAZIDE 25 MG/1
TABLET ORAL
Qty: 90 TABLET | Refills: 3 | Status: SHIPPED | OUTPATIENT
Start: 2020-04-06 | End: 2021-03-25 | Stop reason: SDUPTHER

## 2020-04-06 NOTE — PROGRESS NOTES
\"not worth a shit\"; got that nortriptyline, don't think it helps. Lay here, toss and turn for 2-3 hours. Cant figure out why can't fall asleep. Tired, yawning. Read chapter/night. When put it down, just lay there, reposition. Next thing I know it's 3am.    Other night up at 345am, had put book down around 130am.  When up at 345, was wide awake. Read some more. Gave up and tried to sleep, was 5am and still awake. At some point I passed out between 5-6am.  I don't get it. I used to be able to sleep without a problem 10-12 hours straight through    Thinks took 2 of pamelor one night and was drowsy when woke next day. Never needed medication to sleep before. Don't understand why needs sleep aid. Past 2-3 weeks, not getting up until 1-2pm.  That's ridiculous. Just so freaking tired, just lay here dozing in and out of sleep. Not happy about that. Was talking to wife other day, she seems to think I need to up my medicine. Got really really anxious the other day after coming out of my knee surgery. Like panic attack anxious. Apparently done that past 1-2 days after surgeries. Tend to wig out for little bit til get things under control. They put nerve block in my knee, didn't tell me about it. When it wore off, couldn't get comfortable. Got more anxious and panicky. As pain wore down, got less anxious. The pain had me so tense and anxious was starting to panic. 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 Got that right before knee surgery. Been off because of surgery. Wouldn't be working now with everything going on anyway. Has to cross [facility] to get to airport. Two lanes, both ways, really high. Starting to get anxious when crossing the bridge now. Fearful of car going into river. Don't even want to go back because almost scared to drive across the bridge. Don't get that.   Never had a Prior diagnoses: denies               Outpatient Treatment: don't think I need to, don't think depression is a real thing. Think I need a overton kick in the ass. Should still be getting enjoyment out of things I used to enjoy but I don't                          Psychiatrist: denies                          Therapist: denies              Suicide Attempts: denies              Hx SH:  Denies      Past Psychopharmacologic Trials (including response/reactions):     - just what pcp has me on now. Hasn't done anything for me that I know of. I don't want to be on medicine. Don't like taking pills    Rexulti:  Increased depression per wife  Abilify:  Can't afford to continue, + had weight gain  Trazodone:  Didn't help         Past Medical/Surgical History:   Past Medical History:   Diagnosis Date    Chronic pain syndrome     DDD (degenerative disc disease), cervical     Degenerative joint disease of spinal facet joint     Depressive disorder, not elsewhere classified     Dizziness     Failed back surgical syndrome     GERD (gastroesophageal reflux disease)     Headache(784.0)     HTN (hypertension)     Hyperlipidemia     Lumbar radiculopathy     Sleep apnea     does use CPAP    thyroid cancer     thyroid    Thyroid disease     Wears glasses      Past Surgical History:   Procedure Laterality Date    CARPAL TUNNEL RELEASE Left 3-5-2013    CARPAL TUNNEL RELEASE Right 4-    COLONOSCOPY N/A 10/15/2019    COLONOSCOPY WITH BIOPSY performed by Kaushik Moncada MD at [address] N/A 10/15/2019    COLONOSCOPY POLYPECTOMY SNARE/COLD BIOPSY performed by Kaushik Moncada MD at [place] [address] Left 1-5-2016    First dorsal extensor compartment tendon release.     JOINT REPLACEMENT Right     shoulder    KNEE ARTHROSCOPY Right 2/26/2020    RIGHT KNEE ARTHROSCOPY MEDIAL MENISCECTOMY AND CHONDROPLASTY performed by Mitesh Barrios MD at [place] [address] DECOMPRESS FOREARM,EXCIS MUSC/NERV Right 4/8/2019    RIGHT OPEN TENNIS ELBOW RELEASE performed by Socorro Tubbs MD at [address] [facility] Right 1993    right    SPINAL FUSION  2006    THYROIDECTOMY  2008    UPPER GASTROINTESTINAL ENDOSCOPY N/A 1/29/2019    EGD DIAGNOSTIC ONLY performed by Marcia Boudreaux MD at [address] N/A 10/15/2019    EGD DILATION BALLOON performed by Peggy Leon MD at [address] N/A 10/15/2019    EGD BIOPSY performed by Peggy Leon MD at [address] [facility] History   Problem Relation Age of Onset    Diabetes Father     Heart Failure Father     Hypertension Father     High Blood Pressure Father     High Blood Pressure Mother     Stroke Maternal Grandmother      DEPRESSION:  Two younger brothers and mom being treated for depression; doesn't know what they're taking    PCP: Rohit Stevens MD      Allergies: Allergies   Allergen Reactions    Oxycontin [Oxycodone] Nausea And Vomiting and Nausea Only    Percocet [Oxycodone-Acetaminophen] Nausea And Vomiting         Current Medications:   Current Outpatient Medications on File Prior to Visit   Medication Sig Dispense Refill    DULoxetine (CYMBALTA) 30 MG extended release capsule Take 30 mg by mouth nightly       buPROPion (WELLBUTRIN XL) 150 MG extended release tablet Take 1 tablet by mouth every morning 90 tablet 1    nortriptyline (PAMELOR) 25 MG capsule Take 1-2 capsules by mouth nightly 60 capsule 0    hydroCHLOROthiazide (HYDRODIURIL) 25 MG tablet TAKE 1 TAB daily 90 tablet 3    lisinopril (PRINIVIL;ZESTRIL) 10 MG tablet TAKE 1 TABLET DAILY 90 tablet 3    pregabalin (LYRICA) 100 MG capsule TAKE 1 TAB IN THE AM AND 2 TABS IN THE PM 90 capsule 1    tiZANidine (ZANAFLEX) 4 MG tablet Take . 5-1 tablet by mouth bid prn pain 60 tablet 1    diclofenac sodium (VOLTAREN) 1 % GEL Apply 2 grams to affected area BID 5 Tube 1    levothyroxine (SYNTHROID) 100 MCG tablet 2 p.o. every morning at least 30 minutes before food 60 tablet 3    tapentadol (NUCYNTA) 50 MG TABS Take 50 mg by mouth every 6 hours as needed for Pain.  rosuvastatin (CRESTOR) 40 MG tablet Take 1 tablet by mouth daily 90 tablet 3    omeprazole (PRILOSEC) 40 MG delayed release capsule Take 1 capsule by mouth every morning (before breakfast) 30 capsule 0    magnesium (MAGNESIUM-OXIDE) 250 MG TABS tablet Take 250 mg by mouth daily       No current facility-administered medications on file prior to visit. Controlled Substance Monitoring:    Acute and Chronic Pain Monitoring:   RX Monitoring 2/17/2020   Attestation -   Periodic Controlled Substance Monitoring Possible medication side effects, risk of tolerance/dependence & alternative treatments discussed. 01/18/2020  1   01/14/2020  Nucynta 50 MG Tablet  30.00 15 Ra Min   [id number]   Claire ([id number])   0  40.00 MME  Comm [facility]   01/13/2020  1   12/03/2019  Pregabalin 100 MG Capsule  90.00 30 Ra Min   [id number]   Claire ([id number])   0  2.01 LME  Comm [facility]   11/25/2019  1   10/08/2019  Pregabalin 100 MG Capsule  90.00 30 Ra Min   [id number]   Claire ([id number])   0  2.01 LME  Comm [facility]   09/16/2019  1   03/06/2019  Pregabalin 100 MG Capsule  90.00 30 Ra Min   [id number]   Claire ([id number])   2  2.01 LME  Comm [facility]   06/12/2019  1   03/06/2019  Lyrica 100 MG Capsule  90.00 30 Ra Min   [id number]   Claire ([id number])   1  2.01 LME  Comm [facility]   04/08/2019  1   04/08/2019  Hydrocodone-Acetamin 5-325 MG  28.00 [address]   [id number]   Tara ([id number])   0  20.00 MME  Comm [facility]   03/19/2019  1   03/06/2019  Lyrica 100 MG Capsule  90.00 30 Ra Min   [id number]   Claire ([id number])   0  2.01 LME  Comm [facility]   11/27/2018  1   11/06/2018  Lyrica 100 MG Capsule  90.00 30 Ra Min   [id number]   Wal ([id number])   0  2.01 LME  Comm [facility]   02/23/2018  1   02/13/2018  Lyrica 100 MG Capsule  90.00 30 Ra Min   [id number]   Wal ([id number])   0  2.01 LME  Comm Ins   [place]           OBJECTIVE:  Vitals:    Wt Readings from Diagnosis Orders   1. Moderate episode of recurrent major depressive disorder (HCC)                  1. Safety: NO Imminent risk of danger to/self/others based on the factors considered below. Appropriate for outpatient level of care. Safety plan includes: 911, PES, hotlines, and interventions discussed today.      Risk factors: male gender, depressed mood, chronic pain or medical illness, no outpatient services in place, medication noncompliance, and no collateral information to support safety.     Protective factors: Age >25 and <55,  denies suicidal ideation, does not have lethal plan, does not have access to guns or weapons, patient is mike for safety, no prior suicide attempts, no family h/o suicide, no substance abuse, patient has social or family support, no active psychosis or cognitive dysfunction and patient is future oriented.        2. Psychiatric  - pt struggling with chronic pain issues. Unable to work for past 2+ years d/t chronic pain. This causes financial strain and stress. Worries about family. Feels like a failure. Says doesn't care about anything    - wife (an NP) wants him back on wellbutrin and to max out dose d/t  weight gain and ED risks. Will do XL once daily d/t h/o inconsistent with bid dosing. Pt only interested in doing what wife has recommended at this point    Thinks what he's taking now is \"doing ok\". Would like to have knee and shoulder surgeries completed before adjusting meds further. Hopes if pain improved, would have improved energy, motivation which could also help mood. 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  Anticipates will help with mood as well    - continue wellbutrin xl 150mg/day. Titrate as tolerated  Was on wellbutrin sr previously, didn't take bid. Had reported No change in mood/motivation/energy when on this previously. Prefers to be on as few meds as possible.   WOULD NOT RECOMMEND INCREASING THIS FURTHER AS HE REPORTS INCREASED ANXIETY AND

## 2020-04-07 ENCOUNTER — TELEPHONE (OUTPATIENT)
Dept: ORTHOPEDIC SURGERY | Age: 50
End: 2020-04-07

## 2020-04-21 ENCOUNTER — OFFICE VISIT (OUTPATIENT)
Dept: PAIN MANAGEMENT | Age: 50
End: 2020-04-21
Payer: COMMERCIAL

## 2020-04-21 VITALS
HEART RATE: 108 BPM | DIASTOLIC BLOOD PRESSURE: 90 MMHG | SYSTOLIC BLOOD PRESSURE: 122 MMHG | BODY MASS INDEX: 36.98 KG/M2 | WEIGHT: 288 LBS

## 2020-04-21 PROCEDURE — 99214 OFFICE O/P EST MOD 30 MIN: CPT | Performed by: INTERNAL MEDICINE

## 2020-04-21 RX ORDER — PREGABALIN 100 MG/1
CAPSULE ORAL
Qty: 90 CAPSULE | Refills: 1 | Status: SHIPPED | OUTPATIENT
Start: 2020-04-21 | End: 2020-05-27 | Stop reason: SDUPTHER

## 2020-04-21 RX ORDER — BUPROPION HYDROCHLORIDE 150 MG/1
150 TABLET ORAL EVERY MORNING
Qty: 30 TABLET | Refills: 0 | Status: SHIPPED | OUTPATIENT
Start: 2020-04-21 | End: 2020-05-27 | Stop reason: SDUPTHER

## 2020-04-21 RX ORDER — QUETIAPINE FUMARATE 25 MG/1
25-50 TABLET, FILM COATED ORAL NIGHTLY
Qty: 60 TABLET | Refills: 0 | Status: SHIPPED | OUTPATIENT
Start: 2020-04-21 | End: 2020-05-27 | Stop reason: SDUPTHER

## 2020-04-21 RX ORDER — HYDROCODONE BITARTRATE AND ACETAMINOPHEN 5; 325 MG/1; MG/1
1 TABLET ORAL EVERY 6 HOURS PRN
Qty: 70 TABLET | Refills: 0 | Status: SHIPPED | OUTPATIENT
Start: 2020-04-21 | End: 2021-06-16 | Stop reason: SDUPTHER

## 2020-04-21 RX ORDER — DULOXETIN HYDROCHLORIDE 60 MG/1
60 CAPSULE, DELAYED RELEASE ORAL DAILY
Qty: 30 CAPSULE | Refills: 0 | Status: SHIPPED | OUTPATIENT
Start: 2020-04-21 | End: 2020-05-27 | Stop reason: SDUPTHER

## 2020-04-21 RX ORDER — TIZANIDINE 4 MG/1
TABLET ORAL
Qty: 60 TABLET | Refills: 1 | Status: SHIPPED | OUTPATIENT
Start: 2020-04-21 | End: 2020-05-27 | Stop reason: SDUPTHER

## 2020-04-21 NOTE — LETTER
[facility]  [address] [address]  Phone: [phone]  Fax: [phone]    2020    [address]  [place] [address]    To Whom It May Concern;    Syed Barfield is being treated in my office for the following medical conditions:   1. Chronic pain syndrome    2. DDD (degenerative disc disease), cervical    3. Lumbar radiculopathy    4. Failed back surgical syndrome    5. Degenerative joint disease of spinal facet joint    6. DDD (degenerative disc disease), lumbosacral    7. Osteoarthritis of right glenohumeral joint    8. Low back pain radiating to right leg    9. Recurrent major depressive disorder, in partial remission ([facility] [address]     Due to about medical conditions  is unable to do the followin. No lifting or carrying over  25 lbs  2. No pushing or pulling over 25 lbs  3. Bending, squatting, kneeling, twisting, turning and climbing only on occasion  4. Reaching above shoulder, and typing on keyboard, only on occasion  5. Driving automatic or a standard shift, only on occasion   It is my medical opinion Syed Barfield is unable to sustain gainful employment    If you have any questions or concerns, please don't hesitate to call.     Sincerely,        Og Michelle MD

## 2020-04-21 NOTE — PROGRESS NOTES
organization: Not on file     Attends meetings of clubs or organizations: Not on file     Relationship status: Not on file    Intimate partner violence     Fear of current or ex partner: Not on file     Emotionally abused: Not on file     Physically abused: Not on file     Forced sexual activity: Not on file   Other Topics Concern    Not on file   Social History Narrative    Not on file       Mr. Nela Swenson current medications are   Outpatient Medications Prior to Visit   Medication Sig Dispense Refill    hydroCHLOROthiazide (HYDRODIURIL) 25 MG tablet TAKE 1 TAB daily 90 tablet 3    lisinopril (PRINIVIL;ZESTRIL) 10 MG tablet TAKE 1 TABLET DAILY 90 tablet 3    pregabalin (LYRICA) 100 MG capsule TAKE 1 TAB IN THE AM AND 2 TABS IN THE PM 90 capsule 1    tiZANidine (ZANAFLEX) 4 MG tablet Take . 5-1 tablet by mouth bid prn pain 60 tablet 1    diclofenac sodium (VOLTAREN) 1 % GEL Apply 2 grams to affected area BID 5 Tube 1    levothyroxine (SYNTHROID) 100 MCG tablet 2 p.o. every morning at least 30 minutes before food 60 tablet 3    tapentadol (NUCYNTA) 50 MG TABS Take 50 mg by mouth every 6 hours as needed for Pain.  buPROPion (WELLBUTRIN XL) 150 MG extended release tablet Take 1 tablet by mouth every morning 90 tablet 1    rosuvastatin (CRESTOR) 40 MG tablet Take 1 tablet by mouth daily 90 tablet 3    omeprazole (PRILOSEC) 40 MG delayed release capsule Take 1 capsule by mouth every morning (before breakfast) 30 capsule 0    magnesium (MAGNESIUM-OXIDE) 250 MG TABS tablet Take 250 mg by mouth daily      DULoxetine (CYMBALTA) 30 MG extended release capsule Take 30 mg by mouth nightly       nortriptyline (PAMELOR) 25 MG capsule Take 1-2 capsules by mouth nightly 60 capsule 0     No facility-administered medications prior to visit. REVIEW OF SYSTEMS:   Constitutional: Negative for fatigue and unexpected weight change. Eyes: Negative for visual disturbance.    Respiratory: Negative for shortness of joint    6. DDD (degenerative disc disease), lumbosacral    7. Osteoarthritis of right glenohumeral joint    8. Low back pain radiating to right leg        PLAN:  Informed verbal consent was obtained.  -Continue with current opioid regimen, Discontinue Nuycnta and Start Norco 5 mg 2-3 per day   -Adv Biofeedback, relaxation and meditation techniques. Referral to psychologist for CBT was also discussed with patient   -He was advised to increase fluids ( 5-7  glasses of fluid daily), limit caffeine, avoid cheese products, increase dietary fiber, increase activity and exercise as tolerated and relax regularly and enjoy meals   -Increase Cymbalta to 60 mg   -Continue with Wellbutrin  mg   -Discontinue Pamelor and Start Seroquel 25 mg 1-2 at night   -Continue with Lyrica 300 mg per day   -Continue with Zanaflex   Mr. Tina Gtz will be prescribed  the medications  listed below which are for treatment of his presenting  medical problems which for this visit include:   Diagnoses of Chronic pain syndrome, DDD (degenerative disc disease), cervical, Lumbar radiculopathy, Failed back surgical syndrome, Degenerative joint disease of spinal facet joint, DDD (degenerative disc disease), lumbosacral, Osteoarthritis of right glenohumeral joint, Low back pain radiating to right leg, and Recurrent major depressive disorder, in partial remission ([facility] [address] were pertinent to this visit. Medications/orders associated with this visit:    Current Outpatient Medications   Medication Sig Dispense Refill    pregabalin (LYRICA) 100 MG capsule TAKE 1 TAB IN THE AM AND 2 TABS IN THE PM 90 capsule 1    tiZANidine (ZANAFLEX) 4 MG tablet Take . 5-1 tablet by mouth bid prn pain 60 tablet 1    DULoxetine (CYMBALTA) 60 MG extended release capsule Take 1 capsule by mouth daily 30 capsule 0    buPROPion (WELLBUTRIN XL) 150 MG extended release tablet Take 1 tablet by mouth every morning 30 tablet 0    HYDROcodone-acetaminophen (NORCO) 5-325 MG per

## 2020-05-27 ENCOUNTER — OFFICE VISIT (OUTPATIENT)
Dept: PAIN MANAGEMENT | Age: 50
End: 2020-05-27
Payer: COMMERCIAL

## 2020-05-27 VITALS
DIASTOLIC BLOOD PRESSURE: 87 MMHG | HEART RATE: 104 BPM | WEIGHT: 288 LBS | SYSTOLIC BLOOD PRESSURE: 141 MMHG | BODY MASS INDEX: 36.98 KG/M2

## 2020-05-27 PROCEDURE — 99213 OFFICE O/P EST LOW 20 MIN: CPT | Performed by: INTERNAL MEDICINE

## 2020-05-27 RX ORDER — QUETIAPINE FUMARATE 25 MG/1
25-50 TABLET, FILM COATED ORAL NIGHTLY
Qty: 180 TABLET | Refills: 0 | Status: SHIPPED | OUTPATIENT
Start: 2020-05-27 | End: 2020-07-22 | Stop reason: SDUPTHER

## 2020-05-27 RX ORDER — DULOXETIN HYDROCHLORIDE 60 MG/1
60 CAPSULE, DELAYED RELEASE ORAL DAILY
Qty: 90 CAPSULE | Refills: 0 | Status: SHIPPED | OUTPATIENT
Start: 2020-05-27 | End: 2020-07-22 | Stop reason: SDUPTHER

## 2020-05-27 RX ORDER — TIZANIDINE 4 MG/1
TABLET ORAL
Qty: 180 TABLET | Refills: 0 | Status: SHIPPED | OUTPATIENT
Start: 2020-05-27 | End: 2020-07-22 | Stop reason: SDUPTHER

## 2020-05-27 RX ORDER — BUPROPION HYDROCHLORIDE 150 MG/1
150 TABLET ORAL EVERY MORNING
Qty: 90 TABLET | Refills: 0 | Status: SHIPPED | OUTPATIENT
Start: 2020-05-27 | End: 2020-07-22 | Stop reason: SDUPTHER

## 2020-05-27 RX ORDER — PREGABALIN 100 MG/1
CAPSULE ORAL
Qty: 270 CAPSULE | Refills: 0 | Status: SHIPPED | OUTPATIENT
Start: 2020-05-27 | End: 2020-07-22 | Stop reason: SDUPTHER

## 2020-05-27 NOTE — PROGRESS NOTES
to person, place, and time. Coordination is  normal.  His mood isAppropriate and affect is Neutral/Euthymic(normal) . His    IMPRESSION:   1. Chronic pain syndrome    2. DDD (degenerative disc disease), cervical    3. Lumbar radiculopathy    4. Failed back surgical syndrome    5. Degenerative joint disease of spinal facet joint    6. DDD (degenerative disc disease), lumbosacral    7. Osteoarthritis of right glenohumeral joint    8. Low back pain radiating to right leg    9. Chronic pain of both knees        PLAN:  Informed verbal consent was obtained  -continue with current opioid regimen. Continue with Norco PRN, he has pills left from before  -continue with Advil 600 BID for 7 days  -He was advised to increase fluids ( 5-7  glasses of fluid daily), limit caffeine, avoid cheese products, increase dietary fiber, increase activity and exercise as tolerated and relax regularly and enjoy meals   -He was advised weight reduction, diet changes- 800-1200 flex diet, diet diary, exercising, nutritional  consult increased physical activity as tolerated   -ROM/Stretching exercises as advised      Current Outpatient Medications   Medication Sig Dispense Refill    pregabalin (LYRICA) 100 MG capsule TAKE 1 TAB IN THE AM AND 2 TABS IN THE PM 90 capsule 1    tiZANidine (ZANAFLEX) 4 MG tablet Take . 5-1 tablet by mouth bid prn pain 60 tablet 1    DULoxetine (CYMBALTA) 60 MG extended release capsule Take 1 capsule by mouth daily 30 capsule 0    buPROPion (WELLBUTRIN XL) 150 MG extended release tablet Take 1 tablet by mouth every morning 30 tablet 0    QUEtiapine (SEROQUEL) 25 MG tablet Take 1-2 tablets by mouth nightly 60 tablet 0    hydroCHLOROthiazide (HYDRODIURIL) 25 MG tablet TAKE 1 TAB daily 90 tablet 3    lisinopril (PRINIVIL;ZESTRIL) 10 MG tablet TAKE 1 TABLET DAILY 90 tablet 3    diclofenac sodium (VOLTAREN) 1 % GEL Apply 2 grams to affected area BID 5 Tube 1    levothyroxine (SYNTHROID) 100 MCG tablet 2 p.o. every

## 2020-06-12 ENCOUNTER — OFFICE VISIT (OUTPATIENT)
Dept: ORTHOPEDIC SURGERY | Age: 50
End: 2020-06-12
Payer: COMMERCIAL

## 2020-06-12 VITALS — TEMPERATURE: 97.7 F | HEIGHT: 74 IN | WEIGHT: 285 LBS | BODY MASS INDEX: 36.57 KG/M2

## 2020-06-12 PROCEDURE — 99213 OFFICE O/P EST LOW 20 MIN: CPT | Performed by: ORTHOPAEDIC SURGERY

## 2020-06-15 ENCOUNTER — OFFICE VISIT (OUTPATIENT)
Dept: PSYCHIATRY | Age: 50
End: 2020-06-15
Payer: COMMERCIAL

## 2020-06-15 PROCEDURE — 99443 PR PHYS/QHP TELEPHONE EVALUATION 21-30 MIN: CPT | Performed by: NURSE PRACTITIONER
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
## 2020-07-01 ENCOUNTER — TELEPHONE (OUTPATIENT)
Dept: ORTHOPEDIC SURGERY | Age: 50
End: 2020-07-01

## 2020-07-22 ENCOUNTER — OFFICE VISIT (OUTPATIENT)
Dept: PAIN MANAGEMENT | Age: 50
End: 2020-07-22
Payer: COMMERCIAL

## 2020-07-22 VITALS
BODY MASS INDEX: 36.59 KG/M2 | SYSTOLIC BLOOD PRESSURE: 138 MMHG | WEIGHT: 285 LBS | TEMPERATURE: 98.1 F | HEART RATE: 92 BPM | DIASTOLIC BLOOD PRESSURE: 88 MMHG

## 2020-07-22 PROCEDURE — 99213 OFFICE O/P EST LOW 20 MIN: CPT | Performed by: INTERNAL MEDICINE

## 2020-07-22 RX ORDER — PREGABALIN 100 MG/1
CAPSULE ORAL
Qty: 270 CAPSULE | Refills: 0 | Status: SHIPPED | OUTPATIENT
Start: 2020-07-22 | End: 2020-09-16 | Stop reason: SDUPTHER

## 2020-07-22 RX ORDER — TIZANIDINE 4 MG/1
TABLET ORAL
Qty: 180 TABLET | Refills: 0 | Status: SHIPPED | OUTPATIENT
Start: 2020-07-22 | End: 2020-09-16 | Stop reason: SDUPTHER

## 2020-07-22 RX ORDER — DULOXETIN HYDROCHLORIDE 60 MG/1
60 CAPSULE, DELAYED RELEASE ORAL DAILY
Qty: 90 CAPSULE | Refills: 0 | Status: SHIPPED | OUTPATIENT
Start: 2020-07-22 | End: 2020-09-14

## 2020-07-22 RX ORDER — BUPROPION HYDROCHLORIDE 150 MG/1
150 TABLET ORAL EVERY MORNING
Qty: 90 TABLET | Refills: 0 | Status: SHIPPED | OUTPATIENT
Start: 2020-07-22 | End: 2020-09-08

## 2020-07-22 RX ORDER — QUETIAPINE FUMARATE 25 MG/1
25-50 TABLET, FILM COATED ORAL NIGHTLY
Qty: 180 TABLET | Refills: 0 | Status: SHIPPED | OUTPATIENT
Start: 2020-07-22 | End: 2020-09-16 | Stop reason: SDUPTHER

## 2020-07-22 NOTE — PROGRESS NOTES
Juan Alberto Clifton  1970  [id number]      HISTORY OF PRESENT ILLNESS:  Mr. Jennyfer Strickland is a 45 y.o. male returns for a follow up visit for pain management  He has a diagnosis of   1. Chronic pain syndrome    2. DDD (degenerative disc disease), cervical    3. Lumbar radiculopathy    4. Failed back surgical syndrome    5. Degenerative joint disease of spinal facet joint    6. DDD (degenerative disc disease), lumbosacral    7. Chronic pain of both knees    8. Recurrent major depressive disorder, in partial remission ([facility] [address]    . He complains of pain in the Low back, buttocks  He rates the pain 6/10 and describes it as aching. Current treatment regimen has helped relieve about 50% of the pain. He denies any side effects from the current pain regimen. Patient reports that since the last follow up visit the physical functioning is unchanged, family/social relationships are unchanged, mood is unchanged sleep patterns are unchanged, and that the overall functioning is unchanged. Patient denies misusing/abusing his narcotic pain medications or using any illegal drugs. There are No indicators for possible drug abuse, addiction or diversion problems. Patient states his pain has been baseline. He complains he is having a flare up today, but over all managing with the medications. He says he is using Lyrica along with the other adjuvants. He mentions he has been using Norco as needed only. He reports he has pills left from before. He denies any constipation symptoms. ALLERGIES: Patients list of allergies were reviewed     MEDICATIONS: Mr. Jennyfer Strickland list of medications were reviewed. His current medications are   Outpatient Medications Prior to Visit   Medication Sig Dispense Refill    pregabalin (LYRICA) 100 MG capsule TAKE 1 TAB IN THE AM AND 2 TABS IN THE  capsule 0    tiZANidine (ZANAFLEX) 4 MG tablet Take . 5-1 tablet by mouth bid prn pain 180 tablet 0    DULoxetine (CYMBALTA) 60 MG extended release capsule Take 1 capsule by mouth daily 90 capsule 0    buPROPion (WELLBUTRIN XL) 150 MG extended release tablet Take 1 tablet by mouth every morning 90 tablet 0    QUEtiapine (SEROQUEL) 25 MG tablet Take 1-2 tablets by mouth nightly (Patient taking differently: Take 25-50 mg by mouth nightly Indications: taking prn ) 180 tablet 0    diclofenac sodium (VOLTAREN) 1 % GEL Apply 2 grams to affected area BID 5 Tube 1    hydroCHLOROthiazide (HYDRODIURIL) 25 MG tablet TAKE 1 TAB daily 90 tablet 3    lisinopril (PRINIVIL;ZESTRIL) 10 MG tablet TAKE 1 TABLET DAILY 90 tablet 3    levothyroxine (SYNTHROID) 100 MCG tablet 2 p.o. every morning at least 30 minutes before food 60 tablet 3    buPROPion (WELLBUTRIN XL) 150 MG extended release tablet Take 1 tablet by mouth every morning 90 tablet 1    rosuvastatin (CRESTOR) 40 MG tablet Take 1 tablet by mouth daily 90 tablet 3    omeprazole (PRILOSEC) 40 MG delayed release capsule Take 1 capsule by mouth every morning (before breakfast) 30 capsule 0    magnesium (MAGNESIUM-OXIDE) 250 MG TABS tablet Take 250 mg by mouth daily       No facility-administered medications prior to visit. SOCIAL/FAMILY/PAST MEDICAL HISTORY: Mr. Tammie Hernadez, family and past medical history was reviewed. REVIEW OF SYSTEMS:    Respiratory: Negative for apnea, chest tightness and shortness of breath or change in baseline breathing. Gastrointestinal: Negative for nausea, vomiting, abdominal pain, diarrhea, constipation, blood in stool and abdominal distention. PHYSICAL EXAM:   Nursing note and vitals reviewed. /88   Pulse 92   Temp 98.1 °F (36.7 °C) (Temporal)   Wt 285 lb (129.3 kg)   BMI 36.59 kg/m²   Constitutional: He appears well-developed and well-nourished. No acute distress. Skin: Skin is warm and dry, good turgor. No rash noted. He is not diaphoretic. Cardiovascular: Normal rate, regular rhythm, normal heart sounds, and does not have murmur.      Pulmonary/Chest: Effort normal. No respiratory distress. He does not have wheezes in the lung fields. He has no rales. Neurological/Psychiatric:He is alert and oriented to person, place, and time. Coordination is  normal.  His mood isAppropriate and affect is Neutral/Euthymic(normal) . IMPRESSION:   1. Chronic pain syndrome    2. DDD (degenerative disc disease), cervical    3. Lumbar radiculopathy    4. Failed back surgical syndrome    5. Degenerative joint disease of spinal facet joint    6. DDD (degenerative disc disease), lumbosacral    7. Chronic pain of both knees    8. Osteoarthritis of right glenohumeral joint    9. Low back pain radiating to right leg        PLAN:  Informed verbal consent was obtained  -Continue with current opioid regimen Norco 1-2 per day as needed   -Adv Biofeedback, relaxation and meditation techniques. Referral to psychologist for CBT was also discussed with patient   -He was advised weight reduction, diet changes- 800-1200 flex diet, diet diary, exercising, nutritional  consult increased physical activity as tolerated   -Continue with all other adjuvants as before   -Walking as tolerated    Current Outpatient Medications   Medication Sig Dispense Refill    pregabalin (LYRICA) 100 MG capsule TAKE 1 TAB IN THE AM AND 2 TABS IN THE  capsule 0    tiZANidine (ZANAFLEX) 4 MG tablet Take . 5-1 tablet by mouth bid prn pain 180 tablet 0    DULoxetine (CYMBALTA) 60 MG extended release capsule Take 1 capsule by mouth daily 90 capsule 0    buPROPion (WELLBUTRIN XL) 150 MG extended release tablet Take 1 tablet by mouth every morning 90 tablet 0    QUEtiapine (SEROQUEL) 25 MG tablet Take 1-2 tablets by mouth nightly (Patient taking differently: Take 25-50 mg by mouth nightly Indications: taking prn ) 180 tablet 0    diclofenac sodium (VOLTAREN) 1 % GEL Apply 2 grams to affected area BID 5 Tube 1    hydroCHLOROthiazide (HYDRODIURIL) 25 MG tablet TAKE 1 TAB daily 90 tablet 3    lisinopril (PRINIVIL;ZESTRIL) 10 MG tablet TAKE 1 TABLET DAILY 90 tablet 3    levothyroxine (SYNTHROID) 100 MCG tablet 2 p.o. every morning at least 30 minutes before food 60 tablet 3    buPROPion (WELLBUTRIN XL) 150 MG extended release tablet Take 1 tablet by mouth every morning 90 tablet 1    rosuvastatin (CRESTOR) 40 MG tablet Take 1 tablet by mouth daily 90 tablet 3    omeprazole (PRILOSEC) 40 MG delayed release capsule Take 1 capsule by mouth every morning (before breakfast) 30 capsule 0    magnesium (MAGNESIUM-OXIDE) 250 MG TABS tablet Take 250 mg by mouth daily       No current facility-administered medications for this visit. I will continue his current medication regimen  which is part of the above treatment schedule. It has been helping with Mr. Rocael Santiago chronic  medical problems which for this visit include:   Diagnoses of Chronic pain syndrome, DDD (degenerative disc disease), cervical, Lumbar radiculopathy, Failed back surgical syndrome, Degenerative joint disease of spinal facet joint, DDD (degenerative disc disease), lumbosacral, Chronic pain of both knees, and Recurrent major depressive disorder, in partial remission ([facility] [address] were pertinent to this visit. Risks and benefits of the medications and other alternative treatments  including no treatment were discussed with the patient. The common side effects of these medications were also explained to the patient. Informed verbal consent was obtained. Goals of current treatment regimen include improvement in pain, restoration of functioning- with focus on improvement in physical performance, general activity, work or disability,emotional distress, health care utilization and  decreased medication consumption. Will continue to monitor progress towards achieving/maintaining therapeutic goals with special emphasis on  1. Improvement in perceived interfernce  of pain with ADL's. Ability to do home exercises independently.  Ability to do household chores indoor and/or outdoor work and social and leisure activities. Improve psychosocial and physical functioning. - he is showing progression towards this treatment goal with the current regimen. He was advised against drinking alcohol with the narcotic pain medicines, advised against driving or handling machinery while adjusting the dose of medicines or if having cognitive  issues related to the current medications. Risk of overdose and death, if medicines not taken as prescribed, were also discussed. If the patient develops new symptoms or if the symptoms worsen, the patient should call the office. While transcribing every attempt was made to maintain the accuracy of the note in terms of it's contents,there may have been some errors made inadvertently. Thank you for allowing me to participate in the care of this patient.     Aliya Mendez MD.    Cc: Martina Vleázquez MD

## 2020-07-24 ENCOUNTER — NURSE ONLY (OUTPATIENT)
Dept: FAMILY MEDICINE CLINIC | Age: 50
End: 2020-07-24

## 2020-07-24 LAB — TSH REFLEX: 0.45 UIU/ML (ref 0.27–4.2)

## 2020-07-27 ENCOUNTER — HOSPITAL ENCOUNTER (OUTPATIENT)
Dept: PHYSICAL THERAPY | Age: 50
Setting detail: THERAPIES SERIES
Discharge: HOME | End: 2020-07-27
Payer: COMMERCIAL

## 2020-07-27 ENCOUNTER — TELEPHONE (OUTPATIENT)
Dept: FAMILY MEDICINE CLINIC | Age: 50
End: 2020-07-27

## 2020-07-27 PROCEDURE — 97750 PHYSICAL PERFORMANCE TEST: CPT

## 2020-07-27 NOTE — TELEPHONE ENCOUNTER
PT's wife called back and asked that a 90 day supply for PT's thyroid medication be sent to [facility].

## 2020-07-27 NOTE — PROGRESS NOTES
Outpatient Physical Therapy   Phone: [phone] Fax: [phone]    Functional Capacity Evaluation  Date: 2020        Patient Name:  Urban Hodges    :  1970  MRN: [id number]    Outcome Measures:    Optimal Instrument/Baseline Difficulty  Score 78  Functional disability 52%                                        Therapy Time   Individual Concurrent Group Co-treatment   Time In 1132         Time Out 1530         Minutes 238         Timed Code Treatment Minutes: 286 Minutes       Functional Capacity Evaluation completed this date. Results and full report will be available in [facility] under Media when complete.     Electronically signed by:  Dena Diaz, [address]

## 2020-07-27 NOTE — TELEPHONE ENCOUNTER
PT's wife called in regarding PT's lab results (Okay to speak per HIPAA). Informed of Dr. Sariah Rose note.

## 2020-07-28 RX ORDER — LEVOTHYROXINE SODIUM 0.1 MG/1
TABLET ORAL
Qty: 90 TABLET | Refills: 0 | Status: SHIPPED | OUTPATIENT
Start: 2020-07-28 | End: 2020-07-28 | Stop reason: SDUPTHER

## 2020-07-28 RX ORDER — LEVOTHYROXINE SODIUM 0.1 MG/1
TABLET ORAL
Qty: 90 TABLET | Refills: 0 | Status: SHIPPED | OUTPATIENT
Start: 2020-07-28 | End: 2020-12-29 | Stop reason: SDUPTHER

## 2020-08-17 ENCOUNTER — TELEPHONE (OUTPATIENT)
Dept: ORTHOPEDIC SURGERY | Age: 50
End: 2020-08-17

## 2020-08-25 ENCOUNTER — TELEPHONE (OUTPATIENT)
Dept: ORTHOPEDIC SURGERY | Age: 50
End: 2020-08-25

## 2020-08-25 ENCOUNTER — TELEPHONE (OUTPATIENT)
Dept: PRIMARY CARE CLINIC | Age: 50
End: 2020-08-25

## 2020-08-25 NOTE — TELEPHONE ENCOUNTER
Patient is calling would like a call back is wanting to know if he can drop off la paperwork. Patient has been scheduled this Thursday.  Ph [phone]

## 2020-08-27 ENCOUNTER — OFFICE VISIT (OUTPATIENT)
Dept: ORTHOPEDIC SURGERY | Age: 50
End: 2020-08-27
Payer: COMMERCIAL

## 2020-08-27 VITALS — WEIGHT: 285 LBS | HEIGHT: 74 IN | BODY MASS INDEX: 36.57 KG/M2

## 2020-08-27 PROCEDURE — 99213 OFFICE O/P EST LOW 20 MIN: CPT | Performed by: ORTHOPAEDIC SURGERY

## 2020-08-27 NOTE — PROGRESS NOTES
Chief Complaint    Follow-up (Left Shoulder)      History of Present Illness:  Precious Young is a pleasant, 45 y.o., male, here today for follow up of his bilateral shoulders. For his left shoulder we did order an MRI back in October and at the time recommended a left shoulder scope with biceps tenodesis, given that he had previously failed conservative treatment. He did opt to put off the shoulder surgery as he did also have to undergo arthroscopic knee surgery. Today he complains of pain with daily activities especially overhead endurance-type activities. With respect to his right shoulder. He is now 3 years out following a right total shoulder replacement back in July 2017. This shoulder continues to do well. He does complain of some pain into his biceps but he has no limitations with respect to overall function. He reports no new injuries or setbacks. Pain Assessment  Location of Pain: Shoulder  Location Modifiers: Left  Severity of Pain: 5  Quality of Pain: Sharp  Duration of Pain: Persistent  Frequency of Pain: Intermittent  Aggravating Factors: (general use, overhead movements)  Limiting Behavior: Yes  Relieving Factors: Rest, Ice  Work-Related Injury: No  Are there other pain locations you wish to document?: No      Medical History:  Patient's medications, allergies, past medical, surgical, social and family histories were reviewed and updated as appropriate. Review of Systems    Patient has had no medical changes since last evaluated      Review of Systems  A 14 point review of systems was completed by the patient and is available in the media section of the scanned medical record and was reviewed on 8/27/2020. The review is negative with the exception of those things mentioned in the HPI and Past Medical History    Vital Signs:  Vitals:       General/Appearance: Alert and oriented and in no apparent distress. Skin:  There are no skin lesions, cellulitis, or extreme edema.  The patient has warm and well-perfused Bilateral upper extremities with brisk capillary refill. Left Shoulder Exam:  Inspection: No gross deformities, no signs of infection. Palpation: Tenderness over the biceps and AC joint    Active Range of Motion: Forward Elevation 160, Abduction 160, External Rotation 50, Internal Rotation T12    Passive Range of Motion: Deferred     Strength:  External Rotation 4+/5, Internal Rotation 5/5, Supraspinatus 4+/5, Champagne Toast 4+/5    Special Tests:  Positive for pain over AC joint Marilou's, No Kevin muscle deformity. Neurovascular: Sensation to light touch is intact, no motor deficits, palpable radial pulses 2+      Right Shoulder Exam:  Inspection: No gross deformities, no signs of infection. Palpation: Non-tender. No crepitation    Active Range of Motion: Forward Elevation 150, Abduction 140, External Rotation 30, Internal Rotation to lumbosacral joint    Passive Range of Motion: Deferred    Strength:  External Rotation 4+/5    Special Tests:  Negative belly press No Kevin muscle deformity. Neurovascular: Sensation to light touch is intact, no motor deficits, palpable radial pulses 2+    Self assessment questionnaires were completed today for the right shoulder. Radiology:     Plain radiographs of his bilateral shoulders comprising 3 views: AP, Scapular Y and Axillary lateral were obtained and reviewed in the office:     Right: Shows postsurgical changes from the anatomic total shoulder replacement. All the components are in good placement without any signs of loosening, fractures, subluxations or dislocations.     Left: Mild glenohumeral osteoarthritis, No concentric joint narrowing,  Normal acromion to tuberosity height, AC joint arthritis          Assessment :  Mr. Joyce Collins is a pleasant, 47 y.o. patient with persistent left shoulder pain for nearly 2 years despite conservative management currently with biceps tendinitis, AC joint arthritis and partial rotator cuff tears. His right shoulder continues to do well now 3 years out from an anatomic total shoulder replacement. Impression:  Encounter Diagnoses   Name Primary?  Status post total shoulder arthroplasty, right Yes    Left shoulder pain, unspecified chronicity     Biceps tendonitis on left        Office Procedures:  Orders Placed This Encounter   Procedures    XR SHOULDER RIGHT (MIN 2 VIEWS)     Standing Status:   Future     Number of Occurrences:   1     Standing Expiration Date:   8/27/2021     Order Specific Question:   Reason for exam:     Answer:   pain    XR SHOULDER LEFT (MIN 2 VIEWS)     Standing Status:   Future     Number of Occurrences:   1     Standing Expiration Date:   8/27/2021     Order Specific Question:   Reason for exam:     Answer:   pain       Treatment Plan: With respect to his left shoulder, we again discussed a left shoulder scope, given that last year he went through extensive conservative treatment without lasting relief. Conservative treatment would entail continuation of an anti-inflammatory, activity modification and possibly an injection. After discussing these options, he would like to continue to put off surgery. This is appropriate. His symptoms at this time do not warrant an injection, but we can certainly consider this treatment option in the future. We will see him back on an as needed basis but at least at 5 year postop check for his right shoulder. All questions were answered to patient's satisfaction and He was encouraged to call with any further questions or concerns. Mau Clifton is in agreement with this plan. 8/27/2020  3:20 PM    Apolonia Medeiros, AVEL  [employer] [address]    During this examination, Carmen MARTI, functioned as a scribe for Dr. Amilcar Ashton. The history taking and physical examination were performed by Dr. Phoebe Parker.  All counseling during the appointment was performed between the patient and Dr. Carrington Hendrix. 8/27/20   _____________  I, Dr. Manpreet Mccoy, personally performed the services described in this documentation as described by Constantine Khanna ATC in my presence, and it is both accurate and complete. Chago Hendrix MD, PhD  8/27/2020

## 2020-08-30 PROBLEM — M75.22 BICEPS TENDONITIS ON LEFT: Status: ACTIVE | Noted: 2020-08-30

## 2020-09-08 ENCOUNTER — HOSPITAL ENCOUNTER (EMERGENCY)
Age: 50
Discharge: HOME | End: 2020-09-08
Attending: EMERGENCY MEDICINE
Payer: COMMERCIAL

## 2020-09-08 ENCOUNTER — APPOINTMENT (OUTPATIENT)
Dept: GENERAL RADIOLOGY | Age: 50
End: 2020-09-08
Payer: COMMERCIAL

## 2020-09-08 VITALS
OXYGEN SATURATION: 96 % | HEART RATE: 97 BPM | DIASTOLIC BLOOD PRESSURE: 85 MMHG | WEIGHT: 293.1 LBS | TEMPERATURE: 98.7 F | SYSTOLIC BLOOD PRESSURE: 129 MMHG | BODY MASS INDEX: 37.62 KG/M2 | HEIGHT: 74 IN | RESPIRATION RATE: 20 BRPM

## 2020-09-08 LAB
A/G RATIO: 1.4 (ref 1.1–2.2)
ALBUMIN SERPL-MCNC: 4.4 G/DL (ref 3.4–5)
ALP BLD-CCNC: 111 U/L (ref 40–129)
ALT SERPL-CCNC: 25 U/L (ref 10–40)
ANION GAP SERPL CALCULATED.3IONS-SCNC: 12 MMOL/L (ref 3–16)
AST SERPL-CCNC: 21 U/L (ref 15–37)
BASOPHILS ABSOLUTE: 0 K/UL (ref 0–0.2)
BASOPHILS RELATIVE PERCENT: 0 %
BILIRUB SERPL-MCNC: 0.3 MG/DL (ref 0–1)
BUN BLDV-MCNC: 19 MG/DL (ref 7–20)
CALCIUM SERPL-MCNC: 10.1 MG/DL (ref 8.3–10.6)
CHLORIDE BLD-SCNC: 102 MMOL/L (ref 99–110)
CO2: 25 MMOL/L (ref 21–32)
CREAT SERPL-MCNC: 0.7 MG/DL (ref 0.9–1.3)
D DIMER: 0.46 UG/ML FEU
EOSINOPHILS ABSOLUTE: 0.1 K/UL (ref 0–0.6)
EOSINOPHILS RELATIVE PERCENT: 0.5 %
GFR AFRICAN AMERICAN: >60
GFR NON-AFRICAN AMERICAN: >60
GLOBULIN: 3.2 G/DL
GLUCOSE BLD-MCNC: 95 MG/DL (ref 70–99)
HCT VFR BLD CALC: 47.5 % (ref 40.5–52.5)
HEMOGLOBIN: 15.5 G/DL (ref 13.5–17.5)
LYMPHOCYTES ABSOLUTE: 1.9 K/UL (ref 1–5.1)
LYMPHOCYTES RELATIVE PERCENT: 11.1 %
MCH RBC QN AUTO: 28.4 PG (ref 26–34)
MCHC RBC AUTO-ENTMCNC: 32.6 G/DL (ref 31–36)
MCV RBC AUTO: 87.2 FL (ref 80–100)
MONOCYTES ABSOLUTE: 1.1 K/UL (ref 0–1.3)
MONOCYTES RELATIVE PERCENT: 6.5 %
NEUTROPHILS ABSOLUTE: 13.6 K/UL (ref 1.7–7.7)
NEUTROPHILS RELATIVE PERCENT: 81.9 %
PDW BLD-RTO: 13 % (ref 12.4–15.4)
PLATELET # BLD: 258 K/UL (ref 135–450)
PMV BLD AUTO: 9.9 FL (ref 5–10.5)
POTASSIUM REFLEX MAGNESIUM: 4.3 MMOL/L (ref 3.5–5.1)
RBC # BLD: 5.45 M/UL (ref 4.2–5.9)
SODIUM BLD-SCNC: 139 MMOL/L (ref 136–145)
TOTAL PROTEIN: 7.6 G/DL (ref 6.4–8.2)
TROPONIN: <0.01 NG/ML
WBC # BLD: 16.7 K/UL (ref 4–11)

## 2020-09-08 PROCEDURE — 2580000003 HC RX 258: Performed by: EMERGENCY MEDICINE

## 2020-09-08 PROCEDURE — 84484 ASSAY OF TROPONIN QUANT: CPT

## 2020-09-08 PROCEDURE — 80053 COMPREHEN METABOLIC PANEL: CPT

## 2020-09-08 PROCEDURE — 71046 X-RAY EXAM CHEST 2 VIEWS: CPT

## 2020-09-08 PROCEDURE — 85379 FIBRIN DEGRADATION QUANT: CPT

## 2020-09-08 PROCEDURE — 36415 COLL VENOUS BLD VENIPUNCTURE: CPT

## 2020-09-08 PROCEDURE — 85025 COMPLETE CBC W/AUTO DIFF WBC: CPT

## 2020-09-08 PROCEDURE — 93005 ELECTROCARDIOGRAM TRACING: CPT | Performed by: EMERGENCY MEDICINE

## 2020-09-08 PROCEDURE — 99285 EMERGENCY DEPT VISIT HI MDM: CPT

## 2020-09-08 RX ORDER — 0.9 % SODIUM CHLORIDE 0.9 %
1000 INTRAVENOUS SOLUTION INTRAVENOUS ONCE
Status: COMPLETED | OUTPATIENT
Start: 2020-09-08 | End: 2020-09-08

## 2020-09-08 RX ADMIN — SODIUM CHLORIDE 1000 ML: 9 INJECTION, SOLUTION INTRAVENOUS at 14:38

## 2020-09-08 ASSESSMENT — PAIN DESCRIPTION - PAIN TYPE: TYPE: ACUTE PAIN

## 2020-09-08 ASSESSMENT — PAIN SCALES - GENERAL
PAINLEVEL_OUTOF10: 4
PAINLEVEL_OUTOF10: 3

## 2020-09-08 ASSESSMENT — HEART SCORE: ECG: 0

## 2020-09-08 ASSESSMENT — PAIN DESCRIPTION - LOCATION: LOCATION: CHEST

## 2020-09-08 ASSESSMENT — PAIN DESCRIPTION - ORIENTATION: ORIENTATION: LEFT;UPPER

## 2020-09-08 ASSESSMENT — PAIN DESCRIPTION - DESCRIPTORS: DESCRIPTORS: ACHING

## 2020-09-08 NOTE — ED PROVIDER NOTES
CHIEF COMPLAINT  Chief Complaint   Patient presents with    Chest Pain     left upper chest pains for 4 days    Dizziness     dizziness onset yesterday       HISTORY OF PRESENT ILLNESS  Luli Gonsalez is a 45 y.o. male who presents to the ED complaining of a 5-day history of an achy sensation in his left upper chest over his pectoralis muscle that he describes as a sore muscle that he did not think much of it until last night when he started to feel lightheaded. 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 Patient reports that the pain does not radiate and has not been associated with any cough or shortness of breath. No radiation of pain into the arm, jaw, back or neck. No lower extremity edema or calf pain. No history of PE or DVT. Non-smoker. No history of heart or lung disease. No fevers or chills. No diaphoresis, nausea or vomiting. No other complaints, modifying factors or associated symptoms. Nursing notes reviewed.    Past Medical History:   Diagnosis Date    Chronic pain syndrome     DDD (degenerative disc disease), cervical     Degenerative joint disease of spinal facet joint     Depressive disorder, not elsewhere classified     Dizziness     Failed back surgical syndrome     GERD (gastroesophageal reflux disease)     Headache(784.0)     HTN (hypertension)     Hyperlipidemia     Lumbar radiculopathy     Sleep apnea     does use CPAP    thyroid cancer     thyroid    Thyroid disease     Wears glasses      Past Surgical History:   Procedure Laterality Date    CARPAL TUNNEL RELEASE Left 3-5-2013    CARPAL TUNNEL RELEASE Right 4-    COLONOSCOPY N/A 10/15/2019    COLONOSCOPY WITH BIOPSY performed by Karla Rausch MD at [address] N/A 10/15/2019    COLONOSCOPY POLYPECTOMY SNARE/COLD BIOPSY performed by Karla Rausch MD at [facility] Left 1-5-2016    First dorsal extensor compartment tendon release.  JOINT REPLACEMENT Right     shoulder    KNEE ARTHROSCOPY Right 2/26/2020    RIGHT KNEE ARTHROSCOPY MEDIAL MENISCECTOMY AND CHONDROPLASTY performed by Haroon Garcia MD at [place] [address] [employer],EXCIS MUSC/NERV Right 4/8/2019    RIGHT OPEN TENNIS ELBOW RELEASE performed by Christa Nicholson MD at [address] [place] [address] Right 1993    right   [address]  2006    THYROIDECTOMY  2008    UPPER GASTROINTESTINAL ENDOSCOPY N/A 1/29/2019    EGD DIAGNOSTIC ONLY performed by Freddy Lopes MD at [address] N/A 10/15/2019    EGD DILATION BALLOON [facility] [address] [facility] N/A 10/15/2019    EGD BIOPSY performed by Boris Pichardo MD at [facility] [place]note 2[facility] History   Problem Relation Age of Onset    Diabetes Father     Heart Failure Father     Hypertension Father     High Blood Pressure Father     High Blood Pressure Mother     Stroke Maternal Grandmother      Social History     Socioeconomic History    Marital status:      Spouse name: Not on file    Number of children: 3    Years of education: Not on file    Highest education level: Not on file   Occupational History    Occupation:    Social Needs    Financial resource strain: Not on file    Food insecurity     Worry: Not on file     Inability: Not on file   [place] [employer] needs     Medical: Not on file     Non-medical: Not on file   Tobacco Use    Smoking status: Never Smoker    Smokeless tobacco: Never Used   Substance and Sexual Activity    Alcohol use:  Yes     Alcohol/week: 0.0 standard drinks     Comment: occasional use    Drug use: Never    Sexual activity: Yes     Partners: Female   Lifestyle    Physical activity     Days per week: Not on file     Minutes per session: Not on file    Stress: Not on file   Relationships    Social connections     Talks on phone: Not on file     Gets together: Not on file     Attends [employer] service: Not on file     Active member of club or organization: Not on file     Attends meetings of clubs or organizations: Not on file     Relationship status: Not on file    Intimate partner violence     Fear of current or ex partner: Not on file     Emotionally abused: Not on file     Physically abused: Not on file     Forced sexual activity: Not on file   Other Topics Concern    Not on file   Social History Narrative    Not on file     No current facility-administered medications for this encounter. Current Outpatient Medications   Medication Sig Dispense Refill    levothyroxine (SYNTHROID) 100 MCG tablet 2 p.o. every morning at least 30 minutes before food 90 tablet 0    pregabalin (LYRICA) 100 MG capsule TAKE 1 TAB IN THE AM AND 2 TABS IN THE  capsule 0    tiZANidine (ZANAFLEX) 4 MG tablet Take . 5-1 tablet by mouth bid prn pain 180 tablet 0    DULoxetine (CYMBALTA) 60 MG extended release capsule Take 1 capsule by mouth daily 90 capsule 0    QUEtiapine (SEROQUEL) 25 MG tablet Take 1-2 tablets by mouth nightly 180 tablet 0    diclofenac sodium (VOLTAREN) 1 % GEL Apply 2 grams to affected area BID 5 Tube 1    hydroCHLOROthiazide (HYDRODIURIL) 25 MG tablet TAKE 1 TAB daily 90 tablet 3    lisinopril (PRINIVIL;ZESTRIL) 10 MG tablet TAKE 1 TABLET DAILY 90 tablet 3    buPROPion (WELLBUTRIN XL) 150 MG extended release tablet Take 1 tablet by mouth every morning 90 tablet 1    rosuvastatin (CRESTOR) 40 MG tablet Take 1 tablet by mouth daily 90 tablet 3    magnesium (MAGNESIUM-OXIDE) 250 MG TABS tablet Take 250 mg by mouth daily       Allergies   Allergen Reactions    Oxycontin [Oxycodone] Nausea And Vomiting and Nausea Only    Percocet [Oxycodone-Acetaminophen] Nausea And Vomiting       REVIEW OF SYSTEMS  Positives and pertinent negatives as per HPI. Ten other systems were reviewed and are negative. Nursing notes pertaining to ROS were reviewed. PHYSICAL EXAM   /85   Pulse 97   Temp 98.7 °F (37.1 °C) (Oral)   Resp 26   Ht 6' 2\" (1.88 m)   Wt 293 lb 1.6 oz (132.9 kg)   SpO2 96%   BMI 37.63 kg/m²   General: Alert and oriented x 3, NAD. No increased work of breathing or accessory muscle use. Non-ill appearing. Appropriate and interactive  Eyes: PERRL, no scleral icterus, injection or exudate. EOMI. HENT: Atraumatic. Oral pharynx is clear, moist, no enanthem. No tonsillar hypertropy or exudate. Nasal mucous membranes are clear. TM's are clear without evidence of otitis media. Neck:  No Lymphadenopathy. Non-tender to palpation. Normal ROM. No JVD. No thyromegaly. No Mass. PULMONARY: Lungs clear bilaterally without wheezes, rales or rhonchi. Good air movement bilaterally. CV: Regular rate and rhythm without murmurs, rubs or gallops. ABD: Soft, non-tender, non-distended, normal bowel sounds, no hepatosplenomegaly, no masses. No peritoneal signs, rebound or guarding. Back:  No CVAT, no rash. EXT: No cyanosis or clubbing. No rash. CR < 2 seconds. No tenderness to palpation. No lower extremity edema. +2 pulses in upper/lower extremities bilaterally. Skin is warm and dry. No calf tenderness. Negative Homans sign  PSYCH: normal affect    12 LEAD EKG AS INTERPRETED BY ME:  Sinus Tach  RATE   NORMAL AXIS   NORMAL INTERVALS  NO ST-T SIGNS OF ACUTE ISCHEMIA OR INFARCT        RADIOLOGY    XR CHEST (2 VW)   Final Result   No acute cardiac or pulmonary disease. LABS  I have reviewed all labs for this visit.    Results for orders placed or performed during the hospital encounter of 09/08/20   CBC Auto Differential   Result Value Ref Range    WBC 16.7 (H) 4.0 - 11.0 K/uL    RBC 5.45 4.20 - 5.90 M/uL    Hemoglobin 15.5 13.5 - 17.5 g/dL    Hematocrit 47.5 40.5 - 52.5 %    MCV 87.2 80.0 - 100.0 fL    MCH 28.4 26.0 - 34.0 pg    MCHC 32.6 31.0 - 36.0 g/dL    RDW 13.0 12.4 - 15.4 %    Platelets 344 CONGESTIVE HEART FAILURE, SEPSIS, DKA, ESOPHAGEAL RUPTURE, THORACIC ANEURYSM, thus I consider the discharge disposition reasonable. We also discussed returning to the Emergency Department immediately if new or worsening symptoms occur. We have discussed the symptoms which are most concerning (e.g., bloody sputum, fever, worsening pain or shortness of breath, vomiting) that necessitate immediate return. Patient was given scripts for the following medications. I counseled patient how to take these medications. New Prescriptions    No medications on file         CLINICAL IMPRESSION  1. Chest pain, unspecified type        Blood pressure 129/85, pulse 97, temperature 98.7 °F (37.1 °C), temperature source Oral, resp. rate 26, height 6' 2\" (1.88 m), weight 293 lb 1.6 oz (132.9 kg), SpO2 96 %.       Follow-up with:  Charlene Jeter, [address]  [address] [place] [address]  [phone]    In 2 days            Munira Marquez MD  09/08/20 [id number]

## 2020-09-08 NOTE — ED TRIAGE NOTES
Pt. C/o left upper chest pain for 4-5 days, dizziness onset yesterday. Denies shortness of breath, did not break out in cold sweat. Last night drank 5 Pepsi's last night due to being hot after work.

## 2020-09-09 ENCOUNTER — CARE COORDINATION (OUTPATIENT)
Dept: OTHER | Facility: CLINIC | Age: 50
End: 2020-09-09

## 2020-09-09 LAB
EKG ATRIAL RATE: 107 BPM
EKG DIAGNOSIS: NORMAL
EKG P AXIS: 64 DEGREES
EKG P-R INTERVAL: 126 MS
EKG Q-T INTERVAL: 330 MS
EKG QRS DURATION: 88 MS
EKG QTC CALCULATION (BAZETT): 440 MS
EKG R AXIS: 59 DEGREES
EKG T AXIS: 41 DEGREES
EKG VENTRICULAR RATE: 107 BPM

## 2020-09-09 PROCEDURE — 93010 ELECTROCARDIOGRAM REPORT: CPT | Performed by: INTERNAL MEDICINE

## 2020-09-09 NOTE — CARE COORDINATION
No   Were you discharged with any Home Care or Post Acute Services or do you currently have any active services?:  No              Goals      Conditions and Symptoms      I will schedule office visits, as directed by my provider. I will keep my appointment or reschedule if I have to cancel. I will notify my provider of any barriers to my plan of care. I will notify my provider of any symptoms that indicate a worsening of my condition. Barriers: stress and time constraints  Plan for overcoming my barriers: I will work with [facility] to overcome barriers. Confidence: 8/10  Anticipated Goal Completion Date: 10/1/20    9/9/20: Patient states he will call to schedule f/u with PCP tomorrow. Margarita Rivera RN BSN  Associate Care Manager  Phone: [phone]  Email: [email or web address]

## 2020-09-11 ENCOUNTER — TELEPHONE (OUTPATIENT)
Dept: PRIMARY CARE CLINIC | Age: 50
End: 2020-09-11

## 2020-09-11 NOTE — TELEPHONE ENCOUNTER
Pt calling about status of paperwork that he dropped off a few weeks ago. He has a time crunch. He needs to give to  by next week. PT PHONE:   [phone]       May leave VM if necessary.

## 2020-09-14 ENCOUNTER — TELEPHONE (OUTPATIENT)
Dept: ORTHOPEDIC SURGERY | Age: 50
End: 2020-09-14

## 2020-09-14 ENCOUNTER — OFFICE VISIT (OUTPATIENT)
Dept: PSYCHIATRY | Age: 50
End: 2020-09-14
Payer: COMMERCIAL

## 2020-09-14 PROCEDURE — 99443 PR PHYS/QHP TELEPHONE EVALUATION 21-30 MIN: CPT | Performed by: NURSE PRACTITIONER

## 2020-09-14 RX ORDER — DULOXETIN HYDROCHLORIDE 60 MG/1
60 CAPSULE, DELAYED RELEASE ORAL 2 TIMES DAILY
Qty: 180 CAPSULE | Refills: 1 | Status: SHIPPED | OUTPATIENT
Start: 2020-09-14 | End: 2021-03-25 | Stop reason: SDUPTHER

## 2020-09-14 RX ORDER — ACETAMINOPHEN 160 MG
TABLET,DISINTEGRATING ORAL
COMMUNITY
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
## 2020-09-15 ENCOUNTER — CARE COORDINATION (OUTPATIENT)
Dept: OTHER | Facility: CLINIC | Age: 50
End: 2020-09-15

## 2020-09-15 NOTE — CARE COORDINATION
[facility] ED Follow Up Call    9/15/2020    Patient: Catarino Rico Patient : 1970   MRN: [id number]  Reason for Admission: Chest pain  Discharge Date: 20       Care Transitions ED Follow Up    Care Transitions Interventions  Do you have any ongoing symptoms?:  Yes   Onset of Patient-reported symptoms: In the past 7 days   Patient-reported symptoms:  Pain   Do you have all of your prescriptions and are they filled?:  Yes   Have you scheduled your follow up appointment?:  Yes              Associate Care Manager [facility]) called patient for CT ED f/u outreach. Patient states he is still having pain but states pain is now on top of left shoulder. States \"It's feeling like I'm going to have to have the damn surgery that I don't want to do\". States he had right elbow surgery and right meniscus surgery in the past. Patient rates pain \"6\"/10 at present. States he suffers from chronic pain but states the pain is now keeping him awake at night and causing him to toss and turn throughout the night. States he has follow up appt with pain management tomorrow. Patient states had follow up appt with Leobardo Luna CNP recently and she is changing the Duloxetine to 60 mg BID. Patient reports the medication change is due to patient not having any motivation to do anything, despite having things he wants and needs to do. States he is in \"constant pain all the time and I just don't give a shit if I do it (things that need done) or not\". Patient states he went outside today to work in the yard but received a call from someone and after talking on the phone for several minutes, he ended up going back inside the house and didn't do anything in the yard. Patient states he has follow up appt with PCP scheduled for 10/8/20, which he states was the first available.  States he does not have chest pain since the left shoulder pain has started but reports periodically having dizziness/lightheadedness if he

## 2020-09-16 ENCOUNTER — OFFICE VISIT (OUTPATIENT)
Dept: PAIN MANAGEMENT | Age: 50
End: 2020-09-16
Payer: COMMERCIAL

## 2020-09-16 VITALS
DIASTOLIC BLOOD PRESSURE: 87 MMHG | HEART RATE: 87 BPM | TEMPERATURE: 97.8 F | BODY MASS INDEX: 36.59 KG/M2 | WEIGHT: 285 LBS | SYSTOLIC BLOOD PRESSURE: 140 MMHG

## 2020-09-16 PROCEDURE — 99213 OFFICE O/P EST LOW 20 MIN: CPT | Performed by: INTERNAL MEDICINE

## 2020-09-16 RX ORDER — TIZANIDINE 4 MG/1
TABLET ORAL
Qty: 180 TABLET | Refills: 0 | Status: SHIPPED | OUTPATIENT
Start: 2020-09-16 | End: 2021-06-16 | Stop reason: SDUPTHER

## 2020-09-16 RX ORDER — QUETIAPINE FUMARATE 25 MG/1
25-50 TABLET, FILM COATED ORAL NIGHTLY
Qty: 180 TABLET | Refills: 0 | Status: SHIPPED
Start: 2020-09-16 | End: 2021-03-25 | Stop reason: SINTOL

## 2020-09-16 RX ORDER — PREGABALIN 100 MG/1
CAPSULE ORAL
Qty: 270 CAPSULE | Refills: 0 | Status: SHIPPED | OUTPATIENT
Start: 2020-09-16 | End: 2021-03-25 | Stop reason: SDUPTHER

## 2020-09-16 NOTE — PROGRESS NOTES
Urban Lamarsmith  1970  [id number]      HISTORY OF PRESENT ILLNESS:  Mr. Fabian Reynoso is a 45 y.o. male returns for a follow up visit for pain management  He has a diagnosis of   1. Chronic pain syndrome    2. DDD (degenerative disc disease), cervical    3. Lumbar radiculopathy    4. Failed back surgical syndrome    5. Degenerative joint disease of spinal facet joint    6. DDD (degenerative disc disease), lumbosacral    7. Chronic pain of both knees    8. Osteoarthritis of right glenohumeral joint    9. Low back pain radiating to right leg    10. Recurrent major depressive disorder, in partial remission ([facility] [address]    . He complains of pain in the lower back, left shoulder He rates the pain 7/10 and describes it as sharp, aching. Current treatment regimen has helped relieve about 40% of the pain. He denies any side effects from the current pain regimen. Patient reports that since the last follow up visit the physical functioning is worse, family/social relationships are unchanged, mood is unchanged sleep patterns are worse, and that the overall functioning is worse. Patient denies misusing/abusing his narcotic pain medications or using any illegal drugs. There are No indicators for possible drug abuse, addiction or diversion problems. Mr. Fabian Reynoso states his left shoulder has been hurting. Patient states he has been trying to do his exercises. He says his pain has been tolerable somewhat. Patient is complaining of pain in the shoulder a lot, he has seen Ortho in the past, recommended arthroscopy which is not working yet. Patient says he is not using Norco much still, he has pills left. ALLERGIES: Patients list of allergies were reviewed     MEDICATIONS: Mr. Fabian Reynoso list of medications were reviewed. His current medications are   Outpatient Medications Prior to Visit   Medication Sig Dispense Refill    Cholecalciferol (VITAMIN D3) 50 MCG (2000 UT) CAPS Take by mouth      DULoxetine (CYMBALTA) 60 MG extended release capsule Take 1 capsule by mouth 2 times daily 180 capsule 1    levothyroxine (SYNTHROID) 100 MCG tablet 2 p.o. every morning at least 30 minutes before food (Patient taking differently: Indications: pharmacy screwing things up so had to resort to old script of 200mcg/day 2 p.o. every morning at least 30 minutes before food) 90 tablet 0    pregabalin (LYRICA) 100 MG capsule TAKE 1 TAB IN THE AM AND 2 TABS IN THE PM (Patient taking differently: Indications: says taking mostly 2 at night, none in daytime TAKE 1 TAB IN THE AM AND 2 TABS IN THE PM) 270 capsule 0    tiZANidine (ZANAFLEX) 4 MG tablet Take . 5-1 tablet by mouth bid prn pain (Patient not taking: Reported on 9/14/2020) 180 tablet 0    QUEtiapine (SEROQUEL) 25 MG tablet Take 1-2 tablets by mouth nightly (Patient not taking: Reported on 9/9/2020) 180 tablet 0    diclofenac sodium (VOLTAREN) 1 % GEL Apply 2 grams to affected area BID (Patient taking differently: Apply 2 grams to affected area BID PRN) 5 Tube 1    hydroCHLOROthiazide (HYDRODIURIL) 25 MG tablet TAKE 1 TAB daily 90 tablet 3    lisinopril (PRINIVIL;ZESTRIL) 10 MG tablet TAKE 1 TABLET DAILY 90 tablet 3    buPROPion (WELLBUTRIN XL) 150 MG extended release tablet Take 1 tablet by mouth every morning 90 tablet 1    rosuvastatin (CRESTOR) 40 MG tablet Take 1 tablet by mouth daily 90 tablet 3    magnesium (MAGNESIUM-OXIDE) 250 MG TABS tablet Take 250 mg by mouth daily Indications: takes if wife puts in my pill bottle        No facility-administered medications prior to visit. SOCIAL/FAMILY/PAST MEDICAL HISTORY: Mr. Deepak Trent, family and past medical history was reviewed. REVIEW OF SYSTEMS:    Respiratory: Negative for apnea, chest tightness and shortness of breath or change in baseline breathing. Gastrointestinal: Negative for nausea, vomiting, abdominal pain, diarrhea, constipation, blood in stool and abdominal distention.        PHYSICAL EXAM:   Nursing note and vitals surgical syndrome, Degenerative joint disease of spinal facet joint, DDD (degenerative disc disease), lumbosacral, Chronic pain of both knees, Osteoarthritis of right glenohumeral joint, Low back pain radiating to right leg, and Recurrent major depressive disorder, in partial remission ([address] were also pertinent to this visit. Risks and benefits of the medications and other alternative treatments  including no treatment were discussed with the patient. The common side effects of these medications were also explained to the patient. Informed verbal consent was obtained. Goals of current treatment regimen include improvement in pain, restoration of functioning- with focus on improvement in physical performance, general activity, work or disability,emotional distress, health care utilization and  decreased medication consumption. Will continue to monitor progress towards achieving/maintaining therapeutic goals with special emphasis on  1. Improvement in perceived interfernce  of pain with ADL's. Ability to do home exercises independently. Ability to do household chores indoor and/or outdoor work and social and leisure activities. Improve psychosocial and physical functioning. - he is showing progression towards this treatment goal with the current regimen. He was advised against drinking alcohol with the narcotic pain medicines, advised against driving or handling machinery while adjusting the dose of medicines or if having cognitive  issues related to the current medications. Risk of overdose and death, if medicines not taken as prescribed, were also discussed. If the patient develops new symptoms or if the symptoms worsen, the patient should call the office. While transcribing every attempt was made to maintain the accuracy of the note in terms of it's contents,there may have been some errors made inadvertently. Thank you for allowing me to participate in the care of this patient.     Jes Garland, MD.    Cc: Figueroa Lima MD

## 2020-09-21 ENCOUNTER — TELEPHONE (OUTPATIENT)
Dept: ORTHOPEDIC SURGERY | Age: 50
End: 2020-09-21

## 2020-09-21 NOTE — TELEPHONE ENCOUNTER
He is looking for his disability ppw. He would like a call back asa.   He can be reached at [phone]

## 2020-09-22 NOTE — TELEPHONE ENCOUNTER
Pt called; checking the status/concerns about forms thats needed for his  before next week. Please advise.      Call [phone] to discuss

## 2020-09-23 ENCOUNTER — TELEPHONE (OUTPATIENT)
Dept: ORTHOPEDIC SURGERY | Age: 50
End: 2020-09-23

## 2020-09-24 ENCOUNTER — CARE COORDINATION (OUTPATIENT)
Dept: OTHER | Facility: CLINIC | Age: 50
End: 2020-09-24

## 2020-09-24 NOTE — CARE COORDINATION
[address] [facility] ED Follow Up Call    2020    Patient: Olegaroi Green Patient : 1970   MRN: [id number]  Reason for Admission: Chest pain  Discharge Date: 20    ACM attempted to reach patient for follow up call. HIPAA compliant message left requesting a return phone call at patients convenience. Will continue to follow. Rojas Armstrong RN BSN  Associate Care Manager  Phone: [phone]  Email: [email or web address]

## 2020-09-29 ENCOUNTER — CARE COORDINATION (OUTPATIENT)
Dept: OTHER | Facility: CLINIC | Age: 50
End: 2020-09-29

## 2020-09-29 NOTE — CARE COORDINATION
[address] [facility] ED Follow Up Call    2020    Patient: Prashant Linares Patient : 1970   MRN: [id number]  Reason for Admission: Chest pain  Discharge Date: 20    Associate Care Manager spoke to patient for CT ED f/u. Patient states \"things are much better than they were\". States the \"pain is much better\" and \"it's (pain) pretty much gone\". States he is still having the pain in left shoulder but needs to have scope done. States he has been using TENS unit off and on to shoulder and states that is \"helping some\". Patient reports he is right handed but had right shoulder replaced ~4-5 years ago. States since then, he has learned to use left hand more than right. States he has been using Voltaren gel to shoulder and back, which he states seems to help. Patient states he feels that the Cymbalta increase has been helping with his motivation. Has follow up appt with PCP 10/8/20. Denies needs or concerns at present time. Care Transitions ED Follow Up    Care Transitions Interventions  Do you have any ongoing symptoms?:  No   Are you following your discharge instructions?:  Yes   Do you have all of your prescriptions and are they filled?:  Yes   Have you scheduled your follow up appointment?:  Yes   How are you going to get to your appointment?:  Car - family or friend to transport              Goals      Conditions and Symptoms      I will schedule office visits, as directed by my provider. I will keep my appointment or reschedule if I have to cancel. I will notify my provider of any barriers to my plan of care. I will notify my provider of any symptoms that indicate a worsening of my condition. Barriers: stress and time constraints  Plan for overcoming my barriers: I will work with AC to overcome barriers. Confidence: 8/10  Anticipated Goal Completion Date: 10/1/20    9/9/20: Patient states he will call to schedule f/u with PCP tomorrow.      9/15/20: Patient has appt with PCP scheduled for 10/8/20. Has appt with pain management 9/16/20.     9/29/20: Patient has appt with PCP 10/8/20. Jeff Willard RN BSN  Associate Care Manager  Phone: [phone]  Email: [email or web address]

## 2020-10-13 ENCOUNTER — CARE COORDINATION (OUTPATIENT)
Dept: OTHER | Facility: CLINIC | Age: 50
End: 2020-10-13

## 2020-10-13 NOTE — CARE COORDINATION
[address] [facility] ED Follow Up Call    10/13/2020    Patient: Sourav Argueta Patient : 1970   MRN: [id number]  Reason for Admission: Chest pain  Discharge Date: 2020    ACM attempted to reach patient for follow up call. HIPAA compliant message left requesting a return phone call at patients convenience. Will continue to follow. Effie Lawson RN BSN  Associate Care Manager  Phone: [phone]  Email: [email or web address]

## 2020-10-19 ENCOUNTER — CARE COORDINATION (OUTPATIENT)
Dept: OTHER | Facility: CLINIC | Age: 50
End: 2020-10-19

## 2020-10-19 NOTE — CARE COORDINATION
[address] [facility] ED Follow Up Call    10/19/2020    Patient: Payam Mackenzie Patient : 1970   MRN: [id number]  Reason for Admission: Chest pain  Discharge Date: 2020    ACM attempted to reach patient for follow up call. HIPAA compliant message left requesting a return phone call at patients convenience. ACM signing off if no return call received. AC also sent final lost to follow up letter via [address] Mike Camarillo RN BSN  Associate Care Manager  Phone: [phone]  Email: [email or web address]

## 2020-11-03 PROBLEM — I10 HTN (HYPERTENSION): Status: RESOLVED | Noted: 2020-11-03 | Resolved: 2020-11-03

## 2020-12-21 NOTE — H&P
The patient was interviewed and examined and there have been no changes since the documented History and Physical.    Electronically signed by Butch Jenkins MD on 2/26/2020 at 12:38 PM
Opt out

## 2020-12-29 RX ORDER — LEVOTHYROXINE SODIUM 0.1 MG/1
TABLET ORAL
Qty: 90 TABLET | Refills: 0 | Status: SHIPPED | OUTPATIENT
Start: 2020-12-29 | End: 2021-01-11 | Stop reason: SDUPTHER

## 2020-12-29 NOTE — TELEPHONE ENCOUNTER
Patient's wife requesting a medication refill. Medication: levothyroxine (SYNTHROID) 100 MCG tablet       Pharmacy:  [facility].    [facility] [phone]    Last office visit: 3/19/2020  Next office visit: Visit date not found   Please give a 90 day supply

## 2020-12-31 NOTE — TELEPHONE ENCOUNTER
[facility] called in regarding refill for this medication. They would like to know if a 90 day supply can be sent in for the patient.      Best call back number: [phone]

## 2021-01-06 ENCOUNTER — TELEPHONE (OUTPATIENT)
Dept: FAMILY MEDICINE CLINIC | Age: 51
End: 2021-01-06

## 2021-01-06 NOTE — TELEPHONE ENCOUNTER
[facility] called in regarding PT's prescription for Levothyroxine. They need clarification on the directions and the quantity.      Please call back: [phone]

## 2021-01-08 NOTE — TELEPHONE ENCOUNTER
PT called in regarding refill for this medication. PT says that the medication was supposed to go to the [facility] in [place] on [address]. Please resubmit to the correct pharmacy.

## 2021-01-11 RX ORDER — LEVOTHYROXINE SODIUM 0.1 MG/1
TABLET ORAL
Qty: 60 TABLET | Refills: 0 | Status: SHIPPED | OUTPATIENT
Start: 2021-01-11 | End: 2021-03-25 | Stop reason: SDUPTHER

## 2021-02-15 ENCOUNTER — TELEPHONE (OUTPATIENT)
Dept: ORTHOPEDIC SURGERY | Age: 51
End: 2021-02-15

## 2021-02-15 NOTE — TELEPHONE ENCOUNTER
RECEIVED APS FROM THE [place].  WAITING FOR A REPLY FROM BRANDON/SHERRI  (KHALIDA) REGARDING THE NEED FOR COMPLETION.

## 2021-02-17 ENCOUNTER — TELEPHONE (OUTPATIENT)
Dept: ORTHOPEDIC SURGERY | Age: 51
End: 2021-02-17

## 2021-03-22 ENCOUNTER — TELEPHONE (OUTPATIENT)
Dept: FAMILY MEDICINE CLINIC | Age: 51
End: 2021-03-22

## 2021-03-22 NOTE — TELEPHONE ENCOUNTER
Subject: Appointment Request     Reason for Call: Urgent (Patient Request) No Script     QUESTIONS   Type of Appointment? Established Patient   Reason for appointment request? Available appointments did not meet   patient need   Additional Information for Provider? pt has noticed a lump in left breast   and needs appt to be looked at    pt uncle has  from breast cancer so pt needs a sooner appt he is so   nervous . .. pt would be self pay please call asap to further assist with   appt   ---------------------------------------------------------------------------   --------------   CALL BACK INFO   What is the best way for the office to contact you? OK to leave message on   voicemail   Preferred Call Back Phone Number? [phone]   ---------------------------------------------------------------------------   --------------   SCRIPT ANSWERS   Relationship to Patient? Other   Representative Name? wife   Additional information verified (besides Name and Date of Birth)? Phone   Number   Appointment reason? Symptomatic   Select script based on patient symptoms? Adult No Script   (Is the patient requesting to see the provider for a procedure?)? No   (Is the patient requesting to see the provider urgently - today or   tomorrow. )? Yes   Have you been diagnosed with    tested for    or told that you are suspected of having COVID-19 (Coronavirus)? No   Have you had a fever or taken medication to treat a fever within the past   3 days? No   Have you had a cough    shortness of breath or flu-like symptoms within the past 3 days? No   Do you currently have flu-like symptoms including fever or chills    cough    shortness of breath    or difficulty breathing    or new loss of taste or smell? No   (Service Expert - click yes below to proceed with [employer] As Usual   Scheduling)?  Yes

## 2021-03-25 ENCOUNTER — OFFICE VISIT (OUTPATIENT)
Dept: FAMILY MEDICINE CLINIC | Age: 51
End: 2021-03-25
Payer: COMMERCIAL

## 2021-03-25 VITALS
TEMPERATURE: 97.7 F | OXYGEN SATURATION: 96 % | DIASTOLIC BLOOD PRESSURE: 84 MMHG | BODY MASS INDEX: 38.37 KG/M2 | HEART RATE: 84 BPM | RESPIRATION RATE: 16 BRPM | SYSTOLIC BLOOD PRESSURE: 132 MMHG | WEIGHT: 299 LBS | HEIGHT: 74 IN

## 2021-03-25 DIAGNOSIS — G47.33 OSA (OBSTRUCTIVE SLEEP APNEA): ICD-10-CM

## 2021-03-25 DIAGNOSIS — E78.00 HYPERCHOLESTEROLEMIA: ICD-10-CM

## 2021-03-25 DIAGNOSIS — Z85.850 HISTORY OF THYROID CANCER: ICD-10-CM

## 2021-03-25 DIAGNOSIS — M50.30 DDD (DEGENERATIVE DISC DISEASE), CERVICAL: ICD-10-CM

## 2021-03-25 DIAGNOSIS — G89.4 CHRONIC PAIN SYNDROME: ICD-10-CM

## 2021-03-25 DIAGNOSIS — M54.16 LUMBAR RADICULOPATHY: ICD-10-CM

## 2021-03-25 DIAGNOSIS — M51.37 DDD (DEGENERATIVE DISC DISEASE), LUMBOSACRAL: ICD-10-CM

## 2021-03-25 DIAGNOSIS — N63.0 BREAST MASS IN MALE: Primary | ICD-10-CM

## 2021-03-25 DIAGNOSIS — E03.9 ACQUIRED HYPOTHYROIDISM: ICD-10-CM

## 2021-03-25 DIAGNOSIS — I10 ESSENTIAL HYPERTENSION: ICD-10-CM

## 2021-03-25 DIAGNOSIS — R73.03 PREDIABETES: ICD-10-CM

## 2021-03-25 PROCEDURE — 99214 OFFICE O/P EST MOD 30 MIN: CPT | Performed by: INTERNAL MEDICINE

## 2021-03-25 RX ORDER — HYDROCHLOROTHIAZIDE 25 MG/1
TABLET ORAL
Qty: 90 TABLET | Refills: 3 | Status: SHIPPED | OUTPATIENT
Start: 2021-03-25 | End: 2021-06-16 | Stop reason: SDUPTHER

## 2021-03-25 RX ORDER — DULOXETIN HYDROCHLORIDE 60 MG/1
60 CAPSULE, DELAYED RELEASE ORAL 2 TIMES DAILY
Qty: 180 CAPSULE | Refills: 3 | Status: SHIPPED | OUTPATIENT
Start: 2021-03-25 | End: 2021-06-16 | Stop reason: SDUPTHER

## 2021-03-25 RX ORDER — LISINOPRIL 10 MG/1
TABLET ORAL
Qty: 90 TABLET | Refills: 3 | Status: SHIPPED | OUTPATIENT
Start: 2021-03-25 | End: 2022-08-09 | Stop reason: SDUPTHER

## 2021-03-25 RX ORDER — LEVOTHYROXINE SODIUM 0.1 MG/1
TABLET ORAL
Qty: 60 TABLET | Refills: 3 | Status: SHIPPED | OUTPATIENT
Start: 2021-03-25 | End: 2021-05-14

## 2021-03-25 RX ORDER — ROSUVASTATIN CALCIUM 40 MG/1
40 TABLET, COATED ORAL DAILY
Qty: 90 TABLET | Refills: 3 | Status: SHIPPED | OUTPATIENT
Start: 2021-03-25 | End: 2022-08-09 | Stop reason: SDUPTHER

## 2021-03-25 RX ORDER — PREGABALIN 100 MG/1
CAPSULE ORAL
Qty: 270 CAPSULE | Refills: 3 | Status: SHIPPED | OUTPATIENT
Start: 2021-03-25 | End: 2021-06-16 | Stop reason: SDUPTHER

## 2021-03-25 ASSESSMENT — ENCOUNTER SYMPTOMS
ABDOMINAL PAIN: 0
ABDOMINAL DISTENTION: 0
BACK PAIN: 1
DIARRHEA: 0
SHORTNESS OF BREATH: 0

## 2021-03-25 NOTE — PROGRESS NOTES
Ky Robertson (:  1970) is a 45 y.o. male,Established patient, here for evaluation of the following chief complaint(s): Mass (left breast area )      ASSESSMENT/PLAN:  1. Breast mass in male  -     US BREAST LIMITED LEFT; Future  -     [facility] Unilateral Left; Future  2. DDD (degenerative disc disease), cervical  -     pregabalin (LYRICA) 100 MG capsule; TAKE 1 TAB IN THE AM AND 2 TABS IN THE PM, Disp-270 capsule, R-3Normal  3. Lumbar radiculopathy  -     pregabalin (LYRICA) 100 MG capsule; TAKE 1 TAB IN THE AM AND 2 TABS IN THE PM, Disp-270 capsule, R-3Normal  4. DDD (degenerative disc disease), lumbosacral  -     pregabalin (LYRICA) 100 MG capsule; TAKE 1 TAB IN THE AM AND 2 TABS IN THE PM, Disp-270 capsule, R-3Normal  5. Chronic pain syndrome  -     pregabalin (LYRICA) 100 MG capsule; TAKE 1 TAB IN THE AM AND 2 TABS IN THE PM, Disp-270 capsule, R-3Normal  -     DULoxetine (CYMBALTA) 60 MG extended release capsule; Take 1 capsule by mouth 2 times daily, Disp-180 capsule, R-3Normal  6. History of thyroid cancer  -     levothyroxine (SYNTHROID) 100 MCG tablet; 2 p.o. every morning at least 30 minutes before food, Disp-60 tablet, R-3Normal  7. Acquired hypothyroidism  -     TSH with Reflex; Future  8. Essential hypertension  -     Comprehensive Metabolic Panel; Future  9. Hypercholesterolemia  -     Lipid Panel; Future  10. KAR (obstructive sleep apnea)  11. Prediabetes  -     Hemoglobin A1C; Future      No follow-ups on file. SUBJECTIVE/OBJECTIVE:  HPI   Presents for routine follow-up care. Has not been seen in quite some time. He currently does not have insurance but it is going to be reinstated in [place]. Following with Dr. Janina Ceja for chronic pain. On  lyrica, nortriptyline, cymabalta, tizanidine. Needs refills until he can get back in with him. Degenerative disc disease with prior h/o surgery. Tolerating medications without any adverse effects. He is off Wellbutrin.   No longer following with psych NP. He feels his mood is stable. \"It is good as is going to get\"      Has a h/o HTN on ace-I and diuretic therapy. No recent cp,sob, edema, LH/dizziness.      Also has HL. On statin. No myalgias or abdominal pain.     Has hypothyroidism on replacement s/p thyroidectomy for thyroid cancer in 2008. Also had radioactive iodine. No longer following with anyone for that. Still on Synthroid and needs labs and refill.     Has a h/o KAR and wears his CPAP consistently.     Has concerns today regarding lump in his left breast.  Noted it a few days ago. It is tender. Located behind the areolar complex. No overlying erythema or warmth. No fever or chills. He is worried he has has a family history of breast cancer and has his own personal history of cancer. Review of Systems   Respiratory: Negative for shortness of breath. Cardiovascular: Negative for chest pain, palpitations and leg swelling. Gastrointestinal: Negative for abdominal distention, abdominal pain and diarrhea. Musculoskeletal: Positive for arthralgias and back pain. Negative for myalgias. Psychiatric/Behavioral: Negative for dysphoric mood and suicidal ideas. The patient is not nervous/anxious. Physical Exam  Constitutional:       General: He is not in acute distress. Appearance: Normal appearance. HENT:      Head: Normocephalic and atraumatic. Cardiovascular:      Rate and Rhythm: Normal rate and regular rhythm. Heart sounds: No murmur. Pulmonary:      Effort: Pulmonary effort is normal. No respiratory distress. Breath sounds: Normal breath sounds. No wheezing or rales. Chest:      Breasts:         Right: Normal. No inverted nipple, mass, nipple discharge, skin change or tenderness. Left: Mass and tenderness present. No inverted nipple, nipple discharge or skin change.       Comments: Mobile dense mass behind left nipple areolar complex  Musculoskeletal:      Right lower leg: No edema. Left lower leg: No edema. Neurological:      Mental Status: He is alert. Psychiatric:         Mood and Affect: Mood normal.         Behavior: Behavior normal.         Thought Content: Thought content normal.         Judgment: Judgment normal.            An electronic signature was used to authenticate this note.     --Ana Paula Miranda MD

## 2021-05-05 ENCOUNTER — APPOINTMENT (OUTPATIENT)
Dept: ULTRASOUND IMAGING | Age: 51
End: 2021-05-05
Payer: COMMERCIAL

## 2021-05-05 ENCOUNTER — HOSPITAL ENCOUNTER (OUTPATIENT)
Dept: WOMENS IMAGING | Age: 51
Discharge: HOME | End: 2021-05-05
Payer: COMMERCIAL

## 2021-05-05 DIAGNOSIS — N63.0 BREAST MASS IN MALE: ICD-10-CM

## 2021-05-05 PROCEDURE — G0279 TOMOSYNTHESIS, MAMMO: HCPCS

## 2021-05-06 ENCOUNTER — TELEPHONE (OUTPATIENT)
Dept: FAMILY MEDICINE CLINIC | Age: 51
End: 2021-05-06

## 2021-05-06 NOTE — TELEPHONE ENCOUNTER
----- Message from Meka Vaughn sent at 5/6/2021 10:45 AM EDT -----  Subject: Referral Request    QUESTIONS   Reason for referral request? PT requesting MRI of meniscus on LT side. PT   has an appt on Tuesday but would like referral done before appt. Has the physician seen you for this condition before? No   Preferred Specialist (if applicable)? Do you already have an appointment scheduled? Additional Information for Provider?   ---------------------------------------------------------------------------  --------------  CALL BACK INFO  What is the best way for the office to contact you? OK to leave message on   voicemail  Preferred Call Back Phone Number?  [phone]

## 2021-05-06 NOTE — TELEPHONE ENCOUNTER
Please let patient know that we cannot order imaging prior to an appointment as we need to make sure the imaging is appropriate and have correct documentation so it will be covered by insurance. Please document call and then close encounter.   thanks

## 2021-05-11 ENCOUNTER — OFFICE VISIT (OUTPATIENT)
Dept: ORTHOPEDIC SURGERY | Age: 51
End: 2021-05-11
Payer: COMMERCIAL

## 2021-05-11 ENCOUNTER — OFFICE VISIT (OUTPATIENT)
Dept: FAMILY MEDICINE CLINIC | Age: 51
End: 2021-05-11
Payer: COMMERCIAL

## 2021-05-11 VITALS
OXYGEN SATURATION: 98 % | RESPIRATION RATE: 16 BRPM | SYSTOLIC BLOOD PRESSURE: 122 MMHG | BODY MASS INDEX: 38.26 KG/M2 | DIASTOLIC BLOOD PRESSURE: 68 MMHG | WEIGHT: 298 LBS | HEART RATE: 97 BPM

## 2021-05-11 VITALS — HEIGHT: 74 IN | WEIGHT: 295 LBS | BODY MASS INDEX: 37.86 KG/M2

## 2021-05-11 DIAGNOSIS — N63.0 BREAST MASS IN MALE: Primary | ICD-10-CM

## 2021-05-11 DIAGNOSIS — H60.543 DERMATITIS OF EAR CANAL, BILATERAL: ICD-10-CM

## 2021-05-11 DIAGNOSIS — I10 ESSENTIAL HYPERTENSION: ICD-10-CM

## 2021-05-11 DIAGNOSIS — R73.03 PREDIABETES: ICD-10-CM

## 2021-05-11 DIAGNOSIS — E78.00 HYPERCHOLESTEROLEMIA: ICD-10-CM

## 2021-05-11 DIAGNOSIS — M25.562 LEFT KNEE PAIN, UNSPECIFIED CHRONICITY: ICD-10-CM

## 2021-05-11 DIAGNOSIS — M17.0 PRIMARY OSTEOARTHRITIS OF BOTH KNEES: Primary | ICD-10-CM

## 2021-05-11 DIAGNOSIS — E03.9 ACQUIRED HYPOTHYROIDISM: ICD-10-CM

## 2021-05-11 PROCEDURE — 99214 OFFICE O/P EST MOD 30 MIN: CPT | Performed by: INTERNAL MEDICINE

## 2021-05-11 PROCEDURE — 99204 OFFICE O/P NEW MOD 45 MIN: CPT | Performed by: ORTHOPAEDIC SURGERY

## 2021-05-11 RX ORDER — FLUOCINOLONE ACETONIDE 0.11 MG/ML
5 OIL AURICULAR (OTIC) 2 TIMES DAILY
Qty: 20 ML | Refills: 0 | Status: SHIPPED | OUTPATIENT
Start: 2021-05-11 | End: 2021-05-25

## 2021-05-11 ASSESSMENT — ENCOUNTER SYMPTOMS: BACK PAIN: 1

## 2021-05-11 NOTE — PROGRESS NOTES
Review of Systems   HENT:        Eye or hearing impairment, thyroid disease   Cardiovascular: Positive for leg swelling. High blood pressure   Musculoskeletal: Positive for back pain and neck pain. Neurological:        Chronic pain   Psychiatric/Behavioral:        Depression or other problems   All other systems reviewed and are negative.

## 2021-05-11 NOTE — PROGRESS NOTES
Rose Marie Leary (:  1970) is a 45 y.o. male,Established patient, here for evaluation of the following chief complaint(s):  Follow-up, Abnormal Mammogram, and Knee Pain      ASSESSMENT/PLAN:  1. Breast mass in male  -     HCG, SERUM, QUALITATIVE; Future  -     LUTEINIZING HORMONE; Future  -     HCG, TUMOR MARKER; Future  -     Testosterone, free, total; Future  -     ESTRADIOL; Future  2. Essential hypertension  -     Comprehensive Metabolic Panel; Future  3. Acquired hypothyroidism  -     TSH with Reflex; Future  4. Hypercholesterolemia  -     Lipid Panel; Future  5. Prediabetes  -     Hemoglobin A1C; Future  6. Dermatitis of ear canal, bilateral    He has ongoing pain in his breast.  The mass actually resolved. The mammogram was normal.  Ultrasound was not performed. Recommend we check hormone levels including  human chorionic gonadotropin, LH, testosterone, and estradiol . Recommend early morning testosterone. He will schedule. May need further evaluation with endocrinology of possible breast surgery if pain continues    He will try Dermotic for his ear canal dermatitis. If no improvement, see ENT. He will also return for his other pending lab work  Return in about 4 weeks (around 2021). SUBJECTIVE/OBJECTIVE:  HPI  Patient presents for follow-up. The breast mass under his left nipple at last visit. He went for mammogram which did not show anything other than gynecomastia. He reports the lump actually seems to be improved but he still has pain below the nipple bilaterally. No discharge. No new masses noted. Denies any testicular issues or pain. He also has concerns regarding itchiness in his ears. He has been using Q-tips. No discharge. No pain. No hearing change. Does have some minor tinnitus. He is due for follow-up labs. Did not return to do those after last visit. Review of Systems   HENT: Positive for tinnitus. Negative for ear discharge and ear pain. Genitourinary: Negative for scrotal swelling and testicular pain. Physical Exam  Constitutional:       Appearance: Normal appearance. HENT:      Head: Normocephalic. Right Ear: Tympanic membrane and external ear normal. There is no impacted cerumen. Left Ear: Tympanic membrane and external ear normal. There is no impacted cerumen. Ears:      Comments: Erythematous canal with no discharge noted  Chest:      Breasts:         Right: Normal. No swelling, bleeding, inverted nipple, mass, nipple discharge, skin change or tenderness. Left: Tenderness present. No swelling, bleeding, inverted nipple, mass or nipple discharge. Lymphadenopathy:      Upper Body:      Right upper body: No axillary adenopathy. Left upper body: No axillary adenopathy. Neurological:      Mental Status: He is alert. An electronic signature was used to authenticate this note.     --Halley Ponce MD

## 2021-05-12 ENCOUNTER — NURSE ONLY (OUTPATIENT)
Dept: FAMILY MEDICINE CLINIC | Age: 51
End: 2021-05-12
Payer: COMMERCIAL

## 2021-05-12 DIAGNOSIS — N63.0 BREAST MASS IN MALE: ICD-10-CM

## 2021-05-12 DIAGNOSIS — E03.9 ACQUIRED HYPOTHYROIDISM: ICD-10-CM

## 2021-05-12 DIAGNOSIS — E78.00 HYPERCHOLESTEROLEMIA: ICD-10-CM

## 2021-05-12 DIAGNOSIS — R73.03 PREDIABETES: ICD-10-CM

## 2021-05-12 DIAGNOSIS — I10 ESSENTIAL HYPERTENSION: ICD-10-CM

## 2021-05-12 LAB
A/G RATIO: 1.7 (ref 1.1–2.2)
ALBUMIN SERPL-MCNC: 4.5 G/DL (ref 3.4–5)
ALP BLD-CCNC: 103 U/L (ref 40–129)
ALT SERPL-CCNC: 22 U/L (ref 10–40)
ANION GAP SERPL CALCULATED.3IONS-SCNC: 12 MMOL/L (ref 3–16)
AST SERPL-CCNC: 16 U/L (ref 15–37)
BILIRUB SERPL-MCNC: 0.4 MG/DL (ref 0–1)
BUN BLDV-MCNC: 14 MG/DL (ref 7–20)
CALCIUM SERPL-MCNC: 9.3 MG/DL (ref 8.3–10.6)
CHLORIDE BLD-SCNC: 102 MMOL/L (ref 99–110)
CHOLESTEROL, TOTAL: 159 MG/DL (ref 0–199)
CO2: 28 MMOL/L (ref 21–32)
CREAT SERPL-MCNC: 0.9 MG/DL (ref 0.9–1.3)
ESTRADIOL LEVEL: 26 PG/ML
GFR AFRICAN AMERICAN: >60
GFR NON-AFRICAN AMERICAN: >60
GLOBULIN: 2.6 G/DL
GLUCOSE BLD-MCNC: 108 MG/DL (ref 70–99)
HDLC SERPL-MCNC: 34 MG/DL (ref 40–60)
LDL CHOLESTEROL CALCULATED: 93 MG/DL
LUTEINIZING HORMONE: 3.2 MIU/ML
POTASSIUM SERPL-SCNC: 4.4 MMOL/L (ref 3.5–5.1)
SODIUM BLD-SCNC: 142 MMOL/L (ref 136–145)
T3 TOTAL: 1.4 NG/ML (ref 0.8–2)
T4 FREE: 1.4 NG/DL (ref 0.9–1.8)
TOTAL PROTEIN: 7.1 G/DL (ref 6.4–8.2)
TRIGL SERPL-MCNC: 158 MG/DL (ref 0–150)
TSH REFLEX: 0.07 UIU/ML (ref 0.27–4.2)
VLDLC SERPL CALC-MCNC: 32 MG/DL

## 2021-05-12 PROCEDURE — 36415 COLL VENOUS BLD VENIPUNCTURE: CPT | Performed by: INTERNAL MEDICINE

## 2021-05-12 PROCEDURE — 99999 PR OFFICE/OUTPT VISIT,PROCEDURE ONLY: CPT | Performed by: INTERNAL MEDICINE

## 2021-05-13 NOTE — PROGRESS NOTES
[address]  History and Physical  Knee Pain    Date:  2021    Name:  Evaristo Winchester  Address:  [facility] [address]    :  1970      Age:   45 y.o.    SSN:  [id number]      Medical Record Number:  [id number]      Chief Complaint    New Patient (left knee )      History of Present Illness:  Evaristo Winchester is a 45 y.o. male who presents for evaluation of his left knee pain. 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 He immediately felt pain in the medial aspect of his left knee. He has had difficulty weightbearing since then. Now he notes pain along medial joint line and patellofemoral joint. He notes that the pain is dull and achy with sharp intermittent bouts. This is worse with prolonged standing, weightbearing, stairs and squatting. He denies any radiating pain. The patient denies any new injury since then. He has had 1 partial giving way episode. He does note that his pain has improved over the past several days. The patient denies any numbness, paresthesias, or weakness. Pain Assessment  Location of Pain: Knee  Location Modifiers: Left  Severity of Pain: 8  Quality of Pain: Sharp  Duration of Pain: Persistent  Frequency of Pain: Constant  Aggravating Factors:  Other (Comment)(all)  Relieving Factors: Rest, Nsaids  Result of Injury: No  Work-Related Injury: No  Are there other pain locations you wish to document?: No    Past Medical History:   Diagnosis Date    Chronic pain syndrome     DDD (degenerative disc disease), cervical     Degenerative joint disease of spinal facet joint     Depressive disorder, not elsewhere classified     Dizziness     Failed back surgical syndrome     GERD (gastroesophageal reflux disease)     Headache(784.0)     HTN (hypertension)     Hyperlipidemia     Lumbar radiculopathy     Sleep apnea     does use CPAP    thyroid cancer     thyroid    Thyroid disease     Wears glasses         Past Surgical History:   Procedure Laterality Date    CARPAL TUNNEL RELEASE Left 3-5-2013    CARPAL TUNNEL RELEASE Right 4-    COLONOSCOPY N/A 10/15/2019    COLONOSCOPY WITH BIOPSY performed by Marylu Blake MD at [address] COLONOSCOPY N/A 10/15/2019    COLONOSCOPY POLYPECTOMY SNARE/COLD BIOPSY performed by Marylu Blake MD at [place] [address] Left 1-5-2016    First dorsal extensor compartment tendon release.     JOINT REPLACEMENT Right     shoulder    KNEE ARTHROSCOPY Right 2/26/2020    RIGHT KNEE ARTHROSCOPY MEDIAL MENISCECTOMY AND CHONDROPLASTY performed by Daniela Rivera MD at [place] [address] DECOMPRESS [employer]EARM,EXCIS MUSC/NERV Right 4/8/2019    RIGHT OPEN TENNIS ELBOW RELEASE performed by Ge Sanon MD at [address] [place] [address] Right 1993    right    SPINAL FUSION  2006    THYROIDECTOMY  2008    UPPER GASTROINTESTINAL ENDOSCOPY N/A 1/29/2019    EGD DIAGNOSTIC ONLY performed by Jerome Clemons MD at [facility] N/A 10/15/2019    EGD DILATION BALLOON performed by Marylu Blake MD at [facility] N/A 10/15/2019    EGD BIOPSY performed by Marylu Blake MD at [address] [place] [address] History   Problem Relation Age of Onset    Diabetes Father     Heart Failure Father     Hypertension Father     High Blood Pressure Father     High Blood Pressure Mother     Stroke Maternal Grandmother        Social History     Socioeconomic History    Marital status:      Spouse name: None    Number of children: 3    Years of education: None    Highest education level: None   Occupational History    Occupation:    Social Needs    Financial resource strain: None    Food insecurity     Worry: None     Inability: None    Transportation needs Medical: None     Non-medical: None   Tobacco Use    Smoking status: Never Smoker    Smokeless tobacco: Never Used   Substance and Sexual Activity    Alcohol use: Yes     Alcohol/week: 0.0 standard drinks     Comment: occasional use    Drug use: Never    Sexual activity: Yes     Partners: Female   Lifestyle    Physical activity     Days per week: None     Minutes per session: None    Stress: None   Relationships    Social connections     Talks on phone: None     Gets together: None     Attends Yazidi service: None     Active member of club or organization: None     Attends meetings of clubs or organizations: None     Relationship status: None    Intimate partner violence     Fear of current or ex partner: None     Emotionally abused: None     Physically abused: None     Forced sexual activity: None   Other Topics Concern    None   Social History Narrative    None       Current Outpatient Medications   Medication Sig Dispense Refill    fluocinolone (DERMOTIC) 0.01 % OIL oil Place 5 drops in ear(s) 2 times daily for 14 days 20 mL 0    pregabalin (LYRICA) 100 MG capsule TAKE 1 TAB IN THE AM AND 2 TABS IN THE  capsule 3    levothyroxine (SYNTHROID) 100 MCG tablet 2 p.o. every morning at least 30 minutes before food 60 tablet 3    DULoxetine (CYMBALTA) 60 MG extended release capsule Take 1 capsule by mouth 2 times daily 180 capsule 3    hydroCHLOROthiazide (HYDRODIURIL) 25 MG tablet TAKE 1 TAB daily 90 tablet 3    lisinopril (PRINIVIL;ZESTRIL) 10 MG tablet TAKE 1 TABLET DAILY 90 tablet 3    rosuvastatin (CRESTOR) 40 MG tablet Take 1 tablet by mouth daily 90 tablet 3    Tens Unit MISC by Does not apply route Use as directed. 1 each 0    diclofenac sodium (VOLTAREN) 1 % GEL Apply 2 grams to affected area BID 5 Tube 1    tiZANidine (ZANAFLEX) 4 MG tablet Take . 5-1 tablet by mouth bid prn pain 180 tablet 0    Cholecalciferol (VITAMIN D3) 50 MCG (2000 UT) CAPS Take by mouth      magnesium pulse      Contralateral Knee Comparison Examination: Right    Inspection:  No effusion, ecchymosis, discoloration, erythema, excessive warmth. no gross deformities, no signs of infection. Palpation: There is patellofemoral crepitus, there is no joint line tenderness. No other osseous or soft tissue tenderness around the knee. Negative calf tenderness    Range of Motion:  0-130 degrees without pain or difficulty    Strength: Grossly intact    Special Tests: No ligament instability, valgus and varus stress test are stable at 0 and 30°. Lachman's exhibits a solid endpoint. Negative posterior drawer. Negative Homans sign. Negative Ariana's    Gait: Steady, Non antalgic, without the use of assistive devices    Alignment: Varus deformity    Neuro: Sensation equal bilateral lower extremities    Vascular: 2+ posterior tibialis pulse      Radiology:     X-rays obtained and reviewed in office: AP and merchant view of both knees and a lateral of the left. Impression: No fractures, dislocations, visible tumors, or signs of acute trauma. Patient exhibits decreased joint space in the medial and lateral femoral-tibial compartments bilaterally. Medial compartments worse than lateral.  Patient has appropriate joint spacing in the patellofemoral joints bilaterally. Patella is riding decreased in the trochlear groove with no subluxation or tilt noted. Office Procedures:  Orders Placed This Encounter   Procedures    XR KNEE LEFT (3 VIEWS)     Standing Status:   Future     Number of Occurrences:   1     Standing Expiration Date:   5/11/2022            Assessment: Patient is a 45 y.o. male presenting to the office for an evaluation of his left knee pain. Patient is a working diagnosis of an exacerbation of pre-existing osteoarthritis and a medial meniscus tear.       Visit Diagnoses       Codes    Primary osteoarthritis of both knees    -  Primary M17.0    Left knee pain, unspecified chronicity     M25.562 Medical decision making:  Patient's workup and evaluation were reviewed with the patient in the office today. X-ray imaging was reviewed with the patient. Given the patient's history and physical exam I believe the reasonable degree of medical certainty that the etiology of this patient's symptoms are an exacerbation of pre-existing osteoarthritis as well as a possible medial meniscus tear in the left knee. I believe we should attempt to utilize conservative measures for the next several weeks to see if the patient's condition continues to improve. Should he not exhibit further improvement or resolution of his symptoms in 6 weeks we will order an MRI to evaluate for a meniscus tear or chondral defect. Patient was educated on conservative therapy as detailed in the treatment plan below. All the patient's questions were answered while in the clinic. The patient is understanding of all instructions and agrees with the plan. Treatment Plan:    1. Physical therapy  2. Activity modification  3. Ice 20 minutes ever 1-2 hours PRN  4. Take medications as prescribed/instructed  5. Rest   6. Elevation at least 2 inches above the heart with pillows supporting the joint underneath  7. Lightweight exercise/low impact exercise  8. Appropriate diet/weight management      Follow up: 6 weeks and as needed    This patient exhibits moderate complexity for obtaining an independent history, reviewing past medical records, independent interpretation of diagnostic imaging, and further coordinating care. The patient exhibits low risk given that the patient is being treated with conservative therapy at this time. Approximately 40 minutes were spent reviewing past medical records, imaging, educating the patient, and coordinating care. Viraj Johnson PA-C    Physician Assistant - Certified    16/63/87 8:19 AM    During this examination, Sal MARTI, [facility], functioned as a scribe for Dr. Akash Patel. This dictation was performed with a verbal recognition program (DRAGON) and it was checked for errors. It is possible that there are still dictated errors within this office note. If so, please bring any errors to my attention for an addendum. All efforts were made to ensure that this office note is accurate. This dictation was performed with a verbal recognition program (DRAGON) and it was checked for errors. It is possible that there are still dictated errors within this office note. If so, please bring any errors to my attention for an addendum. All efforts were made to ensure that this office note is accurate. Supervising Physician Attestation:  I, Dr. Radha Fitzgerald, personally performed the services described in this documentation as scribed above, and it is both accurate and complete and I agree with all pertinent clinical information. I personally interviewed the patient and performed a physical examination and medical decision making. I discussed the patient's condition and treatment options and have  reviewed and agree with the past medical, family and social history unless otherwise noted. All of the patient's questions were answered.       Board Certified Orthopaedic Surgeon  [facility] [address]  [facility] and [address] [employer]  Professor of [employer]

## 2021-05-14 DIAGNOSIS — Z85.850 HISTORY OF THYROID CANCER: ICD-10-CM

## 2021-05-14 LAB
ESTIMATED AVERAGE GLUCOSE: 119.8 MG/DL
HBA1C MFR BLD: 5.8 %
HCG TUMOR MARKER: <1 IU/L (ref 0–3)
SEX HORMONE BINDING GLOBULIN: 22 NMOL/L (ref 11–80)
TESTOSTERONE FREE-NONMALE: 42 PG/ML (ref 47–244)
TESTOSTERONE TOTAL: 179 NG/DL (ref 220–1000)

## 2021-05-14 RX ORDER — LEVOTHYROXINE SODIUM 88 UG/1
TABLET ORAL
Qty: 30 TABLET | Refills: 1 | Status: SHIPPED | OUTPATIENT
Start: 2021-05-14 | End: 2021-07-02

## 2021-05-19 ENCOUNTER — NURSE ONLY (OUTPATIENT)
Dept: FAMILY MEDICINE CLINIC | Age: 51
End: 2021-05-19
Payer: COMMERCIAL

## 2021-05-19 DIAGNOSIS — R79.89 LOW TESTOSTERONE: Primary | ICD-10-CM

## 2021-05-19 PROCEDURE — 36415 COLL VENOUS BLD VENIPUNCTURE: CPT | Performed by: INTERNAL MEDICINE

## 2021-05-22 LAB
SEX HORMONE BINDING GLOBULIN: 17 NMOL/L (ref 11–80)
TESTOSTERONE FREE-NONMALE: 37.4 PG/ML (ref 47–244)
TESTOSTERONE TOTAL: 144 NG/DL (ref 220–1000)

## 2021-05-28 DIAGNOSIS — R79.89 LOW TESTOSTERONE: Primary | ICD-10-CM

## 2021-06-04 DIAGNOSIS — M25.562 LEFT KNEE PAIN, UNSPECIFIED CHRONICITY: Primary | ICD-10-CM

## 2021-06-16 ENCOUNTER — OFFICE VISIT (OUTPATIENT)
Dept: PAIN MANAGEMENT | Age: 51
End: 2021-06-16
Payer: COMMERCIAL

## 2021-06-16 VITALS
BODY MASS INDEX: 38.13 KG/M2 | WEIGHT: 297 LBS | SYSTOLIC BLOOD PRESSURE: 131 MMHG | HEART RATE: 86 BPM | TEMPERATURE: 98.2 F | DIASTOLIC BLOOD PRESSURE: 86 MMHG

## 2021-06-16 DIAGNOSIS — F39 MOOD DISORDER (HCC): ICD-10-CM

## 2021-06-16 DIAGNOSIS — M50.30 DDD (DEGENERATIVE DISC DISEASE), CERVICAL: ICD-10-CM

## 2021-06-16 DIAGNOSIS — M47.819 DEGENERATIVE JOINT DISEASE OF SPINAL FACET JOINT: ICD-10-CM

## 2021-06-16 DIAGNOSIS — M79.604 LOW BACK PAIN RADIATING TO RIGHT LEG: ICD-10-CM

## 2021-06-16 DIAGNOSIS — G89.29 CHRONIC PAIN OF BOTH KNEES: ICD-10-CM

## 2021-06-16 DIAGNOSIS — M51.37 DDD (DEGENERATIVE DISC DISEASE), LUMBOSACRAL: ICD-10-CM

## 2021-06-16 DIAGNOSIS — M25.561 CHRONIC PAIN OF BOTH KNEES: ICD-10-CM

## 2021-06-16 DIAGNOSIS — M54.16 LUMBAR RADICULOPATHY: ICD-10-CM

## 2021-06-16 DIAGNOSIS — M54.50 LOW BACK PAIN RADIATING TO RIGHT LEG: ICD-10-CM

## 2021-06-16 DIAGNOSIS — M19.011 OSTEOARTHRITIS OF RIGHT GLENOHUMERAL JOINT: ICD-10-CM

## 2021-06-16 DIAGNOSIS — F51.01 PRIMARY INSOMNIA: ICD-10-CM

## 2021-06-16 DIAGNOSIS — G89.4 CHRONIC PAIN SYNDROME: ICD-10-CM

## 2021-06-16 DIAGNOSIS — M96.1 FAILED BACK SURGICAL SYNDROME: ICD-10-CM

## 2021-06-16 DIAGNOSIS — M25.562 CHRONIC PAIN OF BOTH KNEES: ICD-10-CM

## 2021-06-16 PROCEDURE — 99214 OFFICE O/P EST MOD 30 MIN: CPT | Performed by: INTERNAL MEDICINE

## 2021-06-16 RX ORDER — TIZANIDINE 4 MG/1
4 TABLET ORAL 2 TIMES DAILY PRN
Qty: 60 TABLET | Refills: 1 | Status: SHIPPED | OUTPATIENT
Start: 2021-06-16 | End: 2021-11-30 | Stop reason: SDUPTHER

## 2021-06-16 RX ORDER — HYDROCODONE BITARTRATE AND ACETAMINOPHEN 5; 325 MG/1; MG/1
1 TABLET ORAL EVERY 6 HOURS PRN
Qty: 30 TABLET | Refills: 0 | Status: SHIPPED | OUTPATIENT
Start: 2021-06-16 | End: 2021-07-16

## 2021-06-16 RX ORDER — PREGABALIN 100 MG/1
100 CAPSULE ORAL 3 TIMES DAILY
Qty: 90 CAPSULE | Refills: 1
Start: 2021-06-16 | End: 2021-09-14 | Stop reason: SDUPTHER

## 2021-06-16 RX ORDER — DULOXETIN HYDROCHLORIDE 60 MG/1
60 CAPSULE, DELAYED RELEASE ORAL DAILY
Qty: 30 CAPSULE | Refills: 1
Start: 2021-06-16 | End: 2021-09-14 | Stop reason: SDUPTHER

## 2021-06-16 RX ORDER — HYDROCHLOROTHIAZIDE 25 MG/1
25 TABLET ORAL DAILY
Qty: 30 TABLET | Refills: 1
Start: 2021-06-16 | End: 2021-09-14 | Stop reason: SDUPTHER

## 2021-06-16 NOTE — LETTER
[facility]   [address] [address]  Phone: [phone]  Fax: [phone]    Shy Valera MD        2021      To Whom It May Concern:    1. Chronic pain syndrome    2. DDD (degenerative disc disease), cervical    3. Lumbar radiculopathy    4. Failed back surgical syndrome    5. Degenerative joint disease of spinal facet joint    6. DDD (degenerative disc disease), lumbosacral    7. Primary insomnia    8. Mood disorder ([facility] [address]    9. Osteoarthritis of right glenohumeral joint    10. Low back pain radiating to right leg    11. Chronic pain of both knees      Due to about medical conditions  is unable to do the followin. No lifting or carrying over 25 lbs. 2. No pushing or pulling over 25 lbs. 3. Bending, squatting, kneeling, twisting, turning and climbing on on occasion  4. Reaching above shoulder and typing on keyboard only on occasion  5.  Driving automatic or a standard shift on on occasion    It is my medical opinion Elvis Marques is unable to sustain gainful employment        Sincerely,        Shy Valera MD

## 2021-06-16 NOTE — PROGRESS NOTES
restorative. Does not feel rested in AM. Complains of feeling sleepy  during the day. He mentions he has been working in the garden. Patient's  subjective report of his mood is fair. he describes occasional symptoms of depression, occasional  irritability and some mood swings. Describes his mood as being neutral and reports some pleasure in his daily activities. Reports  fair  appetite, energy and concentration. Able to function well in different aspects of his daily activities. Denies suicidal or homicidal ideation. Denies any complaints of increased tension, does   Worry sometimes and occasional  irritability  he denies any c/o increased anxiety, No c/o panic attacks or symptoms of PTSD     ALLERGIES/PAST MED/FAM/SOC HISTORY: Mr. Christy Calhoun allergies, past medical, family and social history were reviewed in the chart. Mr. Christy Calhoun current medications are   Outpatient Medications Prior to Visit   Medication Sig Dispense Refill    levothyroxine (SYNTHROID) 88 MCG tablet 2 p.o. every morning at least 30 minutes before food 30 tablet 1    pregabalin (LYRICA) 100 MG capsule TAKE 1 TAB IN THE AM AND 2 TABS IN THE  capsule 3    DULoxetine (CYMBALTA) 60 MG extended release capsule Take 1 capsule by mouth 2 times daily 180 capsule 3    hydroCHLOROthiazide (HYDRODIURIL) 25 MG tablet TAKE 1 TAB daily 90 tablet 3    lisinopril (PRINIVIL;ZESTRIL) 10 MG tablet TAKE 1 TABLET DAILY 90 tablet 3    rosuvastatin (CRESTOR) 40 MG tablet Take 1 tablet by mouth daily 90 tablet 3    Tens Unit MISC by Does not apply route Use as directed. 1 each 0    diclofenac sodium (VOLTAREN) 1 % GEL Apply 2 grams to affected area BID 5 Tube 1    tiZANidine (ZANAFLEX) 4 MG tablet Take . 5-1 tablet by mouth bid prn pain 180 tablet 0    Cholecalciferol (VITAMIN D3) 50 MCG (2000 UT) CAPS Take by mouth      magnesium (MAGNESIUM-OXIDE) 250 MG TABS tablet Take 250 mg by mouth daily Indications: takes if wife puts in my pill bottle        No facility-administered medications prior to visit. REVIEW OF SYSTEMS: .   Respiratory: Negative for shortness of breath. Cardiovascular: Negative for chest pain, palpitations  Gastrointestinal: Negative for blood in stool, abdominal distention, nausea, vomiting, abdominal pain, diarrhea,constipation. Neurological: Negative for speech difficulty, weakness and light-headedness, dizziness, tremors, sleepiness  Psychiatric/Behavioral: Negative for suicidal ideas, hallucinations, behavioral problems, self-injury, decreased concentration/cognition, agitation, confusion. PHYSICAL EXAM:   Nursing note and vitals reviewed. /86   Pulse 86   Temp 98.2 °F (36.8 °C) (Infrared)   Wt 297 lb (134.7 kg)   BMI 38.13 kg/m²   General Appearance: Patient is well nourished, well developed, well groomed and in no acute distress. Skin: Skin is warm and dry, good turgor . No rash or lesions noted. He is not diaphoretic. Pulmonary/Chest: Effort normal. No respiratory distress or use of accessory muscles. Auscultation revealing normal air entry. He does not have wheezes in the lung fields. He has no rales. Cardiovascular: Normal rate, regular rhythm, normal heart sounds, and does not have murmur. Exam reveals no gallop and no friction rub. Musculoskeletal / Extremities: Range of motion is normal. Gait is normal, assistive devices use: none. He exhibits edema: Trace, and no tenderness. Neurological/Psychiatric:He is alert and oriented to person, place, and time. Coordination is  normal.   Judgement and Insight is normal  His mood is Appropriate and affect is Neutral/Euthymic(normal) . His behavior is normal.   thought content normal.        IMPRESSION:     1. Chronic pain syndrome    2. DDD (degenerative disc disease), cervical    3. Lumbar radiculopathy    4. Failed back surgical syndrome    5. Degenerative joint disease of spinal facet joint    6. DDD (degenerative disc disease), lumbosacral    7. Primary insomnia    8. Mood disorder ([facility] [address]        PLAN:  Informed verbal consent was obtained. -[place] record was obtained and reviewed  for the last one year and no indicators of drug misuse  were found. Any other controlled substance prescriptions  seen on the record have been accounted for, I am aware of the patient receiving these medications. Ilir Smyth [place] record will be rechecked as part of office protocol.    -Restart Lyrica 300 mg   -Restart Cymbalta 60 mg   -Adv Biofeedback, relaxation and meditation techniques. Referral to psychologist for CBT was also discussed with patient   -He was advised weight reduction, diet changes- 800-1200 flex diet, diet diary, exercising, nutritional  consult increased physical activity as tolerated   -ROM/Stretching exercises   -Continue with HCTZ 25 mg daily   -Continue with Zanaflex 4 mg BID as needed   -Continue with Voltaren gel   -Discard all old medications per office protcol. Mr. Jacob Mata will be prescribed  the medications  listed below which are for treatment of his presenting  medical problems which for this visit include:   Diagnoses of Chronic pain syndrome, DDD (degenerative disc disease), cervical, Lumbar radiculopathy, Failed back surgical syndrome, Degenerative joint disease of spinal facet joint, DDD (degenerative disc disease), lumbosacral, and Chronic pain of both knees were pertinent to this visit.   Medications/orders associated with this visit:    Current Outpatient Medications   Medication Sig Dispense Refill    levothyroxine (SYNTHROID) 88 MCG tablet 2 p.o. every morning at least 30 minutes before food 30 tablet 1    pregabalin (LYRICA) 100 MG capsule TAKE 1 TAB IN THE AM AND 2 TABS IN THE  capsule 3    DULoxetine (CYMBALTA) 60 MG extended release capsule Take 1 capsule by mouth 2 times daily 180 capsule 3    hydroCHLOROthiazide (HYDRODIURIL) 25 MG tablet TAKE 1 TAB daily 90 tablet 3    lisinopril (PRINIVIL;ZESTRIL) 10 MG tablet TAKE 1 TABLET DAILY 90 tablet 3    rosuvastatin (CRESTOR) 40 MG tablet Take 1 tablet by mouth daily 90 tablet 3    Tens Unit MISC by Does not apply route Use as directed. 1 each 0    diclofenac sodium (VOLTAREN) 1 % GEL Apply 2 grams to affected area BID 5 Tube 1    tiZANidine (ZANAFLEX) 4 MG tablet Take . 5-1 tablet by mouth bid prn pain 180 tablet 0    Cholecalciferol (VITAMIN D3) 50 MCG (2000 UT) CAPS Take by mouth      magnesium (MAGNESIUM-OXIDE) 250 MG TABS tablet Take 250 mg by mouth daily Indications: takes if wife puts in my pill bottle        No current facility-administered medications for this visit. Goals of current treatment regimen include improvement in pain, restoration of functioning- with focus on improvement in physical performance, general activity, work or disability,emotional distress, health care utilization and  decreased medication consumption. Will continue to monitor progress towards achieving/maintaining therapeutic goals with special emphasis on  1. Improvement in perceived interfernce  of pain with ADL's. Ability to do home exercises independently. Ability to do household chores indoor and/or outdoor work and social and leisure activities. To increase flexibility/ROM, strength and endurance. Improve psychosocial and physical functioning.- he is not showing any significant progress/or showing regression  towards this goal and reassessment and adjustment of goals/treatment have been made. 2. Improving sleep to 6-7 hours a night. Improve mood/ anxiety and depression symptoms such as crying spells, low energy, problems with concentration, motivation.- he is showing progression towards this treatment goal with the current regimen. 3. Reduction of reliance on opioid analgesia/more appropriate opioid use. - he is showing progression towards this treatment goal with the current regimen. Risks and benefits of the medications and other alternative treatments have been/were  discussed with the patient. Any questions on the  common side effects of these medications were also answered. He was advised against drinking alcohol with the narcotic pain medicines, advised against driving or handling machinery when  starting or adjusting the dose of medicines, feeling groggy or drowsy, or if having any cognitive issues related to the current medications. Heis fully aware of the risk of overdose and death, if medicines are misused and not taken as prescribed. If he develops new symptoms or if the symptoms worsen, he was told to call the office. .  Thank you for allowing me to participate in the care of this patient.     Rajani Pimentel MD    Cc: Jalen Bey MD

## 2021-06-22 ENCOUNTER — OFFICE VISIT (OUTPATIENT)
Dept: ORTHOPEDIC SURGERY | Age: 51
End: 2021-06-22
Payer: COMMERCIAL

## 2021-06-22 VITALS — WEIGHT: 297 LBS | BODY MASS INDEX: 38.12 KG/M2 | HEIGHT: 74 IN

## 2021-06-22 DIAGNOSIS — S83.242A ACUTE MEDIAL MENISCUS TEAR OF LEFT KNEE, INITIAL ENCOUNTER: ICD-10-CM

## 2021-06-22 DIAGNOSIS — M17.12 PRIMARY OSTEOARTHRITIS OF LEFT KNEE: Primary | ICD-10-CM

## 2021-06-22 DIAGNOSIS — M25.562 ACUTE PAIN OF LEFT KNEE: ICD-10-CM

## 2021-06-22 PROCEDURE — 99213 OFFICE O/P EST LOW 20 MIN: CPT | Performed by: ORTHOPAEDIC SURGERY

## 2021-06-24 ENCOUNTER — NURSE ONLY (OUTPATIENT)
Dept: FAMILY MEDICINE CLINIC | Age: 51
End: 2021-06-24
Payer: COMMERCIAL

## 2021-06-24 DIAGNOSIS — E03.9 ACQUIRED HYPOTHYROIDISM: Primary | ICD-10-CM

## 2021-06-24 LAB
T3 TOTAL: 1.35 NG/ML (ref 0.8–2)
T4 FREE: 1.4 NG/DL (ref 0.9–1.8)
TSH REFLEX: 0.23 UIU/ML (ref 0.27–4.2)

## 2021-06-24 PROCEDURE — 36415 COLL VENOUS BLD VENIPUNCTURE: CPT | Performed by: INTERNAL MEDICINE

## 2021-06-24 NOTE — PROGRESS NOTES
[address]  History and Physical  Knee Pain    Date:  2021    Name:  Cong Pérez  Address:  [facility] [address]    :  1970      Age:   45 y.o.    SSN:  [id number]      Medical Record Number:  [id number]      Chief Complaint    Follow-up (left knee)      History of Present Illness:  Cong Pérez is a 45 y.o. male who presents for follow-up evaluation of his left knee pain and review of his MRI results. Patient feels that majority of his symptoms have resolved. He does get intermittent achiness in the medial and patellofemoral joint. He denies any sharp bouts of pain that he was experiencing on his original visit. The patient denies any new injury. The patient denies any numbness, paresthesias, or weakness. 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 He immediately felt pain in the medial aspect of his left knee. He has had difficulty weightbearing since then. Now he notes pain along medial joint line and patellofemoral joint. He notes that the pain is dull and achy with sharp intermittent bouts. This is worse with prolonged standing, weightbearing, stairs and squatting. He denies any radiating pain. The patient denies any new injury since then. He has had 1 partial giving way episode. Pain Assessment  Location of Pain: Knee  Location Modifiers: Left  Severity of Pain: 2  Quality of Pain: Sharp, Aching  Duration of Pain: Persistent  Frequency of Pain: Constant  Aggravating Factors:  Other (Comment) (everything)  Relieving Factors: Ice, Rest, Nsaids  Result of Injury: No  Work-Related Injury: No  Are there other pain locations you wish to document?: No    Past Medical History:   Diagnosis Date    Chronic pain syndrome     DDD (degenerative disc disease), cervical     Degenerative joint disease of spinal facet joint     Depressive disorder, not elsewhere classified     Dizziness     Failed back surgical syndrome     GERD (gastroesophageal reflux disease)     Headache(784.0)     HTN (hypertension)     Hyperlipidemia     Lumbar radiculopathy     Sleep apnea     does use CPAP    thyroid cancer     thyroid    Thyroid disease     Wears glasses         Past Surgical History:   Procedure Laterality Date    CARPAL TUNNEL RELEASE Left 3-5-2013    CARPAL TUNNEL RELEASE Right 4-    COLONOSCOPY N/A 10/15/2019    COLONOSCOPY WITH BIOPSY performed by Mariann Steele MD at [address] N/A 10/15/2019    COLONOSCOPY POLYPECTOMY SNARE/COLD BIOPSY performed by Mariann Steele MD at [address] [place] [address] Left 1-5-2016    First dorsal extensor compartment tendon release.     JOINT REPLACEMENT Right     shoulder    KNEE ARTHROSCOPY Right 2/26/2020    RIGHT KNEE ARTHROSCOPY MEDIAL MENISCECTOMY AND CHONDROPLASTY performed by Voileta Donaldson MD at [place] [address] [employer],EXCIS MUSC/NERV Right 4/8/2019    RIGHT OPEN TENNIS ELBOW RELEASE performed by Handy Ashley MD at [address] [place] [address] Right 1993    right    SPINAL FUSION  2006    THYROIDECTOMY  2008    UPPER GASTROINTESTINAL ENDOSCOPY N/A 1/29/2019    EGD DIAGNOSTIC ONLY performed by Carrie Ross MD at [address] [place] [address] N/A 10/15/2019    EGD DILATION BALLOON performed by Mariann Steele MD at [address] [place] [address] N/A 10/15/2019    EGD BIOPSY performed by Mariann Steele MD at [address] [place] [address] History   Problem Relation Age of Onset    Diabetes Father     Heart Failure Father     Hypertension Father     High Blood Pressure Father     High Blood Pressure Mother     Stroke Maternal Grandmother        Social History     Socioeconomic History    Marital status:  Spouse name: None    Number of children: 3    Years of education: None    Highest education level: None   Occupational History    Occupation:    Tobacco Use    Smoking status: Never Smoker    Smokeless tobacco: Never Used   Vaping Use    Vaping Use: Never used   Substance and Sexual Activity    Alcohol use: Yes     Alcohol/week: 0.0 standard drinks     Comment: occasional use    Drug use: Never    Sexual activity: Yes     Partners: Female   Other Topics Concern    None   Social History Narrative    None     Social Determinants of Health     Financial Resource Strain:     Difficulty of Paying Living Expenses:    Food Insecurity:     Worried About Running Out of Food in the Last Year:     Ran Out of Food in the Last Year:    Transportation Needs:     Lack of Transportation (Medical):  Lack of Transportation (Non-Medical):    Physical Activity:     Days of Exercise per Week:     Minutes of Exercise per Session:    Stress:     Feeling of Stress :    Social Connections:     Frequency of Communication with Friends and Family:     Frequency of Social Gatherings with Friends and Family:     Attends Hinduism Services:     Active Member of Clubs or Organizations:     Attends Club or Organization Meetings:     Marital Status:    Intimate Partner Violence:     Fear of Current or Ex-Partner:     Emotionally Abused:     Physically Abused:     Sexually Abused:        Current Outpatient Medications   Medication Sig Dispense Refill    pregabalin (LYRICA) 100 MG capsule Take 1 capsule by mouth 3 times daily for 30 days.  90 capsule 1    DULoxetine (CYMBALTA) 60 MG extended release capsule Take 1 capsule by mouth daily 30 capsule 1    hydroCHLOROthiazide (HYDRODIURIL) 25 MG tablet Take 1 tablet by mouth daily 30 tablet 1    tiZANidine (ZANAFLEX) 4 MG tablet Take 1 tablet by mouth 2 times daily as needed (muscle spasms) 60 tablet 1    HYDROcodone-acetaminophen (NORCO) 5-325 MG per tablet Take 1 tablet by mouth every 6 hours as needed for Pain (Max 1-2 per day) for up to 30 days. 30 tablet 0    levothyroxine (SYNTHROID) 88 MCG tablet 2 p.o. every morning at least 30 minutes before food 30 tablet 1    lisinopril (PRINIVIL;ZESTRIL) 10 MG tablet TAKE 1 TABLET DAILY 90 tablet 3    rosuvastatin (CRESTOR) 40 MG tablet Take 1 tablet by mouth daily 90 tablet 3    Tens Unit MISC by Does not apply route Use as directed. 1 each 0    Cholecalciferol (VITAMIN D3) 50 MCG (2000 UT) CAPS Take by mouth      magnesium (MAGNESIUM-OXIDE) 250 MG TABS tablet Take 250 mg by mouth daily Indications: takes if wife puts in my pill bottle        No current facility-administered medications for this visit. Allergies   Allergen Reactions    Oxycontin [Oxycodone] Nausea And Vomiting and Nausea Only    Percocet [Oxycodone-Acetaminophen] Nausea And Vomiting         Review of Systems:  A 14 point review of systems was completed by the patient and is available in the media section of the scanned medical record and was reviewed on 6/24/2021. The review is negative with the exception of those things mentioned in the HPI and Past Medical History         Vital Signs:   Ht 6' 2\" (1.88 m)   Wt 297 lb (134.7 kg)   BMI 38.13 kg/m²     General Exam:    General: Crescencio Duffy is a healthy and well appearing 45y.o. year old male who is sitting comfortably in our office in no acute distress. Neuro: Alert & Oriented x 3,  normal,  no focal deficits noted. Normal mood & affect. Eyes: Sclera clear  Ears: Normal external ear  Mouth: Patient wearing a mask  Pulm: Respirations unlabored and regular   Skin: Warm, well perfused      Knee Examination: Left     Inspection:  No effusion, ecchymosis, discoloration, erythema, excessive warmth. no gross deformities, no signs of infection.      Palpation: There is patellofemoral crepitus, there is mild medial joint line tenderness.   No other osseous or soft tissue tenderness around the knee.  Negative calf tenderness     Range of Motion: Grossly intact     Strength: Grossly intact     Special Tests: No ligament instability, Negative Homans sign.       Gait: Steady, Non antalgic, without the use of assistive devices     Alignment: Varus deformity     Neuro: Sensation equal bilateral lower extremities     Vascular: 2+ posterior tibialis pulse             Radiology:     Narrative   Site: [facility] Rad #: [id number] #: [id number] Procedure: MR Left Knee w/o Contrast ; Reason for Exam: left knee pain; medial meniscus tear/chondral defect   This document is confidential medical information.  Unauthorized disclosure or use of this information is prohibited by law. If you are not the intended recipient of this document, please advise us by calling immediately [phone].       [facility] [address]           Patient Name: Marques Mari   Case ID: [id number]   Patient : 1970   Referring Physician: Jonathan Negron MD   Exam Date: 2021   Exam Description: MR Left Knee w/o Contrast            HISTORY:  Left knee pain; medial meniscus tear/chondral defect.       TECHNICAL FACTORS:  Long- and short-axis fat- and water-weighted images were performed.       COMPARISON:  Left knee MRI 2015.       FINDINGS:  Moderate to high grade chondromalacia at the trochlear groove, increased in    severity.  New small, shallow chondral fissure at the midline patellar articular surface.     Extensor mechanism intact.  Small deep infrapatellar bursitis.  Small effusion.       Intact LCL complex.  Moderate grade edema/strain of the proximal popliteus muscle.  Mild edema    of the MCL complex, with low grade pes anserine bursitis.       ACL and PCL intact.  Evidence of 1.6cm chondral lesion in the anterior aspect of the distal    femoral metaphysis and a 9mm chondral lesion in the anteromedial aspect of the proximal tibial    metadiaphysis, both slightly larger compared to the prior exam. Suleman Senna, no surrounding marrow    edema or cortical breakthrough. Oswaldo Norse findings likely represent enchondromas; however, advise    continued followup.       New subcentimeter thin inner edge flap tear in the posterior horn of the medial meniscus    (sagittal PD fat-sat series 10, image 20).  New low grade medial compartment chondromalacia.     Intact lateral meniscus and lateral compartment articular cartilage.       CONCLUSION:   1. New subcentimeter thin inner edge flap tear in the posterior horn of the medial meniscus. 2. New low grade medial compartment chondromalacia, and small shallow chondral fissure at the    midline patellar articular surface.  Moderate to high grade chondromalacia at the trochlear    groove, increased in severity. 3. 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  Advise    continued followup.                                   Thank you for the opportunity to provide your interpretation.       Amy Valera MD       A: HS/kerri 06/13/2021 10:29 PM   T: DT 06/13/2021 5:51 PM            Assessment: Patient is a 45 y.o. male the office for follow-up evaluation of his left knee pain and review of his MRI results. Patient has a diagnosis of osteoarthritis with a flap tear to the posterior horn of the medial meniscus. Visit Diagnoses       Codes    Primary osteoarthritis of left knee    -  Primary M17.12    Acute medial meniscus tear of left knee, initial encounter     S83.242A    Acute pain of left knee     M25.562          No orders of the defined types were placed in this encounter. Medical decision making:  Patient's workup and evaluation were reviewed with the patient in the office today. MRI imaging and results were reviewed with the patient. Since his symptoms are resolving and the patient feels well at this time we will hold off on surgery. Patient was educated on the nonoperative protocol.   Should his symptoms return most notably with sharp pain, locking, catching, and giving way we will reevaluate the patient to assess if he is a candidate for a left knee arthroscopy. All the patient's questions were answered while in the clinic. The patient is understanding of all instructions and agrees with the plan. Treatment Plan:    1. Activity modification  2. Ice 20 minutes ever 1-2 hours PRN  3. Take medications as prescribed/instructed  4. Rest   5. Elevation at least 2 inches above the heart with pillows supporting the joint underneath  6. Lightweight exercise/low impact exercise  7. Appropriate diet/weight management      Follow up: As needed    This patient exhibits moderate complexity for obtaining an independent history, reviewing past medical records, independent interpretation of diagnostic imaging, and further coordinating care. The patient exhibits low risk given that the patient is being treated with conservative therapy at this time. Nakia Mccloud PA-C    Physician Assistant - Certified    44/18/28 1:34 PM    During this examination, Nakia MARTI [place], functioned as a scribe for Dr. Maren Arcos. This dictation was performed with a verbal recognition program (DRAGON) and it was checked for errors. It is possible that there are still dictated errors within this office note. If so, please bring any errors to my attention for an addendum. All efforts were made to ensure that this office note is accurate. This dictation was performed with a verbal recognition program (DRAGON) and it was checked for errors. It is possible that there are still dictated errors within this office note. If so, please bring any errors to my attention for an addendum. All efforts were made to ensure that this office note is accurate.     Supervising Physician Attestation:  I, Dr. Maren Arcos, personally performed the services described in this documentation as scribed above, and it is both accurate and complete and I agree with all pertinent clinical information. I personally interviewed the patient and performed a physical examination and medical decision making. I discussed the patient's condition and treatment options and have  reviewed and agree with the past medical, family and social history unless otherwise noted. All of the patient's questions were answered.       Board Certified Orthopaedic Surgeon  [address] [facility] and [address] and [address] [employer]  Professor of [address]

## 2021-06-30 ENCOUNTER — OFFICE VISIT (OUTPATIENT)
Dept: ORTHOPEDIC SURGERY | Age: 51
End: 2021-06-30
Payer: MEDICARE

## 2021-06-30 VITALS — HEIGHT: 74 IN | WEIGHT: 297 LBS | BODY MASS INDEX: 38.12 KG/M2

## 2021-06-30 DIAGNOSIS — M25.512 LEFT SHOULDER PAIN, UNSPECIFIED CHRONICITY: ICD-10-CM

## 2021-06-30 DIAGNOSIS — M75.22 BICEPS TENDONITIS ON LEFT: Primary | ICD-10-CM

## 2021-06-30 DIAGNOSIS — M25.511 RIGHT SHOULDER PAIN, UNSPECIFIED CHRONICITY: ICD-10-CM

## 2021-06-30 DIAGNOSIS — Z96.611 HISTORY OF TOTAL SHOULDER REPLACEMENT, RIGHT: ICD-10-CM

## 2021-06-30 DIAGNOSIS — M77.12 LEFT TENNIS ELBOW: ICD-10-CM

## 2021-06-30 PROCEDURE — 99214 OFFICE O/P EST MOD 30 MIN: CPT | Performed by: ORTHOPAEDIC SURGERY

## 2021-06-30 PROCEDURE — 20610 DRAIN/INJ JOINT/BURSA W/O US: CPT | Performed by: ORTHOPAEDIC SURGERY

## 2021-06-30 NOTE — PROGRESS NOTES
[facility]  Office Visit  Follow up  Date:  2021    Name:  Kiah Gu  Address:  [facility] [address]    :  1970      Age:   45 y.o.    SSN:  [id number]      Medical Record Number:  [id number]    Chief Complaint:    Follow up for left shoulder    HPI:   Kiah Gu is a 45 y.o. male here today for follow-up regarding his left shoulder. The pateint is well-known to us. We have been treating him conservatively for his left shoulder. He would like to continue moving forward with nonoperative care for his left shoulder, however he would like an injection into the left shoulder to help take off some of the edge of pain he has in his shoulder. He is unable to take any anti-inflammatory medicines due to ulcers, but he has been using Voltaren gel for his back and knees. He was wearing if he can use it for her shoulder. HPI 21:  For his left shoulder we did order an MRI back in October and at the time recommended a left shoulder scope with biceps tenodesis, given that he had previously failed conservative treatment. He did opt to put off the shoulder surgery as he did also have to undergo arthroscopic knee surgery. Today he complains of pain with daily activities especially overhead endurance-type activities.      With respect to his right shoulder. He is now 3 years out following a right total shoulder replacement back in 2017. This shoulder continues to do well. He does complain of some pain into his biceps but he has no limitations with respect to overall function. He reports no new injuries or setbacks. He denies any new numbness, tingling, fevers, chills, chest pain, shortness of breath, or any other new significant symptoms.     Pain Assessment  Location of Pain: Shoulder  Location Modifiers: Left  Severity of Pain: 4  Quality of Pain: Other (Comment) (soreness)  Duration of Pain: Persistent  Frequency of Pain: Constant  Aggravating Factors: Other (Comment) (certain things)  Relieving Factors: Rest  Result of Injury: No  Work-Related Injury: No  Are there other pain locations you wish to document?: No    Past History:  Past Medical History:   Diagnosis Date    Chronic pain syndrome     DDD (degenerative disc disease), cervical     Degenerative joint disease of spinal facet joint     Depressive disorder, not elsewhere classified     Dizziness     Failed back surgical syndrome     GERD (gastroesophageal reflux disease)     Headache(784.0)     HTN (hypertension)     Hyperlipidemia     Lumbar radiculopathy     Sleep apnea     does use CPAP    thyroid cancer     thyroid    Thyroid disease     Wears glasses        Past Surgical History:   Procedure Laterality Date    CARPAL TUNNEL RELEASE Left 3-5-2013    CARPAL TUNNEL RELEASE Right 4-    COLONOSCOPY N/A 10/15/2019    COLONOSCOPY WITH BIOPSY performed by Abran Argueta MD at [address] N/A 10/15/2019    COLONOSCOPY POLYPECTOMY SNARE/COLD BIOPSY performed by Abran Argueta MD at [place] [address] Left 1-5-2016    First dorsal extensor compartment tendon release.     JOINT REPLACEMENT Right     shoulder    KNEE ARTHROSCOPY Right 2/26/2020    RIGHT KNEE ARTHROSCOPY MEDIAL MENISCECTOMY AND CHONDROPLASTY performed by Mendel Ruddle, MD at [place] [address] DECOMPRESS FOREARM,EXCIS MUSC/NERV Right 4/8/2019    RIGHT OPEN TENNIS ELBOW RELEASE performed by Philly Patrick MD at [address] [place] [address] Right 1993    right    SPINAL FUSION  2006    THYROIDECTOMY  2008    UPPER GASTROINTESTINAL ENDOSCOPY N/A 1/29/2019    EGD DIAGNOSTIC ONLY performed by Mykel Brennan MD at [address] [place] [address] N/A 10/15/2019    EGD DILATION BALLOON performed by Abran Argueta MD at [address] [place] [address] N/A 10/15/2019    EGD BIOPSY performed by Abran Argueta MD at [address] [place] [address] Social History     Tobacco Use    Smoking status: Never Smoker    Smokeless tobacco: Never Used   Vaping Use    Vaping Use: Never used   Substance Use Topics    Alcohol use: Yes     Alcohol/week: 0.0 standard drinks     Comment: occasional use    Drug use: Never        Family History:  family history includes Diabetes in his father; Heart Failure in his father; High Blood Pressure in his father and mother; Hypertension in his father; Stroke in his maternal grandmother. Current Outpatient Medications:     pregabalin (LYRICA) 100 MG capsule, Take 1 capsule by mouth 3 times daily for 30 days. , Disp: 90 capsule, Rfl: 1    DULoxetine (CYMBALTA) 60 MG extended release capsule, Take 1 capsule by mouth daily, Disp: 30 capsule, Rfl: 1    hydroCHLOROthiazide (HYDRODIURIL) 25 MG tablet, Take 1 tablet by mouth daily, Disp: 30 tablet, Rfl: 1    tiZANidine (ZANAFLEX) 4 MG tablet, Take 1 tablet by mouth 2 times daily as needed (muscle spasms), Disp: 60 tablet, Rfl: 1    HYDROcodone-acetaminophen (NORCO) 5-325 MG per tablet, Take 1 tablet by mouth every 6 hours as needed for Pain (Max 1-2 per day) for up to 30 days. , Disp: 30 tablet, Rfl: 0    levothyroxine (SYNTHROID) 88 MCG tablet, 2 p.o. every morning at least 30 minutes before food, Disp: 30 tablet, Rfl: 1    lisinopril (PRINIVIL;ZESTRIL) 10 MG tablet, TAKE 1 TABLET DAILY, Disp: 90 tablet, Rfl: 3    rosuvastatin (CRESTOR) 40 MG tablet, Take 1 tablet by mouth daily, Disp: 90 tablet, Rfl: 3    Tens Unit MISC, by Does not apply route Use as directed., Disp: 1 each, Rfl: 0    Cholecalciferol (VITAMIN D3) 50 MCG (2000 UT) CAPS, Take by mouth, Disp: , Rfl:     magnesium (MAGNESIUM-OXIDE) 250 MG TABS tablet, Take 250 mg by mouth daily Indications: takes if wife puts in my pill bottle , Disp: , Rfl:       Allergies   Allergen Reactions    Oxycontin [Oxycodone] Nausea And Vomiting and Nausea Only    Percocet [Oxycodone-Acetaminophen] Nausea And Vomiting Review of Systems: A 14 point review of systems available in the scanned medical record as documented by the patient on 6/30/21. Today's review pertinent items are noted in HPI. Review of Systems    Done: 5/11/21  Patient has had no medical changes since last evaluated        Physical Exam:  Ht 6' 2\" (1.88 m)   Wt 297 lb (134.7 kg)   BMI 38.13 kg/m²     General: No acute distress, well nourished  CV: No obvious peripheral edema. Normal peripheral pulses  Neuro: Alert & oriented x 3  Psych: Normal mood and affect    L shoulder exam    Inspection:  Held in a normal posture. Normal contour at the acromioclavicular joint. No swelling, ecchymosis, or erythema about the shoulder. No atrophy appreciated. No scapular winging. Palpation:  No subacromial crepitus. No tenderness of the AC joint. Some tenderness to the greater tuberosity in the bicipital groove. Range of Motion: Active ROM: Forward flexion 150 degrees, external rotation 40 degrees, internal rotation L1. Normal scapulothoracic rhythm. Strength:  Normal supraspinatus, infraspinatus, and subscapularis muscle strength. Stability: No anterior instability. No posterior instability. Special Tests: Impingement findings are negative. Labral findings are negative. Speed sign and Yergason signs are both negative. Crossover sign is negative. Belly press sign is negative. Lift off sign is negative. Other findings: The skin is warm dry and well perfused. 2+ radial pulse. Sensation is intact to light touch over the deltoid. Right comparison shoulder exam    Inspection:  Held in a normal posture. Normal contour at the acromioclavicular joint. No swelling, ecchymosis, or erythema about the shoulder. No atrophy appreciated. No scapular winging. Palpation:  No subacromial crepitus. No tenderness of the AC joint. No greater tuberosity tenderness. No tenderness in the bicipital groove.     Range of Motion: Active ROM forward flexion 145 degrees, external rotation 40 degrees, internal rotation L5. Normal scapulothoracic rhythm. Strength:  Normal supraspinatus, infraspinatus, and subscapularis muscle strength. Stability: No anterior instability. No posterior instability. Special Tests: Impingement findings are negative. Labral findings are negative. Speed sign and Yergason signs are both negative. Crossover sign is negative. Belly press sign is negative. Lift off sign is negative. Other findings: The skin is warm dry and well perfused. 2+ radial pulse. Sensation is intact to light touch over the deltoid. L Elbow Examination:    Inspection:    Normal alignment. No swelling. Palpation:     Tender to palpation along the lateral epicondyle. Tenderness with resisted wrist extension    Range of Motion:      0-135 degrees. Strength:       5/5 in all muscle groups    Instability testing:  Stable to varus and valgus stress    Vascular exam:    Skin warm and well-perfused. Neurologic exam:     Sensation intact to light    Radiographic:    Findings:   Views: 2 views of the right shoulder  Weight bearing: No  Findings: 2 views of the right shoulder taken in the office today demonstrate no acute osseous abnormalities. There is a stable appearing anatomic total shoulder arthroplasty component without signs of loosening or subsidence. Previous comparison films: 2018    Impression:  1. Stable right total shoulder arthroplasty component    Assessment:  Charley Rodriguez is a 45 y.o. male with:  1. Left shoulder biceps tendinitis, AC joint arthritis, partial rotator cuff tearing confirmed on MRI  2. 3+ years status post right anatomic total shoulder arthroplasty  3. Left tennis elbow    Impression:  Encounter Diagnoses   Name Primary?     Biceps tendonitis on left Yes    Left shoulder pain, unspecified chronicity     Right shoulder pain, unspecified chronicity     Left tennis elbow     History of total shoulder replacement, right Office Procedures:  Orders Placed This Encounter   Procedures    XR SHOULDER RIGHT (MIN 2 VIEWS)     Standing Status:   Future     Number of Occurrences:   1     Standing Expiration Date:   6/30/2022    CT ARTHROCENTESIS ASPIR&/INJ MAJOR JT/BURSA W/O US     80 mg Depomedrol with 8 cc 1% Lidocaine in 10cc syringe with 25G (22G) needle       Plan:   Regarding the right shoulder Charley Rodriguez is doing well and x-rays demonstrate a stable prosthesis. Continue working on his internal rotation on his own. Overall is very happy and were happy he has had good progress and results with his right shoulder. For the patient's left shoulder he was to like to avoid surgery at this time. We did tell him that that is fine as he still has good functional strength and motion to that shoulder. If he ever regresses we may need to get a new MRI to evaluate the rotator cuff the right now we will continue with conservative care. We will do an injection into the subacromial space and to the joint today. We will also fill out ASES and SST today. Follow-up with us as needed for the left shoulder and in 1 year for his annual evaluation of his right shoulder. For the left tennis elbow we will get the patient into a cock-up wrist splint. We will follow up on that in about a month to see how it is progressing. Procedure: Left shoulder intra-articular and subacromial injection  After discussion of the risks, benefits, alternatives to injection, patient agreeable. The skin was cleansed and prepped. Using a 25 gauge needle an injection with 2 mL of 40 mg/ml Depo-Medrol and 8 mL of 1 % lidocaine was injected without difficulty into the shoulder joint and subacromial space 50-50 from a posterior approach. Sterile dressing was applied. The patient tolerated the procedure well. Total time spent for evaluation, education, and development of treatment plan: 30 minutes.     Jalen Barfield DO  [facility] Clinical Fellow    The encounter with Syeda Ji was supervised by Dr. Abbi Zamudio who personally examined the patient and reviewed the plan. This dictation was performed with a verbal recognition program (DRAGON) and it was checked for errors. It is possible that there are still dictated errors within this office note. If so, please bring any errors to my attention for an addendum. All efforts were made to ensure that this office note is accurate.   __________________  I was physically present and personally supervised the Orthopaedic Sports Medicine Fellow in the evaluation and development of a treatment plan for this patient. I personally interviewed the patient and performed a physical examination. In addition, I discussed the patient's condition and treatment options with them. I have also reviewed and agree with the past medical, family and social history unless otherwise noted. All of the patient's questions were answered. Chago Zamudio MD, PhD  6/30/2021

## 2021-07-02 ENCOUNTER — TELEPHONE (OUTPATIENT)
Dept: FAMILY MEDICINE CLINIC | Age: 51
End: 2021-07-02

## 2021-07-02 DIAGNOSIS — Z85.850 HISTORY OF THYROID CANCER: ICD-10-CM

## 2021-07-02 RX ORDER — LEVOTHYROXINE SODIUM 0.15 MG/1
TABLET ORAL
Qty: 1 TABLET | Refills: 1 | Status: SHIPPED | OUTPATIENT
Start: 2021-07-02 | End: 2021-11-08

## 2021-07-02 NOTE — TELEPHONE ENCOUNTER
[id number] Kaylie Grimm let him know that I have updated rx to a total dose of 150 mcg in the morning.  Repeat tsh in 4-6 week

## 2021-07-02 NOTE — TELEPHONE ENCOUNTER
[employer] called in regarding the prescription for levothyroxine. They wanted to verify the quantity.      Please call back: [phone]

## 2021-07-12 ENCOUNTER — TELEPHONE (OUTPATIENT)
Dept: FAMILY MEDICINE CLINIC | Age: 51
End: 2021-07-12

## 2021-07-12 NOTE — TELEPHONE ENCOUNTER
FYI-- returning call. Notified of message on results:   6/24/2021  6:46 PM - Conczulma Cardenas Incoming Lab Results From Soft (Epic Adt)    Component Value Ref Range & Units Status Collected Lab   T3, Total 1.35  0.80 - 2.00 ng/mL Final 06/24/2021 10:35 AM  - [facility]   Testing Performed By    Lab - Abbreviation Name Director Address Valid Date Range   19- - [facility] Vanita Blair M.D. [address] [place] [address]. [facility] [address] 09/26/20 0000-Present   Narrative  Performed by: [address] Lab  Performed at:   [facility]   [facility] [address], [place] [facility] [place] [address]   Phone [phone]   Lab and Collection    T3 - 6/24/2021  Result History    T3 on 6/24/2021   Result Information    Status: Final result (Collected: 6/24/2021 10:35) Provider Status: Reviewed   All Reviewers List    Colby Suresh MD on 6/25/2021 16:45   T3: Result Notes   Young Muir MA   7/12/2021 11:04 AM EDT       Left vm for patient to return call regarding results. [place], [place]   6/25/2021  4:01 PM EDT       Left message requesting a return call. Nighat Dave MD   6/25/2021  1:10 PM EDT       Thyroid is a bit over replaced still.  Please use compartment which she is taking, is he taking 2 tablets of the levothyroxine 88 mcg daily? States had already received this information.

## 2021-08-11 ENCOUNTER — OFFICE VISIT (OUTPATIENT)
Dept: SLEEP MEDICINE | Age: 51
End: 2021-08-11
Payer: MEDICARE

## 2021-08-11 VITALS
RESPIRATION RATE: 16 BRPM | DIASTOLIC BLOOD PRESSURE: 80 MMHG | HEART RATE: 96 BPM | WEIGHT: 292.2 LBS | TEMPERATURE: 94.7 F | SYSTOLIC BLOOD PRESSURE: 124 MMHG | BODY MASS INDEX: 37.5 KG/M2 | OXYGEN SATURATION: 97 % | HEIGHT: 74 IN

## 2021-08-11 DIAGNOSIS — I10 ESSENTIAL HYPERTENSION: ICD-10-CM

## 2021-08-11 DIAGNOSIS — E66.01 CLASS 2 SEVERE OBESITY DUE TO EXCESS CALORIES WITH SERIOUS COMORBIDITY AND BODY MASS INDEX (BMI) OF 38.0 TO 38.9 IN ADULT (HCC): ICD-10-CM

## 2021-08-11 DIAGNOSIS — G47.33 OBSTRUCTIVE SLEEP APNEA: Primary | ICD-10-CM

## 2021-08-11 PROCEDURE — 99204 OFFICE O/P NEW MOD 45 MIN: CPT | Performed by: PSYCHIATRY & NEUROLOGY

## 2021-08-11 ASSESSMENT — ENCOUNTER SYMPTOMS
SORE THROAT: 1
ALLERGIC/IMMUNOLOGIC NEGATIVE: 1
GASTROINTESTINAL NEGATIVE: 1
EYES NEGATIVE: 1
COUGH: 1

## 2021-08-11 ASSESSMENT — SLEEP AND FATIGUE QUESTIONNAIRES
HOW LIKELY ARE YOU TO NOD OFF OR FALL ASLEEP WHILE SITTING QUIETLY AFTER LUNCH WITHOUT ALCOHOL: 2
HOW LIKELY ARE YOU TO NOD OFF OR FALL ASLEEP IN A CAR, WHILE STOPPED FOR A FEW MINUTES IN TRAFFIC: 0
HOW LIKELY ARE YOU TO NOD OFF OR FALL ASLEEP WHILE SITTING AND READING: 2
ESS TOTAL SCORE: 14
HOW LIKELY ARE YOU TO NOD OFF OR FALL ASLEEP WHEN YOU ARE A PASSENGER IN A CAR FOR AN HOUR WITHOUT A BREAK: 2
HOW LIKELY ARE YOU TO NOD OFF OR FALL ASLEEP WHILE WATCHING TV: 2
HOW LIKELY ARE YOU TO NOD OFF OR FALL ASLEEP WHILE SITTING AND TALKING TO SOMEONE: 2
NECK CIRCUMFERENCE (INCHES): 20
HOW LIKELY ARE YOU TO NOD OFF OR FALL ASLEEP WHILE SITTING INACTIVE IN A PUBLIC PLACE: 2
HOW LIKELY ARE YOU TO NOD OFF OR FALL ASLEEP WHILE LYING DOWN TO REST IN THE AFTERNOON WHEN CIRCUMSTANCES PERMIT: 2

## 2021-08-11 NOTE — PATIENT INSTRUCTIONS
We need a copy of his old PSG from [employer]  Patient Education        Learning About CPAP for Sleep Apnea  What is CPAP? CPAP is a small machine that you use at home every night while you sleep. It increases air pressure in your throat to keep your airway open. When you have sleep apnea, this can help you sleep better so you feel much better. CPAP stands for \"continuous positive airway pressure. \"  The CPAP machine will have one of the following:  · A mask that covers your nose and mouth  · Prongs that fit into your nose  · A mask that covers your nose only, which is the most common type. This type is called NCPAP. The N stands for \"nasal.\"  Why is it done? CPAP is usually the best treatment for obstructive sleep apnea. It is the first treatment choice and the most widely used. CPAP:  · Helps you have more normal sleep, so you feel less sleepy and more alert during the daytime. · May help keep heart failure or other heart problems from getting worse. · May help lower your blood pressure. If you use CPAP, your bed partner may also sleep better. That's because you aren't snoring or restless. Your doctor may suggest CPAP if you have:  · Moderate to severe sleep apnea. · Sleep apnea and coronary artery disease (CAD). · Sleep apnea and heart failure. What are the side effects? Some people who use CPAP have:  · A dry or stuffy nose and a sore throat. · Irritated skin on the face. · Sore eyes. · Bloating. How can you care for yourself? If using CPAP is not comfortable, or if you have certain side effects, work with your doctor to fix them. Here are some things you can try:  · Be sure the mask or nasal prongs fit well. · See if your doctor can adjust the pressure of your CPAP. · If your nose is dry, try a humidifier. · If your nose is runny or stuffy, try decongestant medicine or a steroid nasal spray. Be safe with medicines. Read and follow all instructions on the label.  Do not use the medicine longer than the label says. If these things don't help, you might try a different type of machine. Some machines have air pressure that adjusts on its own. Others have air pressures that are different when you breathe in than when you breathe out. This may reduce discomfort caused by too much pressure in your nose. Where can you learn more? Go to https://SIVIpepiceweb.Helium. org and sign in to your P[employer] account. Enter R252 in the Kahub box to learn more about \"Learning About CPAP for Sleep Apnea. \"     If you do not have an account, please click on the \"Sign Up Now\" link. Current as of: October 26, 2020               Content Version: 12.9  © 2006-2021 [employer]wise, Incorporated. Care instructions adapted under license by [facility] ([facility]). If you have questions about a medical condition or this instruction, always ask your healthcare professional. [address] any warranty or liability for your use of this information.

## 2021-08-11 NOTE — PROGRESS NOTES
MD TOBIAS Ugarte Board Certified in Sleep Medicine  Certified [facility] Sleep Medicine  Board Certified in Neurology [address]  [facility] [address] [facility], [place] [address] ([address] [facility]  [address] [facility], [address]           [address]  [address] [facility] [address] [phone]    Subjective:     Patient ID: Kev Wade is a 45 y.o. male. Chief Complaint   Patient presents with    New Patient     KAR  pt has machine  it's broke       HPI:        Kev Wade is a 45 y.o. male referred by Dr. Romi Doe for a sleep evaluation. Patient  was diagnosed with mild obstructive sleep apnea about 7 years ago,was on CPAP up to 2 weeks ago when the machine quit working. This patient has gained about 28 pounds since last PAP titration     He complains of snoring, snorting, choking, periods of not breathing, excessive daytime sleepiness, feels sleepy during the day but he denies knees buckling with laughing, completely or partially paralyzed while falling asleep or waking up, noisy environment, uncomfortable room temperature, uncomfortable bedding. Symptoms began several years ago, unchanged since that time. The patient's bed-partner confirmed the snoring and stopped breathing at night  SLEEP SCHEDULE: Goes to bed around 1:30 AM in the weekdays and 1:30 AM in the weekends. It usually takes the patient  minutes to fall asleep. The patient gets up 0-1 per night to go to the bathroom. The Patient finally gets up at 11:30 AM during the weekdays and 11:30 AM in the weekends. patient wakes up with dry mouth. The patient has restless sleep with frequent arousals in addition to the Patient has significant daytime sleepiness.  The Patient scored Total score: 14 on Lincoln Sleepiness Scale ( more than 10 is indicative of daytime sleepiness)and 52 in Friends and Family:     Attends Congregational Services:     Active Member of Clubs or Organizations:     Attends Club or Organization Meetings:     Marital Status:    Intimate Partner Violence:     Fear of Current or Ex-Partner:     Emotionally Abused:     Physically Abused:     Sexually Abused:        Prior to Admission medications    Medication Sig Start Date End Date Taking? Authorizing Provider   levothyroxine (SYNTHROID) 150 MCG tablet 2 p.o. every morning at least 30 minutes before food 7/2/21   Nasim Evans MD   pregabalin (LYRICA) 100 MG capsule Take 1 capsule by mouth 3 times daily for 30 days. 6/16/21 7/16/21  Cornelio Cheney MD   DULoxetine (CYMBALTA) 60 MG extended release capsule Take 1 capsule by mouth daily 6/16/21   Cornelio Cheney MD   hydroCHLOROthiazide (HYDRODIURIL) 25 MG tablet Take 1 tablet by mouth daily 6/16/21   Cornelio Cheney MD   tiZANidine (ZANAFLEX) 4 MG tablet Take 1 tablet by mouth 2 times daily as needed (muscle spasms) 6/16/21   Cornelio Cheney MD   lisinopril (PRINIVIL;ZESTRIL) 10 MG tablet TAKE 1 TABLET DAILY 3/25/21   Nasim Evans MD   rosuvastatin (CRESTOR) 40 MG tablet Take 1 tablet by mouth daily 3/25/21   Nasim Evans MD   Tens Unit MISC by Does not apply route Use as directed.  9/16/20   Cornelio Cheney MD   Cholecalciferol (VITAMIN D3) 50 MCG (2000 UT) CAPS Take by mouth    Historical Provider, MD   magnesium (MAGNESIUM-OXIDE) 250 MG TABS tablet Take 250 mg by mouth daily Indications: takes if wife puts in my pill bottle     Historical Provider, MD       Allergies as of 08/11/2021 - Fully Reviewed 08/11/2021   Allergen Reaction Noted    Oxycontin [oxycodone] Nausea And Vomiting and Nausea Only 04/10/2018    Percocet [oxycodone-acetaminophen] Nausea And Vomiting 07/07/2017       Patient Active Problem List   Diagnosis    Chronic pain syndrome    Degenerative joint disease of spinal facet joint    Recurrent major depressive disorder ([facility] [address]    DDD (degenerative disc disease), cervical    Lumbar radiculopathy    Hyperlipidemia    DDD (degenerative disc disease), lumbosacral    History of thyroid cancer    Major depressive disorder in partial remission ([address]    Acquired hypothyroidism    Gastroesophageal reflux disease    Essential hypertension    KAR (obstructive sleep apnea)    Prediabetes    Vitamin D deficiency    Biceps tendonitis on left       Past Medical History:   Diagnosis Date    Chronic pain syndrome     DDD (degenerative disc disease), cervical     Degenerative joint disease of spinal facet joint     Depressive disorder, not elsewhere classified     Dizziness     Failed back surgical syndrome     GERD (gastroesophageal reflux disease)     Headache(784.0)     HTN (hypertension)     Hyperlipidemia     Lumbar radiculopathy     Sleep apnea     does use CPAP    thyroid cancer     thyroid    Thyroid disease     Wears glasses        Past Surgical History:   Procedure Laterality Date    CARPAL TUNNEL RELEASE Left 3-5-2013    CARPAL TUNNEL RELEASE Right 4-    COLONOSCOPY N/A 10/15/2019    COLONOSCOPY WITH BIOPSY performed by Nick Sethi MD at [address] N/A 10/15/2019    COLONOSCOPY POLYPECTOMY SNARE/COLD BIOPSY performed by Nick Sethi MD at [place] [address] Left 1-5-2016    First dorsal extensor compartment tendon release.     JOINT REPLACEMENT Right     shoulder    KNEE ARTHROSCOPY Right 2/26/2020    RIGHT KNEE ARTHROSCOPY MEDIAL MENISCECTOMY AND CHONDROPLASTY performed by Milan Kim MD at [place] [address] [employer],EXCIS MUSC/NERV Right 4/8/2019    RIGHT OPEN TENNIS ELBOW RELEASE performed by Mumtaz Wan MD at [address] [facility] Right 1993    right    SPINAL FUSION  2006    THYROIDECTOMY  2008    UPPER GASTROINTESTINAL ENDOSCOPY N/A 1/29/2019    EGD DIAGNOSTIC ONLY performed by Berna Cartagena MD at [address] [place] [address] ENDOSCOPY N/A 10/15/2019    EGD DILATION BALLOON performed by Vero Arboleda MD at [facility] N/A 10/15/2019    EGD BIOPSY performed by Vero Arboleda MD at [address] [place] [address] History   Problem Relation Age of Onset    Diabetes Father     Heart Failure Father     Hypertension Father     High Blood Pressure Father     High Blood Pressure Mother     Stroke Maternal Grandmother        Review of Systems   Constitutional: Positive for fatigue. Negative for diaphoresis. HENT: Positive for sore throat. Eyes: Negative. Respiratory: Positive for cough. Cardiovascular: Positive for leg swelling. Gastrointestinal: Negative. Endocrine: Negative. Genitourinary: Negative. Negative for frequency. Musculoskeletal: Positive for arthralgias and myalgias. Allergic/Immunologic: Negative. Neurological: Negative. Negative for headaches. Hematological: Negative. Psychiatric/Behavioral: The patient is nervous/anxious. Objective:     Vitals:  Weight BMI Neck circumference    Wt Readings from Last 3 Encounters:   08/11/21 292 lb 3.2 oz (132.5 kg)   06/30/21 297 lb (134.7 kg)   06/22/21 297 lb (134.7 kg)    Body mass index is 37.52 kg/m². Neck circumference: 20     BP HR SaO2   BP Readings from Last 3 Encounters:   08/11/21 124/80   06/16/21 131/86   05/11/21 122/68    Pulse Readings from Last 3 Encounters:   08/11/21 96   06/16/21 86   05/11/21 97    SpO2 Readings from Last 3 Encounters:   08/11/21 97%   05/11/21 98%   03/25/21 96%        The mandibular molar Class :   []1 []2 []3      Mallampati I Airway Classification:   []1 []2 []3 []4        Physical Exam    Assessment:   Mild Obstructive Sleep Apnea/Hypopnea Syndrome, was using the CPAP up to 2 weeks ago. Diagnosis Orders   1. Obstructive sleep apnea     2. Essential hypertension     3.  Class 2 severe obesity due to excess calories with serious comorbidity and body mass index

## 2021-08-11 NOTE — PROGRESS NOTES
Dos Santos Patches         : 1970  [] [address]     [] [address]      [] [facility]     [][facility]    [] Healy Sergeant  [x] [facility]   [] Other:  Diagnosis: [x] KAR (G47.33) [] CSA (G47.31) [] Apnea (G47.30)   Length of Need: [] 12 Months [x] 99 Months [] Other:    Machine (THOM!): [] Respironics Dream Station      Auto [x] ResMed AirSense     Auto [] Other:     [x]  CPAP () [] Bilevel ()   Mode: [x] Auto [] Spontaneous    Mode: [] Auto [] Spontaneous           Between 6 and 16 cm  Also please fax us the old PSG                 Comfort Settings:   - Ramp Pressure: 5 cmH2O                                        - Ramp time: 15 min                                     -  Flex/EPR - 3 full time                                    - For ResMed Bilevel (TiMax-4 sec   TiMin- 0.2 sec)     Humidifier: [x] Heated ()        [x] Water chamber replacement ()/ 1 per 6 months        Mask:  Please always start with the mask the patient used during the titraion   [] Nasal () /1 per 3 months [x] Full Face () /1 per 3 months   [] Patient choice -Size and fit mask [x] Patient Choice - Size and fit mask   [] Dispense:  [] Dispense:    [] Headgear () / 1 per 3 months [x] Headgear () / 1 per 3 months   [] Replacement Nasal Cushion ()/2 per month [x] Interface Replacement ()/1 per month   [] Replacement Nasal Pillows ()/2 per month         Tubing: [x] Heated ()/1 per 3 months    [] Standard ()/1 per 3 months [] Other:           Filters: [x] Non-disposable ()/1 per 6 months     [x] Ultra-Fine, Disposable ()/2 per month        Miscellaneous: [x] Chin Strap ()/ 1 per 6 months [] O2 bleed-in:       LPM   [] Oximetry on CPAP/Bilevel []  Other:          Start Order Date: 21    MEDICAL JUSTIFICATION:  I, the undersigned, certify that the above prescribed supplies are medically necessary for this patients wellbeing.   In my opinion, the supplies are both reasonable and necessary in reference to accepted standards of medicalpractice in treatment of this patients condition.     Kumar Morales MD      NPI: [id number]       Order Signed Date: 08/11/21    Electronically signed by Kumar Morales MD on 8/11/2021 at 9:30 AM

## 2021-09-14 ENCOUNTER — OFFICE VISIT (OUTPATIENT)
Dept: PAIN MANAGEMENT | Age: 51
End: 2021-09-14
Payer: COMMERCIAL

## 2021-09-14 VITALS
OXYGEN SATURATION: 96 % | SYSTOLIC BLOOD PRESSURE: 161 MMHG | HEART RATE: 98 BPM | DIASTOLIC BLOOD PRESSURE: 91 MMHG | BODY MASS INDEX: 38.24 KG/M2 | TEMPERATURE: 97.5 F | WEIGHT: 297.8 LBS

## 2021-09-14 DIAGNOSIS — M25.562 CHRONIC PAIN OF BOTH KNEES: ICD-10-CM

## 2021-09-14 DIAGNOSIS — G89.29 CHRONIC PAIN OF BOTH KNEES: ICD-10-CM

## 2021-09-14 DIAGNOSIS — M79.604 LOW BACK PAIN RADIATING TO RIGHT LEG: ICD-10-CM

## 2021-09-14 DIAGNOSIS — M19.011 OSTEOARTHRITIS OF RIGHT GLENOHUMERAL JOINT: ICD-10-CM

## 2021-09-14 DIAGNOSIS — M51.37 DDD (DEGENERATIVE DISC DISEASE), LUMBOSACRAL: ICD-10-CM

## 2021-09-14 DIAGNOSIS — M54.16 LUMBAR RADICULOPATHY: ICD-10-CM

## 2021-09-14 DIAGNOSIS — M25.561 CHRONIC PAIN OF BOTH KNEES: ICD-10-CM

## 2021-09-14 DIAGNOSIS — M54.50 LOW BACK PAIN RADIATING TO RIGHT LEG: ICD-10-CM

## 2021-09-14 DIAGNOSIS — M96.1 FAILED BACK SURGICAL SYNDROME: ICD-10-CM

## 2021-09-14 DIAGNOSIS — G89.4 CHRONIC PAIN SYNDROME: ICD-10-CM

## 2021-09-14 DIAGNOSIS — M50.30 DDD (DEGENERATIVE DISC DISEASE), CERVICAL: ICD-10-CM

## 2021-09-14 DIAGNOSIS — M47.819 DEGENERATIVE JOINT DISEASE OF SPINAL FACET JOINT: ICD-10-CM

## 2021-09-14 PROCEDURE — 99213 OFFICE O/P EST LOW 20 MIN: CPT | Performed by: INTERNAL MEDICINE

## 2021-09-14 RX ORDER — HYDROCHLOROTHIAZIDE 25 MG/1
25 TABLET ORAL DAILY
Qty: 30 TABLET | Refills: 1 | Status: SHIPPED | OUTPATIENT
Start: 2021-09-14 | End: 2021-11-30 | Stop reason: SDUPTHER

## 2021-09-14 RX ORDER — DULOXETIN HYDROCHLORIDE 60 MG/1
60 CAPSULE, DELAYED RELEASE ORAL DAILY
Qty: 30 CAPSULE | Refills: 1 | Status: SHIPPED | OUTPATIENT
Start: 2021-09-14 | End: 2021-11-30 | Stop reason: SDUPTHER

## 2021-09-14 RX ORDER — PREGABALIN 100 MG/1
100 CAPSULE ORAL 3 TIMES DAILY
Qty: 90 CAPSULE | Refills: 1 | Status: SHIPPED | OUTPATIENT
Start: 2021-09-14 | End: 2021-11-30 | Stop reason: SDUPTHER

## 2021-09-14 NOTE — PROGRESS NOTES
Julia Stamp  1970  [id number]      HISTORY OF PRESENT ILLNESS:  Mr. Reina Henry is a 45 y.o. male returns for a follow up visit for pain management  He has a diagnosis of   1. Chronic pain syndrome    2. Degenerative joint disease of spinal facet joint    3. Lumbar radiculopathy    4. DDD (degenerative disc disease), cervical    5. Failed back surgical syndrome    6. DDD (degenerative disc disease), lumbosacral    7. Osteoarthritis of right glenohumeral joint    8. Low back pain radiating to right leg    9. Chronic pain of both knees    . He complains of pain in the lower back  He rates the pain 6/10 and describes it as aching, burning. Current treatment regimen has helped relieve about 40% of the pain. He denies any side effects from the current pain regimen. Patient reports that since the last follow up visit the physical functioning is unchanged, family/social relationships are unchanged, mood is unchanged sleep patterns are unchanged, and that the overall functioning is unchanged. Patient denies misusing/abusing his narcotic pain medications or using any illegal drugs. There are No indicators for possible drug abuse, addiction or diversion problems. Patient states he has been doing fair, his pain has been baseline. Mr. 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 Patient mentions he is using all the medications, he is using Norco PRN only, he has pills left. He reports he is using the Tens Unit. ALLERGIES: Patients list of allergies were reviewed     MEDICATIONS: Mr. Reina Henry list of medications were reviewed. His current medications are   Outpatient Medications Prior to Visit   Medication Sig Dispense Refill    levothyroxine (SYNTHROID) 150 MCG tablet 2 p.o. every morning at least 30 minutes before food 1 tablet 1    DULoxetine (CYMBALTA) 60 MG extended release capsule Take 1 capsule by mouth daily 30 capsule 1    hydroCHLOROthiazide (HYDRODIURIL) 25 MG tablet Take 1 tablet by mouth daily 30 tablet 1    tiZANidine (ZANAFLEX) 4 MG tablet Take 1 tablet by mouth 2 times daily as needed (muscle spasms) 60 tablet 1    lisinopril (PRINIVIL;ZESTRIL) 10 MG tablet TAKE 1 TABLET DAILY 90 tablet 3    rosuvastatin (CRESTOR) 40 MG tablet Take 1 tablet by mouth daily 90 tablet 3    Tens Unit MISC by Does not apply route Use as directed. 1 each 0    Cholecalciferol (VITAMIN D3) 50 MCG (2000 UT) CAPS Take by mouth      magnesium (MAGNESIUM-OXIDE) 250 MG TABS tablet Take 250 mg by mouth daily Indications: takes if wife puts in my pill bottle       pregabalin (LYRICA) 100 MG capsule Take 1 capsule by mouth 3 times daily for 30 days. 90 capsule 1     No facility-administered medications prior to visit. REVIEW OF SYSTEMS:    Respiratory: Negative for apnea, chest tightness and shortness of breath or change in baseline breathing. PHYSICAL EXAM:   Nursing note and vitals reviewed. BP (!) 161/91   Pulse 98   Temp 97.5 °F (36.4 °C) (Temporal)   Wt 297 lb 12.8 oz (135.1 kg)   SpO2 96%   BMI 38.24 kg/m²   Constitutional: He appears well-developed and well-nourished. No acute distress. Cardiovascular: Normal rate, regular rhythm, normal heart sounds, and does not have murmur. Pulmonary/Chest: Effort normal. No respiratory distress. He does not have wheezes in the lung fields. He has no rales. Neurological/Psychiatric:He is alert and oriented to person, place, and time. Coordination is  normal.  His mood isAppropriate and affect is Neutral/Euthymic(normal) . His    IMPRESSION:   1. Chronic pain syndrome    2. Degenerative joint disease of spinal facet joint    3. Lumbar radiculopathy    4. DDD (degenerative disc disease), cervical    5. Failed back surgical syndrome    6. DDD (degenerative disc disease), lumbosacral    7. Osteoarthritis of right glenohumeral joint    8. Low back pain radiating to right leg    9.  Chronic pain of both knees PLAN:  Informed verbal consent was obtained  -Continue with current opioid regimen   -Continue with Norco 1-2 per day   -He was advised weight reduction, diet changes- 800-1200 flex diet, diet diary, exercising, nutritional  consult increased physical activity as tolerated   -Walking/Stretching exercises as advised   -He was advised to increase fluids ( 5-7  glasses of fluid daily), limit caffeine, avoid cheese products, increase dietary fiber, increase activity and exercise as tolerated and relax regularly and enjoy meals      Current Outpatient Medications   Medication Sig Dispense Refill    levothyroxine (SYNTHROID) 150 MCG tablet 2 p.o. every morning at least 30 minutes before food 1 tablet 1    DULoxetine (CYMBALTA) 60 MG extended release capsule Take 1 capsule by mouth daily 30 capsule 1    hydroCHLOROthiazide (HYDRODIURIL) 25 MG tablet Take 1 tablet by mouth daily 30 tablet 1    tiZANidine (ZANAFLEX) 4 MG tablet Take 1 tablet by mouth 2 times daily as needed (muscle spasms) 60 tablet 1    lisinopril (PRINIVIL;ZESTRIL) 10 MG tablet TAKE 1 TABLET DAILY 90 tablet 3    rosuvastatin (CRESTOR) 40 MG tablet Take 1 tablet by mouth daily 90 tablet 3    Tens Unit MISC by Does not apply route Use as directed. 1 each 0    Cholecalciferol (VITAMIN D3) 50 MCG (2000 UT) CAPS Take by mouth      magnesium (MAGNESIUM-OXIDE) 250 MG TABS tablet Take 250 mg by mouth daily Indications: takes if wife puts in my pill bottle       pregabalin (LYRICA) 100 MG capsule Take 1 capsule by mouth 3 times daily for 30 days. 90 capsule 1     No current facility-administered medications for this visit. I will continue his current medication regimen  which is part of the above treatment schedule.  It has been helping with Mr. Chuck Vines chronic  medical problems which for this visit include:   Diagnoses of Chronic pain syndrome, Degenerative joint disease of spinal facet joint, Lumbar radiculopathy, DDD (degenerative disc

## 2021-09-30 DIAGNOSIS — G89.4 CHRONIC PAIN SYNDROME: ICD-10-CM

## 2021-09-30 RX ORDER — DULOXETIN HYDROCHLORIDE 60 MG/1
60 CAPSULE, DELAYED RELEASE ORAL DAILY
Qty: 30 CAPSULE | OUTPATIENT
Start: 2021-09-30

## 2021-09-30 NOTE — TELEPHONE ENCOUNTER
LOV 5/11/21  Future Appointments   Date Time Provider Des Carol   10/1/2021  1:00 PM SCHEDULE, DENT CROSSING [facility] - D[facility]   10/11/2021 11:00 AM MD EMORY Mosquera SLEEP [facility]   11/9/2021 12:00 PM Damien Alvarenga MD [facility]

## 2021-09-30 NOTE — TELEPHONE ENCOUNTER
----- Message from [facility] sent at 9/29/2021 12:34 PM EDT -----  Subject: Refill Request    QUESTIONS  Name of Medication? DULoxetine (CYMBALTA) 60 MG extended release capsule  Patient-reported dosage and instructions? 1x a day  How many days do you have left? 6  Preferred Pharmacy? [facility]  Pharmacy phone number (if available)? [phone]  ---------------------------------------------------------------------------  --------------,  Name of Medication? rosuvastatin (CRESTOR) 40 MG tablet  Patient-reported dosage and instructions? 1x a day  How many days do you have left? 6  Preferred Pharmacy? [facility]  Pharmacy phone number (if available)? [phone]  ---------------------------------------------------------------------------  --------------  Radha Fearing INFO  What is the best way for the office to contact you? OK to leave message on   voicemail  Preferred Call Back Phone Number?  [phone]

## 2021-10-01 ENCOUNTER — NURSE ONLY (OUTPATIENT)
Dept: FAMILY MEDICINE CLINIC | Age: 51
End: 2021-10-01

## 2021-10-01 DIAGNOSIS — Z85.850 HISTORY OF THYROID CANCER: Primary | ICD-10-CM

## 2021-10-01 DIAGNOSIS — Z85.850 HISTORY OF THYROID CANCER: ICD-10-CM

## 2021-10-01 LAB
REASON FOR REJECTION: NORMAL
REJECTED TEST: NORMAL

## 2021-10-03 ENCOUNTER — HOSPITAL ENCOUNTER (EMERGENCY)
Age: 51
Discharge: HOME | End: 2021-10-04
Attending: EMERGENCY MEDICINE
Payer: COMMERCIAL

## 2021-10-03 ENCOUNTER — APPOINTMENT (OUTPATIENT)
Dept: CT IMAGING | Age: 51
End: 2021-10-03
Payer: COMMERCIAL

## 2021-10-03 DIAGNOSIS — M54.50 ACUTE EXACERBATION OF CHRONIC LOW BACK PAIN: Primary | ICD-10-CM

## 2021-10-03 DIAGNOSIS — G89.29 ACUTE EXACERBATION OF CHRONIC LOW BACK PAIN: Primary | ICD-10-CM

## 2021-10-03 LAB
A/G RATIO: 1.4 (ref 1.1–2.2)
ALBUMIN SERPL-MCNC: 4 G/DL (ref 3.4–5)
ALP BLD-CCNC: 91 U/L (ref 40–129)
ALT SERPL-CCNC: 20 U/L (ref 10–40)
ANION GAP SERPL CALCULATED.3IONS-SCNC: 10 MMOL/L (ref 3–16)
AST SERPL-CCNC: 16 U/L (ref 15–37)
BASOPHILS ABSOLUTE: 0.1 K/UL (ref 0–0.2)
BASOPHILS RELATIVE PERCENT: 1 %
BILIRUB SERPL-MCNC: <0.2 MG/DL (ref 0–1)
BILIRUBIN URINE: NEGATIVE
BLOOD, URINE: NEGATIVE
BUN BLDV-MCNC: 16 MG/DL (ref 7–20)
CALCIUM SERPL-MCNC: 9.3 MG/DL (ref 8.3–10.6)
CHLORIDE BLD-SCNC: 106 MMOL/L (ref 99–110)
CLARITY: CLEAR
CO2: 27 MMOL/L (ref 21–32)
COLOR: YELLOW
CREAT SERPL-MCNC: 0.9 MG/DL (ref 0.9–1.3)
EOSINOPHILS ABSOLUTE: 0.1 K/UL (ref 0–0.6)
EOSINOPHILS RELATIVE PERCENT: 1.9 %
GFR AFRICAN AMERICAN: >60
GFR NON-AFRICAN AMERICAN: >60
GLOBULIN: 2.8 G/DL
GLUCOSE BLD-MCNC: 98 MG/DL (ref 70–99)
GLUCOSE URINE: NEGATIVE MG/DL
HCT VFR BLD CALC: 41.4 % (ref 40.5–52.5)
HEMOGLOBIN: 13.7 G/DL (ref 13.5–17.5)
KETONES, URINE: NEGATIVE MG/DL
LEUKOCYTE ESTERASE, URINE: NEGATIVE
LIPASE: 18 U/L (ref 13–60)
LYMPHOCYTES ABSOLUTE: 2 K/UL (ref 1–5.1)
LYMPHOCYTES RELATIVE PERCENT: 28.4 %
MCH RBC QN AUTO: 29.2 PG (ref 26–34)
MCHC RBC AUTO-ENTMCNC: 33.2 G/DL (ref 31–36)
MCV RBC AUTO: 88 FL (ref 80–100)
MICROSCOPIC EXAMINATION: NORMAL
MONOCYTES ABSOLUTE: 0.6 K/UL (ref 0–1.3)
MONOCYTES RELATIVE PERCENT: 9 %
NEUTROPHILS ABSOLUTE: 4.1 K/UL (ref 1.7–7.7)
NEUTROPHILS RELATIVE PERCENT: 59.7 %
NITRITE, URINE: NEGATIVE
PDW BLD-RTO: 12.5 % (ref 12.4–15.4)
PH UA: 5.5 (ref 5–8)
PLATELET # BLD: 226 K/UL (ref 135–450)
PMV BLD AUTO: 9.8 FL (ref 5–10.5)
POTASSIUM SERPL-SCNC: 4.1 MMOL/L (ref 3.5–5.1)
PROTEIN UA: NEGATIVE MG/DL
RBC # BLD: 4.7 M/UL (ref 4.2–5.9)
SODIUM BLD-SCNC: 143 MMOL/L (ref 136–145)
SPECIFIC GRAVITY UA: >=1.03 (ref 1–1.03)
TOTAL PROTEIN: 6.8 G/DL (ref 6.4–8.2)
URINE REFLEX TO CULTURE: NORMAL
URINE TYPE: NORMAL
UROBILINOGEN, URINE: 0.2 E.U./DL
WBC # BLD: 6.9 K/UL (ref 4–11)

## 2021-10-03 PROCEDURE — 99285 EMERGENCY DEPT VISIT HI MDM: CPT

## 2021-10-03 PROCEDURE — 83690 ASSAY OF LIPASE: CPT

## 2021-10-03 PROCEDURE — 96374 THER/PROPH/DIAG INJ IV PUSH: CPT

## 2021-10-03 PROCEDURE — 80053 COMPREHEN METABOLIC PANEL: CPT

## 2021-10-03 PROCEDURE — 36415 COLL VENOUS BLD VENIPUNCTURE: CPT

## 2021-10-03 PROCEDURE — 85025 COMPLETE CBC W/AUTO DIFF WBC: CPT

## 2021-10-03 PROCEDURE — 96372 THER/PROPH/DIAG INJ SC/IM: CPT

## 2021-10-03 PROCEDURE — 96375 TX/PRO/DX INJ NEW DRUG ADDON: CPT

## 2021-10-03 PROCEDURE — 81003 URINALYSIS AUTO W/O SCOPE: CPT

## 2021-10-03 PROCEDURE — 74176 CT ABD & PELVIS W/O CONTRAST: CPT

## 2021-10-03 PROCEDURE — 6360000002 HC RX W HCPCS: Performed by: EMERGENCY MEDICINE

## 2021-10-03 RX ORDER — ORPHENADRINE CITRATE 30 MG/ML
60 INJECTION INTRAMUSCULAR; INTRAVENOUS ONCE
Status: COMPLETED | OUTPATIENT
Start: 2021-10-03 | End: 2021-10-03

## 2021-10-03 RX ORDER — ONDANSETRON 2 MG/ML
4 INJECTION INTRAMUSCULAR; INTRAVENOUS ONCE
Status: COMPLETED | OUTPATIENT
Start: 2021-10-03 | End: 2021-10-03

## 2021-10-03 RX ORDER — HYDROCODONE BITARTRATE AND ACETAMINOPHEN 5; 325 MG/1; MG/1
1 TABLET ORAL EVERY 6 HOURS PRN
COMMUNITY
End: 2022-03-15

## 2021-10-03 RX ORDER — KETOROLAC TROMETHAMINE 30 MG/ML
30 INJECTION, SOLUTION INTRAMUSCULAR; INTRAVENOUS ONCE
Status: COMPLETED | OUTPATIENT
Start: 2021-10-03 | End: 2021-10-03

## 2021-10-03 RX ORDER — MORPHINE SULFATE 4 MG/ML
4 INJECTION, SOLUTION INTRAMUSCULAR; INTRAVENOUS ONCE
Status: DISCONTINUED | OUTPATIENT
Start: 2021-10-03 | End: 2021-10-04 | Stop reason: HOSPADM

## 2021-10-03 RX ADMIN — ONDANSETRON 4 MG: 2 INJECTION INTRAMUSCULAR; INTRAVENOUS at 23:49

## 2021-10-03 RX ADMIN — ORPHENADRINE CITRATE 60 MG: 60 INJECTION INTRAMUSCULAR; INTRAVENOUS at 23:50

## 2021-10-03 RX ADMIN — KETOROLAC TROMETHAMINE 30 MG: 30 INJECTION, SOLUTION INTRAMUSCULAR at 23:50

## 2021-10-03 ASSESSMENT — PAIN DESCRIPTION - FREQUENCY: FREQUENCY: CONTINUOUS

## 2021-10-03 ASSESSMENT — PAIN DESCRIPTION - LOCATION: LOCATION: BACK

## 2021-10-03 ASSESSMENT — PAIN - FUNCTIONAL ASSESSMENT: PAIN_FUNCTIONAL_ASSESSMENT: PREVENTS OR INTERFERES SOME ACTIVE ACTIVITIES AND ADLS

## 2021-10-03 ASSESSMENT — PAIN DESCRIPTION - DESCRIPTORS: DESCRIPTORS: SPASM

## 2021-10-03 ASSESSMENT — PAIN DESCRIPTION - ONSET: ONSET: PROGRESSIVE

## 2021-10-03 ASSESSMENT — PAIN DESCRIPTION - ORIENTATION: ORIENTATION: LOWER;RIGHT;LEFT

## 2021-10-03 ASSESSMENT — PAIN SCALES - GENERAL
PAINLEVEL_OUTOF10: 10
PAINLEVEL_OUTOF10: 10

## 2021-10-03 ASSESSMENT — PAIN DESCRIPTION - PAIN TYPE: TYPE: ACUTE PAIN

## 2021-10-03 ASSESSMENT — PAIN DESCRIPTION - PROGRESSION: CLINICAL_PROGRESSION: GRADUALLY WORSENING

## 2021-10-04 ENCOUNTER — TELEPHONE (OUTPATIENT)
Dept: FAMILY MEDICINE CLINIC | Age: 51
End: 2021-10-04

## 2021-10-04 VITALS
DIASTOLIC BLOOD PRESSURE: 80 MMHG | HEART RATE: 88 BPM | RESPIRATION RATE: 17 BRPM | OXYGEN SATURATION: 99 % | HEIGHT: 74 IN | WEIGHT: 290 LBS | SYSTOLIC BLOOD PRESSURE: 152 MMHG | TEMPERATURE: 98 F | BODY MASS INDEX: 37.22 KG/M2

## 2021-10-04 PROCEDURE — 6370000000 HC RX 637 (ALT 250 FOR IP): Performed by: EMERGENCY MEDICINE

## 2021-10-04 RX ORDER — METHOCARBAMOL 750 MG/1
750 TABLET, FILM COATED ORAL 4 TIMES DAILY
Qty: 40 TABLET | Refills: 0 | Status: SHIPPED | OUTPATIENT
Start: 2021-10-04 | End: 2021-10-14

## 2021-10-04 RX ORDER — METHOCARBAMOL 500 MG/1
1000 TABLET, FILM COATED ORAL ONCE
Status: COMPLETED | OUTPATIENT
Start: 2021-10-04 | End: 2021-10-04

## 2021-10-04 RX ORDER — PREDNISONE 20 MG/1
TABLET ORAL
Qty: 18 TABLET | Refills: 0 | Status: SHIPPED | OUTPATIENT
Start: 2021-10-04 | End: 2021-10-14

## 2021-10-04 RX ORDER — PREDNISONE 20 MG/1
60 TABLET ORAL ONCE
Status: COMPLETED | OUTPATIENT
Start: 2021-10-04 | End: 2021-10-04

## 2021-10-04 RX ADMIN — METHOCARBAMOL 1000 MG: 500 TABLET ORAL at 00:32

## 2021-10-04 RX ADMIN — PREDNISONE 60 MG: 20 TABLET ORAL at 00:32

## 2021-10-04 ASSESSMENT — PAIN DESCRIPTION - LOCATION: LOCATION: BACK

## 2021-10-04 ASSESSMENT — PAIN SCALES - GENERAL: PAINLEVEL_OUTOF10: 5

## 2021-10-04 ASSESSMENT — PAIN - FUNCTIONAL ASSESSMENT: PAIN_FUNCTIONAL_ASSESSMENT: 0-10

## 2021-10-04 ASSESSMENT — PAIN DESCRIPTION - PAIN TYPE: TYPE: ACUTE PAIN

## 2021-10-04 NOTE — ED TRIAGE NOTES
Patient ambulatory to room 5 with c/o three day history of back pain. Patient states, \"It feels like an [place] is wrapped around my middle\". Patient states the worst pain is on his left side and flank. He states he has a history of back surgery and has back spasms occasionally, but that this one is the worst one. Patient has a tens unit in place on his back upon arrival. States he has taken tizanidine and percocet for his pain, last at 1600 without relief. Patient denies injury, denies overuse. He is awake, alert, oriented, respirations easy & regular, skin w/d, MMM & pink, cap refill brisk. Position of comfort for patient is standing up and leaning on counter. Patient's wife at bedside. Dr. Jenn Gross in room to examine patient.

## 2021-10-04 NOTE — ED NOTES
Medications given as ordered. Patient states he does not want morphine at this time. Dr. Talat Fermin notified of this.       Erinn Hayden RN  10/04/21 [id number]

## 2021-10-04 NOTE — ED NOTES
Patient states pain is lessened right now and about half of what it was. Discharge instructions reviewed with patient and verbalized understanding, denies further questions and successful teach back occurred. Offered wheelchair for discharge and declined. Discharged ambulatory with slow and steady gait to ED lobby. Written discharge instructions provided to patient. Prescriptions x2 sent electronically to [address] [place] [address] in [facility].       Julio C Hodges RN  10/04/21 [id number]

## 2021-10-04 NOTE — ED PROVIDER NOTES
[facility]  eMERGENCY dEPARTMENT eNCOUnter      Pt Name: Melyssa Vallecillo  MRN: [id number]  [place] 1970  Date of evaluation: 10/3/2021  Provider: Toñito Foley MD    [address] [place] [address]       Chief Complaint   Patient presents with    Back Pain     patient with 3 day history of back pain. Patient states \"it feels like an aneconda is wrapped around my middle\". Patient indicates most pain is left flank area, patient insistent \"It is not a kidney stone\". CRITICAL CARE TIME   Total Critical Care time was 0 minutes, excluding separately reportable procedures. There was a high probability of clinically significant/life threatening deterioration in the patient's condition which required my urgent intervention. HISTORY OF PRESENT ILLNESS  (Location/Symptom, Timing/Onset, Context/Setting, Quality, Duration, Modifying Factors, Severity.)   Melyssa Vallecillo is a 45 y.o. male who presents to the emergency department planing of pain in his left flank area. He states it feels like spasms. Is been going on for 3 days. Is been gradually building\". No abdominal pain. No nausea or vomiting. No frequency urgency or dysuria. No change in color of his urine. He has chronic back pain. He has had a previous fusion. He is on Lyrica for his chronic back pain. He has a TENS unit. He has no new injuries. Pain is nonradiating. He has no leg pain. No extremity weakness numbness or paresthesias. Nursing Notes were reviewed and I agree. REVIEW OF SYSTEMS    (2-9 systems for level 4, 10 or more for level 5)     General: No fever or chills. Cardiovascular: No chest pain. Pulmonary: No shortness of breath or cough. GI: No abdominal pain nausea or vomiting. No change in bowel habits. No diarrhea. : No frequency urgency or dysuria. No change in color of his urine. Musculoskeletal: Pain in his left flank area for 3 days. Gradually increasing intensity.   He states it feels like spasms. Nonradiating. No leg pain. Neuro: No extremity weakness numbness or tingling. Except as noted above the remainder of the review of systems was reviewed and negative. PAST MEDICAL HISTORY     Past Medical History:   Diagnosis Date    Chronic pain syndrome     DDD (degenerative disc disease), cervical     Degenerative joint disease of spinal facet joint     Depressive disorder, not elsewhere classified     Dizziness     Failed back surgical syndrome     GERD (gastroesophageal reflux disease)     Headache(784.0)     HTN (hypertension)     Hyperlipidemia     Lumbar radiculopathy     Sleep apnea     does use CPAP    thyroid cancer     thyroid    Thyroid disease     Wears glasses          SURGICAL HISTORY       Past Surgical History:   Procedure Laterality Date    CARPAL TUNNEL RELEASE Left 3-5-2013    CARPAL TUNNEL RELEASE Right 4-    COLONOSCOPY N/A 10/15/2019    COLONOSCOPY WITH BIOPSY performed by Jason Hoyt MD at [address] N/A 10/15/2019    COLONOSCOPY POLYPECTOMY SNARE/COLD BIOPSY performed by Jason Hoyt MD at [place] [address] Left 1-5-2016    First dorsal extensor compartment tendon release.     JOINT REPLACEMENT Right     shoulder    KNEE ARTHROSCOPY Right 2/26/2020    RIGHT KNEE ARTHROSCOPY MEDIAL MENISCECTOMY AND CHONDROPLASTY performed by Priti Nunez MD at [place] [address] DECOMPRESS FOREARM,EXCIS MUSC/NERV Right 4/8/2019    RIGHT OPEN TENNIS ELBOW RELEASE performed by Ilya Bales MD at [address] Right 1993    right    SPINAL FUSION  2006    THYROIDECTOMY  2008    UPPER GASTROINTESTINAL ENDOSCOPY N/A 1/29/2019    EGD DIAGNOSTIC ONLY performed by Sarah Hillman MD at [address] N/A 10/15/2019    EGD DILATION BALLOON performed by Jason Hoyt MD at [address] 10/15/2019    EGD BIOPSY performed by Noble Edmond MD at [address]       Previous Medications    CHOLECALCIFEROL (VITAMIN D3) 50 MCG (2000 UT) CAPS    Take by mouth    DULOXETINE (CYMBALTA) 60 MG EXTENDED RELEASE CAPSULE    Take 1 capsule by mouth daily    HYDROCHLOROTHIAZIDE (HYDRODIURIL) 25 MG TABLET    Take 1 tablet by mouth daily    HYDROCODONE-ACETAMINOPHEN (NORCO) 5-325 MG PER TABLET    Take 1 tablet by mouth every 6 hours as needed for Pain. LEVOTHYROXINE (SYNTHROID) 150 MCG TABLET    2 p.o. every morning at least 30 minutes before food    LISINOPRIL (PRINIVIL;ZESTRIL) 10 MG TABLET    TAKE 1 TABLET DAILY    MAGNESIUM (MAGNESIUM-OXIDE) 250 MG TABS TABLET    Take 250 mg by mouth daily Indications: takes if wife puts in my pill bottle     PREGABALIN (LYRICA) 100 MG CAPSULE    Take 1 capsule by mouth 3 times daily for 30 days. ROSUVASTATIN (CRESTOR) 40 MG TABLET    Take 1 tablet by mouth daily    TENS UNIT MISC    by Does not apply route Use as directed. TIZANIDINE (ZANAFLEX) 4 MG TABLET    Take 1 tablet by mouth 2 times daily as needed (muscle spasms)       ALLERGIES     Oxycontin [oxycodone] and Percocet [oxycodone-acetaminophen]    FAMILY HISTORY       Family History   Problem Relation Age of Onset    Diabetes Father     Heart Failure Father     Hypertension Father     High Blood Pressure Father     High Blood Pressure Mother     Stroke Maternal Grandmother           SOCIAL HISTORY       Social History     Socioeconomic History    Marital status:      Spouse name: None    Number of children: 3    Years of education: None    Highest education level: None   Occupational History    Occupation:    Tobacco Use    Smoking status: Never Smoker    Smokeless tobacco: Never Used   Vaping Use    Vaping Use: Never used   Substance and Sexual Activity    Alcohol use:  Yes     Alcohol/week: 0.0 standard drinks     Comment: occasional use    Drug use: Never    Sexual activity: Yes Partners: Female   Other Topics Concern    None   Social History Narrative    None     Social Determinants of Health     Financial Resource Strain:     Difficulty of Paying Living Expenses:    Food Insecurity:     Worried About Running Out of Food in the Last Year:     [address] in the Last Year:    Transportation Needs:     Lack of Transportation (Medical):  Lack of Transportation (Non-Medical):    Physical Activity:     Days of Exercise per Week:     Minutes of Exercise per Session:    Stress:     Feeling of Stress :    Social Connections:     Frequency of Communication with Friends and Family:     Frequency of Social Gatherings with Friends and Family:     Attends Sabianist Services:     Active Member of Clubs or Organizations:     Attends Club or Organization Meetings:     Marital Status:    Intimate Partner Violence:     Fear of Current or Ex-Partner:     Emotionally Abused:     Physically Abused:     Sexually Abused:          PHYSICAL EXAM    (up to 7 for level 4, 8 or more for level 5)     ED Triage Vitals   BP Temp Temp Source Pulse Resp SpO2 Height Weight   10/03/21 2304 10/03/21 2304 10/03/21 2304 10/03/21 2304 10/03/21 2304 10/03/21 2304 10/03/21 2309 10/03/21 2309   (!) 152/88 98 °F (36.7 °C) Oral 105 17 99 % 6' 2\" (1.88 m) 290 lb (131.5 kg)       General: Alert morbidly obese white male no acute distress. Head: Atraumatic and normocephalic. Eyes: No conjunctival injection. Pupils equal round reactive. ENT: Matilde Simper is clear. Oropharynx moist without erythema. Neck: Supple without adenopathy, nontender. Heart: Regular rate and rhythm. No murmurs or gallops noted. Lungs: Breath sounds equal bilaterally and clear. Abdomen: Obese, soft, nontender. No masses organomegaly. Bowel sounds normal.  No flank tenderness. Musculoskeletal: No lower extremity edema. Intact symmetrical pulses in the upper and lower extremities. Skin: Warm and dry, good turgor. No rash.   No pallor or cyanosis. No diaphoresis. Neuro: Awake, alert, oriented. Symmetrical lower extremity motor function. Negative straight leg raises bilaterally. Patellar and Achilles tendon reflexes are 2+ and symmetrical.  Intact sensation to touch. He has a slow steady gait, is not ataxic. DIFFERENTIAL DIAGNOSIS   Differential includes but is not limited to lumbar strain, acute exacerbation of chronic low back pain, pyelonephritis, ureterolithiasis, abdominal aortic aneurysm, pancreatitis, other. DIAGNOSTIC RESULTS     EKG: All EKG's are interpreted by Ochoa Ventura MD in the absence of a cardiologist.      RADIOLOGY:   Non-plain film images such as CT, Ultrasound and MRI are read by the radiologist. Plain radiographic images are visualized and preliminarily interpreted Ochoa Ventura MD with the below findings:      Interpretation per the Radiologist below, if available at the time of this note:    CT ABDOMEN PELVIS WO CONTRAST Additional Contrast? None   Final Result   No acute pathology within the abdomen and pelvis. No obstructive uropathy. Changes related to mature instrumented posterior fusion of L3 and L4. At L2-L3, grade 1 retrolisthesis and moderate bilateral neural foraminal   narrowing.       At L4-L5, moderate bilateral neural foraminal narrowing               ED BEDSIDE ULTRASOUND:   Performed by ED Physician - none    LABS:  Labs Reviewed   CBC WITH AUTO DIFFERENTIAL    Narrative:     Performed at:  [facility]  [address] [facility]   Phone [phone]   COMPREHENSIVE METABOLIC PANEL    Narrative:     Performed at:  [address] [place] [address]  [address] [facility]   Phone [phone]   LIPASE    Narrative:     Performed at:  [address] [place] [address]  [address] [facility]   Phone [phone]   URINE RT REFLEX TO CULTURE    Narrative:     Performed at:  [facility]  [address] [facility]   Phone [phone]       All other labs were within normal range or not returned as of this dictation. EMERGENCY DEPARTMENT COURSE and DIFFERENTIAL DIAGNOSIS/MDM:   Vitals:    Vitals:    10/03/21 2304 10/03/21 2309   BP: (!) 152/88    Pulse: 105    Resp: 17    Temp: 98 °F (36.7 °C)    TempSrc: Oral    SpO2: 99%    Weight:  290 lb (131.5 kg)   Height:  6' 2\" (1.88 m)       Patient has chronic back pain. He has had a lumbar fusion. He is followed by Dr. Ricardo Valle. He has had 3 days of increasing back pain in the left flank area which she states feels like muscle spasms. He has no abdominal pain. He has no  symptoms. His urine is clear. No pyuria or hematuria. His white blood cell count is normal.  His chemistry profile was normal including his liver enzymes and lipase. He has no acute intra-abdominal findings on CT. He has no radicular symptoms or neurologic deficits to suggest a radiculopathy, cord compression. He is not febrile. He has no risk factor for discitis or epidural abscess. I ordered Toradol, morphine, and IM Norflex. He did not want the morphine. He got significantly better with the Toradol and Norflex. He states that the tizanidine is not helping much. He is used Robaxin in the past and it helped. I put him on Robaxin gave him the first dose here. I gave him a dose of prednisone and put him on some prednisone. He is can follow-up with his pain management physician for continued pain. He has Vicodin at home if he needs it for further pain relief. Test results, diagnosis, and treatment plan of been discussed with the patient. He understands treatment plan follow-up as discussed. CONSULTS:  None    PROCEDURES:  None    FINAL IMPRESSION      1.  Acute exacerbation of chronic low back pain          DISPOSITION/PLAN   DISPOSITION Decision To Discharge 10/04/2021 12:27:58 AM      PATIENT REFERRED TO:  Joni Heart MD  [address] [place] [address] [address] [facility]  [phone]    In 1 week        DISCHARGE MEDICATIONS:  New Prescriptions    METHOCARBAMOL (ROBAXIN-750) 750 MG TABLET    Take 1 tablet by mouth 4 times daily for 10 days Use as needed for muscle pain or soreness. May take 2 at bedtime to aid sleep.     PREDNISONE (DELTASONE) 20 MG TABLET    3 tabs po qam for 3 days then 2 tabs qam for 3 days the 1 tab qam for 3 days       (Please note that portions of this note were completed with a voice recognition program.  Efforts were made to edit the dictations but occasionally words are mis-transcribed.)    Albina Sanon MD  Attending Emergency Physician        Veronique Neumann MD  10/04/21 7164

## 2021-10-04 NOTE — ED NOTES
Dr. Jenn Gross in room to discuss test results with patient and discharge plan of care.       Aayush Gorman RN  10/04/21 2851

## 2021-10-11 ENCOUNTER — TELEPHONE (OUTPATIENT)
Dept: PRIMARY CARE CLINIC | Age: 51
End: 2021-10-11

## 2021-10-11 ENCOUNTER — OFFICE VISIT (OUTPATIENT)
Dept: SLEEP MEDICINE | Age: 51
End: 2021-10-11
Payer: COMMERCIAL

## 2021-10-11 VITALS
OXYGEN SATURATION: 95 % | WEIGHT: 298 LBS | BODY MASS INDEX: 38.24 KG/M2 | SYSTOLIC BLOOD PRESSURE: 138 MMHG | HEART RATE: 87 BPM | HEIGHT: 74 IN | DIASTOLIC BLOOD PRESSURE: 88 MMHG | TEMPERATURE: 96 F | RESPIRATION RATE: 18 BRPM

## 2021-10-11 DIAGNOSIS — Z99.89 OSA ON CPAP: Primary | ICD-10-CM

## 2021-10-11 DIAGNOSIS — I10 ESSENTIAL HYPERTENSION: ICD-10-CM

## 2021-10-11 DIAGNOSIS — G47.33 OSA ON CPAP: Primary | ICD-10-CM

## 2021-10-11 DIAGNOSIS — Z99.89 DEPENDENCE ON OTHER ENABLING MACHINES AND DEVICES: ICD-10-CM

## 2021-10-11 PROCEDURE — 99214 OFFICE O/P EST MOD 30 MIN: CPT | Performed by: PSYCHIATRY & NEUROLOGY

## 2021-10-11 ASSESSMENT — ENCOUNTER SYMPTOMS
CHOKING: 0
APNEA: 0

## 2021-10-11 ASSESSMENT — SLEEP AND FATIGUE QUESTIONNAIRES
ESS TOTAL SCORE: 7
HOW LIKELY ARE YOU TO NOD OFF OR FALL ASLEEP WHILE LYING DOWN TO REST IN THE AFTERNOON WHEN CIRCUMSTANCES PERMIT: 3
HOW LIKELY ARE YOU TO NOD OFF OR FALL ASLEEP WHILE SITTING AND TALKING TO SOMEONE: 0
HOW LIKELY ARE YOU TO NOD OFF OR FALL ASLEEP IN A CAR, WHILE STOPPED FOR A FEW MINUTES IN TRAFFIC: 0
HOW LIKELY ARE YOU TO NOD OFF OR FALL ASLEEP WHILE WATCHING TV: 2
HOW LIKELY ARE YOU TO NOD OFF OR FALL ASLEEP WHEN YOU ARE A PASSENGER IN A CAR FOR AN HOUR WITHOUT A BREAK: 0
HOW LIKELY ARE YOU TO NOD OFF OR FALL ASLEEP WHILE SITTING QUIETLY AFTER LUNCH WITHOUT ALCOHOL: 0
HOW LIKELY ARE YOU TO NOD OFF OR FALL ASLEEP WHILE SITTING INACTIVE IN A PUBLIC PLACE: 1
HOW LIKELY ARE YOU TO NOD OFF OR FALL ASLEEP WHILE SITTING AND READING: 1

## 2021-10-11 NOTE — PATIENT INSTRUCTIONS
Patient Education        Learning About CPAP for Sleep Apnea  What is CPAP? CPAP is a small machine that you use at home every night while you sleep. It increases air pressure in your throat to keep your airway open. When you have sleep apnea, this can help you sleep better, feel better, and avoid future health problems. CPAP stands for \"continuous positive airway pressure. \"  The CPAP machine will have one of the following:  · A mask that covers your nose and mouth  · A mask that covers your nose only  · A nasal pillow that covers only the openings of your nose  Why is it done? CPAP is usually the best treatment for obstructive sleep apnea. It is the first treatment choice and the most widely used. CPAP:  · Helps you have more normal sleep, so you feel less sleepy and more alert during the daytime. · May help keep heart failure or other heart problems from getting worse. · May help lower your blood pressure. If you have a bed partner, they may also sleep better when you use a CPAP. That's because you aren't snoring or restless. Your doctor may suggest CPAP if you have:  · Moderate to severe sleep apnea. · Sleep apnea and coronary artery disease (CAD). · Sleep apnea and heart failure. What are the side effects? Some people who use CPAP have:  · A dry or stuffy nose and a sore throat. · Irritated skin on the face. · Bloating. How can you care for yourself? If using CPAP is not comfortable, or if you have certain side effects, work with your doctor to fix them. Here are some things you can try:  · Be sure the mask, nasal mask, or nasal pillow fits well. · See if your doctor can adjust the pressure of your CPAP. · If your nose or mouth is dry, set the machine to deliver warmer or wetter air. Or try using a humidifier. · If your nose is runny or stuffy, talk to your doctor about using a decongestant medicine or steroid nasal spray. Be safe with medicines.  Read and follow all instructions on the label. Do not use the medicine longer than the label says. · Your doctor may also help you with problems like swallowing air, bloating, or claustrophobia. Talk to your doctor if you're still having problems. If these things don't help, you might try a different type of machine. Where can you learn more? Go to https://WaveConnexpepiceweb.Wazoku. org and sign in to your [employer] account. Enter V706 in the Songfor box to learn more about \"Learning About CPAP for Sleep Apnea. \"     If you do not have an account, please click on the \"Sign Up Now\" link. Current as of: July 6, 2021               Content Version: 13.0  © 2006-2021 Healthwise, Incorporated. Care instructions adapted under license by [facility] ([facility]). If you have questions about a medical condition or this instruction, always ask your healthcare professional. [address] any warranty or liability for your use of this information.

## 2021-10-11 NOTE — TELEPHONE ENCOUNTER
Unfortunately that blood was rejected. His chart states a message was left to have the blood redrawn. Please notify wife and patient to schedule nurse visit to have checked. Please document call and then close encounter.   thanks

## 2021-10-11 NOTE — PROGRESS NOTES
MD TOBIAS Paige Board Certified in Sleep Medicine  Certified in [address] [place] [address] Certified in Neurology [facility] [facility] [address]  [phone]   [address] [place] [address], [address]                      [address]  [address] [facility] [address] [phone]    Subjective:     Patient ID: Freida Chacon is a 45 y.o. male. Chief Complaint   Patient presents with    Follow-up     yearly CPAP f/up        HPI:        Freida Chacon is a 45 y.o. male was seen today as a follow for obstructive sleep apnea. Mild obstructive sleep apnea with apnea hypopnea index of 11.8/hr with lowest O2 saturation of 80%. Patient is using the PAP machine about 100% of the time, more than 4 hours a nightabout  97 %, in total average of 8:47 hours a night in last 30 days. Currently on PAP at 11.8 cm (6-16), the AHI is only 1.8 events per hour at this pressure. Patient improved regarding daytime sleepiness and fatigue, wakes up refreshed in the morning. The Patient scored Total score: 7 on [place] Sleepiness Scale ( more than 10 is indicative of daytime sleepiness)   Patient has no problem with PAP pressure or mask, uses FFM. Wakes up in the morning with dry mouth, the setting of the heated humidifier at # off. Patient hastrouble falling asleep while on PAP machine. Patient improved after mask fitting.   HTN is stable  DOT/CDL - N/A        Previous Report(s)Reviewed: historical medical records         Social History     Socioeconomic History    Marital status:      Spouse name: Not on file    Number of children: 3    Years of education: Not on file    Highest education level: Not on file   Occupational History    Occupation:    Tobacco Use    Smoking status: Never Smoker    Smokeless tobacco: Never Used Vaping Use    Vaping Use: Never used   Substance and Sexual Activity    Alcohol use: Yes     Alcohol/week: 0.0 standard drinks     Comment: occasional use    Drug use: Never    Sexual activity: Yes     Partners: Female   Other Topics Concern    Not on file   Social History Narrative    Not on file     Social Determinants of Health     Financial Resource Strain:     Difficulty of Paying Living Expenses:    Food Insecurity:     Worried About Running Out of Food in the Last Year:     [address] in the Last Year:    Transportation Needs:     Lack of Transportation (Medical):  Lack of Transportation (Non-Medical):    Physical Activity:     Days of Exercise per Week:     Minutes of Exercise per Session:    Stress:     Feeling of Stress :    Social Connections:     Frequency of Communication with Friends and Family:     Frequency of Social Gatherings with Friends and Family:     Attends Zoroastrian Services:     Active Member of Clubs or Organizations:     Attends Club or Organization Meetings:     Marital Status:    Intimate Partner Violence:     Fear of Current or Ex-Partner:     Emotionally Abused:     Physically Abused:     Sexually Abused:        Prior to Admission medications    Medication Sig Start Date End Date Taking? Authorizing Provider   methocarbamol (ROBAXIN-750) 750 MG tablet Take 1 tablet by mouth 4 times daily for 10 days Use as needed for muscle pain or soreness. May take 2 at bedtime to aid sleep. 10/4/21 10/14/21 Yes Tre Alvarez MD   predniSONE (DELTASONE) 20 MG tablet 3 tabs po qam for 3 days then 2 tabs qam for 3 days the 1 tab qam for 3 days 10/4/21 10/14/21 Yes Tre Alvarez MD   HYDROcodone-acetaminophen (NORCO) 5-325 MG per tablet Take 1 tablet by mouth every 6 hours as needed for Pain. Yes Historical Provider, MD   pregabalin (LYRICA) 100 MG capsule Take 1 capsule by mouth 3 times daily for 30 days.  9/14/21 10/14/21 Yes Chris Vo MD Past Medical History:   Diagnosis Date    Chronic pain syndrome     DDD (degenerative disc disease), cervical     Degenerative joint disease of spinal facet joint     Depressive disorder, not elsewhere classified     Dizziness     Failed back surgical syndrome     GERD (gastroesophageal reflux disease)     Headache(784.0)     HTN (hypertension)     Hyperlipidemia     Lumbar radiculopathy     Sleep apnea     does use CPAP    thyroid cancer     thyroid    Thyroid disease     Wears glasses        Past Surgical History:   Procedure Laterality Date    CARPAL TUNNEL RELEASE Left 3-5-2013    CARPAL TUNNEL RELEASE Right 4-    COLONOSCOPY N/A 10/15/2019    COLONOSCOPY WITH BIOPSY performed by Chery Ospina MD at [address] N/A 10/15/2019    COLONOSCOPY POLYPECTOMY SNARE/COLD BIOPSY performed by Chery Ospina MD at [place] [address] Left 1-5-2016    First dorsal extensor compartment tendon release.     JOINT REPLACEMENT Right     shoulder    KNEE ARTHROSCOPY Right 2/26/2020    RIGHT KNEE ARTHROSCOPY MEDIAL MENISCECTOMY AND CHONDROPLASTY performed by Monalisa Harris MD at [place] [address] DECOMPRESS FOREARM,EXCIS MUSC/NERV Right 4/8/2019    RIGHT OPEN TENNIS ELBOW RELEASE performed by Yfn Andrade MD at [address] [place] [address] Right 1993    right    SPINAL FUSION  2006    THYROIDECTOMY  2008    UPPER GASTROINTESTINAL ENDOSCOPY N/A 1/29/2019    EGD DIAGNOSTIC ONLY performed by Balbina Fang MD at [address] [facility] N/A 10/15/2019    EGD DILATION BALLOON performed by Chery Ospina MD at [address] [facility] N/A 10/15/2019    EGD BIOPSY performed by Chery Ospina MD at [address] [facility] History   Problem Relation Age of Onset    Diabetes Father     Heart Failure Father     Hypertension Father     High Blood Pressure Father     High Blood Pressure Mother     Stroke Maternal Grandmother        Review of Systems   Constitutional: Negative for fatigue. Respiratory: Negative for apnea and choking. Genitourinary: Negative for frequency. Neurological: Negative for headaches. Objective:     Vitals:  Weight BMI Neck circumference    Wt Readings from Last 3 Encounters:   10/11/21 298 lb (135.2 kg)   10/03/21 290 lb (131.5 kg)   09/14/21 297 lb 12.8 oz (135.1 kg)    Body mass index is 38.26 kg/m². BP HR SaO2   BP Readings from Last 3 Encounters:   10/11/21 138/88   10/04/21 (!) 152/80   09/14/21 (!) 161/91    Pulse Readings from Last 3 Encounters:   10/11/21 87   10/04/21 88   09/14/21 98    SpO2 Readings from Last 3 Encounters:   10/11/21 95%   10/04/21 99%   09/14/21 96%        Themandibular molar Class :   [x]1 []2 []3      Mallampati I Airway Classification:   []1 []2 [x]3 []4      Physical Exam  Vitals and nursing note reviewed. Constitutional:       Appearance: Normal appearance. HENT:      Head: Atraumatic. Nose: Nose normal.      Mouth/Throat:      Mouth: Mucous membranes are moist.   Eyes:      Extraocular Movements: Extraocular movements intact. Cardiovascular:      Rate and Rhythm: Normal rate and regular rhythm. Musculoskeletal:         General: Normal range of motion. Cervical back: Normal range of motion and neck supple. Skin:     General: Skin is warm. Neurological:      General: No focal deficit present. Psychiatric:         Mood and Affect: Mood normal.         :   Mild Obstructive Sleep Apnea/Hypopnea Syndrome under good control on PAP at 11.8 cmwp. Diagnosis Orders   1. KAR on CPAP     2. Dependence on other enabling machines and devices     3. Essential hypertension       Plan: Will continue the PAP at 6-16 cmwp. I educated the Patient about the PAP machine including how to change the heated humidifier. Most likely treating the KAR will is having impact on HTN control.          No orders of the defined types were placed in this encounter. Return in about 1 year (around 10/11/2022) for Reveiwing CPAP usage and compliance report and tro.     Debra Irizarry MD  Medical Director [address] [place] [address]

## 2021-11-08 DIAGNOSIS — Z85.850 HISTORY OF THYROID CANCER: ICD-10-CM

## 2021-11-08 DIAGNOSIS — Z85.850 HISTORY OF THYROID CANCER: Primary | ICD-10-CM

## 2021-11-08 RX ORDER — LEVOTHYROXINE SODIUM 0.1 MG/1
TABLET ORAL
Qty: 60 TABLET | Refills: 3 | Status: SHIPPED | OUTPATIENT
Start: 2021-11-08 | End: 2021-12-10

## 2021-11-08 NOTE — TELEPHONE ENCOUNTER
Pt stated something happened with last lab draw. Needs to have order put in epic to have redrawn. Going to go to [facility] in [place]- closer. Also,  needs refill on synthroid- completely out of med. Please call into [facility] in [place].  Pt is reachable at [phone]

## 2021-11-16 NOTE — PROGRESS NOTES
History of Present Illness:  Britt Owens is a pleasant 49 y.o. male a little over 6 months out following right anatomic total shoulder replacement 07/07/17. He is having some increased aching in the right shoulder and has some pain and tightness in his upper trap and down into his upper arm. He is still working in physical therapy but feels that progress has slowed down because of his increased pain. He believes that this could be coming from his neck as sometimes the pain will radiate up into his cervical spine and give him a headache. He is concerned about returning to work as a  for [employer] as he has to do a lot of overhead lifting. He denies any recent injury, fever, chills, or any other problems. Medical History:  Patient's medications, allergies, past medical, surgical, social and family histories were reviewed and updated as appropriate. Pertinent items are noted in HPI  Review of systems reviewed from Patient History Form dated on 01/18/18 and available in the patient's chart under the Media tab. Vital Signs:  Vitals:    01/18/18 1457   BP: 137/89   Pulse: 93         Constitutional: In no apparent distress. Normal affect. Alert and oriented X3 and is cooperative. Right Shoulder Examination:    Inspection:  Normal appearing shoulder with no gross abnormalities. Well healed surgical incision with so signs of infection. Palpation: No crepitus with shoulder range of motion. Tenderness at the subscapularis, and bicipital groove. Facet tenderness. Active Range of Motion: 130* Forward elevation, 40* external rotation with the elbow at the side, 120* abduction, internal rotation to the level of the back pocket. Passive Range of Motion: As above     Strength:  4/5 internal and external rotation. Special Tests:  Negative belly press.          Radiology:     Plain radiographs of the right shoulder comprising 3 views: Radiographs were obtained and reviewed in the office: True AP, Axillary later, and Outlet views. Impression: Well positioned right total shoulder arthroplasty with no evidence of bone cement lucencies. No evidence of jose prosthetic fracture or dislocation. Assessment :  Felicity Lima is a pleasant 49 y.o. male a little over 6 months out following right anatomic total shoulder replacement 07/07/17. Impression:  Encounter Diagnosis   Name Primary?  S/P shoulder replacement, right Yes       Office Procedures:  Orders Placed This Encounter   Procedures    XR SHOULDER RIGHT (MIN 2 VIEWS)     Order Specific Question:   Reason for exam:     Answer:   exam       Treatment Plan:  We discussed that he may be compensating with his upper trapezius muscle and may have developed some rotator cuff inflammation which is causing pain. We would recommend a Medrol Dose Brant to address the shoulder and the cervical spine pain. He is already taking Meloxicam. We advised that he discontinue this while he is taking the oral steroid. We would like for him to continue in physical therapy. A new physical therapy letter was documented in [facility] today. We will see him back in 3 weeks with x-rays of the cervical spine. All questions were answered to patient's satisfaction and was encouraged to call with any further questions or concerns. Gregory Dean is in agreement with this plan. During this examination, I, Caroline Carcamo ATC, functioned as a scribe for Dr. Chidi Trejo. The history taking and physical examination were performed by Dr. Chidi Trejo. All counseling during the appointment was performed between the patient and Dr. Chidi Trejo.   ______  I, Dr. Brunilda Elaine, personally performed the services described in this documentation as described by Caroline Carcamo ATC in my presence, and it is both accurate and complete. Chago Trejo MD, PhD  1/18/2018 awaiting bed, no change

## 2021-11-29 ENCOUNTER — TELEPHONE (OUTPATIENT)
Dept: FAMILY MEDICINE CLINIC | Age: 51
End: 2021-11-29

## 2021-11-30 ENCOUNTER — OFFICE VISIT (OUTPATIENT)
Dept: PAIN MANAGEMENT | Age: 51
End: 2021-11-30
Payer: COMMERCIAL

## 2021-11-30 VITALS
BODY MASS INDEX: 37.99 KG/M2 | SYSTOLIC BLOOD PRESSURE: 107 MMHG | HEART RATE: 102 BPM | DIASTOLIC BLOOD PRESSURE: 75 MMHG | TEMPERATURE: 97.5 F | WEIGHT: 296 LBS | HEIGHT: 74 IN | OXYGEN SATURATION: 95 %

## 2021-11-30 DIAGNOSIS — M54.50 LOW BACK PAIN RADIATING TO RIGHT LEG: ICD-10-CM

## 2021-11-30 DIAGNOSIS — M50.30 DDD (DEGENERATIVE DISC DISEASE), CERVICAL: ICD-10-CM

## 2021-11-30 DIAGNOSIS — G89.29 CHRONIC PAIN OF BOTH KNEES: ICD-10-CM

## 2021-11-30 DIAGNOSIS — F39 MOOD DISORDER (HCC): ICD-10-CM

## 2021-11-30 DIAGNOSIS — M47.819 DEGENERATIVE JOINT DISEASE OF SPINAL FACET JOINT: ICD-10-CM

## 2021-11-30 DIAGNOSIS — G89.4 CHRONIC PAIN SYNDROME: ICD-10-CM

## 2021-11-30 DIAGNOSIS — M51.37 DDD (DEGENERATIVE DISC DISEASE), LUMBOSACRAL: ICD-10-CM

## 2021-11-30 DIAGNOSIS — M79.604 LOW BACK PAIN RADIATING TO RIGHT LEG: ICD-10-CM

## 2021-11-30 DIAGNOSIS — F51.01 PRIMARY INSOMNIA: ICD-10-CM

## 2021-11-30 DIAGNOSIS — M96.1 FAILED BACK SURGICAL SYNDROME: ICD-10-CM

## 2021-11-30 DIAGNOSIS — M19.011 OSTEOARTHRITIS OF RIGHT GLENOHUMERAL JOINT: ICD-10-CM

## 2021-11-30 DIAGNOSIS — M25.561 CHRONIC PAIN OF BOTH KNEES: ICD-10-CM

## 2021-11-30 DIAGNOSIS — M54.16 LUMBAR RADICULOPATHY: ICD-10-CM

## 2021-11-30 DIAGNOSIS — M25.562 CHRONIC PAIN OF BOTH KNEES: ICD-10-CM

## 2021-11-30 PROCEDURE — 99214 OFFICE O/P EST MOD 30 MIN: CPT | Performed by: INTERNAL MEDICINE

## 2021-11-30 RX ORDER — TIZANIDINE 4 MG/1
4 TABLET ORAL 2 TIMES DAILY PRN
Qty: 60 TABLET | Refills: 1 | Status: SHIPPED | OUTPATIENT
Start: 2021-11-30 | End: 2022-01-25 | Stop reason: SDUPTHER

## 2021-11-30 RX ORDER — DULOXETIN HYDROCHLORIDE 60 MG/1
60 CAPSULE, DELAYED RELEASE ORAL DAILY
Qty: 30 CAPSULE | Refills: 1 | Status: SHIPPED | OUTPATIENT
Start: 2021-11-30 | End: 2022-01-25 | Stop reason: SDUPTHER

## 2021-11-30 RX ORDER — HYDROCHLOROTHIAZIDE 25 MG/1
25 TABLET ORAL DAILY
Qty: 30 TABLET | Refills: 1 | Status: SHIPPED | OUTPATIENT
Start: 2021-11-30 | End: 2022-01-25 | Stop reason: SDUPTHER

## 2021-11-30 RX ORDER — PREGABALIN 100 MG/1
100 CAPSULE ORAL 3 TIMES DAILY
Qty: 90 CAPSULE | Refills: 1 | Status: SHIPPED | OUTPATIENT
Start: 2021-11-30 | End: 2022-01-25 | Stop reason: SDUPTHER

## 2021-11-30 NOTE — TELEPHONE ENCOUNTER
Pt returning call. Notified of message below.  States he was able to see ENT yesterday, did not need appt for this

## 2021-11-30 NOTE — PROGRESS NOTES
Geneva Byers  1970  [id number]    HISTORY OF PRESENT ILLNESS:  Mr. Cass Marques is a 45 y.o. male returns for a follow up visit for multiple medical problems. His current presenting problems are   1. Chronic pain of both knees    2. Osteoarthritis of right glenohumeral joint    3. Failed back surgical syndrome    4. Lumbar radiculopathy    5. Chronic pain syndrome    6. Degenerative joint disease of spinal facet joint    7. DDD (degenerative disc disease), cervical    8. DDD (degenerative disc disease), lumbosacral    9. Low back pain radiating to right leg    10. Primary insomnia    11. Mood disorder ([facility] [address]    . As per information/history obtained from the PADT(patient assessment and documentation tool) - He complains of pain in the lower back with radiation to the lower back He rates the pain 5/10 and describes it as sharp. Pain is made worse by: walking, standing, sitting, bending, lifting. Current treatment regimen has helped relieve about 60% of the pain. He denies side effects from the current pain regimen. Patient reports that since the last follow up visit the physical functioning is unchanged, family/social relationships are unchanged, mood is unchanged and sleep patterns are unchanged, and that the overall functioning is unchanged. Patient denies neurological bowel or bladder. Patient denies misusing/abusing his narcotic pain medications or using any illegal drugs. There are No indicators for possible drug abuse, addiction or diversion problems. Upon obtaining the medical history from Mr. Cass Marques regarding today's office visit for his presenting problems, patient states he has been doing fair, his pain has been baseline. Mr. Cass Marques states he is not using much of the Norco, he has medication left from before. He mentions he is using Lyrica 300 mg along with Zanaflex PRN. Patient says he is using HCTZ daily, his leg swelling is tolerable. Patient states his sleep is fair.  Has fairly normal sleep latency. Averages about 4-6 hours of sleep a night. Denies any signs of sleep apnea. Feels somewhat rested in the morning. Patient's  subjective report of his mood is fair. he describes occasional symptoms of depression, occasional  irritability and some mood swings. Describes his mood as being neutral and reports some pleasure in his daily activities. Reports  fair  appetite, energy and concentration. Able to function well in different aspects of his daily activities. Denies suicidal or homicidal ideation. Denies any complaints of increased tension, does   Worry sometimes and occasional  irritability  he denies any c/o increased anxiety, No c/o panic attacks or symptoms of PTSD, he is on Cymbalta. Patient states he is trying to stay active around the house. ALLERGIES/PAST MED/FAM/SOC HISTORY: Mr. Angela Richmond allergies, past medical, family and social history were reviewed in the chart. Mr. Angela Richmond current medications are   Outpatient Medications Prior to Visit   Medication Sig Dispense Refill    levothyroxine (SYNTHROID) 100 MCG tablet 2 p.o. every morning at least 30 minutes before food 60 tablet 3    HYDROcodone-acetaminophen (NORCO) 5-325 MG per tablet Take 1 tablet by mouth every 6 hours as needed for Pain.  DULoxetine (CYMBALTA) 60 MG extended release capsule Take 1 capsule by mouth daily 30 capsule 1    hydroCHLOROthiazide (HYDRODIURIL) 25 MG tablet Take 1 tablet by mouth daily 30 tablet 1    tiZANidine (ZANAFLEX) 4 MG tablet Take 1 tablet by mouth 2 times daily as needed (muscle spasms) 60 tablet 1    lisinopril (PRINIVIL;ZESTRIL) 10 MG tablet TAKE 1 TABLET DAILY 90 tablet 3    rosuvastatin (CRESTOR) 40 MG tablet Take 1 tablet by mouth daily 90 tablet 3    Tens Unit MISC by Does not apply route Use as directed.  1 each 0    Cholecalciferol (VITAMIN D3) 50 MCG (2000 UT) CAPS Take by mouth      magnesium (MAGNESIUM-OXIDE) 250 MG TABS tablet Take 250 mg by mouth daily Indications: takes if wife puts in my pill bottle       pregabalin (LYRICA) 100 MG capsule Take 1 capsule by mouth 3 times daily for 30 days. 90 capsule 1     No facility-administered medications prior to visit. REVIEW OF SYSTEMS:     Respiratory: Negative for shortness of breath. Cardiovascular: Negative for chest pain, palpitations  Gastrointestinal: Negative for blood in stool, abdominal distention, nausea, vomiting, abdominal pain, diarrhea,constipation. Neurological: Negative for speech difficulty, weakness and light-headedness, dizziness, tremors, sleepiness  Psychiatric/Behavioral: Negative for suicidal ideas, hallucinations, behavioral problems, self-injury, decreased concentration/cognition, agitation, confusion. PHYSICAL EXAM:   Nursing note and vitals reviewed. /75 (Site: Left Upper Arm, Position: Sitting, Cuff Size: Large Adult)   Pulse 102   Temp 97.5 °F (36.4 °C) (Temporal)   Ht 6' 2\" (1.88 m)   Wt 296 lb (134.3 kg)   SpO2 95%   BMI 38.00 kg/m²   General Appearance: Patient is well nourished, well developed, well groomed and in no acute distress. Skin: Skin is warm and dry, good turgor . No rash or lesions noted. He is not diaphoretic. Pulmonary/Chest: Effort normal. No respiratory distress or use of accessory muscles. Auscultation revealing normal air entry. He does not have wheezes in the lung fields. He has no rales. Cardiovascular: Normal rate, regular rhythm, normal heart sounds, and does not have murmur. Exam reveals no gallop and no friction rub. Musculoskeletal / Extremities: Range of motion is normal. Gait is normal, assistive devices use: none. He exhibits edema: none, and no tenderness. Neurological/Psychiatric:He is alert and oriented to person, place, and time. Coordination is  normal.   Judgement and Insight is normal  His mood is Appropriate and affect is Neutral/Euthymic(normal) . His behavior is normal.   thought content normal.        IMPRESSION:     1.  Chronic pain of both knees    2. Osteoarthritis of right glenohumeral joint    3. Failed back surgical syndrome    4. Lumbar radiculopathy    5. Chronic pain syndrome    6. Degenerative joint disease of spinal facet joint    7. DDD (degenerative disc disease), cervical    8. DDD (degenerative disc disease), lumbosacral    9. Low back pain radiating to right leg    10. Primary insomnia    11. Mood disorder ([address]        PLAN:  Informed verbal consent was obtained. -[place] record was obtained and reviewed  for the last one year and no indicators of drug misuse  were found. Any other controlled substance prescriptions  seen on the record have been accounted for, I am aware of the patient receiving these medications. Dacia Casey [place] record will be rechecked as part of office protocol. -ROM/Stretching exercises as advised   -He was advised weight reduction, diet changes- 800-1200 flex diet, diet diary, exercising, nutritional  consult increased physical activity as tolerated   -Back exercises as advised   -Continue with HCTZ, advised to elevate legs  -Continue with Tens Unit   -he was advised proper sleep hygiene-told to avoid:use of caffeine or other stimulants after noon, alcohol use near bedtime, long or frequent naps during the day, erratic sleep schedule, heavy meals near bedtime, vigorous exercise near bedtime and use of electronic devices near bedtime   -CBT techniques- relaxation therapies such as biofeedback, mindfulness based stress reduction, imagery, cognitive restructuring, problem solving discussed with patient   -Continue with Cymbalta 60 mg   -Continue with Lyrica 300 mg   -Interm history reviewed    -Labs ordered CBC, CMP, and TSH.      Mr. Ella Perez will be prescribed  the medications  listed below which are for treatment of his presenting  medical problems which for this visit include:   Diagnoses of Chronic pain of both knees, Osteoarthritis of right glenohumeral joint, Failed back surgical syndrome, Lumbar interfernce  of pain with ADL's. Ability to do home exercises independently. Ability to do household chores indoor and/or outdoor work and social and leisure activities. To increase flexibility/ROM, strength and endurance. Improve psychosocial and physical functioning.- he is showing progression towards this treatment goal with the current regimen. 2. Improving sleep to 6-7 hours a night. Improve mood/ anxiety and depression symptoms such as crying spells, low energy, problems with concentration, motivation.- he is showing progression towards this treatment goal with the current regimen. 3. Reduction of reliance on opioid analgesia/more appropriate opioid use. - he is showing progression towards this treatment goal with the current regimen. Risks and benefits of the medications and other alternative treatments have been/were  discussed with the patient. Any questions on the  common side effects of these medications were also answered. He was advised against drinking alcohol with the narcotic pain medicines, advised against driving or handling machinery when  starting or adjusting the dose of medicines, feeling groggy or drowsy, or if having any cognitive issues related to the current medications. Heis fully aware of the risk of overdose and death, if medicines are misused and not taken as prescribed. If he develops new symptoms or if the symptoms worsen, he was told to call the office. .  Thank you for allowing me to participate in the care of this patient.     Stephanie Combs MD.    Cc: Tim Clifford MD

## 2021-11-30 NOTE — TELEPHONE ENCOUNTER
Called and left a VM to return our call per HIPAA. Patient needs appointment with Dr RUSSELL today or Kimberly tomorrow.

## 2021-12-08 DIAGNOSIS — G89.4 CHRONIC PAIN SYNDROME: ICD-10-CM

## 2021-12-08 DIAGNOSIS — Z85.850 HISTORY OF THYROID CANCER: ICD-10-CM

## 2021-12-08 LAB
A/G RATIO: 2.3 (ref 1.1–2.2)
ALBUMIN SERPL-MCNC: 4.8 G/DL (ref 3.4–5)
ALP BLD-CCNC: 108 U/L (ref 40–129)
ALT SERPL-CCNC: 27 U/L (ref 10–40)
ANION GAP SERPL CALCULATED.3IONS-SCNC: 15 MMOL/L (ref 3–16)
AST SERPL-CCNC: 16 U/L (ref 15–37)
BILIRUB SERPL-MCNC: 0.4 MG/DL (ref 0–1)
BUN BLDV-MCNC: 12 MG/DL (ref 7–20)
CALCIUM SERPL-MCNC: 9.5 MG/DL (ref 8.3–10.6)
CHLORIDE BLD-SCNC: 96 MMOL/L (ref 99–110)
CO2: 26 MMOL/L (ref 21–32)
CREAT SERPL-MCNC: 0.8 MG/DL (ref 0.9–1.3)
GFR AFRICAN AMERICAN: >60
GFR NON-AFRICAN AMERICAN: >60
GLUCOSE BLD-MCNC: 88 MG/DL (ref 70–99)
HCT VFR BLD CALC: 44.8 % (ref 40.5–52.5)
HEMOGLOBIN: 14.5 G/DL (ref 13.5–17.5)
MCH RBC QN AUTO: 28.5 PG (ref 26–34)
MCHC RBC AUTO-ENTMCNC: 32.3 G/DL (ref 31–36)
MCV RBC AUTO: 88.4 FL (ref 80–100)
PDW BLD-RTO: 13.4 % (ref 12.4–15.4)
PLATELET # BLD: 184 K/UL (ref 135–450)
PMV BLD AUTO: 11 FL (ref 5–10.5)
POTASSIUM SERPL-SCNC: 3.9 MMOL/L (ref 3.5–5.1)
RBC # BLD: 5.07 M/UL (ref 4.2–5.9)
SODIUM BLD-SCNC: 137 MMOL/L (ref 136–145)
TOTAL PROTEIN: 6.9 G/DL (ref 6.4–8.2)
WBC # BLD: 7 K/UL (ref 4–11)

## 2021-12-09 LAB
T3 TOTAL: 1.43 NG/ML (ref 0.8–2)
T4 FREE: 1.5 NG/DL (ref 0.9–1.8)
T4 TOTAL: 8.9 UG/DL (ref 4.5–10.9)
TSH REFLEX: 0.22 UIU/ML (ref 0.27–4.2)

## 2021-12-10 ENCOUNTER — OFFICE VISIT (OUTPATIENT)
Dept: FAMILY MEDICINE CLINIC | Age: 51
End: 2021-12-10
Payer: COMMERCIAL

## 2021-12-10 VITALS
HEIGHT: 74 IN | HEART RATE: 98 BPM | RESPIRATION RATE: 17 BRPM | DIASTOLIC BLOOD PRESSURE: 87 MMHG | SYSTOLIC BLOOD PRESSURE: 128 MMHG | BODY MASS INDEX: 37.99 KG/M2 | WEIGHT: 296 LBS | OXYGEN SATURATION: 99 % | TEMPERATURE: 97 F

## 2021-12-10 DIAGNOSIS — Z85.850 HISTORY OF THYROID CANCER: ICD-10-CM

## 2021-12-10 DIAGNOSIS — L98.9 SKIN LESION: ICD-10-CM

## 2021-12-10 DIAGNOSIS — R79.89 LOW TESTOSTERONE: Primary | ICD-10-CM

## 2021-12-10 PROCEDURE — 99214 OFFICE O/P EST MOD 30 MIN: CPT | Performed by: FAMILY MEDICINE

## 2021-12-10 RX ORDER — LEVOTHYROXINE SODIUM 0.2 MG/1
200 TABLET ORAL DAILY
Qty: 90 TABLET | Refills: 3 | Status: SHIPPED | OUTPATIENT
Start: 2021-12-10 | End: 2022-10-18 | Stop reason: SDUPTHER

## 2021-12-10 SDOH — ECONOMIC STABILITY: FOOD INSECURITY: WITHIN THE PAST 12 MONTHS, YOU WORRIED THAT YOUR FOOD WOULD RUN OUT BEFORE YOU GOT MONEY TO BUY MORE.: NEVER TRUE

## 2021-12-10 SDOH — ECONOMIC STABILITY: FOOD INSECURITY: WITHIN THE PAST 12 MONTHS, THE FOOD YOU BOUGHT JUST DIDN'T LAST AND YOU DIDN'T HAVE MONEY TO GET MORE.: NEVER TRUE

## 2021-12-10 ASSESSMENT — SOCIAL DETERMINANTS OF HEALTH (SDOH): HOW HARD IS IT FOR YOU TO PAY FOR THE VERY BASICS LIKE FOOD, HOUSING, MEDICAL CARE, AND HEATING?: NOT HARD AT ALL

## 2021-12-10 NOTE — PROGRESS NOTES
A/P:    Diagnosis Orders   1. Low testosterone  Testosterone, free, total   2. History of thyroid cancer, postsurgical hypothyroidism, free T4 and T3 levels look good  continue levothyroxine 200 mcg daily   3. Skin lesion   I have asked patient to apply Vaseline to lesion daily and cover it for the next few weeks, if lesion does not resolve, he should be evaluated by specialist to make sure there is no concern for cancer       Follow-up in  6 months or sooner if needed    O: /87   Pulse 98   Temp 97 °F (36.1 °C)   Resp 17   Ht 6' 2\" (1.88 m)   Wt 296 lb (134.3 kg)   SpO2 99%   BMI 38.00 kg/m²    Gen- NAD, pleasant  HEENT- Eyes without icterus or injection  Neck- Supple, no lymphadenopathy appreciated  Lungs- CTAB  Heart- RRR  Abd- Soft, non tender  Ext- No edema, approximately 1 cm skin lesion of the shin that is a bit raw and raised  Psych- Appropriate    S: CC-establish with new provider  HPI-Xavier presents to establish with new clinic provider. He reports he is doing reasonably well overall. He has a history of thyroid cancer and had total thyroidectomy. He is on levothyroxine 200 mcg daily. He continues to see pain management for chronic pain. He is in the process of being evaluated for low testosterone by urology. He needs to have a second fasting testosterone level checked. He has a skin lesion of his shin that will not heal.  He scratches it quite a bit.     ROS- Per HPI    Patient's history was reviewed and updated

## 2021-12-21 DIAGNOSIS — R79.89 LOW TESTOSTERONE: ICD-10-CM

## 2021-12-23 LAB
SEX HORMONE BINDING GLOBULIN: 16 NMOL/L (ref 11–80)
TESTOSTERONE FREE-NONMALE: 36.4 PG/ML (ref 47–244)
TESTOSTERONE TOTAL: 137 NG/DL (ref 220–1000)

## 2022-01-25 ENCOUNTER — OFFICE VISIT (OUTPATIENT)
Dept: PAIN MANAGEMENT | Age: 52
End: 2022-01-25
Payer: COMMERCIAL

## 2022-01-25 VITALS
WEIGHT: 302 LBS | HEART RATE: 93 BPM | HEIGHT: 74 IN | DIASTOLIC BLOOD PRESSURE: 90 MMHG | SYSTOLIC BLOOD PRESSURE: 141 MMHG | BODY MASS INDEX: 38.76 KG/M2 | OXYGEN SATURATION: 99 %

## 2022-01-25 DIAGNOSIS — M25.561 CHRONIC PAIN OF BOTH KNEES: ICD-10-CM

## 2022-01-25 DIAGNOSIS — M19.011 OSTEOARTHRITIS OF RIGHT GLENOHUMERAL JOINT: ICD-10-CM

## 2022-01-25 DIAGNOSIS — M54.16 LUMBAR RADICULOPATHY: ICD-10-CM

## 2022-01-25 DIAGNOSIS — M25.562 CHRONIC PAIN OF BOTH KNEES: ICD-10-CM

## 2022-01-25 DIAGNOSIS — M51.37 DDD (DEGENERATIVE DISC DISEASE), LUMBOSACRAL: ICD-10-CM

## 2022-01-25 DIAGNOSIS — M79.604 LOW BACK PAIN RADIATING TO RIGHT LEG: ICD-10-CM

## 2022-01-25 DIAGNOSIS — G89.4 CHRONIC PAIN SYNDROME: ICD-10-CM

## 2022-01-25 DIAGNOSIS — M47.819 DEGENERATIVE JOINT DISEASE OF SPINAL FACET JOINT: ICD-10-CM

## 2022-01-25 DIAGNOSIS — M54.50 LOW BACK PAIN RADIATING TO RIGHT LEG: ICD-10-CM

## 2022-01-25 DIAGNOSIS — M96.1 FAILED BACK SURGICAL SYNDROME: ICD-10-CM

## 2022-01-25 DIAGNOSIS — G89.29 CHRONIC PAIN OF BOTH KNEES: ICD-10-CM

## 2022-01-25 DIAGNOSIS — M50.30 DDD (DEGENERATIVE DISC DISEASE), CERVICAL: ICD-10-CM

## 2022-01-25 PROCEDURE — 99213 OFFICE O/P EST LOW 20 MIN: CPT | Performed by: INTERNAL MEDICINE

## 2022-01-25 RX ORDER — DULOXETIN HYDROCHLORIDE 60 MG/1
60 CAPSULE, DELAYED RELEASE ORAL DAILY
Qty: 30 CAPSULE | Refills: 1 | Status: SHIPPED | OUTPATIENT
Start: 2022-01-25 | End: 2022-03-15 | Stop reason: SDUPTHER

## 2022-01-25 RX ORDER — PREGABALIN 100 MG/1
100 CAPSULE ORAL 3 TIMES DAILY
Qty: 90 CAPSULE | Refills: 1 | Status: SHIPPED | OUTPATIENT
Start: 2022-01-25 | End: 2022-03-15 | Stop reason: SDUPTHER

## 2022-01-25 RX ORDER — HYDROCHLOROTHIAZIDE 25 MG/1
25 TABLET ORAL DAILY
Qty: 30 TABLET | Refills: 1 | Status: SHIPPED | OUTPATIENT
Start: 2022-01-25 | End: 2022-03-15 | Stop reason: SDUPTHER

## 2022-01-25 RX ORDER — TIZANIDINE 4 MG/1
4 TABLET ORAL 2 TIMES DAILY PRN
Qty: 60 TABLET | Refills: 1 | Status: SHIPPED | OUTPATIENT
Start: 2022-01-25 | End: 2022-06-07 | Stop reason: SDUPTHER

## 2022-01-25 NOTE — PROGRESS NOTES
Daivd Saundra  1970  [id number]    HISTORY OF PRESENT ILLNESS:  Mr. Dinh Mercer is a 45 y.o. male returns for a follow up visit for multiple medical problems. His current presenting problems are   1. Chronic pain syndrome    2. Osteoarthritis of right glenohumeral joint    3. Lumbar radiculopathy    4. DDD (degenerative disc disease), cervical    5. Low back pain radiating to right leg    6. Mood disorder ([address]    7. Chronic pain of both knees    8. Failed back surgical syndrome    9. Degenerative joint disease of spinal facet joint    10. DDD (degenerative disc disease), lumbosacral    11. Primary insomnia    . As per information/history obtained from the PADT(patient assessment and documentation tool) - He complains of pain in the lower back with radiation to the lower back He rates the pain 5/10 and describes it as aching. Pain is made worse by: nothing, movement, walking, standing, sitting, bending, lying down, lifting. Current treatment regimen has helped relieve about 50% of the pain. He denies side effects from the current pain regimen. Patient reports that since the last follow up visit the physical functioning is unchanged, family/social relationships are unchanged, mood is unchanged and sleep patterns are unchanged, and that the overall functioning is unchanged. Patient denies neurological bowel or bladder. Patient denies misusing/abusing his narcotic pain medications or using any illegal drugs. There are No indicators for possible drug abuse, addiction or diversion problems. Upon obtaining the medical history from Mr. Dinh Mercer regarding today's office visit for his presenting problems, patient states he has been doing about the same. He mentions he had been complaint with his regimen. He complains the pain is greater in the back than the neck. He says he tries to stay active at home. He mentions he is using all the adjuvants. He says he is using Norco very seldom.  He reports he has pills left from before, which was given more than 6 months ago. ALLERGIES: Patients list of allergies were reviewed     MEDICATIONS: Mr. Gerhard Barnhart list of medications were reviewed. His current medications are   Outpatient Medications Prior to Visit   Medication Sig Dispense Refill    levothyroxine (SYNTHROID) 200 MCG tablet Take 1 tablet by mouth daily 90 tablet 3    HYDROcodone-acetaminophen (NORCO) 5-325 MG per tablet Take 1 tablet by mouth every 6 hours as needed for Pain.  lisinopril (PRINIVIL;ZESTRIL) 10 MG tablet TAKE 1 TABLET DAILY 90 tablet 3    rosuvastatin (CRESTOR) 40 MG tablet Take 1 tablet by mouth daily 90 tablet 3    Tens Unit MISC by Does not apply route Use as directed. 1 each 0    Cholecalciferol (VITAMIN D3) 50 MCG (2000 UT) CAPS Take by mouth      magnesium (MAGNESIUM-OXIDE) 250 MG TABS tablet Take 250 mg by mouth daily Indications: takes if wife puts in my pill bottle       pregabalin (LYRICA) 100 MG capsule Take 1 capsule by mouth 3 times daily for 30 days. 90 capsule 1    DULoxetine (CYMBALTA) 60 MG extended release capsule Take 1 capsule by mouth daily 30 capsule 1    hydroCHLOROthiazide (HYDRODIURIL) 25 MG tablet Take 1 tablet by mouth daily 30 tablet 1    tiZANidine (ZANAFLEX) 4 MG tablet Take 1 tablet by mouth 2 times daily as needed (muscle spasms) 60 tablet 1     No facility-administered medications prior to visit. REVIEW OF SYSTEMS:    Respiratory: Negative for apnea, chest tightness and shortness of breath or change in baseline breathing. PHYSICAL EXAM:   Nursing note and vitals reviewed. BP (!) 141/90   Pulse 93   Ht 6' 2\" (1.88 m)   Wt (!) 302 lb (137 kg)   SpO2 99%   BMI 38.77 kg/m²   Constitutional: He appears well-developed and well-nourished. No acute distress. Cardiovascular: Normal rate, regular rhythm, normal heart sounds, and does not have murmur. Pulmonary/Chest: Effort normal. No respiratory distress. He does not have wheezes in the lung fields. He has no rales. Neurological/Psychiatric:He is alert and oriented to person, place, and time. Coordination is  normal.  His mood isAppropriate and affect is Neutral/Euthymic(normal) . IMPRESSION:   1. Chronic pain syndrome    2. Osteoarthritis of right glenohumeral joint    3. Lumbar radiculopathy    4. DDD (degenerative disc disease), cervical    5. Low back pain radiating to right leg    6. Chronic pain of both knees    7. Failed back surgical syndrome    8. Degenerative joint disease of spinal facet joint    9. DDD (degenerative disc disease), lumbosacral        PLAN:  Informed verbal consent was obtained  -ROM/Stretching exercises as advised   -Continue with all other adjuvant medications as before   -He was advised weight reduction, diet changes- 800-1200 flex diet, diet diary, exercising, nutritional  consult increased physical activity as tolerated   -Walking as tolerated    -Back stretching exercises given      Current Outpatient Medications   Medication Sig Dispense Refill    pregabalin (LYRICA) 100 MG capsule Take 1 capsule by mouth 3 times daily for 30 days. 90 capsule 1    DULoxetine (CYMBALTA) 60 MG extended release capsule Take 1 capsule by mouth daily 30 capsule 1    hydroCHLOROthiazide (HYDRODIURIL) 25 MG tablet Take 1 tablet by mouth daily 30 tablet 1    tiZANidine (ZANAFLEX) 4 MG tablet Take 1 tablet by mouth 2 times daily as needed (muscle spasms) 60 tablet 1    levothyroxine (SYNTHROID) 200 MCG tablet Take 1 tablet by mouth daily 90 tablet 3    HYDROcodone-acetaminophen (NORCO) 5-325 MG per tablet Take 1 tablet by mouth every 6 hours as needed for Pain.  lisinopril (PRINIVIL;ZESTRIL) 10 MG tablet TAKE 1 TABLET DAILY 90 tablet 3    rosuvastatin (CRESTOR) 40 MG tablet Take 1 tablet by mouth daily 90 tablet 3    Tens Unit MISC by Does not apply route Use as directed.  1 each 0    Cholecalciferol (VITAMIN D3) 50 MCG (2000 UT) CAPS Take by mouth      magnesium (MAGNESIUM-OXIDE) 250 MG TABS tablet Take 250 mg by mouth daily Indications: takes if wife puts in my pill bottle        No current facility-administered medications for this visit. I will continue his current medication regimen  which is part of the above treatment schedule. It has been helping with Mr. Coates Picking chronic  medical problems which for this visit include:   Diagnoses of Chronic pain syndrome, Osteoarthritis of right glenohumeral joint, Lumbar radiculopathy, DDD (degenerative disc disease), cervical, Low back pain radiating to right leg, Chronic pain of both knees, Failed back surgical syndrome, Degenerative joint disease of spinal facet joint, and DDD (degenerative disc disease), lumbosacral were pertinent to this visit. Risks and benefits of the medications and other alternative treatments  including no treatment were discussed with the patient. The common side effects of these medications were also explained to the patient. Informed verbal consent was obtained. Goals of current treatment regimen include improvement in pain, restoration of functioning- with focus on improvement in physical performance, general activity, work or disability,emotional distress, health care utilization and  decreased medication consumption. Will continue to monitor progress towards achieving/maintaining therapeutic goals with special emphasis on  1. Improvement in perceived interfernce  of pain with ADL's. Ability to do home exercises independently. Ability to do household chores indoor and/or outdoor work and social and leisure activities. Improve psychosocial and physical functioning. - he is showing progression towards this treatment goal with the current regimen. He was advised against drinking alcohol with the narcotic pain medicines, advised against driving or handling machinery while adjusting the dose of medicines or if having cognitive  issues related to the current medications. Risk of overdose and death, if medicines not taken as prescribed, were also discussed. If the patient develops new symptoms or if the symptoms worsen, the patient should call the office. While transcribing every attempt was made to maintain the accuracy of the note in terms of it's contents,there may have been some errors made inadvertently. Thank you for allowing me to participate in the care of this patient.     Shahana Benton MD.    Cc: Fely Tucker DO

## 2022-03-15 ENCOUNTER — OFFICE VISIT (OUTPATIENT)
Dept: PAIN MANAGEMENT | Age: 52
End: 2022-03-15
Payer: COMMERCIAL

## 2022-03-15 VITALS
WEIGHT: 303 LBS | BODY MASS INDEX: 38.9 KG/M2 | HEART RATE: 90 BPM | DIASTOLIC BLOOD PRESSURE: 87 MMHG | SYSTOLIC BLOOD PRESSURE: 132 MMHG

## 2022-03-15 DIAGNOSIS — M25.561 CHRONIC PAIN OF BOTH KNEES: ICD-10-CM

## 2022-03-15 DIAGNOSIS — M51.37 DDD (DEGENERATIVE DISC DISEASE), LUMBOSACRAL: ICD-10-CM

## 2022-03-15 DIAGNOSIS — M47.819 DEGENERATIVE JOINT DISEASE OF SPINAL FACET JOINT: ICD-10-CM

## 2022-03-15 DIAGNOSIS — M25.562 CHRONIC PAIN OF BOTH KNEES: ICD-10-CM

## 2022-03-15 DIAGNOSIS — M96.1 FAILED BACK SURGICAL SYNDROME: ICD-10-CM

## 2022-03-15 DIAGNOSIS — G89.4 CHRONIC PAIN SYNDROME: ICD-10-CM

## 2022-03-15 DIAGNOSIS — M54.16 LUMBAR RADICULOPATHY: ICD-10-CM

## 2022-03-15 DIAGNOSIS — M79.604 LOW BACK PAIN RADIATING TO RIGHT LEG: ICD-10-CM

## 2022-03-15 DIAGNOSIS — G89.29 CHRONIC PAIN OF BOTH KNEES: ICD-10-CM

## 2022-03-15 DIAGNOSIS — M54.50 LOW BACK PAIN RADIATING TO RIGHT LEG: ICD-10-CM

## 2022-03-15 DIAGNOSIS — M19.011 OSTEOARTHRITIS OF RIGHT GLENOHUMERAL JOINT: ICD-10-CM

## 2022-03-15 DIAGNOSIS — M50.30 DDD (DEGENERATIVE DISC DISEASE), CERVICAL: ICD-10-CM

## 2022-03-15 PROCEDURE — 99213 OFFICE O/P EST LOW 20 MIN: CPT | Performed by: INTERNAL MEDICINE

## 2022-03-15 RX ORDER — DULOXETIN HYDROCHLORIDE 60 MG/1
60 CAPSULE, DELAYED RELEASE ORAL DAILY
Qty: 30 CAPSULE | Refills: 1 | Status: SHIPPED | OUTPATIENT
Start: 2022-03-15 | End: 2022-06-13 | Stop reason: SDUPTHER

## 2022-03-15 RX ORDER — HYDROCHLOROTHIAZIDE 25 MG/1
25 TABLET ORAL DAILY
Qty: 30 TABLET | Refills: 1 | Status: SHIPPED | OUTPATIENT
Start: 2022-03-15 | End: 2022-06-07 | Stop reason: SDUPTHER

## 2022-03-15 RX ORDER — PREGABALIN 100 MG/1
100 CAPSULE ORAL 3 TIMES DAILY
Qty: 90 CAPSULE | Refills: 1 | Status: SHIPPED | OUTPATIENT
Start: 2022-03-15 | End: 2022-03-15 | Stop reason: CLARIF

## 2022-03-15 NOTE — PROGRESS NOTES
Bijan Holter  1970  [id number]    HISTORY OF PRESENT ILLNESS:  Mr. Mariam Reynoso is a 47 y.o. male returns for a follow up visit for multiple medical problems. His current presenting problems are   1. Chronic pain syndrome    2. Osteoarthritis of right glenohumeral joint    3. Lumbar radiculopathy    4. DDD (degenerative disc disease), cervical    5. Low back pain radiating to right leg    6. Chronic pain of both knees    7. Failed back surgical syndrome    8. Degenerative joint disease of spinal facet joint    9. DDD (degenerative disc disease), lumbosacral    10. Primary insomnia    11. Mood disorder ([facility] [address]    . As per information/history obtained from the PADT(patient assessment and documentation tool) - He complains of pain in the lower back with radiation to the buttocks He rates the pain 6/10 and describes it as aching. Pain is made worse by: movement, walking, standing, sitting, bending, lifting. Current treatment regimen has helped relieve about 50% of the pain. He denies side effects from the current pain regimen. Patient reports that since the last follow up visit the physical functioning is unchanged, family/social relationships are unchanged, mood is unchanged and sleep patterns are unchanged, and that the overall functioning is unchanged. Patient denies neurological bowel or bladder. Patient denies misusing/abusing his narcotic pain medications or using any illegal drugs. There are No indicators for possible drug abuse, addiction or diversion problems. Upon obtaining the medical history from Mr. Mariam Reynoso regarding today's office visit for his presenting problems, patient states he has been doing fair. Mr. Mariam Reynoso is complaining of pain in the left knee and mild swelling. He mentions he is using Lyrica along with Cymbalta. Patient says he has not been using any opioids at this time. He states he is using Diuretics still.        ALLERGIES: Patients list of allergies were reviewed     MEDICATIONS: Mr. Kang Messina list of medications were reviewed. His current medications are   Outpatient Medications Prior to Visit   Medication Sig Dispense Refill    tiZANidine (ZANAFLEX) 4 MG tablet Take 1 tablet by mouth 2 times daily as needed (muscle spasms) 60 tablet 1    levothyroxine (SYNTHROID) 200 MCG tablet Take 1 tablet by mouth daily 90 tablet 3    lisinopril (PRINIVIL;ZESTRIL) 10 MG tablet TAKE 1 TABLET DAILY 90 tablet 3    rosuvastatin (CRESTOR) 40 MG tablet Take 1 tablet by mouth daily 90 tablet 3    Tens Unit MISC by Does not apply route Use as directed. 1 each 0    Cholecalciferol (VITAMIN D3) 50 MCG (2000 UT) CAPS Take by mouth      magnesium (MAGNESIUM-OXIDE) 250 MG TABS tablet Take 250 mg by mouth daily Indications: takes if wife puts in my pill bottle       pregabalin (LYRICA) 100 MG capsule Take 1 capsule by mouth 3 times daily for 30 days. 90 capsule 1    DULoxetine (CYMBALTA) 60 MG extended release capsule Take 1 capsule by mouth daily 30 capsule 1    hydroCHLOROthiazide (HYDRODIURIL) 25 MG tablet Take 1 tablet by mouth daily 30 tablet 1    HYDROcodone-acetaminophen (NORCO) 5-325 MG per tablet Take 1 tablet by mouth every 6 hours as needed for Pain. No facility-administered medications prior to visit. REVIEW OF SYSTEMS:    Respiratory: Negative for apnea, chest tightness and shortness of breath or change in baseline breathing. PHYSICAL EXAM:   Nursing note and vitals reviewed. /87   Pulse 90   Wt (!) 303 lb (137.4 kg)   BMI 38.90 kg/m²   Constitutional: He appears well-developed and well-nourished. No acute distress. Cardiovascular: Normal rate, regular rhythm, normal heart sounds, and does not have murmur. Pulmonary/Chest: Effort normal. No respiratory distress. He does not have wheezes in the lung fields. He has no rales. Neurological/Psychiatric:He is alert and oriented to person, place, and time.  Coordination is  normal.  His mood isAppropriate and affect is Neutral/Euthymic(normal) . IMPRESSION:   1. Chronic pain syndrome    2. Osteoarthritis of right glenohumeral joint    3. Lumbar radiculopathy    4. DDD (degenerative disc disease), cervical    5. Low back pain radiating to right leg    6. Chronic pain of both knees    7. Failed back surgical syndrome    8. Degenerative joint disease of spinal facet joint    9. DDD (degenerative disc disease), lumbosacral        PLAN:  Informed verbal consent was obtained  -[place] record was obtained and reviewed  for the last one year and no indicators of drug misuse  were found. Any other controlled substance prescriptions  seen on the record have been accounted for, I am aware of the patient receiving these medications. Hilary Rued [place] record will be rechecked as part of office protocol.    -Urine drug screen with GC/MS for opiates and drugs of abuse was ordered and will follow up on results.   -Continue with all other adjuvant medications as before   -He was advised weight reduction, diet changes- 800-1200 flex diet, diet diary, exercising, nutritional  consult increased physical activity as tolerated   -ROM/Stretching exercises as advised      Current Outpatient Medications   Medication Sig Dispense Refill    DULoxetine (CYMBALTA) 60 MG extended release capsule Take 1 capsule by mouth daily 30 capsule 1    pregabalin (LYRICA) 100 MG capsule Take 1 capsule by mouth 3 times daily for 30 days. 90 capsule 1    hydroCHLOROthiazide (HYDRODIURIL) 25 MG tablet Take 1 tablet by mouth daily 30 tablet 1    tiZANidine (ZANAFLEX) 4 MG tablet Take 1 tablet by mouth 2 times daily as needed (muscle spasms) 60 tablet 1    levothyroxine (SYNTHROID) 200 MCG tablet Take 1 tablet by mouth daily 90 tablet 3    lisinopril (PRINIVIL;ZESTRIL) 10 MG tablet TAKE 1 TABLET DAILY 90 tablet 3    rosuvastatin (CRESTOR) 40 MG tablet Take 1 tablet by mouth daily 90 tablet 3    Tens Unit MISC by Does not apply route Use as directed.  1 each 0    Cholecalciferol against drinking alcohol with the narcotic pain medicines, advised against driving or handling machinery while adjusting the dose of medicines or if having cognitive  issues related to the current medications. Risk of overdose and death, if medicines not taken as prescribed, were also discussed. If the patient develops new symptoms or if the symptoms worsen, the patient should call the office. While transcribing every attempt was made to maintain the accuracy of the note in terms of it's contents,there may have been some errors made inadvertently. Thank you for allowing me to participate in the care of this patient.     Chinyere Cazares MD.    Cc: Liliam Young DO

## 2022-04-12 NOTE — OP NOTE
expressed all fluid out of the knee. We injected the knee with a Marcaine/morphine/epinephrine solution. We closed our portal sites with interrupted simple 4-0 nylon stitches. Sterile dressings were applied. There were no complications. 4 oz Fruit Juice (Specify quantity, date/time)/Dextrose 50% 25 Grams IV Push/Other (Specify)

## 2022-06-07 ENCOUNTER — OFFICE VISIT (OUTPATIENT)
Dept: PAIN MANAGEMENT | Age: 52
End: 2022-06-07
Payer: COMMERCIAL

## 2022-06-07 VITALS
DIASTOLIC BLOOD PRESSURE: 82 MMHG | BODY MASS INDEX: 37.77 KG/M2 | OXYGEN SATURATION: 96 % | WEIGHT: 294.2 LBS | HEART RATE: 95 BPM | SYSTOLIC BLOOD PRESSURE: 125 MMHG

## 2022-06-07 DIAGNOSIS — M79.604 LOW BACK PAIN RADIATING TO RIGHT LEG: ICD-10-CM

## 2022-06-07 DIAGNOSIS — M25.561 CHRONIC PAIN OF BOTH KNEES: ICD-10-CM

## 2022-06-07 DIAGNOSIS — M47.819 DEGENERATIVE JOINT DISEASE OF SPINAL FACET JOINT: ICD-10-CM

## 2022-06-07 DIAGNOSIS — M25.562 CHRONIC PAIN OF BOTH KNEES: ICD-10-CM

## 2022-06-07 DIAGNOSIS — G89.29 CHRONIC PAIN OF BOTH KNEES: ICD-10-CM

## 2022-06-07 DIAGNOSIS — G89.4 CHRONIC PAIN SYNDROME: ICD-10-CM

## 2022-06-07 DIAGNOSIS — M19.011 OSTEOARTHRITIS OF RIGHT GLENOHUMERAL JOINT: ICD-10-CM

## 2022-06-07 DIAGNOSIS — M50.30 DDD (DEGENERATIVE DISC DISEASE), CERVICAL: ICD-10-CM

## 2022-06-07 DIAGNOSIS — F39 MOOD DISORDER (HCC): ICD-10-CM

## 2022-06-07 DIAGNOSIS — M51.37 DDD (DEGENERATIVE DISC DISEASE), LUMBOSACRAL: ICD-10-CM

## 2022-06-07 DIAGNOSIS — M96.1 FAILED BACK SURGICAL SYNDROME: ICD-10-CM

## 2022-06-07 DIAGNOSIS — F51.01 PRIMARY INSOMNIA: ICD-10-CM

## 2022-06-07 DIAGNOSIS — M54.50 LOW BACK PAIN RADIATING TO RIGHT LEG: ICD-10-CM

## 2022-06-07 DIAGNOSIS — M54.16 LUMBAR RADICULOPATHY: ICD-10-CM

## 2022-06-07 PROCEDURE — 99214 OFFICE O/P EST MOD 30 MIN: CPT | Performed by: INTERNAL MEDICINE

## 2022-06-07 RX ORDER — HYDROCHLOROTHIAZIDE 25 MG/1
25 TABLET ORAL DAILY
Qty: 30 TABLET | Refills: 1 | Status: SHIPPED | OUTPATIENT
Start: 2022-06-07 | End: 2022-08-11 | Stop reason: SDUPTHER

## 2022-06-07 RX ORDER — TIZANIDINE 4 MG/1
4 TABLET ORAL 2 TIMES DAILY PRN
Qty: 60 TABLET | Refills: 1 | Status: SHIPPED | OUTPATIENT
Start: 2022-06-07 | End: 2022-08-16 | Stop reason: SDUPTHER

## 2022-06-07 RX ORDER — PREGABALIN 100 MG/1
100 CAPSULE ORAL 3 TIMES DAILY
Qty: 90 CAPSULE | Refills: 1 | Status: SHIPPED | OUTPATIENT
Start: 2022-06-07 | End: 2022-08-16 | Stop reason: SDUPTHER

## 2022-06-07 NOTE — PROGRESS NOTES
Kiah Gu  1970  [id number]    HISTORY OF PRESENT ILLNESS:  Mr. Nancyann Cranker is a 47 y.o. male returns for a follow up visit for multiple medical problems. His  presenting problems are   1. Chronic pain syndrome    2. Lumbar radiculopathy    3. Low back pain radiating to right leg    4. Failed back surgical syndrome    5. DDD (degenerative disc disease), lumbosacral    6. Mood disorder ([address]    7. Osteoarthritis of right glenohumeral joint    8. DDD (degenerative disc disease), cervical    9. Chronic pain of both knees    10. Degenerative joint disease of spinal facet joint    11. Primary insomnia    . As per information/history obtained from the PADT(patient assessment and documentation tool) -  He complains of pain in the lower back with radiation to the buttocks He rates the pain 5/10 and describes it as aching. Pain is made worse by: movement, walking, standing, sitting, bending, lifting. Current treatment regimen has helped relieve about 50% of the pain. He denies side effects from the current pain regimen. Patient reports that since the last follow up visit the physical functioning is unchanged, family/social relationships are unchanged, mood is unchanged and sleep patterns are unchanged, and that the overall functioning is unchanged. Patient denies neurological bowel or bladder. Patient denies misusing/abusing his narcotic pain medications or using any illegal drugs. There are No indicators for possible drug abuse, addiction or diversion problems. Upon obtaining the medical history from Mr. Nancyann Cranker regarding today's office visit for his presenting problems, patient states he has been doing fair, pain has been manageable. He complains his back still hurts the most. He says he is using Cymbalta along with Zanaflex and Lyrica. He denies any side effects. Patient's  subjective report of his mood is fair. he describes occasional symptoms of depression, occasional  irritability and some mood swings. Describes his mood as being neutral and reports some pleasure in his daily activities. Reports  fair  appetite, energy and concentration. Able to function well in different aspects of his daily activities. Denies suicidal or homicidal ideation. Denies any complaints of increased tension, does   Worry sometimes and occasional  irritability  he denies any c/o increased anxiety, No c/o panic attacks or symptoms of PTSD . Patient states his sleep is fair. Has fairly normal sleep latency. Averages about 4-6 hours of sleep a night. Denies any signs of sleep apnea. Feels somewhat rested in the morning. He states his weight has been stable. He mentions he is not using any opioids at this time. ALLERGIES/PAST MED/FAM/SOC HISTORY: Mr. Nancyann Cranker allergies, past medical, family and social history were reviewed in the chart. Mr. Nancyann Cranker current medications are   Outpatient Medications Prior to Visit   Medication Sig Dispense Refill    DULoxetine (CYMBALTA) 60 MG extended release capsule Take 1 capsule by mouth daily 30 capsule 1    levothyroxine (SYNTHROID) 200 MCG tablet Take 1 tablet by mouth daily 90 tablet 3    lisinopril (PRINIVIL;ZESTRIL) 10 MG tablet TAKE 1 TABLET DAILY 90 tablet 3    rosuvastatin (CRESTOR) 40 MG tablet Take 1 tablet by mouth daily 90 tablet 3    Tens Unit MISC by Does not apply route Use as directed. 1 each 0    Cholecalciferol (VITAMIN D3) 50 MCG (2000 UT) CAPS Take by mouth      magnesium (MAGNESIUM-OXIDE) 250 MG TABS tablet Take 250 mg by mouth daily Indications: takes if wife puts in my pill bottle       hydroCHLOROthiazide (HYDRODIURIL) 25 MG tablet Take 1 tablet by mouth daily 30 tablet 1    tiZANidine (ZANAFLEX) 4 MG tablet Take 1 tablet by mouth 2 times daily as needed (muscle spasms) 60 tablet 1     No facility-administered medications prior to visit. REVIEW OF SYSTEMS: .   Respiratory: Negative for shortness of breath.     Cardiovascular: Negative for chest pain, palpitations  Gastrointestinal: Negative for blood in stool, abdominal distention, nausea, vomiting, abdominal pain, diarrhea,constipation. Neurological: Negative for speech difficulty, weakness and light-headedness, dizziness, tremors, sleepiness  Psychiatric/Behavioral: Negative for suicidal ideas, hallucinations, behavioral problems, self-injury, decreased concentration/cognition, agitation, confusion. PHYSICAL EXAM:   Nursing note and vitals reviewed. /82   Pulse 95   Wt 294 lb 3.2 oz (133.4 kg)   SpO2 96%   BMI 37.77 kg/m²   General Appearance: Patient is well nourished, well developed, well groomed and in no acute distress. Skin: Skin is warm and dry, good turgor . No rash or lesions noted. He is not diaphoretic. Pulmonary/Chest: Effort normal. No respiratory distress or use of accessory muscles. Auscultation revealing normal air entry. He does not have wheezes in the lung fields. He has no rales. Cardiovascular: Normal rate, regular rhythm, normal heart sounds, and does not have murmur. Exam reveals no gallop and no friction rub. Musculoskeletal / Extremities: Range of motion is normal. Gait is normal, assistive devices use: none. He exhibits edema: none, and no tenderness. Neurological/Psychiatric:He is alert and oriented to person, place, and time. Coordination is  normal.   Judgement and Insight is normal  His mood is Appropriate and affect is Neutral/Euthymic(normal) . His behavior is normal.   thought content normal.        IMPRESSION:     1. Chronic pain syndrome    2. Lumbar radiculopathy    3. Low back pain radiating to right leg    4. Failed back surgical syndrome    5. DDD (degenerative disc disease), lumbosacral    6. Mood disorder ([address]    7. Osteoarthritis of right glenohumeral joint    8. DDD (degenerative disc disease), cervical    9. Chronic pain of both knees    10. Degenerative joint disease of spinal facet joint    11.  Primary insomnia        PLAN:  Informed verbal consent was obtained. -ROM/Stretching exercises as advised   -He was advised weight reduction, diet changes- 800-1200 flex diet, diet diary, exercising, nutritional  consult increased physical activity as tolerated   -Continue with Lyrica 300 mg   -He was advised to increase fluids ( 5-7  glasses of fluid daily), limit caffeine, avoid cheese products, increase dietary fiber, increase activity and exercise as tolerated and relax regularly and enjoy meals   -Patient's urine drug screen results with GC/MS confirmation were obtained and reviewed and were negative for any illicit drugs. Prescribed medications were within acceptable range.   -Interim history reviewed   -CBT techniques- relaxation therapies such as biofeedback, mindfulness based stress reduction, imagery, cognitive restructuring, problem solving discussed with patient   -Continue with Cymbalta   -Continue with HCTZ 25 mg daily   -Leg elevation   -he was advised proper sleep hygiene-told to avoid:use of caffeine or other stimulants after noon, alcohol use near bedtime, long or frequent naps during the day, erratic sleep schedule, heavy meals near bedtime, vigorous exercise near bedtime and use of electronic devices near bedtime   Mr. Kemar Johansen will be prescribed  the medications  listed below which are for treatment of his presenting  medical problems which for this visit include:   Diagnoses of Chronic pain syndrome, Lumbar radiculopathy, Low back pain radiating to right leg, Failed back surgical syndrome, DDD (degenerative disc disease), lumbosacral, Mood disorder ([address] Osteoarthritis of right glenohumeral joint, DDD (degenerative disc disease), cervical, Chronic pain of both knees, Degenerative joint disease of spinal facet joint, and Primary insomnia were pertinent to this visit.   Medications/orders associated with this visit:    Current Outpatient Medications   Medication Sig Dispense Refill    hydroCHLOROthiazide (HYDRODIURIL) 25 MG tablet Take 1 tablet by mouth daily 30 tablet 1    tiZANidine (ZANAFLEX) 4 MG tablet Take 1 tablet by mouth 2 times daily as needed (muscle spasms) 60 tablet 1    pregabalin (LYRICA) 100 MG capsule Take 1 capsule by mouth 3 times daily for 30 days. 90 capsule 1    DULoxetine (CYMBALTA) 60 MG extended release capsule Take 1 capsule by mouth daily 30 capsule 1    levothyroxine (SYNTHROID) 200 MCG tablet Take 1 tablet by mouth daily 90 tablet 3    lisinopril (PRINIVIL;ZESTRIL) 10 MG tablet TAKE 1 TABLET DAILY 90 tablet 3    rosuvastatin (CRESTOR) 40 MG tablet Take 1 tablet by mouth daily 90 tablet 3    Tens Unit MISC by Does not apply route Use as directed. 1 each 0    Cholecalciferol (VITAMIN D3) 50 MCG (2000 UT) CAPS Take by mouth      magnesium (MAGNESIUM-OXIDE) 250 MG TABS tablet Take 250 mg by mouth daily Indications: takes if wife puts in my pill bottle        No current facility-administered medications for this visit. Goals of current treatment regimen include improvement in pain, restoration of functioning- with focus on improvement in physical performance, general activity, work or disability,emotional distress, health care utilization and  decreased medication consumption. Will continue to monitor progress towards achieving/maintaining therapeutic goals with special emphasis on  1. Improvement in perceived interfernce  of pain with ADL's. Ability to do home exercises independently. Ability to do household chores indoor and/or outdoor work and social and leisure activities. To increase flexibility/ROM, strength and endurance. Improve psychosocial and physical functioning.- he is showing progression towards this treatment goal with the current regimen. 2. Improving sleep to 6-7 hours a night. Improve mood/ anxiety and depression symptoms such as crying spells, low energy, problems with concentration, motivation.- he is showing progression towards this treatment goal with the current regimen.    3. Reduction of reliance on opioid analgesia/more appropriate opioid use. - he is showing progression towards this treatment goal with the current regimen. Risks and benefits of the medications and other alternative treatments have been/were  discussed with the patient. Any questions on the  common side effects of these medications were also answered. He was advised against drinking alcohol with the narcotic pain medicines, advised against driving or handling machinery when  starting or adjusting the dose of medicines, feeling groggy or drowsy, or if having any cognitive issues related to the current medications. Heis fully aware of the risk of overdose and death, if medicines are misused and not taken as prescribed. If he develops new symptoms or if the symptoms worsen, he was told to call the office. .  Thank you for allowing me to participate in the care of this patient.     Tanna Valerio MD    Cc: Rl Leal DO

## 2022-06-13 ENCOUNTER — OFFICE VISIT (OUTPATIENT)
Dept: FAMILY MEDICINE CLINIC | Age: 52
End: 2022-06-13
Payer: COMMERCIAL

## 2022-06-13 VITALS
SYSTOLIC BLOOD PRESSURE: 136 MMHG | HEART RATE: 85 BPM | RESPIRATION RATE: 16 BRPM | WEIGHT: 310 LBS | OXYGEN SATURATION: 98 % | DIASTOLIC BLOOD PRESSURE: 78 MMHG | BODY MASS INDEX: 39.8 KG/M2

## 2022-06-13 DIAGNOSIS — E78.49 OTHER HYPERLIPIDEMIA: ICD-10-CM

## 2022-06-13 DIAGNOSIS — I10 PRIMARY HYPERTENSION: Primary | ICD-10-CM

## 2022-06-13 DIAGNOSIS — G89.4 CHRONIC PAIN SYNDROME: ICD-10-CM

## 2022-06-13 DIAGNOSIS — R73.03 PREDIABETES: ICD-10-CM

## 2022-06-13 LAB
ANION GAP SERPL CALCULATED.3IONS-SCNC: 11 MMOL/L (ref 3–16)
BUN BLDV-MCNC: 16 MG/DL (ref 7–20)
CALCIUM SERPL-MCNC: 9.3 MG/DL (ref 8.3–10.6)
CHLORIDE BLD-SCNC: 104 MMOL/L (ref 99–110)
CO2: 25 MMOL/L (ref 21–32)
CREAT SERPL-MCNC: 0.7 MG/DL (ref 0.9–1.3)
GFR AFRICAN AMERICAN: >60
GFR NON-AFRICAN AMERICAN: >60
GLUCOSE BLD-MCNC: 101 MG/DL (ref 70–99)
POTASSIUM SERPL-SCNC: 4.7 MMOL/L (ref 3.5–5.1)
SODIUM BLD-SCNC: 140 MMOL/L (ref 136–145)
TSH REFLEX: 0.88 UIU/ML (ref 0.27–4.2)

## 2022-06-13 PROCEDURE — 99214 OFFICE O/P EST MOD 30 MIN: CPT | Performed by: FAMILY MEDICINE

## 2022-06-13 PROCEDURE — 36415 COLL VENOUS BLD VENIPUNCTURE: CPT | Performed by: FAMILY MEDICINE

## 2022-06-13 RX ORDER — DULOXETIN HYDROCHLORIDE 60 MG/1
60 CAPSULE, DELAYED RELEASE ORAL 2 TIMES DAILY
Qty: 180 CAPSULE | Refills: 3 | Status: SHIPPED | OUTPATIENT
Start: 2022-06-13 | End: 2022-08-16 | Stop reason: SDUPTHER

## 2022-06-13 ASSESSMENT — PATIENT HEALTH QUESTIONNAIRE - PHQ9
9. THOUGHTS THAT YOU WOULD BE BETTER OFF DEAD, OR OF HURTING YOURSELF: 0
4. FEELING TIRED OR HAVING LITTLE ENERGY: 0
SUM OF ALL RESPONSES TO PHQ QUESTIONS 1-9: 4
1. LITTLE INTEREST OR PLEASURE IN DOING THINGS: 1
8. MOVING OR SPEAKING SO SLOWLY THAT OTHER PEOPLE COULD HAVE NOTICED. OR THE OPPOSITE, BEING SO FIGETY OR RESTLESS THAT YOU HAVE BEEN MOVING AROUND A LOT MORE THAN USUAL: 0
SUM OF ALL RESPONSES TO PHQ QUESTIONS 1-9: 4
SUM OF ALL RESPONSES TO PHQ QUESTIONS 1-9: 4
6. FEELING BAD ABOUT YOURSELF - OR THAT YOU ARE A FAILURE OR HAVE LET YOURSELF OR YOUR FAMILY DOWN: 1
10. IF YOU CHECKED OFF ANY PROBLEMS, HOW DIFFICULT HAVE THESE PROBLEMS MADE IT FOR YOU TO DO YOUR WORK, TAKE CARE OF THINGS AT HOME, OR GET ALONG WITH OTHER PEOPLE: 0
SUM OF ALL RESPONSES TO PHQ QUESTIONS 1-9: 4
7. TROUBLE CONCENTRATING ON THINGS, SUCH AS READING THE NEWSPAPER OR WATCHING TELEVISION: 0
SUM OF ALL RESPONSES TO PHQ9 QUESTIONS 1 & 2: 2
5. POOR APPETITE OR OVEREATING: 0
3. TROUBLE FALLING OR STAYING ASLEEP: 1
2. FEELING DOWN, DEPRESSED OR HOPELESS: 1

## 2022-06-13 NOTE — PROGRESS NOTES
A/P:    Diagnosis Orders   1. Primary hypertension  Basic Metabolic Panel    TSH with Reflex   2. Chronic pain syndrome  DULoxetine (CYMBALTA) 60 MG extended release capsule   3. Other hyperlipidemia  Basic Metabolic Panel   4. Prediabetes  Hemoglobin A1C     His hypertension is controlled. He will continue with current medication management. His hyperlipidemia was reasonably controlled on last check, he will continue statin    For his prediabetes, check A1c today, continue with lifestyle optimization    For his chronic pain, duloxetine refilled, he will continue following with pain management    Follow-up in 6 months or sooner if needed    O: /78 (Site: Left Upper Arm, Position: Sitting, Cuff Size: Large Adult)   Pulse 85   Resp 16   Wt (!) 310 lb (140.6 kg)   SpO2 98%   BMI 39.80 kg/m²    Gen- NAD, pleasant  HEENT- Eyes without icterus or injection  Neck- Supple, no lymphadenopathy appreciated  Lungs- CTAB  Heart- RRR  Abd- Soft, non tender  Ext- No edema  Psych- Appropriate, no SI/HI    S: CC-med check  HPI-patient presents for follow-up of hypertension, hyperlipidemia, chronic pain. He reports he has increased his duloxetine to 60 mg twice per day. This is helping his pain and mood. He denies any chest pain, increased work of breathing, palpitations. He continues to see pain management. He reports he is doing well mood wise.     ROS- Per HPI    Patient's medications, allergies, and past medical hx were reviewed

## 2022-06-14 LAB
ESTIMATED AVERAGE GLUCOSE: 122.6 MG/DL
HBA1C MFR BLD: 5.9 %

## 2022-07-07 ENCOUNTER — TELEPHONE (OUTPATIENT)
Dept: FAMILY MEDICINE CLINIC | Age: 52
End: 2022-07-07

## 2022-07-07 NOTE — TELEPHONE ENCOUNTER
Patients wife is calling explaining he is suffering from depression such as fatigue, loss of interest, threats of suicide. Wife and patient are going through a divorce. I asked wife if patient knows that she is calling- she stated he does and asked me to set up an appointment. Wife states he is making statements such as \"we are better off without him\" and ideas that we would \"run himself into a guardrail\". Spoke with Dr RUSSELL and was advised to tell the wife:    - Take patient to ED or call EMS and call [facility] for help. Patients wife declined all of the above and stated he will not go and he is not a direct threat to himself or others. Dr RUSSELL offered patient to come in next week. I offered wife appointment for Monday or Tuesday both she declined stating he has to work. Next available was Thursday, wife accepted appointment.

## 2022-07-14 ENCOUNTER — TELEMEDICINE (OUTPATIENT)
Dept: FAMILY MEDICINE CLINIC | Age: 52
End: 2022-07-14
Payer: COMMERCIAL

## 2022-07-14 DIAGNOSIS — F33.9 RECURRENT MAJOR DEPRESSIVE DISORDER, REMISSION STATUS UNSPECIFIED (HCC): ICD-10-CM

## 2022-07-14 DIAGNOSIS — M25.561 ACUTE PAIN OF RIGHT KNEE: ICD-10-CM

## 2022-07-14 DIAGNOSIS — F43.21 GRIEF REACTION: Primary | ICD-10-CM

## 2022-07-14 PROCEDURE — 99214 OFFICE O/P EST MOD 30 MIN: CPT | Performed by: FAMILY MEDICINE

## 2022-07-14 RX ORDER — MIRTAZAPINE 15 MG/1
15 TABLET, FILM COATED ORAL NIGHTLY
Qty: 30 TABLET | Refills: 0 | Status: SHIPPED | OUTPATIENT
Start: 2022-07-14 | End: 2022-09-08

## 2022-07-14 ASSESSMENT — PATIENT HEALTH QUESTIONNAIRE - PHQ9
SUM OF ALL RESPONSES TO PHQ QUESTIONS 1-9: 21
8. MOVING OR SPEAKING SO SLOWLY THAT OTHER PEOPLE COULD HAVE NOTICED. OR THE OPPOSITE, BEING SO FIGETY OR RESTLESS THAT YOU HAVE BEEN MOVING AROUND A LOT MORE THAN USUAL: 0
6. FEELING BAD ABOUT YOURSELF - OR THAT YOU ARE A FAILURE OR HAVE LET YOURSELF OR YOUR FAMILY DOWN: 3
SUM OF ALL RESPONSES TO PHQ QUESTIONS 1-9: 21
7. TROUBLE CONCENTRATING ON THINGS, SUCH AS READING THE NEWSPAPER OR WATCHING TELEVISION: 3
2. FEELING DOWN, DEPRESSED OR HOPELESS: 3
4. FEELING TIRED OR HAVING LITTLE ENERGY: 3
9. THOUGHTS THAT YOU WOULD BE BETTER OFF DEAD, OR OF HURTING YOURSELF: 3
SUM OF ALL RESPONSES TO PHQ QUESTIONS 1-9: 18
3. TROUBLE FALLING OR STAYING ASLEEP: 3
SUM OF ALL RESPONSES TO PHQ QUESTIONS 1-9: 21
5. POOR APPETITE OR OVEREATING: 3

## 2022-07-14 ASSESSMENT — COLUMBIA-SUICIDE SEVERITY RATING SCALE - C-SSRS
2. HAVE YOU ACTUALLY HAD ANY THOUGHTS OF KILLING YOURSELF?: YES
4. HAVE YOU HAD THESE THOUGHTS AND HAD SOME INTENTION OF ACTING ON THEM?: NO
6. HAVE YOU EVER DONE ANYTHING, STARTED TO DO ANYTHING, OR PREPARED TO DO ANYTHING TO END YOUR LIFE?: NO
5. HAVE YOU STARTED TO WORK OUT OR WORKED OUT THE DETAILS OF HOW TO KILL YOURSELF? DO YOU INTEND TO CARRY OUT THIS PLAN?: NO
3. HAVE YOU BEEN THINKING ABOUT HOW YOU MIGHT KILL YOURSELF?: NO
1. WITHIN THE PAST MONTH, HAVE YOU WISHED YOU WERE DEAD OR WISHED YOU COULD GO TO SLEEP AND NOT WAKE UP?: YES

## 2022-07-14 NOTE — PROGRESS NOTES
Appointment was done audiovisually. Patient gave consent for an audiovisual visit. Patient was in their state [place] at time of visit. Parties involved in visit were myself, nurse, patient, and wife. A/P:    Diagnosis Orders   1. Grief reaction  Ambulatory referral to Psychology   2. Acute pain of right knee  XR KNEE RIGHT (3 VIEWS)   3. Recurrent major depressive disorder, remission status unspecified ([facility] [address]  Ambulatory referral to Psychology     For his acute grief reaction and worsening depression, he agrees to counseling referral.      He would like to proceed with [employer] testing    For his depression, insomnia, mirtazapine 15 mg at night prescribed. We discussed possible metabolic side effects in particular. He agrees to get medical attention of his thoughts of self-harm what intensify, or he would develop HI. Follow-up in 3 weeks or sooner if needed    For his knee pain, knee x-ray ordered, he was counseled to continue wearing brace, use intermittent warm compresses, and elevate leg as much as possible    O: There were no vitals taken for this visit. Gen- NAD, appears a bit disheveled compared to previous visit  HEENT- Eyes without icterus or injection  Lungs- no increased work of breathing appreciated  Psych- Appropriate, dysthymic    S: CC-depression  HPI-patient, with wife today, presents over video to discuss his depression. He reports that wife told him she wanted a divorce 7 to 10 days ago. Since then he has been very down. He has low interest, low energy, sadness, chronic insomnia. He reports that couples counseling is not an option. He reports that he thinks about suicide, but would not act on it due to his mary ann and fear. He reports he has 4 very good children. He also enjoys baseball. He denies HI. He reports compliance with duloxetine 120 mg daily.   He has been on duloxetine, Wellbutrin, and Abilify in the past.  He does not feel there is any chance at reconciliation with wife. He also reports falling onto right knee approximately 1 week ago. He has difficulty walking and pain behind the knee. He feels like it gives out at times. He denies locking. He had previous cleanup procedure of right knee done.     ROS- Per HPI    Patient's medications, allergies, and past medical hx were reviewed

## 2022-07-15 ENCOUNTER — HOSPITAL ENCOUNTER (OUTPATIENT)
Age: 52
Discharge: HOME | End: 2022-07-15
Payer: COMMERCIAL

## 2022-07-15 ENCOUNTER — HOSPITAL ENCOUNTER (OUTPATIENT)
Dept: GENERAL RADIOLOGY | Age: 52
Discharge: HOME | End: 2022-07-15
Payer: COMMERCIAL

## 2022-07-15 DIAGNOSIS — M25.561 ACUTE PAIN OF RIGHT KNEE: ICD-10-CM

## 2022-07-15 PROCEDURE — 73562 X-RAY EXAM OF KNEE 3: CPT

## 2022-07-18 DIAGNOSIS — M25.461 EFFUSION OF RIGHT KNEE: Primary | ICD-10-CM

## 2022-07-27 ENCOUNTER — OFFICE VISIT (OUTPATIENT)
Dept: ORTHOPEDIC SURGERY | Age: 52
End: 2022-07-27
Payer: COMMERCIAL

## 2022-07-27 VITALS — WEIGHT: 289 LBS | HEIGHT: 74 IN | BODY MASS INDEX: 37.09 KG/M2

## 2022-07-27 DIAGNOSIS — M25.561 ACUTE PAIN OF RIGHT KNEE: Primary | ICD-10-CM

## 2022-07-27 PROCEDURE — 99204 OFFICE O/P NEW MOD 45 MIN: CPT | Performed by: STUDENT IN AN ORGANIZED HEALTH CARE EDUCATION/TRAINING PROGRAM

## 2022-07-27 PROCEDURE — 20610 DRAIN/INJ JOINT/BURSA W/O US: CPT | Performed by: STUDENT IN AN ORGANIZED HEALTH CARE EDUCATION/TRAINING PROGRAM

## 2022-07-27 RX ORDER — BETAMETHASONE SODIUM PHOSPHATE AND BETAMETHASONE ACETATE 3; 3 MG/ML; MG/ML
12 INJECTION, SUSPENSION INTRA-ARTICULAR; INTRALESIONAL; INTRAMUSCULAR; SOFT TISSUE ONCE
Status: COMPLETED | OUTPATIENT
Start: 2022-07-27 | End: 2022-07-29

## 2022-07-27 RX ORDER — BUPIVACAINE HYDROCHLORIDE 2.5 MG/ML
2 INJECTION, SOLUTION INFILTRATION; PERINEURAL ONCE
Status: COMPLETED | OUTPATIENT
Start: 2022-07-27 | End: 2022-07-29

## 2022-07-27 RX ORDER — LIDOCAINE HYDROCHLORIDE 10 MG/ML
2 INJECTION, SOLUTION INFILTRATION; PERINEURAL ONCE
Status: COMPLETED | OUTPATIENT
Start: 2022-07-27 | End: 2022-07-29

## 2022-07-27 NOTE — PROGRESS NOTES
Behavioral Health Consultation  VIANEY Eckert  7/28/2022   10:11 AM EDT      Clive Lim, was evaluated through a synchronous (real-time) audio encounter. The patient (or guardian if applicable) is aware that this is a billable service, which includes applicable co-pays. This Virtual Visit was conducted with patient's (and/or legal guardian's) consent. Verbal consent to proceed has been obtained within the past 12 months. The visit was conducted pursuant to the emergency declaration under the [facility], [address] [place] [address] authority and the [employer] Act. Patient identification was verified, and a caregiver was present when appropriate. The patient was located in a state where the provider was licensed to provide care. Time spent with Patient: 47 minutes  This is patient's first [facility] appointment. Reason for Consult:    Chief Complaint   Patient presents with    Other     anger       Referring Provider: Lisseth Frost DO  [address]    Patient provided informed consent for the behavioral health program. Discussed with patient model of service to include the limits of confidentiality (i.e. abuse reporting, suicide intervention, etc.) and short-term intervention focused approach. Pt indicated understanding. Feedback given to PCP.     Verified the following information:  Patient's identification: Yes  Patient location: [place] [id number]   Patient's call back number: [phone]   Patient's emergency contact's name and number, as well as permission to contact them if needed: Extended Emergency Contact Information  Primary Emergency Contact: Kathia Hernandez  Address: [address] Phone: [phone]  Mobile Phone: [phone]  Relation: Spouse  Secondary Emergency Contact: [facility] Phone: [phone]  Relation: Parent     Provider location: [address]:  Attempted video call, but was unable to connect due to technical problems. A phone call was conducted instead. Patient presents with concerns about marital problems. Pt states that one month ago, his wife of almost 24 years informed him that she wants a divorce. She states that she does not love him anymore, and wants to see other people. Pt reports feeling very angry, and expressed thoughts of violence towards anyone that his wife may have an affair with, to protect his family values. Pt denies HI. Pt states that he would never hurt his wife, stating that he loves her, and does not want a divorce. Pt states that he injured his back at work in 2002, which affected his ability to help with chores around the home. Additionally, it has affected the relationship with his 3 daughters, as he hasn't been able to engage in as many of their activities as he would have liked. Pt reports that his wife stated that she thinks he is depressed, but he does not feel that he is. He reports having low motivation at times, but denies sad mood. Pt states he has SI at times due to physical pain, but denies plan/intention. Pt states his protective factors are his children and [employer] beliefs. Pt states that he is interested in obtaining a referral for marriage counseling. He reports poor communication throughout his marriage, and expressed motivation to address issues. He states that there is a lot of distress at home with the relationship with his wife, and also with their daughters. Patient lives with wife and 3 daughters (24, 23, 15) . Patient is retired. Patient describes friends as social support. Patient identifies as [address]. Per note from Dr. Coty Shin on 7/14/22:  HPI-patient, with wife today, presents over video to discuss his depression. He reports that wife told him she wanted a divorce 7 to 10 days ago.   Since then MCG tablet Take 1 tablet by mouth daily 90 tablet 3    lisinopril (PRINIVIL;ZESTRIL) 10 MG tablet TAKE 1 TABLET DAILY 90 tablet 3    rosuvastatin (CRESTOR) 40 MG tablet Take 1 tablet by mouth daily 90 tablet 3    Tens Unit MISC by Does not apply route Use as directed. 1 each 0    Cholecalciferol (VITAMIN D3) 50 MCG (2000 UT) CAPS Take by mouth       Current Facility-Administered Medications   Medication Dose Route Frequency Provider Last Rate Last Admin    lidocaine 1 % injection 2 mL  2 mL Intra-artICUlar Once Mak Downing MD        bupivacaine (MARCAINE) 0.25 % injection 5 mg  2 mL Intra-artICUlar Once Mak Downing MD        betamethasone acetate-betamethasone sodium phosphate (CELESTONE) injection 12 mg  12 mg Intra-artICUlar Once Mak Downing MD         Social History:   Social History     Socioeconomic History    Marital status:      Spouse name: Not on file    Number of children: 3    Years of education: Not on file    Highest education level: Not on file   Occupational History    Occupation:    Tobacco Use    Smoking status: Never    Smokeless tobacco: Never   Vaping Use    Vaping Use: Never used   Substance and Sexual Activity    Alcohol use:  Yes     Alcohol/week: 0.0 standard drinks     Comment: occasional use    Drug use: Never    Sexual activity: Yes     Partners: Female   Other Topics Concern    Not on file   Social History Narrative     (wife=np), 3 daughters, 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 He has never used smokeless tobacco.  ETOH:   reports current alcohol use. Family History:   Family History   Problem Relation Age of Onset    Diabetes Father     Heart Failure Father     Hypertension Father     High Blood Pressure Father     High Blood Pressure Mother     Stroke Maternal Grandmother          A:  Patient endorses angry mood. Endorses passive SI at times, due to pain, denies plan/intent. Pt's protective factors include children [employer] beliefs. Denies HI, with adequate insight and motivation. Diagnosis:    1.  Relationship problems          Past Diagnosis:        Diagnosis Date    Chronic pain syndrome     DDD (degenerative disc disease), cervical     Degenerative joint disease of spinal facet joint     Depressive disorder, not elsewhere classified     Dizziness     Failed back surgical syndrome     GERD (gastroesophageal reflux disease)     Headache(784.0)     HTN (hypertension)     Hyperlipidemia     Lumbar radiculopathy     Sleep apnea     does use CPAP    thyroid cancer     thyroid    Thyroid disease     Wears glasses            Plan:  Pt interventions:  Established rapport  Conducted functional assessment  Supportive techniques  Ulman-setting to identify pt's primary goals for [facility] visit / overall health  Collaborative treatment planning,Clarified role of [facility] in primary care,Recommended that pt establish with a mental health clinician with whom they can meet regularly for psychotherapy services  Provided referral to specialty mental health     Pt Behavioral Change Plan:   See Pt Instructions

## 2022-07-27 NOTE — PROGRESS NOTES
Chief Complaint   Patient presents with    Knee Pain     Bilateral Knee Pain RT Worse       HPI:      Josué Elam is a 47 y.o. male who presents for evaluation of right knee pain. He has had some right \knee pain in the past and has previously had his meniscus cleaned up several years ago. His knee has been doing okay recently until 2 weeks ago when he fell onto both of his knees. His left knee does hurt a bit, but it is minor in comparison to the right. He does report that his right  knee has been feeling better since the initial injury, but he continues to get severe sharp pain when he goes down steps or bends the knee. He has tried ice, heat, ibuprofen, knee brace. He was walking on crutches for about 3-4 days after the injury as well. Past Medical History:   Diagnosis Date    Chronic pain syndrome     DDD (degenerative disc disease), cervical     Degenerative joint disease of spinal facet joint     Depressive disorder, not elsewhere classified     Dizziness     Failed back surgical syndrome     GERD (gastroesophageal reflux disease)     Headache(784.0)     HTN (hypertension)     Hyperlipidemia     Lumbar radiculopathy     Sleep apnea     does use CPAP    thyroid cancer     thyroid    Thyroid disease     Wears glasses        Medications and allergies were reviewed as necessary. Review of Systems:  Pertinent items are noted in HPI. Physical Examination: This is a pleasant male, alert, and in no acute distress. Vitals:    07/27/22 1105   Weight: 289 lb (131.1 kg)   Height: 6' 2\" (1.88 m)       Right knee exam: medial joint line tenderness, no bruising or pre-patellar bursitis appreciated. Some pain without significant laxity with valgus stress test. + [facility] with posteromedial pain. Pain worse with knee flexed less than 90 degrees.      Vascular exam: Extremities warm and well perfused, no significant edema    Respiratory exam: Breathing easy and unlabored    Neuro exam: No focal neuro deficits    Lymphatic: No obvious lymphadenopathy    Skin: Warm, dry    Radiology:     3 view X-rays of the right  dated 7/15/22 were reviewed independently today and discussed with the patient. The films revealed: effusion present. Mild-moderate medial compartment OA. Assessment and Plan:     1. Acute pain of right knee  Acute on chronic right knee pain after a fall. Exam is potentially consistent with a meniscus tear, but favor bone contusion. There is an effusion present which is contributing to his pain. We discussed his available treatment options including a steroid injection, MRI to determine extent of injury, physical therapy. He elected to start with a steroid injection if his pain fails to improve we will consider additional testing if indicated. - right knee IA steroid injection    Follow-up: 1 month. Sooner with any problems, questions, concerns, or worsening symptoms. Sana Connolly MD  Primary Care Sports Medicine    Procedure Performed: Knee Joint Steroid Injection - Right   Anesthesia: none  Specimen Removed: none  Complications: none    PROCEDURE IN DETAIL:    After the patient's chart was reviewed and risks and benefits were discussed, verbal informed consent was obtained to proceed with the stated procedure. The appropriate extremity and site of procedure was identified and verified with the patient. The site was then prepped and draped in usual sterile fashion utilizing aseptic solution. The patient was seated upright and the lateral joint space was palpated. Ethyl Chloride was used to anesthetize the skin. The needle was inserted just lateral to the patellar tendon and advanced posteriorly towards the joint space. After negative aspiration, a 6 ml mixture of 2ml of 1% lidocaine, 2ml of 0.50% bupivacaine and 12 mg betamethasone was injected without resistance using a 25 gauge 1 1/2 inch needle. The needle was withdrawn from the site and hemostasis was obtained by direct pressure.  A band aide was applied to the site. The patient tolerated the procedure well without complications. Kacie Perdomo MD  Primary Care Sports Medicine      Please note that this chart was at least partially generated using dragon dictation software. Although every effort was made to ensure the accuracy of this automated transcription, some errors in transcription may have occurred.

## 2022-07-28 ENCOUNTER — TELEMEDICINE (OUTPATIENT)
Dept: FAMILY MEDICINE CLINIC | Age: 52
End: 2022-07-28

## 2022-07-28 ENCOUNTER — TELEMEDICINE (OUTPATIENT)
Dept: PSYCHOLOGY | Age: 52
End: 2022-07-28
Payer: COMMERCIAL

## 2022-07-28 DIAGNOSIS — F43.21 GRIEF REACTION: ICD-10-CM

## 2022-07-28 DIAGNOSIS — Z63.8 STRESS DUE TO FAMILY TENSION: Primary | ICD-10-CM

## 2022-07-28 DIAGNOSIS — F32.89 OTHER DEPRESSION: ICD-10-CM

## 2022-07-28 DIAGNOSIS — Z63.9 RELATIONSHIP PROBLEMS: Primary | ICD-10-CM

## 2022-07-28 PROCEDURE — 98968 PH1 ASSMT&MGMT NQHP 21-30: CPT | Performed by: SOCIAL WORKER

## 2022-07-28 SDOH — SOCIAL STABILITY - SOCIAL INSECURITY: OTHER SPECIFIED PROBLEMS RELATED TO PRIMARY SUPPORT GROUP: Z63.8

## 2022-07-28 SDOH — SOCIAL STABILITY - SOCIAL INSECURITY: PROBLEM RELATED TO PRIMARY SUPPORT GROUP, UNSPECIFIED: Z63.9

## 2022-07-28 NOTE — PROGRESS NOTES
Appointment was done over the telephone. Patient gave consent for a telephone visit. Patient was in their state [place] at time of visit. Parties involved in visit were myself, nurse, and patient. A/P:    Diagnosis Orders   1. Stress due to family tension        2. Grief reaction        3. Other depression          He is a bit better sadness wise, he will continue with counseling, he will continue mirtazapine and duloxetine, he agrees to get medical attention if SI or HI should develop, socialization, getting out of house recommended    Follow-up in 2 weeks or sooner if needed    Over 30 minutes was spent in patient care including chart review, virtual evaluation       O: There were no vitals taken for this visit. Gen- NAD, pleasant  Lungs- No increased work of breathing appreciated  Psych- Appropriate, angry, no SI/HI    S: CC-checkup  HPI-patient reports his sadness and crying at night has improved. However, his other symptoms are about the same. He is trying to get wife to start counseling. He denies SI, HI. He had his first individual counseling session this morning.     ROS- Per HPI    Patient's medications, allergies, and past medical hx were reviewed

## 2022-07-29 RX ADMIN — BUPIVACAINE HYDROCHLORIDE 5 MG: 2.5 INJECTION, SOLUTION INFILTRATION; PERINEURAL at 09:53

## 2022-07-29 RX ADMIN — BETAMETHASONE SODIUM PHOSPHATE AND BETAMETHASONE ACETATE 12 MG: 3; 3 INJECTION, SUSPENSION INTRA-ARTICULAR; INTRALESIONAL; INTRAMUSCULAR; SOFT TISSUE at 09:51

## 2022-07-29 RX ADMIN — LIDOCAINE HYDROCHLORIDE 2 ML: 10 INJECTION, SOLUTION INFILTRATION; PERINEURAL at 09:53

## 2022-08-04 ENCOUNTER — TELEPHONE (OUTPATIENT)
Dept: FAMILY MEDICINE CLINIC | Age: 52
End: 2022-08-04

## 2022-08-04 NOTE — TELEPHONE ENCOUNTER
Returned Pt phone call. Provided updated contact information for referral, and encouraged him to reach out if he has any more issues connecting with a community counselor.  Next [facility] appt is scheduled for 8/11/22

## 2022-08-04 NOTE — TELEPHONE ENCOUNTER
Pt states that the person he was referred to for marriage counseling. The phone just rings busy and he emailed but has not gotten a response. He is asking if there is a different way to get into them.

## 2022-08-08 NOTE — TELEPHONE ENCOUNTER
Requesting refills on rosvastain 40 MG TAKES 1 QD and  lisinopril 10 MG 1 QD. Please call into [facility].  Pt is reachable at [phone]

## 2022-08-08 NOTE — TELEPHONE ENCOUNTER
Medication:   Requested Prescriptions     Pending Prescriptions Disp Refills    lisinopril (PRINIVIL;ZESTRIL) 10 MG tablet 90 tablet 3     Sig: TAKE 1 TABLET DAILY    rosuvastatin (CRESTOR) 40 MG tablet 90 tablet 3     Sig: Take 1 tablet by mouth in the morning. Last Filled:  3/25/2021                           Patient Phone Number: [phone] (home) [phone] (work)    Last appt: 7/28/2022   Next appt: 8/11/2022    Last [place]AR[place]:   RX Monitoring 2/17/2020   Attestation -   Periodic Controlled Substance Monitoring Possible medication side effects, risk of tolerance/dependence & alternative treatments discussed.

## 2022-08-09 RX ORDER — LISINOPRIL 10 MG/1
TABLET ORAL
Qty: 90 TABLET | Refills: 3 | Status: SHIPPED | OUTPATIENT
Start: 2022-08-09 | End: 2022-08-11 | Stop reason: SDUPTHER

## 2022-08-09 RX ORDER — ROSUVASTATIN CALCIUM 40 MG/1
40 TABLET, COATED ORAL DAILY
Qty: 90 TABLET | Refills: 3 | Status: SHIPPED | OUTPATIENT
Start: 2022-08-09 | End: 2022-08-11 | Stop reason: SDUPTHER

## 2022-08-09 NOTE — PROGRESS NOTES
Behavioral Health Consultation- Follow UP  VIANEY Loaiza  8/10/2022   2:55 PM      Time spent with Patient: 35 minutes  This is patient's second  [facility] appointment. Reason for Consult:    Chief Complaint   Patient presents with    Depression       Referring Provider: Brittany Henning DO  [address]  [facility]    Patient provided informed consent for the behavioral health program. Discussed with patient model of service to include the limits of confidentiality (i.e. abuse reporting, suicide intervention, etc.) and short-term intervention focused approach. Pt indicated understanding. Feedback given to PCP. S:  Last appointment 7/28/22    Pt states that his wife is moving forward with the divorce. She is looking at a house on Friday. Pt and his wife will be starting marriage counseling today with Phyllis Rutledge. Pt states that he continues to feel anger, depression and anxiety. Pt is unsure if antidepressant is helpful. Discussed self-care and healthy coping. Pt continues to  girl's softball. Pt states that he listens to music, which is helpful. Pt states that he is unhappy with several other factors in his life. Pt gets agitated watching the news. Pt reports that he is short-tempered, and easily angered. Patient reported depressive symptoms including depressed mood, low motivation, anhedonia, poor self-worth, social isolation, insomnia, and fatigue/loss of energy. Pt states that he noticed a change in mood when he injured his back. Pt states that he is no longer able to engage in several of the activities that he used to, including playing sports, due to chronic pain.      O:  MSE:    Appearance: good hygiene   Attitude: cooperative and friendly  Consciousness: alert  Orientation: oriented to person, place, time, general circumstance  Memory    recent and remote memory intact   Attention/Concentration    intact  Psychomotor Activity:normal  Eye Contact: normal  Speech: normal rate and volume, well-articulated  Mood    Angry  Depressed  Affect    agitation  Perception: within normal limits  Thought Content: within normal limits  Thought Process: logical, coherent and goal-directed  Associations    logical connections  Insight: good  Judgment    Intact  Appetite abnormal  Sleep disturbance Yes  Fatigue Yes  Loss of pleasure Yes  Impulsive behavior Yes  Morbid Ideation: passive thoughts of death  Suicide Assessment: suicidal ideation without plan or intent  Homicidal Ideation: no    History:    Medications:   Current Outpatient Medications   Medication Sig Dispense Refill    lisinopril (PRINIVIL;ZESTRIL) 10 MG tablet TAKE 1 TABLET DAILY 90 tablet 3    rosuvastatin (CRESTOR) 40 MG tablet Take 1 tablet by mouth in the morning. 90 tablet 3    mirtazapine (REMERON) 15 MG tablet Take 1 tablet by mouth nightly 30 tablet 0    DULoxetine (CYMBALTA) 60 MG extended release capsule Take 1 capsule by mouth in the morning and at bedtime 180 capsule 3    hydroCHLOROthiazide (HYDRODIURIL) 25 MG tablet Take 1 tablet by mouth daily 30 tablet 1    tiZANidine (ZANAFLEX) 4 MG tablet Take 1 tablet by mouth 2 times daily as needed (muscle spasms) 60 tablet 1    pregabalin (LYRICA) 100 MG capsule Take 1 capsule by mouth 3 times daily for 30 days. 90 capsule 1    levothyroxine (SYNTHROID) 200 MCG tablet Take 1 tablet by mouth daily 90 tablet 3    Tens Unit MISC by Does not apply route Use as directed. 1 each 0    Cholecalciferol (VITAMIN D3) 50 MCG (2000 UT) CAPS Take by mouth       No current facility-administered medications for this visit.      Social History:   Social History     Socioeconomic History    Marital status:      Spouse name: Not on file    Number of children: 3    Years of education: Not on file    Highest education level: Not on file   Occupational History    Occupation:    Tobacco Use    Smoking status: Never    Smokeless tobacco: Never   Vaping Use    Vaping Use: Never

## 2022-08-10 ENCOUNTER — OFFICE VISIT (OUTPATIENT)
Dept: PSYCHOLOGY | Age: 52
End: 2022-08-10
Payer: COMMERCIAL

## 2022-08-10 DIAGNOSIS — Z63.9 RELATIONSHIP PROBLEMS: ICD-10-CM

## 2022-08-10 DIAGNOSIS — F32.A DEPRESSIVE DISORDER: Primary | ICD-10-CM

## 2022-08-10 PROCEDURE — 90832 PSYTX W PT 30 MINUTES: CPT | Performed by: SOCIAL WORKER

## 2022-08-10 SDOH — SOCIAL STABILITY - SOCIAL INSECURITY: PROBLEM RELATED TO PRIMARY SUPPORT GROUP, UNSPECIFIED: Z63.9

## 2022-08-10 NOTE — PATIENT INSTRUCTIONS
Review handout on depression. Make the CD case. Listen to music as often as possible. Review handout on anger      OVERCOMING DEPRESSION    This booklet is designed to provide information about strategies for overcoming depression. It discusses a model or framework for understanding depression (the depression spiral) and presents an overview of treatment of depression, including use of medication and strategic coping strategies. The booklet is designed to be used with the assistance of your Behavioral Health Consultant. The Depression Spiral    The figure below depicts one helpful way to think about and understand depression. Our life experience (including depression) is influenced by a number of interrelated factors: our environment, biological factors, our thoughts and beliefs, our behaviors, and our emotions. Each factor can affect the others. For example, Kristen Bear recently began working in a fast-paced, high-pressure job (environmental factor). She began to have thoughts such as Theres no way I can get all this work done. Its impossible. If I dont get it done, I may lose my job.   As a result, she began to work longer hours, cut out all fun activities, and withdraw from family and friends (behaviors). With this decrease in many of the positive, rewarding aspects of her life, she began to feel down, depressed, and more irritable (emotions). As the depression cycle started to take hold, she had more difficulty sleeping and concentrating (biology), which led to even more irritability and depression (emotions) and further withdrawal from activities and people (behaviors). At some point in the cycle, the balance of chemicals in her brain also began to alter (biology), which further deepened the spiral of depression.      BREAKING THE DEPRESSION SPIRAL      As you can see, a variety of factors, including thoughts, behaviors, emotions, and environmental and biological factors can cause, maintain, and worsen depression. Fortunately, there are effective ways of breaking the spiral of depression. Since all the factors are interrelated (with one aspect affecting the others), even making small changes in just one or two areas can gradually lead to significant improvements in other areas. For example, Lesley Trevino noticed her worsening moods and decided to take action to break the depression spiral.  She focused first on one area that she felt would be easy to change:  her behaviors. Specifically, she wanted to make changes in how she spent her time in the evenings after work. She made a goal of spending 30 minutes each evening relaxing and doing fun activities with her family (behaviors). After several weeks, she noticed that she was beginning to feel lower levels of stress and her sleep began to improve (biological factor). Feeling more rested and better able to concentrate (biological factors) increased her belief that she could effectively manage the demands of her new job (thoughts). She noticed that she had fewer days of feeling down and depressed (emotions). Other people working to improve their depression may choose to focus initially on other areas. Some people benefit from starting an antidepressant medication (biological factor) to begin to break the depression cycle. Others focus first on increasing regular physical exercise (a behavioral and biological factor that may help decrease depression), recognizing and changing thought patterns that contribute to depression or worry (thoughts), or learning and trying out new behaviors to improve work or family situations (behaviors, e.g., problem-solving, effective communication, time management, etc.). As you can see, there are a variety of coping methods and behavioral strategies that may be helpful in decreasing depression. It is probably not in your best interests to try all or too many strategies at any one time.   Rather, keep it simple and do not overwhelm yourself. It is usually best to pick one or two strategies that sound most relevant to you, try those coping strategies for a few weeks or longer, and then move on to other coping strategies that you think may be important later on. And remember -- even if you are just working directly on one or two coping strategies, you will probably be having an indirect positive effect on other areas. Your Behavioral Health Consultant [facility]) can work with you to develop skills in some of the most effective strategies for breaking the depression spiral and decreasing depression. Four effective strategies include:    _____  a. Increasing rewarding activities    _____  b. Taking antidepressant medication as directed by PCP     _____  c. Increasing physical exercise     _____  d. Increasing balanced thinking     Talk with your [facility] about which of the above you are interested in working on to better manage your depression. Anger Management  Unhealthy=anything that does damage to self, others, relationships, property  cutting  drinking  smoking  bottling up emotions (pretending youre not mad)  stewing in it  fantasizing about (or plotting) revenge  punching walls  name-calling  blaming  physical violence  get revenge/retaliate  criticize others  take it personally  infer negative intentions  destruction of property (throwing things, breaking things, etc.)    Healthy=constructive and effective; issue addressed/resolved  pause (catch it ASAP by setting your intention to respond in a healthy way)  time-out (because you cant have a constructive discussion when youre flooded)  cool off (music, take a walk, deep breathing)  re-think it (Is your level of anger valid/appropriate? What emotion is underneath/driving the anger?  Did you misunderstand?)  wait to make decisions or take actions until youve calmed down and addressed the problem  journal to sort it out  return and discuss when youre calm (express facts and feelings only)  clarify other persons intentions  try to see what was going on with the other person that may be the true cause of their actions/words  offer the olive branch (the goal is to preserve or repair the relationship, not destroy it further)  stand on the foundation of what is good in the relationship (remember how you care about this person)  recall the kind of person you want to be (kind, honest, understanding, etc.)  express the problem in I-statements (e.g.- I feel disrespected when you text while Im talking to you)  redirect physical energy: throw a ball at an outside wall, beat up a big tree with a stick, throw ice cubes against an outside brick wall, freeze water in a 2-liter bottle (cut the top off first) and smash the ice in the driveway with a hammer, use a punching bag, beat up a pillow or your bed, go for a run or a bike ride etc., yell in an empty house or secluded area

## 2022-08-11 ENCOUNTER — OFFICE VISIT (OUTPATIENT)
Dept: FAMILY MEDICINE CLINIC | Age: 52
End: 2022-08-11
Payer: COMMERCIAL

## 2022-08-11 VITALS
TEMPERATURE: 98.5 F | SYSTOLIC BLOOD PRESSURE: 112 MMHG | BODY MASS INDEX: 36.57 KG/M2 | WEIGHT: 285 LBS | HEART RATE: 93 BPM | HEIGHT: 74 IN | DIASTOLIC BLOOD PRESSURE: 77 MMHG | OXYGEN SATURATION: 97 %

## 2022-08-11 DIAGNOSIS — G89.4 CHRONIC PAIN SYNDROME: ICD-10-CM

## 2022-08-11 DIAGNOSIS — F32.A DEPRESSION, UNSPECIFIED DEPRESSION TYPE: Primary | ICD-10-CM

## 2022-08-11 DIAGNOSIS — Z63.0 RELATIONSHIP PROBLEM BETWEEN PARTNERS: ICD-10-CM

## 2022-08-11 PROCEDURE — 99213 OFFICE O/P EST LOW 20 MIN: CPT | Performed by: FAMILY MEDICINE

## 2022-08-11 RX ORDER — ROSUVASTATIN CALCIUM 40 MG/1
40 TABLET, COATED ORAL DAILY
Qty: 90 TABLET | Refills: 3 | Status: SHIPPED | OUTPATIENT
Start: 2022-08-11 | End: 2022-10-18 | Stop reason: SDUPTHER

## 2022-08-11 RX ORDER — LISINOPRIL 10 MG/1
TABLET ORAL
Qty: 90 TABLET | Refills: 3 | Status: SHIPPED | OUTPATIENT
Start: 2022-08-11 | End: 2022-10-18 | Stop reason: SDUPTHER

## 2022-08-11 RX ORDER — HYDROCHLOROTHIAZIDE 25 MG/1
25 TABLET ORAL DAILY
Qty: 90 TABLET | Refills: 3 | Status: SHIPPED | OUTPATIENT
Start: 2022-08-11 | End: 2022-10-18 | Stop reason: SDUPTHER

## 2022-08-11 RX ORDER — TESTOSTERONE CYPIONATE 200 MG/ML
200 INJECTION INTRAMUSCULAR
COMMUNITY
Start: 2022-08-03 | End: 2022-09-08 | Stop reason: SDUPTHER

## 2022-08-11 SDOH — SOCIAL STABILITY - SOCIAL INSECURITY: PROBLEMS IN RELATIONSHIP WITH SPOUSE OR PARTNER: Z63.0

## 2022-08-11 NOTE — PROGRESS NOTES
A/P:    Diagnosis Orders   1. Depression, unspecified depression type        2. Relationship problem between partners        3. Chronic pain syndrome  hydroCHLOROthiazide (HYDRODIURIL) 25 MG tablet        Symptoms are the same, great amount of grief contributing to his symptoms. He wishes to stay with current meds and dosages. We will do [employer] testing today. He agrees to get medical attention of SI or HI should develop    Follow-up in 4 weeks or sooner if needed    O: /77   Pulse 93   Temp 98.5 °F (36.9 °C)   Ht 6' 2\" (1.88 m)   Wt 285 lb (129.3 kg)   SpO2 97%   BMI 36.59 kg/m²    Gen- NAD, pleasant  HEENT- Eyes without icterus or injection  Neck- Supple, no lymphadenopathy appreciated  Lungs- CTAB  Heart- RRR  Abd- Soft, non tender  Ext- No edema  Psych- Appropriate, no si/hi    S: CC-checkup  HPI-patient reports he is doing about the same. He went to counseling yesterday with wife and was not productive. She does not want to go to counseling again. He saw counselor yesterday. He denies SI, HI. He denies alcohol abuse.     ROS- Per HPI    Patient's medications, allergies, and past medical hx were reviewed

## 2022-08-11 NOTE — PROGRESS NOTES
Patient is agreeable to [employer] Testing. Patient was educated and aware of cost and potential coverage to test. Patient would like to proceed with testing. Patient has not had anything to eat, drink, or smoke in the past 30 minutes.

## 2022-08-16 ENCOUNTER — OFFICE VISIT (OUTPATIENT)
Dept: PAIN MANAGEMENT | Age: 52
End: 2022-08-16
Payer: COMMERCIAL

## 2022-08-16 VITALS
BODY MASS INDEX: 37.11 KG/M2 | SYSTOLIC BLOOD PRESSURE: 123 MMHG | HEART RATE: 90 BPM | WEIGHT: 289 LBS | DIASTOLIC BLOOD PRESSURE: 71 MMHG

## 2022-08-16 DIAGNOSIS — M96.1 FAILED BACK SURGICAL SYNDROME: ICD-10-CM

## 2022-08-16 DIAGNOSIS — G89.4 CHRONIC PAIN SYNDROME: ICD-10-CM

## 2022-08-16 DIAGNOSIS — M50.30 DDD (DEGENERATIVE DISC DISEASE), CERVICAL: ICD-10-CM

## 2022-08-16 DIAGNOSIS — M19.011 OSTEOARTHRITIS OF RIGHT GLENOHUMERAL JOINT: ICD-10-CM

## 2022-08-16 DIAGNOSIS — F39 MOOD DISORDER (HCC): ICD-10-CM

## 2022-08-16 DIAGNOSIS — M54.50 LOW BACK PAIN RADIATING TO RIGHT LEG: ICD-10-CM

## 2022-08-16 DIAGNOSIS — M79.604 LOW BACK PAIN RADIATING TO RIGHT LEG: ICD-10-CM

## 2022-08-16 DIAGNOSIS — F51.01 PRIMARY INSOMNIA: ICD-10-CM

## 2022-08-16 DIAGNOSIS — M54.16 LUMBAR RADICULOPATHY: ICD-10-CM

## 2022-08-16 DIAGNOSIS — M47.819 DEGENERATIVE JOINT DISEASE OF SPINAL FACET JOINT: ICD-10-CM

## 2022-08-16 DIAGNOSIS — M51.37 DDD (DEGENERATIVE DISC DISEASE), LUMBOSACRAL: ICD-10-CM

## 2022-08-16 PROCEDURE — 99214 OFFICE O/P EST MOD 30 MIN: CPT | Performed by: INTERNAL MEDICINE

## 2022-08-16 RX ORDER — PREGABALIN 100 MG/1
100 CAPSULE ORAL 3 TIMES DAILY
Qty: 90 CAPSULE | Refills: 1 | Status: SHIPPED | OUTPATIENT
Start: 2022-08-16 | End: 2022-10-11 | Stop reason: SDUPTHER

## 2022-08-16 RX ORDER — TIZANIDINE 4 MG/1
4 TABLET ORAL 2 TIMES DAILY PRN
Qty: 60 TABLET | Refills: 1 | Status: SHIPPED | OUTPATIENT
Start: 2022-08-16

## 2022-08-16 RX ORDER — HYDROCODONE BITARTRATE AND ACETAMINOPHEN 5; 325 MG/1; MG/1
1 TABLET ORAL 2 TIMES DAILY
Qty: 60 TABLET | Refills: 0 | Status: SHIPPED | OUTPATIENT
Start: 2022-08-16 | End: 2022-10-11 | Stop reason: SDUPTHER

## 2022-08-16 RX ORDER — DULOXETIN HYDROCHLORIDE 60 MG/1
60 CAPSULE, DELAYED RELEASE ORAL 2 TIMES DAILY
Qty: 180 CAPSULE | Refills: 3 | Status: SHIPPED | OUTPATIENT
Start: 2022-08-16 | End: 2022-09-08

## 2022-08-16 NOTE — PROGRESS NOTES
Jennifer Bronwood  1970  [id number]    HISTORY OF PRESENT ILLNESS:  Mr. Grover Spears is a 47 y.o. male returns for a follow up visit for multiple medical problems. His  presenting problems are   1. Chronic pain syndrome    2. Lumbar radiculopathy    3. Failed back surgical syndrome    4. DDD (degenerative disc disease), cervical    5. Mood disorder ([address]    6. Primary insomnia    7. Degenerative joint disease of spinal facet joint    8. DDD (degenerative disc disease), lumbosacral    .    As per information/history obtained from the PADT(patient assessment and documentation tool) -  He complains of pain in the lower back with radiation to the buttocks, hips Bilateral, upper leg Bilateral, knees Bilateral, lower leg Bilateral, ankles Bilateral, and feet Bilateral He rates the pain 3/10 and describes it as sharp. Pain is made worse by: movement, walking, standing, sitting, bending, lifting. Current treatment regimen has helped relieve about 30% of the pain. He denies side effects from the current pain regimen. Patient reports that since the last follow up visit the physical functioning is worse, family/social relationships are worse, mood is worse and sleep patterns are worse, and that the overall functioning is worse. Patient denies neurological bowel or bladder. Patient denies misusing/abusing his narcotic pain medications or using any illegal drugs. There are No indicators for possible drug abuse, addiction or diversion problems. Upon obtaining the medical history from Mr. Grover Spears regarding today's office visit for his presenting problems,Mr. Grover Spears complains his knees have been hurting, bilaterally. He states he had a fall recently and had landed on his knees. He mentions he had Xrays done and all was okay, has DJD. He mentions it has been hurting a lot. He reports he has a appointment to see ortho. He states he has been complaint with his regieman. He says he is using OTC NSAIDs.  He mentions is going through a divorce. Patient states his sleep is fair. Has fairly normal sleep latency. Averages about 4-6 hours of sleep a night. Denies any signs of sleep apnea. Feels somewhat rested in the morning. He says he is on Cymblata 60 mg. Patient's  subjective report of his mood is fair. he describes occasional symptoms of depression, occasional  irritability and some mood swings. Describes his mood as being neutral and reports some pleasure in his daily activities. Reports  fair  appetite, energy and concentration. Able to function well in different aspects of his daily activities. Denies suicidal or homicidal ideation. Denies any complaints of increased tension, does   Worry sometimes and occasional  irritability  he denies any c/o increased anxiety, No c/o panic attacks or symptoms of PTSD. He reports he is having a lot of family stressors. He mentions he has been out of [facility] for a while. ALLERGIES/PAST MED/FAM/SOC HISTORY: Mr. Grover Spears allergies, past medical, family and social history were reviewed in the chart. Mr. Grover Spears current medications are   Outpatient Medications Prior to Visit   Medication Sig Dispense Refill    testosterone cypionate (DEPOTESTOTERONE CYPIONATE) 200 MG/ML injection Inject 200 mg into the muscle every 14 days. lisinopril (PRINIVIL;ZESTRIL) 10 MG tablet TAKE 1 TABLET DAILY 90 tablet 3    rosuvastatin (CRESTOR) 40 MG tablet Take 1 tablet by mouth in the morning. 90 tablet 3    hydroCHLOROthiazide (HYDRODIURIL) 25 MG tablet Take 1 tablet by mouth in the morning.  90 tablet 3    mirtazapine (REMERON) 15 MG tablet Take 1 tablet by mouth nightly 30 tablet 0    DULoxetine (CYMBALTA) 60 MG extended release capsule Take 1 capsule by mouth in the morning and at bedtime 180 capsule 3    tiZANidine (ZANAFLEX) 4 MG tablet Take 1 tablet by mouth 2 times daily as needed (muscle spasms) 60 tablet 1    levothyroxine (SYNTHROID) 200 MCG tablet Take 1 tablet by mouth daily 90 tablet 3    Tens Unit MISC by Does not apply route Use as directed. 1 each 0    Cholecalciferol (VITAMIN D3) 50 MCG (2000 UT) CAPS Take by mouth      pregabalin (LYRICA) 100 MG capsule Take 1 capsule by mouth 3 times daily for 30 days. 90 capsule 1     No facility-administered medications prior to visit. REVIEW OF SYSTEMS: .   Respiratory: Negative for shortness of breath. Cardiovascular: Negative for chest pain, palpitations  Gastrointestinal: Negative for blood in stool, abdominal distention, nausea, vomiting, abdominal pain, diarrhea,constipation. Neurological: Negative for speech difficulty, weakness and light-headedness, dizziness, tremors, sleepiness  Psychiatric/Behavioral: Negative for suicidal ideas, hallucinations, behavioral problems, self-injury, decreased concentration/cognition, agitation, confusion. PHYSICAL EXAM:   Nursing note and vitals reviewed. /71   Pulse 90   Wt 289 lb (131.1 kg)   BMI 37.11 kg/m²   General Appearance: Patient is well nourished, well developed, well groomed and in no acute distress. Skin: Skin is warm and dry, good turgor . No rash or lesions noted. He is not diaphoretic. Pulmonary/Chest: Effort normal. No respiratory distress or use of accessory muscles. Auscultation revealing normal air entry. He does not have wheezes in the lung fields. He has no rales. Cardiovascular: Normal rate, regular rhythm, normal heart sounds, and does not have murmur. Exam reveals no gallop and no friction rub. Musculoskeletal / Extremities: Range of motion is normal. Gait is normal, assistive devices use: none. He exhibits edema: Trace, and no tenderness. Neurological/Psychiatric:He is alert and oriented to person, place, and time. Coordination is  normal.   Judgement and Insight is normal  His mood is Appropriate and affect is Neutral/Euthymic(normal) . His behavior is normal.   thought content normal.        IMPRESSION:     1. Chronic pain syndrome    2. Lumbar radiculopathy    3.  Failed back surgical syndrome    4. DDD (degenerative disc disease), cervical    5. Mood disorder ([address]    6. Primary insomnia    7. Degenerative joint disease of spinal facet joint    8. DDD (degenerative disc disease), lumbosacral    9. Low back pain radiating to right leg    10. Osteoarthritis of right glenohumeral joint    11. Chronic pain of both knees        PLAN:  Informed verbal consent was obtained. -ROM/Stretching exercises as advised   -Interim history reviewed   -Xray right knee reviewed   -continue with all other adjuvants as before   -He was advised weight reduction, diet changes- 800-1200 flex diet, diet diary, exercising, nutritional  consult increased physical activity as tolerated   -Restart Norco 5 mg 1-2 per day as needed   -Continue with Lyrica 300 mg   -CBT techniques- relaxation therapies such as biofeedback, mindfulness based stress reduction, imagery, cognitive restructuring, problem solving discussed with patient   -Continue with Cymblata 60 mg  Mr. Gabriel Griffiths will be prescribed  the medications  listed below which are for treatment of his presenting  medical problems which for this visit include:   Diagnoses of Chronic pain syndrome, Lumbar radiculopathy, Failed back surgical syndrome, DDD (degenerative disc disease), cervical, Mood disorder ([address] Primary insomnia, Degenerative joint disease of spinal facet joint, and DDD (degenerative disc disease), lumbosacral were pertinent to this visit. Medications/orders associated with this visit:    Current Outpatient Medications   Medication Sig Dispense Refill    testosterone cypionate (DEPOTESTOTERONE CYPIONATE) 200 MG/ML injection Inject 200 mg into the muscle every 14 days. lisinopril (PRINIVIL;ZESTRIL) 10 MG tablet TAKE 1 TABLET DAILY 90 tablet 3    rosuvastatin (CRESTOR) 40 MG tablet Take 1 tablet by mouth in the morning. 90 tablet 3    hydroCHLOROthiazide (HYDRODIURIL) 25 MG tablet Take 1 tablet by mouth in the morning.  90 tablet 3    mirtazapine (REMERON) 15 MG tablet Take 1 tablet by mouth nightly 30 tablet 0    DULoxetine (CYMBALTA) 60 MG extended release capsule Take 1 capsule by mouth in the morning and at bedtime 180 capsule 3    tiZANidine (ZANAFLEX) 4 MG tablet Take 1 tablet by mouth 2 times daily as needed (muscle spasms) 60 tablet 1    levothyroxine (SYNTHROID) 200 MCG tablet Take 1 tablet by mouth daily 90 tablet 3    Tens Unit MISC by Does not apply route Use as directed. 1 each 0    Cholecalciferol (VITAMIN D3) 50 MCG (2000 UT) CAPS Take by mouth      pregabalin (LYRICA) 100 MG capsule Take 1 capsule by mouth 3 times daily for 30 days. 90 capsule 1     No current facility-administered medications for this visit. Goals of current treatment regimen include improvement in pain, restoration of functioning- with focus on improvement in physical performance, general activity, work or disability,emotional distress, health care utilization and  decreased medication consumption. Will continue to monitor progress towards achieving/maintaining therapeutic goals with special emphasis on  1. Improvement in perceived interfernce  of pain with ADL's. Ability to do home exercises independently. Ability to do household chores indoor and/or outdoor work and social and leisure activities. To increase flexibility/ROM, strength and endurance. Improve psychosocial and physical functioning.- he is not showing any significant progress/or showing regression  towards this goal and reassessment and adjustment of goals/treatment have been made. 2. Improving sleep to 6-7 hours a night. Improve mood/ anxiety and depression symptoms such as crying spells, low energy, problems with concentration, motivation.- he is showing progression towards this treatment goal with the current regimen. 3. Reduction of reliance on opioid analgesia/more appropriate opioid use- he is showing progression towards this treatment goal with the current regimen.    Risks and benefits of the medications and other alternative treatments have been/were  discussed with the patient. Any questions on the  common side effects of these medications were also answered. He was advised against drinking alcohol with the narcotic pain medicines, advised against driving or handling machinery when  starting or adjusting the dose of medicines, feeling groggy or drowsy, or if having any cognitive issues related to the current medications. Heis fully aware of the risk of overdose and death, if medicines are misused and not taken as prescribed. If he develops new symptoms or if the symptoms worsen, he was told to call the office. .  Thank you for allowing me to participate in the care of this patient.     Niranjan Adrian MD    Cc: Ella Lewis DO

## 2022-08-23 NOTE — PROGRESS NOTES
Yes  Loss of pleasure Yes  Impulsive behavior Yes  Morbid Ideation: no   Suicide Assessment: no suicidal ideation, plan, or intent  Homicidal Ideation: no    History:    Medications:   Current Outpatient Medications   Medication Sig Dispense Refill    tiZANidine (ZANAFLEX) 4 MG tablet Take 1 tablet by mouth 2 times daily as needed (muscle spasms) 60 tablet 1    pregabalin (LYRICA) 100 MG capsule Take 1 capsule by mouth in the morning and 1 capsule at noon and 1 capsule before bedtime. Do all this for 30 days. 90 capsule 1    DULoxetine (CYMBALTA) 60 MG extended release capsule Take 1 capsule by mouth in the morning and at bedtime 180 capsule 3    HYDROcodone-acetaminophen (NORCO) 5-325 MG per tablet Take 1 tablet by mouth in the morning and 1 tablet before bedtime. Do all this for 30 days. 60 tablet 0    testosterone cypionate (DEPOTESTOTERONE CYPIONATE) 200 MG/ML injection Inject 200 mg into the muscle every 14 days. lisinopril (PRINIVIL;ZESTRIL) 10 MG tablet TAKE 1 TABLET DAILY 90 tablet 3    rosuvastatin (CRESTOR) 40 MG tablet Take 1 tablet by mouth in the morning. 90 tablet 3    hydroCHLOROthiazide (HYDRODIURIL) 25 MG tablet Take 1 tablet by mouth in the morning. 90 tablet 3    mirtazapine (REMERON) 15 MG tablet Take 1 tablet by mouth nightly 30 tablet 0    levothyroxine (SYNTHROID) 200 MCG tablet Take 1 tablet by mouth daily 90 tablet 3    Tens Unit MISC by Does not apply route Use as directed. 1 each 0    Cholecalciferol (VITAMIN D3) 50 MCG (2000 UT) CAPS Take by mouth       No current facility-administered medications for this visit.      Social History:   Social History     Socioeconomic History    Marital status:      Spouse name: Not on file    Number of children: 3    Years of education: Not on file    Highest education level: Not on file   Occupational History    Occupation:    Tobacco Use    Smoking status: Never    Smokeless tobacco: Never   Vaping Use    Vaping Use: Never used Substance and Sexual Activity    Alcohol use: Yes     Alcohol/week: 0.0 standard drinks     Comment: occasional use    Drug use: Never    Sexual activity: Yes     Partners: Female   Other Topics Concern    Not on file   Social History Narrative     (wife=np), 3 daughters, 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 He has never used smokeless tobacco.  ETOH:   reports current alcohol use. Family History:   Family History   Problem Relation Age of Onset    Diabetes Father     Heart Failure Father     Hypertension Father     High Blood Pressure Father     High Blood Pressure Mother     Stroke Maternal Grandmother          A:  Patient endorses stable mood. Denies SI/HI, with good insight and motivation. PHQ Scores 8/24/2022 7/14/2022 6/13/2022 2/17/2020 12/9/2019 10/14/2019 8/4/2017   PHQ2 Score 6 - 2 4 4 4 0   PHQ9 Score 24 21 4 15 16 18 0     Interpretation of Total Score Depression Severity: 1-4 = Minimal depression, 5-9 = Mild depression, 10-14 = Moderate depression, 15-19 = Moderately severe depression, 20-27 = Severe depression     FARZANEH 7 SCORE 8/24/2022 2/17/2020 12/9/2019 10/14/2019   FARZANEH-7 Total Score 11 - - -   FARZANEH-7 Total Score - 14 9 16     Interpretation of FARZANEH-7 score: 5-9 = mild anxiety, 10-14 = moderate anxiety, 15+ = severe anxiety. Recommend referral to behavioral health for scores 10 or greater. Diagnosis:    1. Depressive disorder    2. Relationship problems          Past Diagnosis:        Diagnosis Date    Chronic pain syndrome     DDD (degenerative disc disease), cervical     Degenerative joint disease of spinal facet joint     Depressive disorder, not elsewhere classified     Dizziness     Failed back surgical syndrome     GERD (gastroesophageal reflux disease)     Headache(784.0)     HTN (hypertension)     Hyperlipidemia     Lumbar radiculopathy     Sleep apnea     does use CPAP    thyroid cancer     thyroid    Thyroid disease     Wears glasses          Plan:  Patient interventions:  Emphasized self-care as important for managing overall health  Explained relaxed breathing technique in detail and practiced this with pt in visit  Trained in strategies for increasing balanced thinking  Trained in improving communication skills  Supportive techniques    Patient Behavioral Change Plan:   See Patient Instructions

## 2022-08-24 ENCOUNTER — OFFICE VISIT (OUTPATIENT)
Dept: ORTHOPEDIC SURGERY | Age: 52
End: 2022-08-24
Payer: COMMERCIAL

## 2022-08-24 ENCOUNTER — OFFICE VISIT (OUTPATIENT)
Dept: PSYCHOLOGY | Age: 52
End: 2022-08-24
Payer: COMMERCIAL

## 2022-08-24 DIAGNOSIS — Z63.9 RELATIONSHIP PROBLEMS: ICD-10-CM

## 2022-08-24 DIAGNOSIS — M17.0 PRIMARY OSTEOARTHRITIS OF BOTH KNEES: Primary | ICD-10-CM

## 2022-08-24 DIAGNOSIS — F32.A DEPRESSIVE DISORDER: Primary | ICD-10-CM

## 2022-08-24 PROCEDURE — 90834 PSYTX W PT 45 MINUTES: CPT | Performed by: SOCIAL WORKER

## 2022-08-24 PROCEDURE — 99214 OFFICE O/P EST MOD 30 MIN: CPT | Performed by: STUDENT IN AN ORGANIZED HEALTH CARE EDUCATION/TRAINING PROGRAM

## 2022-08-24 SDOH — SOCIAL STABILITY - SOCIAL INSECURITY: PROBLEM RELATED TO PRIMARY SUPPORT GROUP, UNSPECIFIED: Z63.9

## 2022-08-24 ASSESSMENT — PATIENT HEALTH QUESTIONNAIRE - PHQ9
5. POOR APPETITE OR OVEREATING: 3
8. MOVING OR SPEAKING SO SLOWLY THAT OTHER PEOPLE COULD HAVE NOTICED. OR THE OPPOSITE, BEING SO FIGETY OR RESTLESS THAT YOU HAVE BEEN MOVING AROUND A LOT MORE THAN USUAL: 3
SUM OF ALL RESPONSES TO PHQ9 QUESTIONS 1 & 2: 6
6. FEELING BAD ABOUT YOURSELF - OR THAT YOU ARE A FAILURE OR HAVE LET YOURSELF OR YOUR FAMILY DOWN: 3
2. FEELING DOWN, DEPRESSED OR HOPELESS: 3
7. TROUBLE CONCENTRATING ON THINGS, SUCH AS READING THE NEWSPAPER OR WATCHING TELEVISION: 3
9. THOUGHTS THAT YOU WOULD BE BETTER OFF DEAD, OR OF HURTING YOURSELF: 0
SUM OF ALL RESPONSES TO PHQ QUESTIONS 1-9: 24
4. FEELING TIRED OR HAVING LITTLE ENERGY: 3
1. LITTLE INTEREST OR PLEASURE IN DOING THINGS: 3
SUM OF ALL RESPONSES TO PHQ QUESTIONS 1-9: 24
3. TROUBLE FALLING OR STAYING ASLEEP: 3
SUM OF ALL RESPONSES TO PHQ QUESTIONS 1-9: 24
SUM OF ALL RESPONSES TO PHQ QUESTIONS 1-9: 24

## 2022-08-24 ASSESSMENT — ANXIETY QUESTIONNAIRES
4. TROUBLE RELAXING: 2
3. WORRYING TOO MUCH ABOUT DIFFERENT THINGS: 2
1. FEELING NERVOUS, ANXIOUS, OR ON EDGE: 2
2. NOT BEING ABLE TO STOP OR CONTROL WORRYING: 2
6. BECOMING EASILY ANNOYED OR IRRITABLE: 3
5. BEING SO RESTLESS THAT IT IS HARD TO SIT STILL: 0
7. FEELING AFRAID AS IF SOMETHING AWFUL MIGHT HAPPEN: 0
GAD7 TOTAL SCORE: 11

## 2022-08-24 ASSESSMENT — COLUMBIA-SUICIDE SEVERITY RATING SCALE - C-SSRS
6. HAVE YOU EVER DONE ANYTHING, STARTED TO DO ANYTHING, OR PREPARED TO DO ANYTHING TO END YOUR LIFE?: NO
1. WITHIN THE PAST MONTH, HAVE YOU WISHED YOU WERE DEAD OR WISHED YOU COULD GO TO SLEEP AND NOT WAKE UP?: NO
2. HAVE YOU ACTUALLY HAD ANY THOUGHTS OF KILLING YOURSELF?: NO

## 2022-08-24 NOTE — PROGRESS NOTES
Chief Complaint   Patient presents with    Follow-up       HPI:      Lyle Sanchez is a 47 y.o. male who presents for follow-up evaluation of bilateral knee pain. Has been having pain with walking, weightbearing, going up steps. He had a right intra-articular steroid injection done 1 month ago and a left intra-articular steroid injection performed 1 to 2 weeks ago which resulted in only slight improvement in his knee pain. He is also been on Norco for chronic back pain and uses ibuprofen/naproxen as needed for the knee pain as well. All these things only helped minimally with his pain up to this point time. He comes in today to discuss further options as his pain is inadequately controlled at this time. Past Medical History:   Diagnosis Date    Chronic pain syndrome     DDD (degenerative disc disease), cervical     Degenerative joint disease of spinal facet joint     Depressive disorder, not elsewhere classified     Dizziness     Failed back surgical syndrome     GERD (gastroesophageal reflux disease)     Headache(784.0)     HTN (hypertension)     Hyperlipidemia     Lumbar radiculopathy     Sleep apnea     does use CPAP    thyroid cancer     thyroid    Thyroid disease     Wears glasses        Medication  Current Outpatient Medications   Medication Sig Dispense Refill    tiZANidine (ZANAFLEX) 4 MG tablet Take 1 tablet by mouth 2 times daily as needed (muscle spasms) 60 tablet 1    pregabalin (LYRICA) 100 MG capsule Take 1 capsule by mouth in the morning and 1 capsule at noon and 1 capsule before bedtime. Do all this for 30 days. 90 capsule 1    DULoxetine (CYMBALTA) 60 MG extended release capsule Take 1 capsule by mouth in the morning and at bedtime 180 capsule 3    HYDROcodone-acetaminophen (NORCO) 5-325 MG per tablet Take 1 tablet by mouth in the morning and 1 tablet before bedtime. Do all this for 30 days.  60 tablet 0    testosterone cypionate (DEPOTESTOTERONE CYPIONATE) 200 MG/ML injection Inject 200 mg into the muscle every 14 days. lisinopril (PRINIVIL;ZESTRIL) 10 MG tablet TAKE 1 TABLET DAILY 90 tablet 3    rosuvastatin (CRESTOR) 40 MG tablet Take 1 tablet by mouth in the morning. 90 tablet 3    hydroCHLOROthiazide (HYDRODIURIL) 25 MG tablet Take 1 tablet by mouth in the morning. 90 tablet 3    mirtazapine (REMERON) 15 MG tablet Take 1 tablet by mouth nightly 30 tablet 0    levothyroxine (SYNTHROID) 200 MCG tablet Take 1 tablet by mouth daily 90 tablet 3    Tens Unit MISC by Does not apply route Use as directed. 1 each 0    Cholecalciferol (VITAMIN D3) 50 MCG (2000 UT) CAPS Take by mouth       No current facility-administered medications for this visit. Allergies  Allergies   Allergen Reactions    Oxycontin [Oxycodone] Nausea And Vomiting and Nausea Only    Percocet [Oxycodone-Acetaminophen] Nausea And Vomiting       Review of Systems:  Pertinent items are noted in HPI. Physical Examination: This is a pleasant male, alert, and in no acute distress. There were no vitals filed for this visit. Bilateral knee exam: crepitus with ROM, medial joint/bony tenderness, 5/5 strength in all planes. Normal sensation to light touch, negative [facility]'s and [facility]'s. No varus/valgus laxity. Vascular exam: Extremities warm and well perfused, no significant edema    Respiratory exam: Breathing easy and unlabored    Neuro exam: No focal neuro deficits    Lymphatic: No obvious lymphadenopathy    Skin: Warm, dry    Radiology:     6 total view X-rays of the bilateral knees dated 7/15/22 and 5/11/21 were reviewed and interpreted independently today and discussed with the patient. The films revealed: moderate medial compartment OA and mild anterior compartment osteoarthritis of the right knee and mild to moderate medial compartment osteoarthritis of the left knee. Assessment and Plan:     1.  Primary osteoarthritis of both knees  He comes back in today to discuss further treatment options for his bilateral knee pain due to osteoarthritis. He has tried NSAIDs, Tylenol, narcotics, steroid injections which all failed to result in significant relief of his symptoms. We discussed the risks and benefits of hyaluronic acid injections and he would like to try these. We will work on pre-CERT to have these injections performed in the bilateral knees and call him once we have approval.  - ORTHOVISC INJECTION (For Auth/Precert); Future - bilateral knees      Orders Placed This Encounter   Procedures    ORTHOVISC INJECTION (For Auth/Precert)     6 total Orthovisc injections. 3 injections per knee     Standing Status:   Future     Standing Expiration Date:   10/24/2022       Follow-up: Once approved by insurance we will call. Sooner with any problems, questions, concerns, or worsening symptoms. Kiesha Whitehead MD  Primary Care Sports Medicine    Please note that this chart was at least partially generated using dragon dictation software. Although every effort was made to ensure the accuracy of this automated transcription, some errors in transcription may have occurred. On this date, 8/24/2022 I have spent greater than 40 minutes reviewing previous notes, test results, and coordinating care as well as documenting and discussing the plan of care with the patient.

## 2022-09-07 ENCOUNTER — TELEPHONE (OUTPATIENT)
Dept: FAMILY MEDICINE CLINIC | Age: 52
End: 2022-09-07

## 2022-09-07 NOTE — TELEPHONE ENCOUNTER
Case reference number is [id number]. Marked the  clinicals with urgent but this is a retro so can't be expedited, it will be handled in the standard time frame for genesight testing. If you have any questions can contact  Aisha and use the above reference number. Please advise?  Thanks

## 2022-09-07 NOTE — TELEPHONE ENCOUNTER
Sergei Shea from [facility] called in regards to genetic testing marked urgent. Pt gave them a case number and it is currently marked as under clinical review. So they need to verify this information and pt's condition to decide if it is truly urgent.  Sergei Shea can be reached at [phone]    Case number [id number]

## 2022-09-08 ENCOUNTER — TELEPHONE (OUTPATIENT)
Dept: ORTHOPEDIC SURGERY | Age: 52
End: 2022-09-08

## 2022-09-08 ENCOUNTER — OFFICE VISIT (OUTPATIENT)
Dept: FAMILY MEDICINE CLINIC | Age: 52
End: 2022-09-08
Payer: COMMERCIAL

## 2022-09-08 VITALS
DIASTOLIC BLOOD PRESSURE: 78 MMHG | OXYGEN SATURATION: 98 % | HEIGHT: 74 IN | BODY MASS INDEX: 37.09 KG/M2 | HEART RATE: 69 BPM | TEMPERATURE: 98.4 F | WEIGHT: 289 LBS | SYSTOLIC BLOOD PRESSURE: 127 MMHG

## 2022-09-08 DIAGNOSIS — E34.9 TESTOSTERONE DEFICIENCY: ICD-10-CM

## 2022-09-08 DIAGNOSIS — G89.4 CHRONIC PAIN SYNDROME: ICD-10-CM

## 2022-09-08 DIAGNOSIS — F32.A DEPRESSION, UNSPECIFIED DEPRESSION TYPE: Primary | ICD-10-CM

## 2022-09-08 PROCEDURE — 90750 HZV VACC RECOMBINANT IM: CPT | Performed by: FAMILY MEDICINE

## 2022-09-08 PROCEDURE — 99214 OFFICE O/P EST MOD 30 MIN: CPT | Performed by: FAMILY MEDICINE

## 2022-09-08 PROCEDURE — 90471 IMMUNIZATION ADMIN: CPT | Performed by: FAMILY MEDICINE

## 2022-09-08 PROCEDURE — 90472 IMMUNIZATION ADMIN EACH ADD: CPT | Performed by: FAMILY MEDICINE

## 2022-09-08 PROCEDURE — 90674 CCIIV4 VAC NO PRSV 0.5 ML IM: CPT | Performed by: FAMILY MEDICINE

## 2022-09-08 RX ORDER — DESVENLAFAXINE 100 MG/1
100 TABLET, EXTENDED RELEASE ORAL DAILY
Qty: 90 TABLET | Refills: 0 | Status: SHIPPED | OUTPATIENT
Start: 2022-09-08 | End: 2022-10-18 | Stop reason: SDUPTHER

## 2022-09-08 RX ORDER — TESTOSTERONE CYPIONATE 200 MG/ML
200 INJECTION INTRAMUSCULAR
Qty: 2 ML | Refills: 1 | Status: SHIPPED | OUTPATIENT
Start: 2022-09-08 | End: 2022-10-18 | Stop reason: SDUPTHER

## 2022-09-08 RX ORDER — MIRTAZAPINE 15 MG/1
15 TABLET, FILM COATED ORAL NIGHTLY
Qty: 30 TABLET | Refills: 0 | Status: CANCELLED | OUTPATIENT
Start: 2022-09-08

## 2022-09-08 NOTE — TELEPHONE ENCOUNTER
Just spoke with Nicole Emerson and Ana about this and [employer] called back and stated it would be run through normal

## 2022-09-08 NOTE — PROGRESS NOTES
A/P:    Diagnosis Orders   1. Depression, unspecified depression type        2. Testosterone deficiency  testosterone cypionate (DEPOTESTOTERONE CYPIONATE) 200 MG/ML injection      3. Chronic pain syndrome          His Gene[facility]ight testing shows significant gene drug interaction with duloxetine. Desvenlafaxine is listed as moderate gene drug interaction. There are no antidepressants listed as take as directed. We will switch his duloxetine 120 mg daily to desvenlafaxine 100 mg daily as trial.  He is to update me on his progress in about 2 weeks. I am hoping this change will help his pain as well. Courtesy refills for testosterone given. He agrees to follow-up with his endocrinologist.    He would like to proceed with Shingrix and influenza vaccines today. O: /78   Pulse 69   Temp 98.4 °F (36.9 °C)   Ht 6' 2\" (1.88 m)   Wt 289 lb (131.1 kg)   SpO2 98%   BMI 37.11 kg/m²    Gen- NAD, pleasant  HEENT- Eyes without icterus or injection  Neck- Supple, no lymphadenopathy appreciated  Lungs- CTAB  Heart- RRR  Abd- Soft, non tender  Ext- No edema  Psych-dysthymic overall, no SI/HI, seems a bit more positive compared to last couple of visits    S: CC-checkup  HPI-patient presents for follow-up of depression, grief. He reports he is about the same. He is going to take a trip to [place] in a couple weeks and meet up with a friend who is going through some similar experiences. He is looking forward to this trip. He denies SI, HI. He has his next counseling session in 1 week. He has not taken mirtazapine in the last couple weeks and feels no different off of med. He requests refill of his testosterone be refilled while he awaits follow-up appointment with endocrinology through [facility].  He rates his current pain is 7-8 out of 10.        ROS- Per HPI    Patient's medications, allergies, and past medical hx were reviewed

## 2022-09-08 NOTE — TELEPHONE ENCOUNTER
General Question     Subject: PATIENT IS CHECKING ON THE STATUS OF INSURANCE APPROVAL FOR INJECTIONS FOR AMA KNEES.  PLEASE ADVISE   Patient: Everardo Denzel  Contact Number: [phone]

## 2022-09-08 NOTE — TELEPHONE ENCOUNTER
Patient's gel injections have not yet been authorized by insurance. We will let him know as soon as they have. He has an appointment scheduled for Friday, so we may be able to either give him the injections then or get him more information as it becomes available.

## 2022-09-09 ENCOUNTER — OFFICE VISIT (OUTPATIENT)
Dept: ORTHOPEDIC SURGERY | Age: 52
End: 2022-09-09
Payer: COMMERCIAL

## 2022-09-09 DIAGNOSIS — M17.0 PRIMARY OSTEOARTHRITIS OF BOTH KNEES: Primary | ICD-10-CM

## 2022-09-09 PROCEDURE — 99999 PR OFFICE/OUTPT VISIT,PROCEDURE ONLY: CPT | Performed by: STUDENT IN AN ORGANIZED HEALTH CARE EDUCATION/TRAINING PROGRAM

## 2022-09-09 PROCEDURE — 20610 DRAIN/INJ JOINT/BURSA W/O US: CPT | Performed by: STUDENT IN AN ORGANIZED HEALTH CARE EDUCATION/TRAINING PROGRAM

## 2022-09-12 NOTE — PROGRESS NOTES
Having some increased right knee pain. Hoping the injection will relieve the pain. Procedure Performed: Knee Joint Orthorvisc Injection - Bilateral  Anesthesia: none  Specimen Removed: none  Complications: none     PROCEDURE IN DETAIL:     After the patient's chart was reviewed, verbal informed consent was obtained to proceed with the stated procedure. The appropriate extremity and site of procedure was identified and verified with the patient. The site was then prepped and draped in usual sterile fashion utilizing aseptic solution. The patient was seated upright and the lateral joint space was palpated. Ethyl Chloride was used to anesthetize the skin. The needle was inserted just lateral to the patellar tendon and advanced posteriorly towards the joint space. After negative aspiration, 30mg (2ml) Orthovisc was injected without resistance using a 22 gauge 1 1/2 inch needle. The needle was withdrawn from the site and hemostasis was obtained by direct pressure. A band aide was applied to the site. The patient tolerated the procedure well without complications.     Kiesha Whitehead MD  Primary Care Sports Medicine

## 2022-09-12 NOTE — PROGRESS NOTES
effects of depression on decision making. Pt wants to see his wife get mental health support, however she states that she is not interested at this time. O:  MSE:    Appearance: adequate hygiene  Attitude: cooperative and friendly  Consciousness: alert  Orientation: oriented to person, place, time, general circumstance  Memory    recent and remote memory intact   Attention/Concentration    intact  Psychomotor Activity:normal  Eye Contact: normal  Speech: normal rate and volume, well-articulated  Mood    Depressed  Affect    depressed affect  Perception: within normal limits  Thought Content: within normal limits  Thought Process: logical, coherent and goal-directed  Associations    logical connections  Insight: good  Judgment    Intact  Appetite normal  Sleep disturbance Yes  Fatigue Yes  Loss of pleasure Yes  Impulsive behavior Yes  Morbid Ideation: no   Suicide Assessment: no suicidal ideation, plan, or intent  Homicidal Ideation: no    History:    Medications:   Current Outpatient Medications   Medication Sig Dispense Refill    testosterone cypionate (DEPOTESTOTERONE CYPIONATE) 200 MG/ML injection Inject 1 mL into the muscle every 14 days for 56 days. 2 mL 1    desvenlafaxine succinate (PRISTIQ) 100 MG TB24 extended release tablet Take 1 tablet by mouth daily In place of duloxetine 90 tablet 0    tiZANidine (ZANAFLEX) 4 MG tablet Take 1 tablet by mouth 2 times daily as needed (muscle spasms) 60 tablet 1    pregabalin (LYRICA) 100 MG capsule Take 1 capsule by mouth in the morning and 1 capsule at noon and 1 capsule before bedtime. Do all this for 30 days. 90 capsule 1    HYDROcodone-acetaminophen (NORCO) 5-325 MG per tablet Take 1 tablet by mouth in the morning and 1 tablet before bedtime. Do all this for 30 days. 60 tablet 0    lisinopril (PRINIVIL;ZESTRIL) 10 MG tablet TAKE 1 TABLET DAILY 90 tablet 3    rosuvastatin (CRESTOR) 40 MG tablet Take 1 tablet by mouth in the morning.  90 tablet 3 hydroCHLOROthiazide (HYDRODIURIL) 25 MG tablet Take 1 tablet by mouth in the morning. 90 tablet 3    levothyroxine (SYNTHROID) 200 MCG tablet Take 1 tablet by mouth daily 90 tablet 3    Tens Unit MISC by Does not apply route Use as directed. 1 each 0    Cholecalciferol (VITAMIN D3) 50 MCG (2000 UT) CAPS Take by mouth       No current facility-administered medications for this visit. Social History:   Social History     Socioeconomic History    Marital status:      Spouse name: Not on file    Number of children: 3    Years of education: Not on file    Highest education level: Not on file   Occupational History    Occupation:    Tobacco Use    Smoking status: Never    Smokeless tobacco: Never   Vaping Use    Vaping Use: Never used   Substance and Sexual Activity    Alcohol use: Yes     Alcohol/week: 0.0 standard drinks     Comment: occasional use    Drug use: Never    Sexual activity: Yes     Partners: Female   Other Topics Concern    Not on file   Social History Narrative     (wife=np), 3 daughters, 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 He has never used smokeless tobacco.  ETOH:   reports current alcohol use.   Family History:   Family History   Problem Relation Age of Onset    Diabetes Father     Heart Failure Father     Hypertension Father     High Blood Pressure Father     High Blood Pressure Mother     Stroke Maternal Grandmother A:  Patient endorses stable mood. Denies SI/HI, with good insight and motivation. PHQ Scores 8/24/2022 7/14/2022 6/13/2022 2/17/2020 12/9/2019 10/14/2019 8/4/2017   PHQ2 Score 6 - 2 4 4 4 0   PHQ9 Score 24 21 4 15 16 18 0     Interpretation of Total Score Depression Severity: 1-4 = Minimal depression, 5-9 = Mild depression, 10-14 = Moderate depression, 15-19 = Moderately severe depression, 20-27 = Severe depression     FARZANEH 7 SCORE 8/24/2022 2/17/2020 12/9/2019 10/14/2019   FARZANEH-7 Total Score 11 - - -   FARZANEH-7 Total Score - 14 9 16     Interpretation of FARZANEH-7 score: 5-9 = mild anxiety, 10-14 = moderate anxiety, 15+ = severe anxiety. Recommend referral to behavioral health for scores 10 or greater. Diagnosis:    1.  Depressive disorder    2. Relationship problems          Past Diagnosis:        Diagnosis Date    Chronic pain syndrome     DDD (degenerative disc disease), cervical     Degenerative joint disease of spinal facet joint     Depressive disorder, not elsewhere classified     Dizziness     Failed back surgical syndrome     GERD (gastroesophageal reflux disease)     Headache(784.0)     HTN (hypertension)     Hyperlipidemia     Lumbar radiculopathy     Sleep apnea     does use CPAP    thyroid cancer     thyroid    Thyroid disease     Wears glasses          Plan:  Patient interventions:  Provided handout on  grief  Discussed self-care (sleep, nutrition, rewarding activities, social support, exercise)  Emphasized self-care as important for managing overall health  Trained in strategies for increasing balanced thinking  Trained in improving communication skills  Supportive techniques    Patient Behavioral Change Plan:   See Patient Instructions

## 2022-09-13 ENCOUNTER — TELEPHONE (OUTPATIENT)
Dept: FAMILY MEDICINE CLINIC | Age: 52
End: 2022-09-13

## 2022-09-13 NOTE — TELEPHONE ENCOUNTER
Harrell Dakin from [facility] called stating the [place] testing for Pt was denied because it does not meet medicare criteria. Harrell Dakin stated a letter will be mailed out.  If any questions Harrell Dakin is reachable at [phone]

## 2022-09-14 ENCOUNTER — TELEMEDICINE (OUTPATIENT)
Dept: PSYCHOLOGY | Age: 52
End: 2022-09-14
Payer: COMMERCIAL

## 2022-09-14 DIAGNOSIS — F32.A DEPRESSIVE DISORDER: Primary | ICD-10-CM

## 2022-09-14 DIAGNOSIS — Z63.9 RELATIONSHIP PROBLEMS: ICD-10-CM

## 2022-09-14 PROCEDURE — 90834 PSYTX W PT 45 MINUTES: CPT | Performed by: SOCIAL WORKER

## 2022-09-14 SDOH — SOCIAL STABILITY - SOCIAL INSECURITY: PROBLEM RELATED TO PRIMARY SUPPORT GROUP, UNSPECIFIED: Z63.9

## 2022-09-14 NOTE — PATIENT INSTRUCTIONS
Engage in extra self-care activities on Wednesday and the following days. Spend time with friends when able. Review handout on grief. Use I statements when communicating with your wife. Grief & Mourning    Grief is the personal response to loss. Mourning is the public expression of that loss. What is Normal Grief? Grief reactions vary depending on who we are, what we lost,  and how much the loss affects our day-to-day functioning. Different people may express grief differently and you may even have different grief responses between one loss and another. Reactions to grief and loss include not just emotional symptoms, but also behavioral and physical symptoms. These reactions can often change over time. All are normal for a short period of time. Emotional Behavioral Physical   Shock, denial,                   numbness Crying unexpectedly Exhaustion/Fatigue      Sadness, anxiety, guilt,       fear Sleep changes (increase or decrease) Decreased energy        Anger (at others or        God), Not eating/Weight changes Memory problems        Irritability, frustration Withdrawing from others Stomach and intestinal upset    Restlessness, difficulty concentrating, trouble making decisions Pain and headaches     Symptoms that are not normal and may signal the need to talk to a professional include:  Use of drugs, alcohol, violence, and thoughts of killing oneself. The duration of grief varies from person to person. Current research shows that the average recovery time is 18 to 24 months. Also, grief reactions can be stronger around anniversary or other significant dates, such as birthdays, and holidays. The Stages of Grief  Grief therapists often describe stages of grief outlined by the research of Dr. Monae Hines. These stages do not always go in order. You may move back and forth among some of the stages and may even skip some of them.   These stages are meant as a guide to help you understand your reactions and those of others who are grieving. Denial:  Denial (not acknowledging the loss) can help contain the shock of loss. Denial can act as a safety mechanism to block out grief until we are ready to handle it. Sadness and depression:  Deep, intense grief and mourning appear during this stage. When the full understanding of our loss comes, it can seem overwhelming. During this stage, you may cry often and unexpectedly. You may not want to be around people or to do things that you normally enjoy. During this stage, it is best to remain as active as possible and to seek supportive people who will allow you to say what you need to or to cry when you need to. It is important to allow yourself to work through your full range and experience of emotions. Anger:  Rage and anger can be intense toward friends and relatives, other people and things, and even toward God. It is important to have an outlet to release anger through activities such as exercise, hobbies, or through therapy. Guilt, shame, and blame are feelings that need to be addressed, especially if it is toward you. Acceptance: This stage includes coming to terms with the loss. It does not mean that you have found the answers to your questions or that you stop thinking about what is gone. It does signify a reinvestment in life and a willingness to readjust to your new circumstances while carrying the memories of what you have lost with you. How to Help Yourself  Give yourself time to grieve. It is normal and important to express your grief and to work through the concerns that arise for you at this time. Stuffing your feelings may not be helpful and may delay or prolong your grief. Find supportive people to reach out to during your grief. This is the time when the support of others may be the most helpful. Dont be afraid to tell them how they can best help, even if it means just listening. It is often very helpful to talk about your loss with people who will allow you to express your emotions. Take care of your health. Often after a loss, we stop doing the things we need to for health care, such as exercising, eating correctly, keeping Dr. appointments, or taking prescribed medications. If you are on a health care regimen, it is important to continue to adhere to your treatment. Postpone major life changes. Give yourself time to adjust to your loss before making plans to change jobs, move or sell your home, remarry, etc.  Grief can sometimes cloud your judgment and ability to make decisions. Consider keeping a journal.  It is often helpful to write or tell the story of your loss and what it means to you as a way to work through your feelings. Participate in activities. Staying active through exercise, enjoyable activities, outings with supportive others, or even starting new hobbies can help us get through tough times while providing opportunities for constructive development and use of energy. Consider joining grief-support groups or contacting a grief counselor for additional support and help. Remember that depressive symptoms (feeling sad) are a fundamental part of normal grief. Staying active and finding support from others can help you to work through the grief process. References  ANASTASIA Solorzano, Natalie Varma T, VIMAL Fairchild, DOMINICK Pearl, & FRANKLIN Rodriguez. (2005). Does widowhood affect memory performance of older persons? Psychological Medicine, 35(2), 217-226. Nery Collins, RADHA Johnson, & RAMY Mack. (2005). Health behaviors associated with better quality of life for older bereaved persons. Journal of Palliative Medicine, 8(1), . Reji Cabral.  (2004). Working with grieving adults. Advances in Psychiatric Treatment, 10, 164-170. Eloy Mazariegos JT, Julia, [address] [place] [address], & Ha Hendrix. (2004).   Life after death: Shena Rivera therapy after the suddent traumatic death of a family member. Perspectives in Psychiatric Care, 40(4), 435-028. Michele, [address] [facility], , ADIN Geller, & JERRY Orourke  (2000). Bereavement services in acute care settings. Death Studies, 24, 51-64. Codi Arevalo, [address] [place], [place]  (2000). Psychotherapy of traumatic grief:  A review of evidence for psychotherapeutic treatment. Death Studies, 24, 479-495. JULY Ramírez. (2004). Connections between counseling theories and current theories of grief and mourning. Journal of Mental Health Counseling, 26(2), 125-145.

## 2022-09-16 ENCOUNTER — OFFICE VISIT (OUTPATIENT)
Dept: ORTHOPEDIC SURGERY | Age: 52
End: 2022-09-16
Payer: COMMERCIAL

## 2022-09-16 DIAGNOSIS — M17.0 PRIMARY OSTEOARTHRITIS OF BOTH KNEES: Primary | ICD-10-CM

## 2022-09-16 PROCEDURE — 20610 DRAIN/INJ JOINT/BURSA W/O US: CPT | Performed by: STUDENT IN AN ORGANIZED HEALTH CARE EDUCATION/TRAINING PROGRAM

## 2022-09-16 NOTE — PROGRESS NOTES
Chief Complaint   Patient presents with    Knee Pain     2nd set of bilateral orthovisc injections. HPI:      Paola De Guzman is a 47 y.o. male who presents for follow-up evaluation of bilateral knee pain. He comes in today with multiple questions regarding his bilateral knee pain in addition to the HA injections. He has questions regarding the possible influence of back pain and his previous fall. He was not having pain until he fell on the right knee a few months back which improved somewhat after the steroid injection. However he has been having increasing left knee pain which continues to bother him a good amount, but he didn't have any type of inciting injury that he can recall. Past Medical History:   Diagnosis Date    Chronic pain syndrome     DDD (degenerative disc disease), cervical     Degenerative joint disease of spinal facet joint     Depressive disorder, not elsewhere classified     Dizziness     Failed back surgical syndrome     GERD (gastroesophageal reflux disease)     Headache(784.0)     HTN (hypertension)     Hyperlipidemia     Lumbar radiculopathy     Sleep apnea     does use CPAP    thyroid cancer     thyroid    Thyroid disease     Wears glasses        Medication  Current Outpatient Medications   Medication Sig Dispense Refill    testosterone cypionate (DEPOTESTOTERONE CYPIONATE) 200 MG/ML injection Inject 1 mL into the muscle every 14 days for 56 days. 2 mL 1    desvenlafaxine succinate (PRISTIQ) 100 MG TB24 extended release tablet Take 1 tablet by mouth daily In place of duloxetine 90 tablet 0    tiZANidine (ZANAFLEX) 4 MG tablet Take 1 tablet by mouth 2 times daily as needed (muscle spasms) 60 tablet 1    pregabalin (LYRICA) 100 MG capsule Take 1 capsule by mouth in the morning and 1 capsule at noon and 1 capsule before bedtime. Do all this for 30 days.  90 capsule 1    lisinopril (PRINIVIL;ZESTRIL) 10 MG tablet TAKE 1 TABLET DAILY 90 tablet 3    rosuvastatin (CRESTOR) 40 MG tablet Take 1 tablet by mouth in the morning. 90 tablet 3    hydroCHLOROthiazide (HYDRODIURIL) 25 MG tablet Take 1 tablet by mouth in the morning. 90 tablet 3    levothyroxine (SYNTHROID) 200 MCG tablet Take 1 tablet by mouth daily 90 tablet 3    Tens Unit MISC by Does not apply route Use as directed. 1 each 0    Cholecalciferol (VITAMIN D3) 50 MCG (2000 UT) CAPS Take by mouth       No current facility-administered medications for this visit. Allergies  Allergies   Allergen Reactions    Oxycontin [Oxycodone] Nausea And Vomiting and Nausea Only    Percocet [Oxycodone-Acetaminophen] Nausea And Vomiting       Review of Systems:  Pertinent items are noted in HPI. Physical Examination: This is a pleasant male, alert, and in no acute distress. There were no vitals filed for this visit. Bilateral knee exam: significant TTP along bilateral knees and bilateral joint lines. Medial joint line is the worse on both however. Walks with a limp, placing more weight on right knee, walking relatively straight-legged on the left knee. Full ROM today. Vascular exam: Extremities warm and well perfused, no significant edema    Respiratory exam: Breathing easy and unlabored    Neuro exam: No focal neuro deficits    Lymphatic: No obvious lymphadenopathy    Skin: Warm, dry    Radiology:     X-rays of the bilateral knees from June and July of this year were reviewed and interpreted independently today and discussed with the patient. The films revealed: no acute findings, moderate medial compartment OA (worse on right knee). Assessment and Plan:     1. Primary osteoarthritis of both knees  To have bilateral osteoarthritis of his bilateral knees and he continues to have a lot of pain in the left knee despite injections. He did not have any inciting event that he can recall, but continues to have pain. We had discussion about getting additional imaging should his symptoms fail to improve with HA injections.  Additionally, after the injections he had some pain with left knee injection as he did last week so we kept him in the clinic and put ice on the knee. He continued to have normal distal pulses, color, and 5/5 strength with plantarflexion and dorsiflexion. His pain was back to baseline by the time he left. May consider ultrasound-guided injection in the future instead as he has had a fair amount of pain with each left knee injection he has had both with myself and another provider.   - Orthovisc injection today (bilateral - see note below)    Mariah Lopez MD  [facility]    Procedure Performed: Knee Joint Orthorvisc Injection - Bilateral  Anesthesia: none  Specimen Removed: none  Complications: none     PROCEDURE IN DETAIL:     After the patient's chart was reviewed, verbal informed consent was obtained to proceed with the stated procedure. The appropriate extremity and site of procedure was identified and verified with the patient. The site was then prepped and draped in usual sterile fashion utilizing aseptic solution. The patient was seated upright and the lateral joint space was palpated. Ethyl Chloride was used to anesthetize the skin. The needle was inserted just lateral to the patellar tendon and advanced posteriorly towards the joint space. After negative aspiration, 30mg (2ml) Orthovisc was injected without resistance using a 25 gauge 1 1/2 inch needle. The needle was withdrawn from the site and hemostasis was obtained by direct pressure. A band aide was applied to the site. The patient tolerated the procedure well without complications. Mariah Lopez MD  [facility]    Please note that this chart was at least partially generated using dragon dictation software. Although every effort was made to ensure the accuracy of this automated transcription, some errors in transcription may have occurred.     On this date, 9/16/2022 I have spent greater than 20 minutes reviewing previous notes, test results, and coordinating care as well as documenting and discussing the plan of care with the patient in addition to the injections performed today.

## 2022-09-23 ENCOUNTER — OFFICE VISIT (OUTPATIENT)
Dept: ORTHOPEDIC SURGERY | Age: 52
End: 2022-09-23
Payer: COMMERCIAL

## 2022-09-23 DIAGNOSIS — M17.0 PRIMARY OSTEOARTHRITIS OF BOTH KNEES: Primary | ICD-10-CM

## 2022-09-23 PROCEDURE — 20611 DRAIN/INJ JOINT/BURSA W/US: CPT | Performed by: STUDENT IN AN ORGANIZED HEALTH CARE EDUCATION/TRAINING PROGRAM

## 2022-09-23 PROCEDURE — 99999 PR OFFICE/OUTPT VISIT,PROCEDURE ONLY: CPT | Performed by: STUDENT IN AN ORGANIZED HEALTH CARE EDUCATION/TRAINING PROGRAM

## 2022-09-23 RX ORDER — LIDOCAINE HYDROCHLORIDE 10 MG/ML
3 INJECTION, SOLUTION INFILTRATION; PERINEURAL ONCE
Status: COMPLETED | OUTPATIENT
Start: 2022-09-23 | End: 2022-09-23

## 2022-09-23 RX ADMIN — LIDOCAINE HYDROCHLORIDE 3 ML: 10 INJECTION, SOLUTION INFILTRATION; PERINEURAL at 11:30

## 2022-10-11 ENCOUNTER — OFFICE VISIT (OUTPATIENT)
Dept: PAIN MANAGEMENT | Age: 52
End: 2022-10-11
Payer: COMMERCIAL

## 2022-10-11 VITALS
HEART RATE: 96 BPM | WEIGHT: 290.4 LBS | BODY MASS INDEX: 37.29 KG/M2 | OXYGEN SATURATION: 98 % | DIASTOLIC BLOOD PRESSURE: 91 MMHG | SYSTOLIC BLOOD PRESSURE: 131 MMHG

## 2022-10-11 DIAGNOSIS — M54.50 LOW BACK PAIN RADIATING TO RIGHT LEG: ICD-10-CM

## 2022-10-11 DIAGNOSIS — M25.562 CHRONIC PAIN OF BOTH KNEES: ICD-10-CM

## 2022-10-11 DIAGNOSIS — M50.30 DDD (DEGENERATIVE DISC DISEASE), CERVICAL: ICD-10-CM

## 2022-10-11 DIAGNOSIS — M25.561 CHRONIC PAIN OF BOTH KNEES: ICD-10-CM

## 2022-10-11 DIAGNOSIS — G89.4 CHRONIC PAIN SYNDROME: ICD-10-CM

## 2022-10-11 DIAGNOSIS — M96.1 FAILED BACK SURGICAL SYNDROME: ICD-10-CM

## 2022-10-11 DIAGNOSIS — M79.604 LOW BACK PAIN RADIATING TO RIGHT LEG: ICD-10-CM

## 2022-10-11 DIAGNOSIS — G89.29 CHRONIC PAIN OF BOTH KNEES: ICD-10-CM

## 2022-10-11 DIAGNOSIS — M51.37 DDD (DEGENERATIVE DISC DISEASE), LUMBOSACRAL: ICD-10-CM

## 2022-10-11 DIAGNOSIS — M47.819 DEGENERATIVE JOINT DISEASE OF SPINAL FACET JOINT: ICD-10-CM

## 2022-10-11 DIAGNOSIS — M19.011 OSTEOARTHRITIS OF RIGHT GLENOHUMERAL JOINT: ICD-10-CM

## 2022-10-11 DIAGNOSIS — M54.16 LUMBAR RADICULOPATHY: ICD-10-CM

## 2022-10-11 PROCEDURE — 99213 OFFICE O/P EST LOW 20 MIN: CPT | Performed by: INTERNAL MEDICINE

## 2022-10-11 RX ORDER — HYDROCODONE BITARTRATE AND ACETAMINOPHEN 5; 325 MG/1; MG/1
1 TABLET ORAL 2 TIMES DAILY
Qty: 60 TABLET | Refills: 0 | Status: SHIPPED | OUTPATIENT
Start: 2022-10-11 | End: 2022-11-10

## 2022-10-11 RX ORDER — PREGABALIN 100 MG/1
100 CAPSULE ORAL 3 TIMES DAILY
Qty: 90 CAPSULE | Refills: 1 | Status: SHIPPED | OUTPATIENT
Start: 2022-10-11 | End: 2022-11-10

## 2022-10-11 NOTE — PROGRESS NOTES
Marianela Braswellgs  1970  [id number]      HISTORY OF PRESENT ILLNESS:  Mr. Lashae Medellin is a 47 y.o. male returns for a follow up visit for pain management  He has a diagnosis of   1. Chronic pain syndrome    2. DDD (degenerative disc disease), cervical    3. Degenerative joint disease of spinal facet joint    4. Osteoarthritis of right glenohumeral joint    5. Lumbar radiculopathy    6. Mood disorder ([address]    7. DDD (degenerative disc disease), lumbosacral    8. Chronic pain of both knees    9. Failed back surgical syndrome    10. Primary insomnia    11. Low back pain radiating to right leg    . He complains of pain in the  Low back, Bilateral knees   He rates the pain 5/10 and describes it as aching. Current treatment regimen has helped relieve about 60% of the pain. He denies any side effects from the current pain regimen. Patient reports that since the last follow up visit the physical functioning is unchanged, family/social relationships are unchanged, mood is unchanged sleep patterns are unchanged, and that the overall functioning is unchanged. Patient denies misusing/abusing his narcotic pain medications or using any illegal drugs. There are No indicators for possible drug abuse, addiction or diversion problems, patient states he has been doing about the same. Mr. Lashae Medellin says he had 3 Gel injections on bilateral knees but did not help much. He mentions he is using Lyrica still. Patient states he was switched to Pristiq due too gene sight testing results, no change noticed. ALLERGIES: Patients list of allergies were reviewed     MEDICATIONS: Mr. Lashae Medellin list of medications were reviewed. His current medications are   Outpatient Medications Prior to Visit   Medication Sig Dispense Refill    testosterone cypionate (DEPOTESTOTERONE CYPIONATE) 200 MG/ML injection Inject 1 mL into the muscle every 14 days for 56 days.  2 mL 1    desvenlafaxine succinate (PRISTIQ) 100 MG TB24 extended release tablet Take 1 tablet by mouth daily In place of duloxetine 90 tablet 0    tiZANidine (ZANAFLEX) 4 MG tablet Take 1 tablet by mouth 2 times daily as needed (muscle spasms) 60 tablet 1    HYDROcodone-acetaminophen (NORCO) 5-325 MG per tablet Take 1 tablet by mouth in the morning and 1 tablet before bedtime. Do all this for 30 days. 60 tablet 0    lisinopril (PRINIVIL;ZESTRIL) 10 MG tablet TAKE 1 TABLET DAILY 90 tablet 3    rosuvastatin (CRESTOR) 40 MG tablet Take 1 tablet by mouth in the morning. 90 tablet 3    hydroCHLOROthiazide (HYDRODIURIL) 25 MG tablet Take 1 tablet by mouth in the morning. 90 tablet 3    levothyroxine (SYNTHROID) 200 MCG tablet Take 1 tablet by mouth daily 90 tablet 3    Tens Unit MISC by Does not apply route Use as directed. 1 each 0    Cholecalciferol (VITAMIN D3) 50 MCG (2000 UT) CAPS Take by mouth      pregabalin (LYRICA) 100 MG capsule Take 1 capsule by mouth in the morning and 1 capsule at noon and 1 capsule before bedtime. Do all this for 30 days. 90 capsule 1     No facility-administered medications prior to visit. REVIEW OF SYSTEMS:    Respiratory: Negative for apnea, chest tightness and shortness of breath or change in baseline breathing. PHYSICAL EXAM:   Nursing note and vitals reviewed. BP (!) 131/91   Pulse 96   Wt 290 lb 6.4 oz (131.7 kg)   SpO2 98%   BMI 37.29 kg/m²   Constitutional: He appears well-developed and well-nourished. No acute distress. Cardiovascular: Normal rate, regular rhythm, normal heart sounds, and does not have murmur. Pulmonary/Chest: Effort normal. No respiratory distress. He does not have wheezes in the lung fields. He has no rales. Neurological/Psychiatric:He is alert and oriented to person, place, and time. Coordination is  normal.  His mood isAppropriate and affect is Neutral/Euthymic(normal) . IMPRESSION:   1. Chronic pain syndrome    2. DDD (degenerative disc disease), cervical    3. Degenerative joint disease of spinal facet joint    4. Osteoarthritis of right glenohumeral joint    5. Lumbar radiculopathy    6. DDD (degenerative disc disease), lumbosacral    7. Chronic pain of both knees    8. Failed back surgical syndrome    9. Low back pain radiating to right leg        PLAN:  Informed verbal consent was obtained  - [place] was obtained and reviewed  for the last one year and no indicators of drug misuse  were found. Any other controlled substance prescriptions  seen on the record have been accounted for, I am aware of the patient receiving these medications. [facility] [place] record will be rechecked as part of office protocol.    -Can continue with Pristiq for another 2 weeks, if no change will switch back  -He was advised to increase fluids ( 5-7  glasses of fluid daily), limit caffeine, avoid cheese products, increase dietary fiber, increase activity and exercise as tolerated and relax regularly and enjoy meals   -Continue with Norco as needed, has pills left   -Continue with Lyrica same dose   Current Outpatient Medications   Medication Sig Dispense Refill    testosterone cypionate (DEPOTESTOTERONE CYPIONATE) 200 MG/ML injection Inject 1 mL into the muscle every 14 days for 56 days. 2 mL 1    desvenlafaxine succinate (PRISTIQ) 100 MG TB24 extended release tablet Take 1 tablet by mouth daily In place of duloxetine 90 tablet 0    tiZANidine (ZANAFLEX) 4 MG tablet Take 1 tablet by mouth 2 times daily as needed (muscle spasms) 60 tablet 1    lisinopril (PRINIVIL;ZESTRIL) 10 MG tablet TAKE 1 TABLET DAILY 90 tablet 3    rosuvastatin (CRESTOR) 40 MG tablet Take 1 tablet by mouth in the morning. 90 tablet 3    hydroCHLOROthiazide (HYDRODIURIL) 25 MG tablet Take 1 tablet by mouth in the morning. 90 tablet 3    levothyroxine (SYNTHROID) 200 MCG tablet Take 1 tablet by mouth daily 90 tablet 3    Tens Unit MISC by Does not apply route Use as directed.  1 each 0    Cholecalciferol (VITAMIN D3) 50 MCG (2000 UT) CAPS Take by mouth      pregabalin (LYRICA) 100 MG capsule Take 1 capsule by mouth in the morning and 1 capsule at noon and 1 capsule before bedtime. Do all this for 30 days. 90 capsule 1     No current facility-administered medications for this visit. I will continue his current medication regimen  which is part of the above treatment schedule. It has been helping with Mr. Canelo Aldana chronic  medical problems which for this visit include:   Diagnoses of Chronic pain syndrome, DDD (degenerative disc disease), cervical, Degenerative joint disease of spinal facet joint, Osteoarthritis of right glenohumeral joint, Lumbar radiculopathy, Mood disorder ([address] DDD (degenerative disc disease), lumbosacral, Chronic pain of both knees, Failed back surgical syndrome, Primary insomnia, and Low back pain radiating to right leg were pertinent to this visit. Risks and benefits of the medications and other alternative treatments  including no treatment were discussed with the patient. The common side effects of these medications were also explained to the patient. Informed verbal consent was obtained. Goals of current treatment regimen include improvement in pain, restoration of functioning- with focus on improvement in physical performance, general activity, work or disability,emotional distress, health care utilization and  decreased medication consumption. Will continue to monitor progress towards achieving/maintaining therapeutic goals with special emphasis on  1. Improvement in perceived interfernce  of pain with ADL's. Ability to do home exercises independently. Ability to do household chores indoor and/or outdoor work and social and leisure activities. Improve psychosocial and physical functioning. - he is showing progression towards this treatment goal with the current regimen.        He was advised against drinking alcohol with the narcotic pain medicines, advised against driving or handling machinery while adjusting the dose of medicines or if having cognitive  issues related to the current medications. Risk of overdose and death, if medicines not taken as prescribed, were also discussed. If the patient develops new symptoms or if the symptoms worsen, the patient should call the office. While transcribing every attempt was made to maintain the accuracy of the note in terms of it's contents,there may have been some errors made inadvertently. Thank you for allowing me to participate in the care of this patient.     Felix Carmichael MD.    Cc: Neil Curtis DO

## 2022-10-12 ENCOUNTER — OFFICE VISIT (OUTPATIENT)
Dept: ORTHOPEDIC SURGERY | Age: 52
End: 2022-10-12
Payer: COMMERCIAL

## 2022-10-12 DIAGNOSIS — M17.0 PRIMARY OSTEOARTHRITIS OF BOTH KNEES: Primary | ICD-10-CM

## 2022-10-12 PROCEDURE — 99214 OFFICE O/P EST MOD 30 MIN: CPT | Performed by: STUDENT IN AN ORGANIZED HEALTH CARE EDUCATION/TRAINING PROGRAM

## 2022-10-12 NOTE — PROGRESS NOTES
Chief Complaint   Patient presents with    Follow-up     Bilateral knees. Has been having pain up until this morning. HPI:      Samara Peña is a 47 y.o. male who presents for follow-up evaluation of bilateral knee pain. Since our last visit he was still having knee pain up until today actually. He was at a concert both Friday and Saturday night this week. He reports that he had some pain with a concert Friday night, but on Saturday night when he was also standing for the entire duration of a concert he did not have any pain. He reports that previously was much stronger but that now sometimes he feels that he has knee weakness and notices increased difficulty stepping up onto boxes and increased pain with going up stairs. Overall, his symptoms are improved compared to our last visit after completing the HA injections. Past Medical History:   Diagnosis Date    Chronic pain syndrome     DDD (degenerative disc disease), cervical     Degenerative joint disease of spinal facet joint     Depressive disorder, not elsewhere classified     Dizziness     Failed back surgical syndrome     GERD (gastroesophageal reflux disease)     Headache(784.0)     HTN (hypertension)     Hyperlipidemia     Lumbar radiculopathy     Sleep apnea     does use CPAP    thyroid cancer     thyroid    Thyroid disease     Wears glasses        Medication  Current Outpatient Medications   Medication Sig Dispense Refill    HYDROcodone-acetaminophen (NORCO) 5-325 MG per tablet Take 1 tablet by mouth 2 times daily for 30 days. 60 tablet 0    pregabalin (LYRICA) 100 MG capsule Take 1 capsule by mouth 3 times daily for 30 days. 90 capsule 1    testosterone cypionate (DEPOTESTOTERONE CYPIONATE) 200 MG/ML injection Inject 1 mL into the muscle every 14 days for 56 days.  2 mL 1    desvenlafaxine succinate (PRISTIQ) 100 MG TB24 extended release tablet Take 1 tablet by mouth daily In place of duloxetine 90 tablet 0    tiZANidine (ZANAFLEX) 4 MG tablet Take 1 tablet by mouth 2 times daily as needed (muscle spasms) 60 tablet 1    lisinopril (PRINIVIL;ZESTRIL) 10 MG tablet TAKE 1 TABLET DAILY 90 tablet 3    rosuvastatin (CRESTOR) 40 MG tablet Take 1 tablet by mouth in the morning. 90 tablet 3    hydroCHLOROthiazide (HYDRODIURIL) 25 MG tablet Take 1 tablet by mouth in the morning. 90 tablet 3    levothyroxine (SYNTHROID) 200 MCG tablet Take 1 tablet by mouth daily 90 tablet 3    Tens Unit MISC by Does not apply route Use as directed. 1 each 0    Cholecalciferol (VITAMIN D3) 50 MCG (2000 UT) CAPS Take by mouth       No current facility-administered medications for this visit. Allergies  Allergies   Allergen Reactions    Oxycontin [Oxycodone] Nausea And Vomiting and Nausea Only    Percocet [Oxycodone-Acetaminophen] Nausea And Vomiting       Review of Systems:  Pertinent items are noted in HPI. Physical Examination: This is a pleasant male, alert, and in no acute distress. There were no vitals filed for this visit. Bilateral knee exam: No appreciable tenderness today, full range of motion in all planes, strength 5/5 with flexion and extension with mild reproduction of anterior knee pain with resisted knee extension. Vascular exam: Extremities warm and well perfused, no significant edema    Respiratory exam: Breathing easy and unlabored    Neuro exam: No focal neuro deficits    Lymphatic: No obvious lymphadenopathy    Skin: Warm, dry    Radiology:     6 total view X-rays of the bilateral knees dated 7/15/22 and 5/11/21 were reviewed and interpreted independently today and discussed with the patient. The films revealed: moderate medial compartment OA and mild anterior compartment osteoarthritis of the right knee and mild to moderate medial compartment osteoarthritis of the left knee. Assessment and Plan:     1.  Primary osteoarthritis of both knees  Overall, his symptoms are improved somewhat compared to her last visit after having had the HA and steroid injections. He is not eligible to have a repeat steroid injection until the end of the month on his right knee and a few weeks later on the left knee. 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 He is planning to get at the gym as well. Follow-up if knee pain returns or worsens. Follow-up: As needed. Sooner with any problems, questions, concerns, or worsening symptoms. Macie Guzman MD  [facility]    Please note that this chart was at least partially generated using dragon dictation software. Although every effort was made to ensure the accuracy of this automated transcription, some errors in transcription may have occurred. On this date, 10/12/2022 I have spent greater than 30 minutes reviewing previous notes, test results, and coordinating care as well as documenting and discussing the plan of care with the patient.

## 2022-10-13 ENCOUNTER — OFFICE VISIT (OUTPATIENT)
Dept: SLEEP MEDICINE | Age: 52
End: 2022-10-13
Payer: COMMERCIAL

## 2022-10-13 VITALS
RESPIRATION RATE: 18 BRPM | HEART RATE: 95 BPM | DIASTOLIC BLOOD PRESSURE: 70 MMHG | TEMPERATURE: 97.1 F | SYSTOLIC BLOOD PRESSURE: 115 MMHG | BODY MASS INDEX: 36.57 KG/M2 | WEIGHT: 285 LBS | OXYGEN SATURATION: 99 % | HEIGHT: 74 IN

## 2022-10-13 DIAGNOSIS — G47.33 OSA ON CPAP: Primary | ICD-10-CM

## 2022-10-13 DIAGNOSIS — Z99.89 OSA ON CPAP: Primary | ICD-10-CM

## 2022-10-13 DIAGNOSIS — Z99.89 DEPENDENCE ON OTHER ENABLING MACHINES AND DEVICES: ICD-10-CM

## 2022-10-13 DIAGNOSIS — I10 ESSENTIAL HYPERTENSION: ICD-10-CM

## 2022-10-13 DIAGNOSIS — E66.01 CLASS 2 SEVERE OBESITY DUE TO EXCESS CALORIES WITH SERIOUS COMORBIDITY AND BODY MASS INDEX (BMI) OF 36.0 TO 36.9 IN ADULT (HCC): ICD-10-CM

## 2022-10-13 PROCEDURE — 99214 OFFICE O/P EST MOD 30 MIN: CPT | Performed by: PSYCHIATRY & NEUROLOGY

## 2022-10-13 ASSESSMENT — SLEEP AND FATIGUE QUESTIONNAIRES
ESS TOTAL SCORE: 5
HOW LIKELY ARE YOU TO NOD OFF OR FALL ASLEEP WHEN YOU ARE A PASSENGER IN A CAR FOR AN HOUR WITHOUT A BREAK: 0
HOW LIKELY ARE YOU TO NOD OFF OR FALL ASLEEP WHILE WATCHING TV: 1
HOW LIKELY ARE YOU TO NOD OFF OR FALL ASLEEP WHILE SITTING AND READING: 1
HOW LIKELY ARE YOU TO NOD OFF OR FALL ASLEEP WHILE SITTING AND TALKING TO SOMEONE: 0
HOW LIKELY ARE YOU TO NOD OFF OR FALL ASLEEP WHILE LYING DOWN TO REST IN THE AFTERNOON WHEN CIRCUMSTANCES PERMIT: 1
HOW LIKELY ARE YOU TO NOD OFF OR FALL ASLEEP WHILE SITTING INACTIVE IN A PUBLIC PLACE: 1
HOW LIKELY ARE YOU TO NOD OFF OR FALL ASLEEP IN A CAR, WHILE STOPPED FOR A FEW MINUTES IN TRAFFIC: 0
HOW LIKELY ARE YOU TO NOD OFF OR FALL ASLEEP WHILE SITTING QUIETLY AFTER LUNCH WITHOUT ALCOHOL: 1

## 2022-10-13 ASSESSMENT — ENCOUNTER SYMPTOMS: APNEA: 0

## 2022-10-13 NOTE — PROGRESS NOTES
MD TOBIAS Gurrola Board Certified in Sleep Medicine  Certified in [address] [place] [address] Certified in Neurology [facility] [place] [address] [place], [place] [address] [place] Savana Melgoza, JOHN Duran 66  [phone]   [address] [address] [place]                      [address]  [address] [address] [phone]    Subjective:     Patient ID: Ellen Dean is a 47 y.o. male. Chief Complaint   Patient presents with    Follow-up    Sleep Apnea       HPI:        Ellen Dean is a 47 y.o. male was seen today as annual follow for Mild obstructive sleep apnea with apnea hypopnea index of 11.8/hr with lowest O2 saturation of 80%. (weight was 269 pounds). Patient is using the PAP machine about 100% of the time, more than 4 hours a nightabout  96 %, in total average of 9:05 hours a night in last 90 days. Currently on PAP at 11.5 cm (6-16), the AHI is only 3.0 events per hour at this pressure. Patient improved regarding daytime sleepiness and fatigue, wakes up refreshed in the morning. The Patient scored We[place] Sleepiness Score: 5 on Weimar Sleepiness Scale ( more than 10 is indicative of daytime sleepiness)   Patient has no problem with PAP pressure or mask, uses FFM. Wakes up in the morning with dry mouth, the setting of the heated humidifier at # 0.    BP is stable. Has gained 16 pounds since last visit.    DOT/CDL - N/A        Previous Report(s)Reviewed: historical medical records         Social History     Socioeconomic History    Marital status:      Spouse name: Not on file    Number of children: 3    Years of education: Not on file    Highest education level: Not on file   Occupational History    Occupation:    Tobacco Use    Smoking status: Never     Passive exposure: Past    Smokeless tobacco: Never   Vaping Use    Vaping Use: Never used   Substance and Sexual Activity    Alcohol use: Yes     Alcohol/week: 0.0 standard drinks     Comment: occasional use    Drug use: Never    Sexual activity: Yes     Partners: Female   Other Topics Concern    Not on file   Social History Narrative     (wife=np), 3 daughters, 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, now on disability, loves baseball     Social Determinants of Health     Financial Resource Strain: Low Risk     Difficulty of Paying Living Expenses: Not hard at all   Food Insecurity: No Food Insecurity    Worried About Running Out of Food in the Last Year: Never true    Ran Out of Food in the Last Year: Never true   Transportation Needs: Not on file   Physical Activity: Not on file   Stress: Not on file   Social Connections: Not on file   Intimate Partner Violence: Not on file   Housing Stability: Not on file       Prior to Admission medications    Medication Sig Start Date End Date Taking? Authorizing Provider   HYDROcodone-acetaminophen (NORCO) 5-325 MG per tablet Take 1 tablet by mouth 2 times daily for 30 days. 10/11/22 11/10/22 Yes Jerome Olvera MD   pregabalin (LYRICA) 100 MG capsule Take 1 capsule by mouth 3 times daily for 30 days. 10/11/22 11/10/22 Yes Jerome Olvera MD   testosterone cypionate (DEPOTESTOTERONE CYPIONATE) 200 MG/ML injection Inject 1 mL into the muscle every 14 days for 56 days. 9/8/22 11/3/22 Yes Melva Li DO   desvenlafaxine succinate (PRISTIQ) 100 MG TB24 extended release tablet Take 1 tablet by mouth daily In place of duloxetine 9/8/22  Yes Melva Li DO   tiZANidine (ZANAFLEX) 4 MG tablet Take 1 tablet by mouth 2 times daily as needed (muscle spasms) 8/16/22  Yes Jerome Olvera MD   lisinopril (PRINIVIL;ZESTRIL) 10 MG tablet TAKE 1 TABLET DAILY 8/11/22  Yes Melva Li DO   rosuvastatin (CRESTOR) 40 MG tablet Take 1 tablet by mouth in the morning.  8/11/22  Yes Khanh Li,    hydroCHLOROthiazide (HYDRODIURIL) 25 MG tablet Take 1 tablet by mouth in the morning. 8/11/22  Yes Gay Li DO   levothyroxine (SYNTHROID) 200 MCG tablet Take 1 tablet by mouth daily 12/10/21  Yes Gay Li DO   Tens Unit MISC by Does not apply route Use as directed.  9/16/20  Yes Valeria Dominguez MD   Cholecalciferol (VITAMIN D3) 50 MCG (2000 UT) CAPS Take by mouth   Yes Historical Provider, MD       Allergies as of 10/13/2022 - Fully Reviewed 10/13/2022   Allergen Reaction Noted    Oxycontin [oxycodone] Nausea And Vomiting and Nausea Only 04/10/2018    Percocet [oxycodone-acetaminophen] Nausea And Vomiting 07/07/2017       Patient Active Problem List   Diagnosis    Chronic pain syndrome    Degenerative joint disease of spinal facet joint    Recurrent major depressive disorder ([address]    DDD (degenerative disc disease), cervical    Lumbar radiculopathy    Hyperlipidemia    DDD (degenerative disc disease), lumbosacral    History of thyroid cancer    Major depressive disorder in partial remission (HCC)    Acquired hypothyroidism    Gastroesophageal reflux disease    Essential hypertension    KAR (obstructive sleep apnea)    Prediabetes    Vitamin D deficiency    Biceps tendonitis on left       Past Medical History:   Diagnosis Date    Chronic pain syndrome     DDD (degenerative disc disease), cervical     Degenerative joint disease of spinal facet joint     Depressive disorder, not elsewhere classified     Dizziness     Failed back surgical syndrome     GERD (gastroesophageal reflux disease)     Headache(784.0)     HTN (hypertension)     Hyperlipidemia     Lumbar radiculopathy     Sleep apnea     does use CPAP    thyroid cancer     thyroid    Thyroid disease     Wears glasses        Past Surgical History:   Procedure Laterality Date    CARPAL TUNNEL RELEASE Left 3-5-2013    CARPAL TUNNEL RELEASE Right 4-    COLONOSCOPY N/A 10/15/2019    COLONOSCOPY WITH BIOPSY performed by Kev [facility]rellana [facility] at [facility] COLONOSCOPY N/A 10/15/2019    COLONOSCOPY POLYPECTOMY SNARE/COLD BIOPSY performed by Rafael Bunch MD at [address] [place] Left 1-5-2016    First dorsal extensor compartment tendon release. JOINT REPLACEMENT Right     shoulder    KNEE ARTHROSCOPY Right 2/26/2020    RIGHT KNEE ARTHROSCOPY MEDIAL MENISCECTOMY AND CHONDROPLASTY performed by Emry Apley, MD at [facility] DECOMPRESS FOREARM,EXCIS MUSC/NERV Right 4/8/2019    RIGHT OPEN TENNIS ELBOW RELEASE performed by Janeen Pugh MD at [facility]    right    SPINAL FUSION  2006    THYROIDECTOMY  2008    UPPER GASTROINTESTINAL ENDOSCOPY N/A 1/29/2019    EGD DIAGNOSTIC ONLY performed by Evelyn Samano MD at [place] [address] N/A 10/15/2019    EGD DILATION BALLOON performed by Rafael Bunch MD at [place] [address] N/A 10/15/2019    EGD BIOPSY performed by Rafael Bunch MD at [facility] History   Problem Relation Age of Onset    Diabetes Father     Heart Failure Father     Hypertension Father     High Blood Pressure Father     High Blood Pressure Mother     Stroke Maternal Grandmother        Review of Systems   Constitutional:  Negative for fatigue. Respiratory:  Negative for apnea. Genitourinary:  Negative for frequency. Neurological:  Negative for headaches. Objective:     Vitals:  Weight BMI Neck circumference    Wt Readings from Last 3 Encounters:   10/13/22 285 lb (129.3 kg)   10/11/22 290 lb 6.4 oz (131.7 kg)   09/08/22 289 lb (131.1 kg)    Body mass index is 36.59 kg/m².        BP HR SaO2   BP Readings from Last 3 Encounters:   10/13/22 115/70   10/11/22 (!) 131/91   09/08/22 127/78    Pulse Readings from Last 3 Encounters:   10/13/22 95   10/11/22 96   09/08/22 69    SpO2 Readings from Last 3 Encounters:   10/13/22 99%   10/11/22 98%   09/08/22 98%        Themandibular molar Class :   [x]1 []2 []3      Mallampati I Airway Classification:   []1 []2 [x]3 []4      Physical Exam  Vitals and nursing note reviewed. Cardiovascular:      Rate and Rhythm: Normal rate and regular rhythm. Pulmonary:      Effort: Pulmonary effort is normal.      Breath sounds: Normal breath sounds. Musculoskeletal:      Right lower leg: No edema. Left lower leg: No edema.       :   Mild Obstructive Sleep Apnea/Hypopnea Syndrome under good control on PAP at 11.5 cmwp. Diagnosis Orders   1. KAR on CPAP        2. Dependence on other enabling machines and devices        3. Essential hypertension        4. Class 2 severe obesity due to excess calories with serious comorbidity and body mass index (BMI) of 36.0 to 36.9 in [facility])          Plan: Will continue the PAP at 6-16 cmwp. I educated the Patient about the PAP machine including how to change the heated humidifier. I will order PAP supplies, mask, filters. Most likely treating the KAR will have positive impact on HTN control. Weight loss: The proportionality between weight and AHI. With 10% weight change, the AHI has a 27% proportionate change. With 20% weight change, the AHI has a 45-50% proportionate change. No orders of the defined types were placed in this encounter. Return in about 1 year (around 10/13/2023) for Reveiwing CPAP usage and compliance report and tro.     Marrian Castleman, MD  Medical Director - [facility]

## 2022-10-13 NOTE — PROGRESS NOTES
Simonne Osler         : 1970        PHONE: [phone] (home) [phone] (work)  [] [facility]     [] [address]      [] [facility]     [] [facility]    [] Julio Fus  [x] [facility]   [] [address] [place] [address]  [] [facility] [] Other:     Diagnosis: [x] KAR (G47.33) [] CSA (G47.31) [] Apnea (G47.30)   Length of Need: [] 12 Months [x] 99 Months [] Other:    Machine (THOM!): [] Respironics Dream Station      Auto [] ResMed AirSense     Auto [] Other:     []  CPAP () [] Bilevel ()   Mode: [] Auto [] Spontaneous    Mode: [] Auto [] Spontaneous                            Comfort Settings:   - Ramp Pressure: 5 cmH2O                                        - Ramp time: 15 min                                     -  Flex/EPR - 3 full time                                    - For ResMed Bilevel (TiMax-4 sec   TiMin- 0.2 sec)     Humidifier: [] Heated ()        [x] Water chamber replacement ()/ 1 per 6 months        Mask:   [] Nasal () /1 per 3 months [x] Full Face () /1 per 3 months   [] Patient choice -Size and fit mask [x] Patient Choice - Size and fit mask   [] Dispense:  [] Dispense:    [] Headgear () / 1 per 3 months [x] Headgear () / 1 per 3 months   [] Replacement Nasal Cushion ()/2 per month [x] Interface Replacement ()/1 per month   [] Replacement Nasal Pillows ()/2 per month         Tubing: [x] Heated ()/1 per 3 months    [] Standard ()/1 per 3 months [] Other:           Filters: [x] Non-disposable ()/1 per 6 months     [x] Ultra-Fine, Disposable ()/2 per month        Miscellaneous: [x] Chin Strap ()/ 1 per 6 months [] O2 bleed-in:       LPM   [] Oximetry on CPAP/Bilevel []  Other:          Start Order Date: 10/13/22    MEDICAL JUSTIFICATION:  I, the undersigned, certify that the above prescribed supplies are medically necessary for this patients wellbeing.   In my opinion, the supplies are both reasonable and necessary in reference to accepted standards of medicalpractice in treatment of this patients condition.     Cass Guadalupe MD      NPI: [id number]       Order Signed Date: 10/13/22    Electronically signed by Cass Guadalupe MD on 10/13/2022 at 1:37 PM

## 2022-10-18 ENCOUNTER — OFFICE VISIT (OUTPATIENT)
Dept: FAMILY MEDICINE CLINIC | Age: 52
End: 2022-10-18
Payer: COMMERCIAL

## 2022-10-18 VITALS
WEIGHT: 289 LBS | DIASTOLIC BLOOD PRESSURE: 78 MMHG | HEART RATE: 96 BPM | TEMPERATURE: 99.1 F | BODY MASS INDEX: 37.09 KG/M2 | OXYGEN SATURATION: 95 % | SYSTOLIC BLOOD PRESSURE: 121 MMHG | HEIGHT: 74 IN

## 2022-10-18 DIAGNOSIS — E03.9 ACQUIRED HYPOTHYROIDISM: ICD-10-CM

## 2022-10-18 DIAGNOSIS — E34.9 TESTOSTERONE DEFICIENCY: ICD-10-CM

## 2022-10-18 DIAGNOSIS — I10 PRIMARY HYPERTENSION: ICD-10-CM

## 2022-10-18 DIAGNOSIS — F41.8 DEPRESSION WITH ANXIETY: Primary | ICD-10-CM

## 2022-10-18 DIAGNOSIS — R73.03 PREDIABETES: ICD-10-CM

## 2022-10-18 LAB
A/G RATIO: 2 (ref 1.1–2.2)
ALBUMIN SERPL-MCNC: 4.8 G/DL (ref 3.4–5)
ALP BLD-CCNC: 98 U/L (ref 40–129)
ALT SERPL-CCNC: 20 U/L (ref 10–40)
ANION GAP SERPL CALCULATED.3IONS-SCNC: 14 MMOL/L (ref 3–16)
AST SERPL-CCNC: 18 U/L (ref 15–37)
BILIRUB SERPL-MCNC: 0.5 MG/DL (ref 0–1)
BUN BLDV-MCNC: 16 MG/DL (ref 7–20)
CALCIUM SERPL-MCNC: 9.8 MG/DL (ref 8.3–10.6)
CHLORIDE BLD-SCNC: 99 MMOL/L (ref 99–110)
CO2: 27 MMOL/L (ref 21–32)
CREAT SERPL-MCNC: 0.9 MG/DL (ref 0.9–1.3)
GFR SERPL CREATININE-BSD FRML MDRD: >60 ML/MIN/{1.73_M2}
GLUCOSE BLD-MCNC: 115 MG/DL (ref 70–99)
POTASSIUM SERPL-SCNC: 4.9 MMOL/L (ref 3.5–5.1)
SODIUM BLD-SCNC: 140 MMOL/L (ref 136–145)
TOTAL PROTEIN: 7.2 G/DL (ref 6.4–8.2)
TSH REFLEX: 0.97 UIU/ML (ref 0.27–4.2)

## 2022-10-18 PROCEDURE — 99215 OFFICE O/P EST HI 40 MIN: CPT | Performed by: FAMILY MEDICINE

## 2022-10-18 PROCEDURE — 36415 COLL VENOUS BLD VENIPUNCTURE: CPT | Performed by: FAMILY MEDICINE

## 2022-10-18 RX ORDER — NALOXONE HYDROCHLORIDE 4 MG/.1ML
1 SPRAY NASAL PRN
Qty: 1 EACH | Refills: 0 | Status: SHIPPED | OUTPATIENT
Start: 2022-10-18

## 2022-10-18 RX ORDER — LISINOPRIL 10 MG/1
TABLET ORAL
Qty: 90 TABLET | Refills: 3 | Status: SHIPPED | OUTPATIENT
Start: 2022-10-18

## 2022-10-18 RX ORDER — TESTOSTERONE CYPIONATE 200 MG/ML
200 INJECTION INTRAMUSCULAR
Qty: 2 ML | Refills: 0 | Status: SHIPPED | OUTPATIENT
Start: 2022-10-18 | End: 2022-12-13

## 2022-10-18 RX ORDER — HYDROCHLOROTHIAZIDE 25 MG/1
25 TABLET ORAL DAILY
Qty: 90 TABLET | Refills: 3 | Status: SHIPPED | OUTPATIENT
Start: 2022-10-18

## 2022-10-18 RX ORDER — DESVENLAFAXINE 100 MG/1
100 TABLET, EXTENDED RELEASE ORAL DAILY
Qty: 90 TABLET | Refills: 3 | Status: SHIPPED | OUTPATIENT
Start: 2022-10-18

## 2022-10-18 RX ORDER — LEVOTHYROXINE SODIUM 0.2 MG/1
200 TABLET ORAL DAILY
Qty: 90 TABLET | Refills: 3 | Status: SHIPPED | OUTPATIENT
Start: 2022-10-18

## 2022-10-18 RX ORDER — ROSUVASTATIN CALCIUM 40 MG/1
40 TABLET, COATED ORAL DAILY
Qty: 90 TABLET | Refills: 3 | Status: SHIPPED | OUTPATIENT
Start: 2022-10-18

## 2022-10-18 RX ORDER — LORAZEPAM 0.5 MG/1
0.5 TABLET ORAL 2 TIMES DAILY PRN
Qty: 28 TABLET | Refills: 0 | Status: SHIPPED | OUTPATIENT
Start: 2022-10-18 | End: 2022-11-01

## 2022-10-18 NOTE — PROGRESS NOTES
A/P:    Diagnosis Orders   1. Depression with anxiety  LORazepam (ATIVAN) 0.5 MG tablet      2. Testosterone deficiency  testosterone cypionate (DEPOTESTOTERONE CYPIONATE) 200 MG/ML injection    Testosterone, free, total      3. Prediabetes        4. Primary hypertension  Comprehensive Metabolic Panel      5. Acquired hypothyroidism  TSH with Reflex        His mood symptoms are not improving, he has follow-up appointment with counselor tomorrow, lorazepam 0.5 mg twice daily as needed for the next 14 days prescribed, this is for intense anxiety only,  Narcan prescribed as well since he takes hydrocodone occasionally, he agrees to get medical attention if SI or HI should develop, I have recommended that he see psychiatry, he declines this for now I have also asked him to think about going as a walk-in at [facility] for evaluation, I have encouraged him to reach out to  as planned (feels this could be helpful). Check above lab work    His hypertension is controlled    His hypothyroidism was controlled on last check    His last A1c was in the prediabetic range    Follow-up in 2 weeks or sooner if needed    Over 40 minutes was spent in patient care including chart review, face to face evaluation, coordination of care      O: /78   Pulse 96   Temp 99.1 °F (37.3 °C)   Ht 6' 2\" (1.88 m)   Wt 289 lb (131.1 kg)   SpO2 95%   BMI 37.11 kg/m²    Gen- NAD, pleasant  HEENT- Eyes without icterus or injection  Neck- Supple, no lymphadenopathy appreciated  Lungs- CTAB  Heart- RRR  Abd- Soft, non tender  Ext- No edema  Psych- Appropriate, no SI/HI    S: CC-check up  HPI-patient presents for follow-up of chronic conditions. He reports that his anxiety and depression are not improved. His wife has moved out. He gets intensely anxious at times. He ruminates and sometimes paces. He denies SI. He has violent thoughts sometimes, but but said he would not act on those thoughts.  He feels support from children. He contacted  yesterday and is waiting for a call back. He would like to talk with  about his anger issues and grief.     ROS- Per HPI    Patient's medications, allergies, and past medical hx were reviewed

## 2022-10-19 ENCOUNTER — TELEPHONE (OUTPATIENT)
Dept: FAMILY MEDICINE CLINIC | Age: 52
End: 2022-10-19

## 2022-10-19 LAB
REASON FOR REJECTION: NORMAL
REJECTED TEST: NORMAL

## 2022-10-20 LAB
SEX HORMONE BINDING GLOBULIN: 16 NMOL/L (ref 11–80)
TESTOSTERONE FREE-NONMALE: 159.5 PG/ML (ref 47–244)
TESTOSTERONE TOTAL: 538 NG/DL (ref 220–1000)

## 2022-10-24 NOTE — PROGRESS NOTES
Behavioral Health Consultation- Follow UP  VIANEY Harrell  10/26/2022   3:55 PM      Time spent with Patient: 35 minutes  This is patient's fifth  [facility] appointment. Reason for Consult:    Chief Complaint   Patient presents with    Depression    Anxiety     Referring Provider: James Amin DO  [address],  [facility]    Patient provided informed consent for the behavioral health program. Discussed with patient model of service to include the limits of confidentiality (i.e. abuse reporting, suicide intervention, etc.) and short-term intervention focused approach. Pt indicated understanding. Feedback given to PCP. S:  Last appointment 9/14/22    Pt reports improvement in mood. Pt states that he continues to feel angry at his wife, as she has a new boyfriend, and he feels that she is prioritizing herself over their family/children. Pt reports having negative thinking . Pt reports having SI on Friday, and called a friend for support. Pt denies plan/intent to hurt himself or his wife's boyfriend, stating his kids are his protective factor. Pt continues to have sleep difficulties. Pt states that when he is at home alone he feels depressed. Pt is also having some anxiety and restlessness. Pt was prescribed Ativan, but states that he will not take it. Pt states that he and his 3 daughters will start attending [employer] together on Sundays and having dinner together.      O:  MSE:    Appearance: good hygiene   Attitude: cooperative and friendly  Consciousness: alert  Orientation: oriented to person, place, time, general circumstance  Memory    recent and remote memory intact   Attention/Concentration    intact  Psychomotor Activity:normal  Eye Contact: normal  Speech: normal rate and volume, well-articulated  Mood    Angry  Depressed  Affect   congruent  Perception: within normal limits  Thought Content: within normal limits  Thought Process: logical, coherent and goal-directed  Associations    logical connections  Insight: good  Judgment    Intact  Appetite abnormal  Sleep disturbance Yes  Fatigue Yes  Loss of pleasure Yes  Impulsive behavior Yes  Morbid Ideation: passive thoughts of death  Suicide Assessment: no suicidal ideation, plan, or intent  Homicidal Ideation: no    History:    Medications:   Current Outpatient Medications   Medication Sig Dispense Refill    rosuvastatin (CRESTOR) 40 MG tablet Take 1 tablet by mouth daily 90 tablet 3    levothyroxine (SYNTHROID) 200 MCG tablet Take 1 tablet by mouth daily 90 tablet 3    lisinopril (PRINIVIL;ZESTRIL) 10 MG tablet TAKE 1 TABLET DAILY 90 tablet 3    desvenlafaxine succinate (PRISTIQ) 100 MG TB24 extended release tablet Take 1 tablet by mouth daily In place of duloxetine 90 tablet 3    hydroCHLOROthiazide (HYDRODIURIL) 25 MG tablet Take 1 tablet by mouth daily 90 tablet 3    testosterone cypionate (DEPOTESTOTERONE CYPIONATE) 200 MG/ML injection Inject 1 mL into the muscle every 14 days for 56 days. 2 mL 0    LORazepam (ATIVAN) 0.5 MG tablet Take 1 tablet by mouth 2 times daily as needed for Anxiety for up to 14 days. 28 tablet 0    naloxone 4 MG/0.1ML LIQD nasal spray 1 spray by Nasal route as needed for Opioid Reversal 1 each 0    HYDROcodone-acetaminophen (NORCO) 5-325 MG per tablet Take 1 tablet by mouth 2 times daily for 30 days. 60 tablet 0    pregabalin (LYRICA) 100 MG capsule Take 1 capsule by mouth 3 times daily for 30 days. 90 capsule 1    tiZANidine (ZANAFLEX) 4 MG tablet Take 1 tablet by mouth 2 times daily as needed (muscle spasms) 60 tablet 1    Tens Unit MISC by Does not apply route Use as directed. 1 each 0    Cholecalciferol (VITAMIN D3) 50 MCG (2000 UT) CAPS Take by mouth       No current facility-administered medications for this visit.      Social History:   Social History     Socioeconomic History    Marital status:      Spouse name: Not on file    Number of children: 3    Years of education: Not on file    Highest education level: Not on file   Occupational History    Occupation:    Tobacco Use    Smoking status: Never     Passive exposure: Past    Smokeless tobacco: Never   Vaping Use    Vaping Use: Never used   Substance and Sexual Activity    Alcohol use: Yes     Alcohol/week: 0.0 standard drinks     Comment: occasional use    Drug use: Never    Sexual activity: Yes     Partners: Female   Other Topics Concern    Not on file   Social History Narrative     (wife=np), 3 daughters, 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 He has been exposed to tobacco smoke. He has never used smokeless tobacco.  ETOH:   reports current alcohol use. Family History:   Family History   Problem Relation Age of Onset    Diabetes Father     Heart Failure Father     Hypertension Father     High Blood Pressure Father     High Blood Pressure Mother     Stroke Maternal Grandmother          A:  Patient endorses stable mood. Denies SI/HI, with good insight and motivation.      PHQ Scores 10/26/2022 8/24/2022 7/14/2022 6/13/2022 2/17/2020 12/9/2019 10/14/2019   PHQ2 Score 6 6 - 2 4 4 4   PHQ9 Score 25 24 21 4 15 16 18     Interpretation of Total Score Depression Severity: 1-4 = Minimal depression, 5-9 = Mild depression, 10-14 = Moderate depression, 15-19 = Moderately severe depression, 20-27 = Severe depression     FARZANEH 7 SCORE 10/26/2022 8/24/2022 2/17/2020 12/9/2019 10/14/2019   FARZANEH-7 Total Score 14 11 - - -   FARZANEH-7 Total Score - - 14 9 16     Interpretation of FARZANEH-7 score: 5-9 = mild anxiety, 10-14 = moderate anxiety, 15+ = severe anxiety. Recommend referral to behavioral health for scores 10 or greater. Diagnosis:    1. Moderate episode of recurrent major depressive disorder ([facility] [address]    2.  Anxiety          Past Diagnosis:        Diagnosis Date    Chronic pain syndrome     DDD (degenerative disc disease), cervical     Degenerative joint disease of spinal facet joint     Depressive disorder, not elsewhere classified     Dizziness     Failed back surgical syndrome     GERD (gastroesophageal reflux disease)     Headache(784.0)     HTN (hypertension)     Hyperlipidemia     Lumbar radiculopathy     Sleep apnea     does use CPAP    thyroid cancer     thyroid    Thyroid disease     Wears glasses          Plan:  Patient interventions:  Provided handout on  healthy ways to express anger  Trained in strategies for increasing balanced thinking  Supportive techniques    Patient Behavioral Change Plan:   See Patient Instructions

## 2022-10-26 ENCOUNTER — OFFICE VISIT (OUTPATIENT)
Dept: PSYCHOLOGY | Age: 52
End: 2022-10-26
Payer: COMMERCIAL

## 2022-10-26 DIAGNOSIS — F33.1 MODERATE EPISODE OF RECURRENT MAJOR DEPRESSIVE DISORDER (HCC): Primary | ICD-10-CM

## 2022-10-26 DIAGNOSIS — F41.9 ANXIETY: ICD-10-CM

## 2022-10-26 PROCEDURE — 90832 PSYTX W PT 30 MINUTES: CPT | Performed by: SOCIAL WORKER

## 2022-10-26 ASSESSMENT — PATIENT HEALTH QUESTIONNAIRE - PHQ9
3. TROUBLE FALLING OR STAYING ASLEEP: 3
2. FEELING DOWN, DEPRESSED OR HOPELESS: 3
9. THOUGHTS THAT YOU WOULD BE BETTER OFF DEAD, OR OF HURTING YOURSELF: 3
1. LITTLE INTEREST OR PLEASURE IN DOING THINGS: 3
8. MOVING OR SPEAKING SO SLOWLY THAT OTHER PEOPLE COULD HAVE NOTICED. OR THE OPPOSITE, BEING SO FIGETY OR RESTLESS THAT YOU HAVE BEEN MOVING AROUND A LOT MORE THAN USUAL: 1
SUM OF ALL RESPONSES TO PHQ QUESTIONS 1-9: 25
SUM OF ALL RESPONSES TO PHQ9 QUESTIONS 1 & 2: 6
7. TROUBLE CONCENTRATING ON THINGS, SUCH AS READING THE NEWSPAPER OR WATCHING TELEVISION: 3
5. POOR APPETITE OR OVEREATING: 3
10. IF YOU CHECKED OFF ANY PROBLEMS, HOW DIFFICULT HAVE THESE PROBLEMS MADE IT FOR YOU TO DO YOUR WORK, TAKE CARE OF THINGS AT HOME, OR GET ALONG WITH OTHER PEOPLE: 3
SUM OF ALL RESPONSES TO PHQ QUESTIONS 1-9: 25
SUM OF ALL RESPONSES TO PHQ QUESTIONS 1-9: 25
4. FEELING TIRED OR HAVING LITTLE ENERGY: 3
SUM OF ALL RESPONSES TO PHQ QUESTIONS 1-9: 22
6. FEELING BAD ABOUT YOURSELF - OR THAT YOU ARE A FAILURE OR HAVE LET YOURSELF OR YOUR FAMILY DOWN: 3

## 2022-10-26 ASSESSMENT — ANXIETY QUESTIONNAIRES
2. NOT BEING ABLE TO STOP OR CONTROL WORRYING: 3
5. BEING SO RESTLESS THAT IT IS HARD TO SIT STILL: 1
7. FEELING AFRAID AS IF SOMETHING AWFUL MIGHT HAPPEN: 0
6. BECOMING EASILY ANNOYED OR IRRITABLE: 3
1. FEELING NERVOUS, ANXIOUS, OR ON EDGE: 1
IF YOU CHECKED OFF ANY PROBLEMS ON THIS QUESTIONNAIRE, HOW DIFFICULT HAVE THESE PROBLEMS MADE IT FOR YOU TO DO YOUR WORK, TAKE CARE OF THINGS AT HOME, OR GET ALONG WITH OTHER PEOPLE: EXTREMELY DIFFICULT
3. WORRYING TOO MUCH ABOUT DIFFERENT THINGS: 3
GAD7 TOTAL SCORE: 14
4. TROUBLE RELAXING: 3

## 2022-10-26 ASSESSMENT — COLUMBIA-SUICIDE SEVERITY RATING SCALE - C-SSRS
4. HAVE YOU HAD THESE THOUGHTS AND HAD SOME INTENTION OF ACTING ON THEM?: NO
2. HAVE YOU ACTUALLY HAD ANY THOUGHTS OF KILLING YOURSELF?: YES
6. HAVE YOU EVER DONE ANYTHING, STARTED TO DO ANYTHING, OR PREPARED TO DO ANYTHING TO END YOUR LIFE?: NO
1. WITHIN THE PAST MONTH, HAVE YOU WISHED YOU WERE DEAD OR WISHED YOU COULD GO TO SLEEP AND NOT WAKE UP?: YES
5. HAVE YOU STARTED TO WORK OUT OR WORKED OUT THE DETAILS OF HOW TO KILL YOURSELF? DO YOU INTEND TO CARRY OUT THIS PLAN?: NO
3. HAVE YOU BEEN THINKING ABOUT HOW YOU MIGHT KILL YOURSELF?: NO

## 2022-10-26 NOTE — PATIENT INSTRUCTIONS
Healthy Ways to Express Anger  Be careful not to say or do anything to hurt yourself or anyone else. 1.) Tell someone youre angry. 2.) Talk to yourself in the mirror about your anger. 3.) Play an angry song on the piano/an instrument. 4.) Beat a drum. 5.) Jump up and down. 6. Exercise: do jumping jacks, push ups, etc.   7.) Do an angry dance. 8.) Cry.  9.) Bounce or kick a ball. 10.) Dig in dirt. 11.) Go swimming. 12.) Throw rocks into a pond, lake, or ocean. 13.) Craven IM SO ANGRY!!!   14.) Get a hug.  15.) Tear and crumple up newspapers, magazines, or old phone books and  fill a garbage bag.  16.) Arrey leaves or shovel snow. 17.) Go into a safe room to get some \"space. \"  18.) Run.  19.) Walk away. 20.) Swing. 21. Lie on a bed with your feet in the air. Kick your feet back and forth and yell  AHHHHHH!  or IM ANGRY!!!

## 2022-11-09 ENCOUNTER — OFFICE VISIT (OUTPATIENT)
Dept: PSYCHOLOGY | Age: 52
End: 2022-11-09
Payer: COMMERCIAL

## 2022-11-09 DIAGNOSIS — F33.1 MODERATE EPISODE OF RECURRENT MAJOR DEPRESSIVE DISORDER (HCC): Primary | ICD-10-CM

## 2022-11-09 DIAGNOSIS — Z63.9 RELATIONSHIP PROBLEMS: ICD-10-CM

## 2022-11-09 DIAGNOSIS — F41.9 ANXIETY: ICD-10-CM

## 2022-11-09 PROCEDURE — 90834 PSYTX W PT 45 MINUTES: CPT | Performed by: SOCIAL WORKER

## 2022-11-09 SDOH — SOCIAL STABILITY - SOCIAL INSECURITY: PROBLEM RELATED TO PRIMARY SUPPORT GROUP, UNSPECIFIED: Z63.9

## 2022-11-09 ASSESSMENT — ANXIETY QUESTIONNAIRES
3. WORRYING TOO MUCH ABOUT DIFFERENT THINGS: 3
4. TROUBLE RELAXING: 3
5. BEING SO RESTLESS THAT IT IS HARD TO SIT STILL: 1
6. BECOMING EASILY ANNOYED OR IRRITABLE: 2
1. FEELING NERVOUS, ANXIOUS, OR ON EDGE: 2
GAD7 TOTAL SCORE: 17
IF YOU CHECKED OFF ANY PROBLEMS ON THIS QUESTIONNAIRE, HOW DIFFICULT HAVE THESE PROBLEMS MADE IT FOR YOU TO DO YOUR WORK, TAKE CARE OF THINGS AT HOME, OR GET ALONG WITH OTHER PEOPLE: EXTREMELY DIFFICULT
7. FEELING AFRAID AS IF SOMETHING AWFUL MIGHT HAPPEN: 3
2. NOT BEING ABLE TO STOP OR CONTROL WORRYING: 3

## 2022-11-09 ASSESSMENT — PATIENT HEALTH QUESTIONNAIRE - PHQ9
8. MOVING OR SPEAKING SO SLOWLY THAT OTHER PEOPLE COULD HAVE NOTICED. OR THE OPPOSITE, BEING SO FIGETY OR RESTLESS THAT YOU HAVE BEEN MOVING AROUND A LOT MORE THAN USUAL: 1
SUM OF ALL RESPONSES TO PHQ9 QUESTIONS 1 & 2: 6
4. FEELING TIRED OR HAVING LITTLE ENERGY: 2
5. POOR APPETITE OR OVEREATING: 3
7. TROUBLE CONCENTRATING ON THINGS, SUCH AS READING THE NEWSPAPER OR WATCHING TELEVISION: 3
6. FEELING BAD ABOUT YOURSELF - OR THAT YOU ARE A FAILURE OR HAVE LET YOURSELF OR YOUR FAMILY DOWN: 3
SUM OF ALL RESPONSES TO PHQ QUESTIONS 1-9: 24
3. TROUBLE FALLING OR STAYING ASLEEP: 3
SUM OF ALL RESPONSES TO PHQ QUESTIONS 1-9: 24
1. LITTLE INTEREST OR PLEASURE IN DOING THINGS: 3
2. FEELING DOWN, DEPRESSED OR HOPELESS: 3
SUM OF ALL RESPONSES TO PHQ QUESTIONS 1-9: 21
SUM OF ALL RESPONSES TO PHQ QUESTIONS 1-9: 24
10. IF YOU CHECKED OFF ANY PROBLEMS, HOW DIFFICULT HAVE THESE PROBLEMS MADE IT FOR YOU TO DO YOUR WORK, TAKE CARE OF THINGS AT HOME, OR GET ALONG WITH OTHER PEOPLE: 3
9. THOUGHTS THAT YOU WOULD BE BETTER OFF DEAD, OR OF HURTING YOURSELF: 3

## 2022-11-09 ASSESSMENT — COLUMBIA-SUICIDE SEVERITY RATING SCALE - C-SSRS
4. HAVE YOU HAD THESE THOUGHTS AND HAD SOME INTENTION OF ACTING ON THEM?: NO
2. HAVE YOU ACTUALLY HAD ANY THOUGHTS OF KILLING YOURSELF?: YES
5. HAVE YOU STARTED TO WORK OUT OR WORKED OUT THE DETAILS OF HOW TO KILL YOURSELF? DO YOU INTEND TO CARRY OUT THIS PLAN?: NO
1. WITHIN THE PAST MONTH, HAVE YOU WISHED YOU WERE DEAD OR WISHED YOU COULD GO TO SLEEP AND NOT WAKE UP?: YES
3. HAVE YOU BEEN THINKING ABOUT HOW YOU MIGHT KILL YOURSELF?: NO
6. HAVE YOU EVER DONE ANYTHING, STARTED TO DO ANYTHING, OR PREPARED TO DO ANYTHING TO END YOUR LIFE?: NO

## 2022-11-09 NOTE — PATIENT INSTRUCTIONS
Review handout on depression. Referral:  Davon Frye: [phone]      OVERCOMING DEPRESSION    This booklet is designed to provide information about strategies for overcoming depression. It discusses a model or framework for understanding depression (the depression spiral) and presents an overview of treatment of depression, including use of medication and strategic coping strategies. The booklet is designed to be used with the assistance of your Behavioral Health Consultant. The Depression Spiral    The figure below depicts one helpful way to think about and understand depression. Our life experience (including depression) is influenced by a number of interrelated factors: our environment, biological factors, our thoughts and beliefs, our behaviors, and our emotions. Each factor can affect the others. For example, Matthieu Miranda recently began working in a fast-paced, high-pressure job (environmental factor). She began to have thoughts such as Theres no way I can get all this work done. Its impossible. If I dont get it done, I may lose my job.   As a result, she began to work longer hours, cut out all fun activities, and withdraw from family and friends (behaviors). With this decrease in many of the positive, rewarding aspects of her life, she began to feel down, depressed, and more irritable (emotions). As the depression cycle started to take hold, she had more difficulty sleeping and concentrating (biology), which led to even more irritability and depression (emotions) and further withdrawal from activities and people (behaviors). At some point in the cycle, the balance of chemicals in her brain also began to alter (biology), which further deepened the spiral of depression. BREAKING THE DEPRESSION SPIRAL      As you can see, a variety of factors, including thoughts, behaviors, emotions, and environmental and biological factors can cause, maintain, and worsen depression.   Fortunately, there are effective ways of breaking the spiral of depression. Since all the factors are interrelated (with one aspect affecting the others), even making small changes in just one or two areas can gradually lead to significant improvements in other areas. For example, Sulema Mahoney noticed her worsening moods and decided to take action to break the depression spiral.  She focused first on one area that she felt would be easy to change:  her behaviors. Specifically, she wanted to make changes in how she spent her time in the evenings after work. She made a goal of spending 30 minutes each evening relaxing and doing fun activities with her family (behaviors). After several weeks, she noticed that she was beginning to feel lower levels of stress and her sleep began to improve (biological factor). Feeling more rested and better able to concentrate (biological factors) increased her belief that she could effectively manage the demands of her new job (thoughts). She noticed that she had fewer days of feeling down and depressed (emotions). Other people working to improve their depression may choose to focus initially on other areas. Some people benefit from starting an antidepressant medication (biological factor) to begin to break the depression cycle. Others focus first on increasing regular physical exercise (a behavioral and biological factor that may help decrease depression), recognizing and changing thought patterns that contribute to depression or worry (thoughts), or learning and trying out new behaviors to improve work or family situations (behaviors, e.g., problem-solving, effective communication, time management, etc.). As you can see, there are a variety of coping methods and behavioral strategies that may be helpful in decreasing depression. It is probably not in your best interests to try all or too many strategies at any one time. Rather, keep it simple and do not overwhelm yourself.   It is usually best to pick one or two strategies that sound most relevant to you, try those coping strategies for a few weeks or longer, and then move on to other coping strategies that you think may be important later on. And remember -- even if you are just working directly on one or two coping strategies, you will probably be having an indirect positive effect on other areas. Your Behavioral Health Consultant [facility]) can work with you to develop skills in some of the most effective strategies for breaking the depression spiral and decreasing depression. Four effective strategies include:    _____  a. Increasing rewarding activities    _____  b. Taking antidepressant medication as directed by PCP     _____  c. Increasing physical exercise     _____  d. Increasing balanced thinking     Talk with your [facility] about which of the above you are interested in working on to better manage your depression.

## 2022-11-09 NOTE — PROGRESS NOTES
Behavioral Health Consultation- Follow UP  VIANEY Patrick  11/9/2022   10:31 AM      Time spent with Patient: 40 minutes  This is patient's sixth  [facility] appointment. Reason for Consult:    Chief Complaint   Patient presents with    Depression    Anxiety       Referring Provider: Lorena Jackson DO  [address],  [facility]    Patient provided informed consent for the behavioral health program. Discussed with patient model of service to include the limits of confidentiality (i.e. abuse reporting, suicide intervention, etc.) and short-term intervention focused approach. Pt indicated understanding. Feedback given to PCP. S:  Last appointment 10/26/22    Pt continues to have sadness over the separation with his wife. Pt reports depressive sxs including sad mood, low motivation, low energy, anhedonia, social isolation, and decreased appetite. Pt denies SI. Pt states that if the divorce moves forward, and his wife stays with her boyfriend he will \"snap. \" Pt endorses passive HI towards his wife's boyfriend. Pt has vague plan \"I have ideas\" however denies intent at this time. Pt states that his daughters are his protective factor, and he also continues to have hope about reconciliation. Additionally, Pt states that due to financial circumstances, the divorce is not imminent. Pt states that his oldest daughter's boyfriend moved into their home. This has been an added stressor. Pt states that he doesn't plan to celebrate the holidays due to his depression, and his wife not being present.      O:  MSE:    Appearance: adequate hygiene  Attitude: cooperative and friendly  Consciousness: alert  Orientation: oriented to person, place, time, general circumstance  Memory    recent and remote memory intact   Attention/Concentration    intact  Psychomotor Activity:normal  Eye Contact: normal  Speech: normal rate and volume, well-articulated  Mood    Depressed  Affect    depressed affect  Perception: within normal limits  Thought Content: within normal limits  Thought Process: logical, coherent and goal-directed  Associations    logical connections  Insight: good  Judgment    Intact  Appetite abnormal: decreased  Sleep disturbance Yes  Fatigue Yes  Loss of pleasure Yes  Impulsive behavior Yes  Morbid Ideation: passive thoughts of death  Suicide Assessment: no suicidal ideation, plan, or intent  Homicidal Ideation: Pt has passive HI towards his wife's boyfriend. Pt has vague plans, denies intent, stating that his daughters are his protective factor. History:    Medications:   Current Outpatient Medications   Medication Sig Dispense Refill    rosuvastatin (CRESTOR) 40 MG tablet Take 1 tablet by mouth daily 90 tablet 3    levothyroxine (SYNTHROID) 200 MCG tablet Take 1 tablet by mouth daily 90 tablet 3    lisinopril (PRINIVIL;ZESTRIL) 10 MG tablet TAKE 1 TABLET DAILY 90 tablet 3    desvenlafaxine succinate (PRISTIQ) 100 MG TB24 extended release tablet Take 1 tablet by mouth daily In place of duloxetine 90 tablet 3    hydroCHLOROthiazide (HYDRODIURIL) 25 MG tablet Take 1 tablet by mouth daily 90 tablet 3    testosterone cypionate (DEPOTESTOTERONE CYPIONATE) 200 MG/ML injection Inject 1 mL into the muscle every 14 days for 56 days. 2 mL 0    naloxone 4 MG/0.1ML LIQD nasal spray 1 spray by Nasal route as needed for Opioid Reversal 1 each 0    HYDROcodone-acetaminophen (NORCO) 5-325 MG per tablet Take 1 tablet by mouth 2 times daily for 30 days. 60 tablet 0    pregabalin (LYRICA) 100 MG capsule Take 1 capsule by mouth 3 times daily for 30 days. 90 capsule 1    tiZANidine (ZANAFLEX) 4 MG tablet Take 1 tablet by mouth 2 times daily as needed (muscle spasms) 60 tablet 1    Tens Unit MISC by Does not apply route Use as directed. 1 each 0    Cholecalciferol (VITAMIN D3) 50 MCG (2000 UT) CAPS Take by mouth       No current facility-administered medications for this visit.      Social History:   Social History     Socioeconomic History    Marital status:      Spouse name: Not on file    Number of children: 3    Years of education: Not on file    Highest education level: Not on file   Occupational History    Occupation:    Tobacco Use    Smoking status: Never     Passive exposure: Past    Smokeless tobacco: Never   Vaping Use    Vaping Use: Never used   Substance and Sexual Activity    Alcohol use: Yes     Alcohol/week: 0.0 standard drinks     Comment: occasional use    Drug use: Never    Sexual activity: Yes     Partners: Female   Other Topics Concern    Not on file   Social History Narrative     (wife=np), 3 daughters, 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 He has been exposed to tobacco smoke. He has never used smokeless tobacco.  ETOH:   reports current alcohol use. Family History:   Family History   Problem Relation Age of Onset    Diabetes Father     Heart Failure Father     Hypertension Father     High Blood Pressure Father     High Blood Pressure Mother     Stroke Maternal Grandmother        A:  Patient endorses stable mood, with fair insight and motivation. Pt endorses passive HI towards his wife's boyfriend. He endorses vague plan \"I have several ideas\" and potential means; he is a  formerly in the [place].  Pt denies intent today stating that as long as he remains hopeful towards reconciliation with his wife, and they remain , he will not act on it. Pt was able to identify potential negative consequences of acting on HI, however, the acuity of depressive sxs appears to prevent this from being a protective factor at this time. Will continue to assess during ongoing visits. PHQ Scores 11/9/2022 10/26/2022 8/24/2022 7/14/2022 6/13/2022 2/17/2020 12/9/2019   PHQ2 Score 6 6 6 - 2 4 4   PHQ9 Score 24 25 24 21 4 15 16     Interpretation of Total Score Depression Severity: 1-4 = Minimal depression, 5-9 = Mild depression, 10-14 = Moderate depression, 15-19 = Moderately severe depression, 20-27 = Severe depression     FARZANEH 7 SCORE 11/9/2022 10/26/2022 8/24/2022 2/17/2020 12/9/2019 10/14/2019   FARZANEH-7 Total Score 17 14 11 - - -   FARZANEH-7 Total Score - - - 14 9 16     Interpretation of FARZANEH-7 score: 5-9 = mild anxiety, 10-14 = moderate anxiety, 15+ = severe anxiety. Recommend referral to behavioral health for scores 10 or greater. Diagnosis:    1. Moderate episode of recurrent major depressive disorder ([address]    2. Relationship problems    3.  Anxiety          Past Diagnosis:        Diagnosis Date    Chronic pain syndrome     DDD (degenerative disc disease), cervical     Degenerative joint disease of spinal facet joint     Depressive disorder, not elsewhere classified     Dizziness     Failed back surgical syndrome     GERD (gastroesophageal reflux disease)     Headache(784.0)     HTN (hypertension)     Hyperlipidemia     Lumbar radiculopathy     Sleep apnea     does use CPAP    thyroid cancer     thyroid    Thyroid disease     Wears glasses          Plan:  Patient interventions:  Provided handout on  depression  Discussed self-care (sleep, nutrition, rewarding activities, social support, exercise)  Emphasized self-care as important for managing overall health  Discussed and set plan for behavioral activation  Trained in strategies for increasing balanced thinking  Provided Psychoeducation re: depression, emotional regulation  Discussed benefits of referral for specialty care  Supportive techniques  Used open-ended questions to create discrepancy in thinking    Patient Behavioral Change Plan:   See Patient Instructions

## 2022-11-10 ENCOUNTER — PATIENT MESSAGE (OUTPATIENT)
Dept: FAMILY MEDICINE CLINIC | Age: 52
End: 2022-11-10

## 2022-11-15 RX ORDER — SEMAGLUTIDE 0.25 MG/.5ML
0.25 INJECTION, SOLUTION SUBCUTANEOUS
Qty: 2 ML | Refills: 0 | Status: SHIPPED | OUTPATIENT
Start: 2022-11-15

## 2022-11-15 NOTE — TELEPHONE ENCOUNTER
From: Priya Gooden  To: Dr. Diaz Rudd  Sent: 11/10/2022 8:34 PM EST  Subject: Maile Velasquez,  I would like to see if you would be willing to order me some Ozempic for my pre-diabetes and weight loss. My wife has been pretty successful with it and I also recently joined the gym as well.     Anselmo Ospina

## 2022-12-06 ENCOUNTER — OFFICE VISIT (OUTPATIENT)
Dept: PAIN MANAGEMENT | Age: 52
End: 2022-12-06

## 2022-12-06 VITALS
WEIGHT: 271 LBS | HEART RATE: 114 BPM | BODY MASS INDEX: 34.79 KG/M2 | SYSTOLIC BLOOD PRESSURE: 123 MMHG | DIASTOLIC BLOOD PRESSURE: 85 MMHG

## 2022-12-06 DIAGNOSIS — M79.604 LOW BACK PAIN RADIATING TO RIGHT LEG: ICD-10-CM

## 2022-12-06 DIAGNOSIS — M50.30 DDD (DEGENERATIVE DISC DISEASE), CERVICAL: ICD-10-CM

## 2022-12-06 DIAGNOSIS — M47.819 DEGENERATIVE JOINT DISEASE OF SPINAL FACET JOINT: ICD-10-CM

## 2022-12-06 DIAGNOSIS — M51.37 DDD (DEGENERATIVE DISC DISEASE), LUMBOSACRAL: ICD-10-CM

## 2022-12-06 DIAGNOSIS — G89.29 CHRONIC PAIN OF BOTH KNEES: ICD-10-CM

## 2022-12-06 DIAGNOSIS — M25.561 CHRONIC PAIN OF BOTH KNEES: ICD-10-CM

## 2022-12-06 DIAGNOSIS — M19.011 OSTEOARTHRITIS OF RIGHT GLENOHUMERAL JOINT: ICD-10-CM

## 2022-12-06 DIAGNOSIS — G89.4 CHRONIC PAIN SYNDROME: ICD-10-CM

## 2022-12-06 DIAGNOSIS — M96.1 FAILED BACK SURGICAL SYNDROME: ICD-10-CM

## 2022-12-06 DIAGNOSIS — M54.16 LUMBAR RADICULOPATHY: ICD-10-CM

## 2022-12-06 DIAGNOSIS — M54.50 LOW BACK PAIN RADIATING TO RIGHT LEG: ICD-10-CM

## 2022-12-06 DIAGNOSIS — M25.562 CHRONIC PAIN OF BOTH KNEES: ICD-10-CM

## 2022-12-06 RX ORDER — SILDENAFIL 100 MG/1
100 TABLET, FILM COATED ORAL PRN
Qty: 10 TABLET | Refills: 1 | Status: SHIPPED | OUTPATIENT
Start: 2022-12-06

## 2022-12-06 RX ORDER — PREGABALIN 100 MG/1
100 CAPSULE ORAL 3 TIMES DAILY
Qty: 90 CAPSULE | Refills: 1 | Status: SHIPPED | OUTPATIENT
Start: 2022-12-06 | End: 2023-01-05

## 2022-12-06 NOTE — PROGRESS NOTES
Ellen Dean  1970  [id number]      HISTORY OF PRESENT ILLNESS:  Mr. Keila Melgoza is a 47 y.o. male returns for a follow up visit for pain management  He has a diagnosis of   1. Drug-induced erectile dysfunction    2. Chronic pain syndrome    3. DDD (degenerative disc disease), cervical    4. Degenerative joint disease of spinal facet joint    5. Osteoarthritis of right glenohumeral joint    6. Lumbar radiculopathy    7. DDD (degenerative disc disease), lumbosacral    8. Chronic pain of both knees    9. Failed back surgical syndrome    10. Low back pain radiating to right leg    11. Mood disorder ([address]    12. Primary insomnia    . He complains of pain in the  lower back  He rates the pain 5/10 and describes it as aching. Current treatment regimen has helped relieve about 60% of the pain. He denies any side effects from the current pain regimen. Patient reports that since the last follow up visit the physical functioning is unchanged, family/social relationships are unchanged, mood is unchanged sleep patterns are unchanged, and that the overall functioning is unchanged. Patient denies misusing/abusing his narcotic pain medications or using any illegal drugs. There are No indicators for possible drug abuse, addiction or diversion problems, patient states he has been doing fair, his pain has been baseline, manageable with the medications. Mr. Keila Melgoza says he is not using [place] that much, he has not needed it. He mentions he is using Lyrica still. Patient reports he is not working full time. He is complaining of ED issues. ALLERGIES: Patients list of allergies were reviewed     MEDICATIONS: Mr. Keila Melgoza list of medications were reviewed. His current medications are   Outpatient Medications Prior to Visit   Medication Sig Dispense Refill    Semaglutide-Weight Management (WEGOVY) 0.25 MG/0.5ML SOAJ SC injection Inject 0.25 mg into the skin every 7 days 2 mL 0    rosuvastatin (CRESTOR) 40 MG tablet Take 1 tablet by mouth daily 90 tablet 3    levothyroxine (SYNTHROID) 200 MCG tablet Take 1 tablet by mouth daily 90 tablet 3    lisinopril (PRINIVIL;ZESTRIL) 10 MG tablet TAKE 1 TABLET DAILY 90 tablet 3    desvenlafaxine succinate (PRISTIQ) 100 MG TB24 extended release tablet Take 1 tablet by mouth daily In place of duloxetine 90 tablet 3    hydroCHLOROthiazide (HYDRODIURIL) 25 MG tablet Take 1 tablet by mouth daily 90 tablet 3    testosterone cypionate (DEPOTESTOTERONE CYPIONATE) 200 MG/ML injection Inject 1 mL into the muscle every 14 days for 56 days. 2 mL 0    naloxone 4 MG/0.1ML LIQD nasal spray 1 spray by Nasal route as needed for Opioid Reversal 1 each 0    HYDROcodone-acetaminophen (NORCO) 5-325 MG per tablet Take 1 tablet by mouth 2 times daily for 30 days. 60 tablet 0    tiZANidine (ZANAFLEX) 4 MG tablet Take 1 tablet by mouth 2 times daily as needed (muscle spasms) 60 tablet 1    Tens Unit MISC by Does not apply route Use as directed. 1 each 0    Cholecalciferol (VITAMIN D3) 50 MCG (2000 UT) CAPS Take by mouth      pregabalin (LYRICA) 100 MG capsule Take 1 capsule by mouth 3 times daily for 30 days. 90 capsule 1     No facility-administered medications prior to visit. REVIEW OF SYSTEMS:    Respiratory: Negative for apnea, chest tightness and shortness of breath or change in baseline breathing. PHYSICAL EXAM:   Nursing note and vitals reviewed. /85   Pulse (!) 114   Wt 271 lb (122.9 kg)   BMI 34.79 kg/m²   Constitutional: He appears well-developed and well-nourished. No acute distress. Cardiovascular: Normal rate, regular rhythm, normal heart sounds, and does not have murmur. Pulmonary/Chest: Effort normal. No respiratory distress. He does not have wheezes in the lung fields. He has no rales. Neurological/Psychiatric:He is alert and oriented to person, place, and time. Coordination is  normal.  His mood isAppropriate and affect is Neutral/Euthymic(normal) . His    IMPRESSION:   1.  Chronic related to the current medications. Risk of overdose and death, if medicines not taken as prescribed, were also discussed. If the patient develops new symptoms or if the symptoms worsen, the patient should call the office. While transcribing every attempt was made to maintain the accuracy of the note in terms of it's contents,there may have been some errors made inadvertently. Thank you for allowing me to participate in the care of this patient.     Felix Carmichael MD.    Cc: Neil Curtis DO

## 2023-01-19 ENCOUNTER — TELEPHONE (OUTPATIENT)
Dept: FAMILY MEDICINE CLINIC | Age: 53
End: 2023-01-19

## 2023-01-19 ENCOUNTER — TELEPHONE (OUTPATIENT)
Dept: PSYCHOLOGY | Age: 53
End: 2023-01-19

## 2023-01-19 DIAGNOSIS — F33.1 MODERATE EPISODE OF RECURRENT MAJOR DEPRESSIVE DISORDER (HCC): Primary | ICD-10-CM

## 2023-01-19 DIAGNOSIS — F41.9 ANXIETY: ICD-10-CM

## 2023-01-19 DIAGNOSIS — Z63.9 RELATIONSHIP PROBLEMS: ICD-10-CM

## 2023-01-19 SDOH — SOCIAL STABILITY - SOCIAL INSECURITY: PROBLEM RELATED TO PRIMARY SUPPORT GROUP, UNSPECIFIED: Z63.9

## 2023-01-19 NOTE — TELEPHONE ENCOUNTER
Pt called to discuss referral to specialty mental health. [facility] provided referrals.    Pt continues to struggle with marital distress and is feeling anxious and depressed. Pt is hesitant to engage in services as he is unsure how it will help him. Processed this with Patient, and provided feedback regarding benefits of specialty mental health.    Pt reports that there has been a suicide and suicide attempt with close contacts. Pt has been asked to support others, and is unsure how to do this due to his own struggles. Processed events and emotions with patient.    Spent 30 minutes with patient.

## 2023-02-11 ENCOUNTER — APPOINTMENT (OUTPATIENT)
Dept: CT IMAGING | Age: 53
End: 2023-02-11
Payer: COMMERCIAL

## 2023-02-11 ENCOUNTER — ANESTHESIA (OUTPATIENT)
Dept: OPERATING ROOM | Age: 53
End: 2023-02-11
Payer: COMMERCIAL

## 2023-02-11 ENCOUNTER — ANESTHESIA EVENT (OUTPATIENT)
Dept: OPERATING ROOM | Age: 53
End: 2023-02-11
Payer: COMMERCIAL

## 2023-02-11 ENCOUNTER — HOSPITAL ENCOUNTER (OUTPATIENT)
Age: 53
Setting detail: OBSERVATION
Discharge: HOME | End: 2023-02-11
Attending: EMERGENCY MEDICINE | Admitting: SURGERY
Payer: COMMERCIAL

## 2023-02-11 VITALS
DIASTOLIC BLOOD PRESSURE: 75 MMHG | HEIGHT: 74 IN | SYSTOLIC BLOOD PRESSURE: 111 MMHG | RESPIRATION RATE: 16 BRPM | BODY MASS INDEX: 33.13 KG/M2 | HEART RATE: 92 BPM | TEMPERATURE: 97.7 F | OXYGEN SATURATION: 95 % | WEIGHT: 258.16 LBS

## 2023-02-11 DIAGNOSIS — K35.80 ACUTE APPENDICITIS, UNSPECIFIED ACUTE APPENDICITIS TYPE: ICD-10-CM

## 2023-02-11 DIAGNOSIS — K35.30 ACUTE APPENDICITIS WITH LOCALIZED PERITONITIS, WITHOUT PERFORATION, ABSCESS, OR GANGRENE: Primary | ICD-10-CM

## 2023-02-11 PROBLEM — K37 APPENDICITIS: Status: ACTIVE | Noted: 2023-02-11

## 2023-02-11 LAB
A/G RATIO: 1.3 (ref 1.1–2.2)
ALBUMIN SERPL-MCNC: 4.2 G/DL (ref 3.4–5)
ALP BLD-CCNC: 88 U/L (ref 40–129)
ALT SERPL-CCNC: 19 U/L (ref 10–40)
ANION GAP SERPL CALCULATED.3IONS-SCNC: 10 MMOL/L (ref 3–16)
AST SERPL-CCNC: 13 U/L (ref 15–37)
BACTERIA: ABNORMAL /HPF
BASOPHILS ABSOLUTE: 0 K/UL (ref 0–0.2)
BASOPHILS RELATIVE PERCENT: 0.4 %
BILIRUB SERPL-MCNC: 0.5 MG/DL (ref 0–1)
BILIRUBIN URINE: NEGATIVE
BLOOD, URINE: ABNORMAL
BUN BLDV-MCNC: 13 MG/DL (ref 7–20)
CALCIUM SERPL-MCNC: 9.5 MG/DL (ref 8.3–10.6)
CHLORIDE BLD-SCNC: 101 MMOL/L (ref 99–110)
CLARITY: ABNORMAL
CO2: 28 MMOL/L (ref 21–32)
COLOR: YELLOW
CREAT SERPL-MCNC: 0.7 MG/DL (ref 0.9–1.3)
EOSINOPHILS ABSOLUTE: 0 K/UL (ref 0–0.6)
EOSINOPHILS RELATIVE PERCENT: 0.4 %
EPITHELIAL CELLS, UA: ABNORMAL /HPF (ref 0–5)
GFR SERPL CREATININE-BSD FRML MDRD: >60 ML/MIN/{1.73_M2}
GLUCOSE BLD-MCNC: 103 MG/DL (ref 70–99)
GLUCOSE URINE: NEGATIVE MG/DL
HCT VFR BLD CALC: 46.3 % (ref 40.5–52.5)
HEMOGLOBIN: 15.5 G/DL (ref 13.5–17.5)
IMMATURE GRANULOCYTES #: 0.1 K/UL (ref 0–0.2)
IMMATURE GRANULOCYTES %: 0.7 %
KETONES, URINE: NEGATIVE MG/DL
LEUKOCYTE ESTERASE, URINE: NEGATIVE
LIPASE: 20 U/L (ref 13–60)
LYMPHOCYTES ABSOLUTE: 1.4 K/UL (ref 1–5.1)
LYMPHOCYTES RELATIVE PERCENT: 15.5 %
MCH RBC QN AUTO: 28.8 PG (ref 26–34)
MCHC RBC AUTO-ENTMCNC: 33.5 G/DL (ref 32–36.4)
MCV RBC AUTO: 85.9 FL (ref 80–100)
MICROSCOPIC EXAMINATION: YES
MONOCYTES ABSOLUTE: 0.8 K/UL (ref 0–1.3)
MONOCYTES RELATIVE PERCENT: 9 %
MUCUS: ABNORMAL /LPF
NEUTROPHILS ABSOLUTE: 6.7 K/UL (ref 1.7–7.7)
NEUTROPHILS RELATIVE PERCENT: 74 %
NITRITE, URINE: NEGATIVE
PDW BLD-RTO: 13.1 % (ref 12.4–15.4)
PH UA: 5.5 (ref 5–8)
PLATELET # BLD: 185 K/UL (ref 135–450)
PMV BLD AUTO: 11 FL (ref 5–10.5)
POTASSIUM REFLEX MAGNESIUM: 4.2 MMOL/L (ref 3.5–5.1)
PROTEIN UA: 30 MG/DL
RBC # BLD: 5.39 M/UL (ref 4.2–5.9)
RBC UA: ABNORMAL /HPF (ref 0–4)
SODIUM BLD-SCNC: 139 MMOL/L (ref 136–145)
SPECIFIC GRAVITY UA: 1.02 (ref 1–1.03)
TOTAL PROTEIN: 7.4 G/DL (ref 6.4–8.2)
URINE REFLEX TO CULTURE: ABNORMAL
URINE TYPE: ABNORMAL
UROBILINOGEN, URINE: 0.2 E.U./DL
WBC # BLD: 9.1 K/UL (ref 4–11)
WBC UA: ABNORMAL /HPF (ref 0–5)

## 2023-02-11 PROCEDURE — 85025 COMPLETE CBC W/AUTO DIFF WBC: CPT

## 2023-02-11 PROCEDURE — 6360000002 HC RX W HCPCS

## 2023-02-11 PROCEDURE — 96365 THER/PROPH/DIAG IV INF INIT: CPT

## 2023-02-11 PROCEDURE — 2720000010 HC SURG SUPPLY STERILE: Performed by: SURGERY

## 2023-02-11 PROCEDURE — 83690 ASSAY OF LIPASE: CPT

## 2023-02-11 PROCEDURE — 99285 EMERGENCY DEPT VISIT HI MDM: CPT

## 2023-02-11 PROCEDURE — 44970 LAPAROSCOPY APPENDECTOMY: CPT | Performed by: SURGERY

## 2023-02-11 PROCEDURE — 7100000000 HC PACU RECOVERY - FIRST 15 MIN: Performed by: SURGERY

## 2023-02-11 PROCEDURE — 99222 1ST HOSP IP/OBS MODERATE 55: CPT | Performed by: SURGERY

## 2023-02-11 PROCEDURE — 2500000003 HC RX 250 WO HCPCS

## 2023-02-11 PROCEDURE — 80053 COMPREHEN METABOLIC PANEL: CPT

## 2023-02-11 PROCEDURE — 3600000004 HC SURGERY LEVEL 4 BASE: Performed by: SURGERY

## 2023-02-11 PROCEDURE — G0378 HOSPITAL OBSERVATION PER HR: HCPCS

## 2023-02-11 PROCEDURE — 6360000002 HC RX W HCPCS: Performed by: SURGERY

## 2023-02-11 PROCEDURE — 2709999900 HC NON-CHARGEABLE SUPPLY: Performed by: SURGERY

## 2023-02-11 PROCEDURE — 3600000014 HC SURGERY LEVEL 4 ADDTL 15MIN: Performed by: SURGERY

## 2023-02-11 PROCEDURE — A4217 STERILE WATER/SALINE, 500 ML: HCPCS | Performed by: SURGERY

## 2023-02-11 PROCEDURE — 6370000000 HC RX 637 (ALT 250 FOR IP): Performed by: SURGERY

## 2023-02-11 PROCEDURE — 96375 TX/PRO/DX INJ NEW DRUG ADDON: CPT

## 2023-02-11 PROCEDURE — 74177 CT ABD & PELVIS W/CONTRAST: CPT

## 2023-02-11 PROCEDURE — 2580000003 HC RX 258: Performed by: SURGERY

## 2023-02-11 PROCEDURE — 2500000003 HC RX 250 WO HCPCS: Performed by: SURGERY

## 2023-02-11 PROCEDURE — 36415 COLL VENOUS BLD VENIPUNCTURE: CPT

## 2023-02-11 PROCEDURE — 6360000004 HC RX CONTRAST MEDICATION: Performed by: EMERGENCY MEDICINE

## 2023-02-11 PROCEDURE — 7100000001 HC PACU RECOVERY - ADDTL 15 MIN: Performed by: SURGERY

## 2023-02-11 PROCEDURE — 2580000003 HC RX 258: Performed by: EMERGENCY MEDICINE

## 2023-02-11 PROCEDURE — 3700000000 HC ANESTHESIA ATTENDED CARE: Performed by: SURGERY

## 2023-02-11 PROCEDURE — 3700000001 HC ADD 15 MINUTES (ANESTHESIA): Performed by: SURGERY

## 2023-02-11 PROCEDURE — 88305 TISSUE EXAM BY PATHOLOGIST: CPT

## 2023-02-11 PROCEDURE — 2580000003 HC RX 258

## 2023-02-11 PROCEDURE — 6360000002 HC RX W HCPCS: Performed by: EMERGENCY MEDICINE

## 2023-02-11 PROCEDURE — 81001 URINALYSIS AUTO W/SCOPE: CPT

## 2023-02-11 PROCEDURE — 93005 ELECTROCARDIOGRAM TRACING: CPT | Performed by: SURGERY

## 2023-02-11 RX ORDER — HYDROCODONE BITARTRATE AND ACETAMINOPHEN 5; 325 MG/1; MG/1
1 TABLET ORAL EVERY 4 HOURS PRN
Status: DISCONTINUED | OUTPATIENT
Start: 2023-02-11 | End: 2023-02-11 | Stop reason: HOSPADM

## 2023-02-11 RX ORDER — LEVOTHYROXINE SODIUM 0.1 MG/1
200 TABLET ORAL
Status: DISCONTINUED | OUTPATIENT
Start: 2023-02-12 | End: 2023-02-11 | Stop reason: HOSPADM

## 2023-02-11 RX ORDER — ONDANSETRON 2 MG/ML
4 INJECTION INTRAMUSCULAR; INTRAVENOUS EVERY 6 HOURS PRN
Status: DISCONTINUED | OUTPATIENT
Start: 2023-02-11 | End: 2023-02-11 | Stop reason: HOSPADM

## 2023-02-11 RX ORDER — DEXAMETHASONE SODIUM PHOSPHATE 4 MG/ML
INJECTION, SOLUTION INTRA-ARTICULAR; INTRALESIONAL; INTRAMUSCULAR; INTRAVENOUS; SOFT TISSUE PRN
Status: DISCONTINUED | OUTPATIENT
Start: 2023-02-11 | End: 2023-02-11 | Stop reason: SDUPTHER

## 2023-02-11 RX ORDER — LIDOCAINE HYDROCHLORIDE 20 MG/ML
INJECTION, SOLUTION INFILTRATION; PERINEURAL PRN
Status: DISCONTINUED | OUTPATIENT
Start: 2023-02-11 | End: 2023-02-11 | Stop reason: SDUPTHER

## 2023-02-11 RX ORDER — LISINOPRIL 10 MG/1
10 TABLET ORAL DAILY
Status: DISCONTINUED | OUTPATIENT
Start: 2023-02-12 | End: 2023-02-11 | Stop reason: HOSPADM

## 2023-02-11 RX ORDER — MORPHINE SULFATE 2 MG/ML
2 INJECTION, SOLUTION INTRAMUSCULAR; INTRAVENOUS
Status: DISCONTINUED | OUTPATIENT
Start: 2023-02-11 | End: 2023-02-11 | Stop reason: HOSPADM

## 2023-02-11 RX ORDER — MAGNESIUM HYDROXIDE 1200 MG/15ML
LIQUID ORAL CONTINUOUS PRN
Status: COMPLETED | OUTPATIENT
Start: 2023-02-11 | End: 2023-02-11

## 2023-02-11 RX ORDER — SODIUM CHLORIDE 0.9 % (FLUSH) 0.9 %
5-40 SYRINGE (ML) INJECTION EVERY 12 HOURS SCHEDULED
Status: DISCONTINUED | OUTPATIENT
Start: 2023-02-11 | End: 2023-02-11 | Stop reason: HOSPADM

## 2023-02-11 RX ORDER — FENTANYL CITRATE 50 UG/ML
INJECTION, SOLUTION INTRAMUSCULAR; INTRAVENOUS PRN
Status: DISCONTINUED | OUTPATIENT
Start: 2023-02-11 | End: 2023-02-11 | Stop reason: SDUPTHER

## 2023-02-11 RX ORDER — DIPHENHYDRAMINE HYDROCHLORIDE 50 MG/ML
12.5 INJECTION INTRAMUSCULAR; INTRAVENOUS
Status: DISCONTINUED | OUTPATIENT
Start: 2023-02-11 | End: 2023-02-11 | Stop reason: HOSPADM

## 2023-02-11 RX ORDER — SUCCINYLCHOLINE CHLORIDE 20 MG/ML
INJECTION INTRAMUSCULAR; INTRAVENOUS PRN
Status: DISCONTINUED | OUTPATIENT
Start: 2023-02-11 | End: 2023-02-11 | Stop reason: SDUPTHER

## 2023-02-11 RX ORDER — PROPOFOL 10 MG/ML
INJECTION, EMULSION INTRAVENOUS PRN
Status: DISCONTINUED | OUTPATIENT
Start: 2023-02-11 | End: 2023-02-11 | Stop reason: SDUPTHER

## 2023-02-11 RX ORDER — SODIUM CHLORIDE 0.9 % (FLUSH) 0.9 %
5-40 SYRINGE (ML) INJECTION PRN
Status: DISCONTINUED | OUTPATIENT
Start: 2023-02-11 | End: 2023-02-11 | Stop reason: HOSPADM

## 2023-02-11 RX ORDER — ONDANSETRON 4 MG/1
4 TABLET, ORALLY DISINTEGRATING ORAL EVERY 8 HOURS PRN
Status: DISCONTINUED | OUTPATIENT
Start: 2023-02-11 | End: 2023-02-11 | Stop reason: HOSPADM

## 2023-02-11 RX ORDER — BUPIVACAINE HYDROCHLORIDE 5 MG/ML
INJECTION, SOLUTION EPIDURAL; INTRACAUDAL
Status: COMPLETED | OUTPATIENT
Start: 2023-02-11 | End: 2023-02-11

## 2023-02-11 RX ORDER — ACETAMINOPHEN 325 MG/1
650 TABLET ORAL EVERY 6 HOURS PRN
Status: DISCONTINUED | OUTPATIENT
Start: 2023-02-11 | End: 2023-02-11 | Stop reason: HOSPADM

## 2023-02-11 RX ORDER — FENTANYL CITRATE 50 UG/ML
25 INJECTION, SOLUTION INTRAMUSCULAR; INTRAVENOUS EVERY 5 MIN PRN
Status: DISCONTINUED | OUTPATIENT
Start: 2023-02-11 | End: 2023-02-11 | Stop reason: HOSPADM

## 2023-02-11 RX ORDER — MEPERIDINE HYDROCHLORIDE 25 MG/ML
12.5 INJECTION INTRAMUSCULAR; INTRAVENOUS; SUBCUTANEOUS EVERY 5 MIN PRN
Status: DISCONTINUED | OUTPATIENT
Start: 2023-02-11 | End: 2023-02-11 | Stop reason: HOSPADM

## 2023-02-11 RX ORDER — SODIUM CHLORIDE 9 MG/ML
INJECTION, SOLUTION INTRAVENOUS PRN
Status: DISCONTINUED | OUTPATIENT
Start: 2023-02-11 | End: 2023-02-11 | Stop reason: HOSPADM

## 2023-02-11 RX ORDER — MIDAZOLAM HYDROCHLORIDE 1 MG/ML
INJECTION INTRAMUSCULAR; INTRAVENOUS PRN
Status: DISCONTINUED | OUTPATIENT
Start: 2023-02-11 | End: 2023-02-11 | Stop reason: SDUPTHER

## 2023-02-11 RX ORDER — LABETALOL HYDROCHLORIDE 5 MG/ML
5 INJECTION, SOLUTION INTRAVENOUS
Status: DISCONTINUED | OUTPATIENT
Start: 2023-02-11 | End: 2023-02-11 | Stop reason: HOSPADM

## 2023-02-11 RX ORDER — SODIUM CHLORIDE 9 MG/ML
INJECTION, SOLUTION INTRAVENOUS CONTINUOUS
Status: DISCONTINUED | OUTPATIENT
Start: 2023-02-11 | End: 2023-02-11 | Stop reason: HOSPADM

## 2023-02-11 RX ORDER — ENOXAPARIN SODIUM 100 MG/ML
30 INJECTION SUBCUTANEOUS 2 TIMES DAILY
Status: DISCONTINUED | OUTPATIENT
Start: 2023-02-11 | End: 2023-02-11 | Stop reason: HOSPADM

## 2023-02-11 RX ORDER — ROCURONIUM BROMIDE 10 MG/ML
INJECTION, SOLUTION INTRAVENOUS PRN
Status: DISCONTINUED | OUTPATIENT
Start: 2023-02-11 | End: 2023-02-11 | Stop reason: SDUPTHER

## 2023-02-11 RX ORDER — MORPHINE SULFATE 4 MG/ML
4 INJECTION, SOLUTION INTRAMUSCULAR; INTRAVENOUS
Status: DISCONTINUED | OUTPATIENT
Start: 2023-02-11 | End: 2023-02-11 | Stop reason: HOSPADM

## 2023-02-11 RX ORDER — KETOROLAC TROMETHAMINE 30 MG/ML
INJECTION, SOLUTION INTRAMUSCULAR; INTRAVENOUS PRN
Status: DISCONTINUED | OUTPATIENT
Start: 2023-02-11 | End: 2023-02-11 | Stop reason: SDUPTHER

## 2023-02-11 RX ORDER — HYDROCODONE BITARTRATE AND ACETAMINOPHEN 5; 325 MG/1; MG/1
2 TABLET ORAL EVERY 4 HOURS PRN
Status: DISCONTINUED | OUTPATIENT
Start: 2023-02-11 | End: 2023-02-11 | Stop reason: HOSPADM

## 2023-02-11 RX ORDER — HYDROCHLOROTHIAZIDE 25 MG/1
25 TABLET ORAL DAILY
Status: DISCONTINUED | OUTPATIENT
Start: 2023-02-12 | End: 2023-02-11 | Stop reason: HOSPADM

## 2023-02-11 RX ORDER — SODIUM CHLORIDE 9 MG/ML
INJECTION, SOLUTION INTRAVENOUS CONTINUOUS PRN
Status: DISCONTINUED | OUTPATIENT
Start: 2023-02-11 | End: 2023-02-11 | Stop reason: SDUPTHER

## 2023-02-11 RX ORDER — PREGABALIN 100 MG/1
100 CAPSULE ORAL 3 TIMES DAILY
Status: DISCONTINUED | OUTPATIENT
Start: 2023-02-11 | End: 2023-02-11 | Stop reason: HOSPADM

## 2023-02-11 RX ORDER — ONDANSETRON 2 MG/ML
4 INJECTION INTRAMUSCULAR; INTRAVENOUS
Status: DISCONTINUED | OUTPATIENT
Start: 2023-02-11 | End: 2023-02-11 | Stop reason: HOSPADM

## 2023-02-11 RX ORDER — HYDROCODONE BITARTRATE AND ACETAMINOPHEN 5; 325 MG/1; MG/1
1 TABLET ORAL EVERY 6 HOURS PRN
Qty: 20 TABLET | Refills: 0 | Status: SHIPPED | OUTPATIENT
Start: 2023-02-11 | End: 2023-02-15 | Stop reason: SDUPTHER

## 2023-02-11 RX ADMIN — ROCURONIUM BROMIDE 10 MG: 10 INJECTION INTRAVENOUS at 17:09

## 2023-02-11 RX ADMIN — ONDANSETRON 4 MG: 2 INJECTION INTRAMUSCULAR; INTRAVENOUS at 17:27

## 2023-02-11 RX ADMIN — PIPERACILLIN AND TAZOBACTAM 3375 MG: 3; .375 INJECTION, POWDER, LYOPHILIZED, FOR SOLUTION INTRAVENOUS at 13:57

## 2023-02-11 RX ADMIN — SODIUM CHLORIDE: 9 INJECTION, SOLUTION INTRAVENOUS at 17:04

## 2023-02-11 RX ADMIN — PHENYLEPHRINE HYDROCHLORIDE 200 MCG: 10 INJECTION INTRAVENOUS at 17:16

## 2023-02-11 RX ADMIN — SUCCINYLCHOLINE CHLORIDE 180 MG: 20 INJECTION, SOLUTION INTRAMUSCULAR; INTRAVENOUS at 17:10

## 2023-02-11 RX ADMIN — HYDROCODONE BITARTRATE AND ACETAMINOPHEN 2 TABLET: 5; 325 TABLET ORAL at 19:41

## 2023-02-11 RX ADMIN — MIDAZOLAM 2 MG: 1 INJECTION INTRAMUSCULAR; INTRAVENOUS at 17:02

## 2023-02-11 RX ADMIN — DEXAMETHASONE SODIUM PHOSPHATE 4 MG: 4 INJECTION, SOLUTION INTRAMUSCULAR; INTRAVENOUS at 17:13

## 2023-02-11 RX ADMIN — PROPOFOL 250 MG: 10 INJECTION, EMULSION INTRAVENOUS at 17:09

## 2023-02-11 RX ADMIN — SODIUM CHLORIDE: 9 INJECTION, SOLUTION INTRAVENOUS at 18:42

## 2023-02-11 RX ADMIN — IOPAMIDOL 100 ML: 755 INJECTION, SOLUTION INTRAVENOUS at 13:04

## 2023-02-11 RX ADMIN — KETOROLAC TROMETHAMINE 30 MG: 30 INJECTION, SOLUTION INTRAMUSCULAR; INTRAVENOUS at 17:27

## 2023-02-11 RX ADMIN — HYDROMORPHONE HYDROCHLORIDE 1 MG: 1 INJECTION, SOLUTION INTRAMUSCULAR; INTRAVENOUS; SUBCUTANEOUS at 17:41

## 2023-02-11 RX ADMIN — FENTANYL CITRATE 100 MCG: 50 INJECTION INTRAMUSCULAR; INTRAVENOUS at 17:02

## 2023-02-11 RX ADMIN — SUGAMMADEX 30 MG: 100 INJECTION, SOLUTION INTRAVENOUS at 17:33

## 2023-02-11 RX ADMIN — LIDOCAINE HYDROCHLORIDE 100 MG: 20 INJECTION, SOLUTION INFILTRATION; PERINEURAL at 17:09

## 2023-02-11 RX ADMIN — ROCURONIUM BROMIDE 25 MG: 10 INJECTION INTRAVENOUS at 17:15

## 2023-02-11 ASSESSMENT — PAIN SCALES - GENERAL
PAINLEVEL_OUTOF10: 4
PAINLEVEL_OUTOF10: 0
PAINLEVEL_OUTOF10: 7
PAINLEVEL_OUTOF10: 0
PAINLEVEL_OUTOF10: 5
PAINLEVEL_OUTOF10: 4
PAINLEVEL_OUTOF10: 4
PAINLEVEL_OUTOF10: 0
PAINLEVEL_OUTOF10: 0

## 2023-02-11 ASSESSMENT — PAIN DESCRIPTION - ORIENTATION
ORIENTATION: LEFT
ORIENTATION: RIGHT;LOWER

## 2023-02-11 ASSESSMENT — ENCOUNTER SYMPTOMS
ABDOMINAL PAIN: 1
WHEEZING: 0
SHORTNESS OF BREATH: 0
SHORTNESS OF BREATH: 0
SORE THROAT: 0
CONSTIPATION: 0
DIARRHEA: 0
CHEST TIGHTNESS: 0
VOMITING: 0
NAUSEA: 1

## 2023-02-11 ASSESSMENT — PAIN - FUNCTIONAL ASSESSMENT
PAIN_FUNCTIONAL_ASSESSMENT: 0-10
PAIN_FUNCTIONAL_ASSESSMENT: PREVENTS OR INTERFERES SOME ACTIVE ACTIVITIES AND ADLS

## 2023-02-11 ASSESSMENT — LIFESTYLE VARIABLES: SMOKING_STATUS: 0

## 2023-02-11 ASSESSMENT — PAIN DESCRIPTION - LOCATION
LOCATION: ABDOMEN
LOCATION: ABDOMEN

## 2023-02-11 ASSESSMENT — PAIN DESCRIPTION - DESCRIPTORS
DESCRIPTORS: ACHING
DESCRIPTORS: DULL

## 2023-02-11 NOTE — ANESTHESIA POSTPROCEDURE EVALUATION
Department of Anesthesiology  Postprocedure Note    Patient: Jos Mercado  MRN: [id number]  YOB: 1970  Date of evaluation: 2/11/2023      Procedure Summary     Date: 02/11/23 Room / Location: [address]    Anesthesia Start: 1702 Anesthesia Stop: 6996    Procedure: LAPAROSCOPIC APPENDECTOMY (Abdomen) Diagnosis:       Acute appendicitis, unspecified acute appendicitis type      (APPENDICITIS)    Surgeons: Chet Phoenix, MD Responsible Provider: Caesar Phalen, MD    Anesthesia Type: general ASA Status: 3 - Emergent          Anesthesia Type: No value filed.     Bigg Phase I:      Bigg Phase II:        Anesthesia Post Evaluation    Patient location during evaluation: PACU  Patient participation: complete - patient participated  Level of consciousness: awake  Airway patency: patent  Nausea & Vomiting: no nausea  Complications: no  Cardiovascular status: hemodynamically stable  Respiratory status: acceptable  Hydration status: euvolemic  Multimodal analgesia pain management approach

## 2023-02-11 NOTE — DISCHARGE INSTRUCTIONS
Alexandro Fountain MD     General and Vascular Surgery   Lucila Forte MD     [address] [facility] MD [address]     [address] [facility] [address], [facility], [facility]  Umberto Damon CNP    Phone: [phone]           Fax: [phone]                                                                 POST-OPERATIVE INSTRUCTIONS FOR APPENDECTOMY    Call the office to schedule your post-operative appointment with your surgeon for two (2) weeks. You will have a water occlusive glue closing your incisions. You may shower. Wash incisions gently, and pat them dry. Do not rub your incisions. Do not soak incisions in tub baths, hot tubs or pools    General guidelines for activity:  Avoid strenuous activity or lifting anything heavier than 15 pounds for 2 weeks. It is OK to be up walking around; walking up and down stairs is also OK. Do what is comfortable: stop and rest when you feel tired. Drink plenty of fluids and stay on a bland diet for 2-3 days after surgery. Do NOT drive for 5 days and while taking your narcotic pain medicine. Watch for signs of infection:   Fever over 100.5°   Excessive warmth or bright redness around your incisions  Leakage of bloody or cloudy fluid from you incisions    During the laparoscopic procedure that you had, gas is pumped into the abdominal cavity. You may feel abdominal, shoulder, or rib pain for a few days due to this. You will have pain medicine ordered. Take as directed. If you experience constipation  Increase your water intake, and activity; walking is best.  A stool softener or mild laxative may be necessary if you still have not had a bowel  movement ; call the office for further instructions.

## 2023-02-11 NOTE — H&P
General Surgery History & Physical      Neelam Garcia   : 1970 MRN: [id number]  Date of Admission: 2023  Admitting Physician:Jakob Coleman MD  Primary Care Physician: Yannick Lee DO    Chief Complaint: \"abdominal pain\"    Diagnosis Present on Admission:   Appendicitis      Secondary Diagnosis  Patient Active Problem List   Diagnosis    Chronic pain syndrome    Degenerative joint disease of spinal facet joint    Recurrent major depressive disorder ([facility] [address]    DDD (degenerative disc disease), cervical    Lumbar radiculopathy    Hyperlipidemia    DDD (degenerative disc disease), lumbosacral    History of thyroid cancer    Major depressive disorder in partial remission ([facility] [address]    Acquired hypothyroidism    Gastroesophageal reflux disease    Essential hypertension    KAR (obstructive sleep apnea)    Prediabetes    Vitamin D deficiency    Biceps tendonitis on left    Appendicitis       History of Present Illness  Neelam Garcia is a 47 y.o. male admitted on 2023 for appendicitis. Reports acute onset nausea and generalized abdominal pain yesterday morning. Thought he had GI bug. Pain worsened throughout the day and now localized to right mid abdomen/right flank. Denies fever, chills, other complaints. Prior to Admission medications    Medication Sig Start Date End Date Taking? Authorizing Provider   pregabalin (LYRICA) 100 MG capsule Take 1 capsule by mouth 3 times daily for 30 days. Patient taking differently: Take 100 mg by mouth daily.  22  Nicole Rome MD   sildenafil (VIAGRA) 100 MG tablet Take 1 tablet by mouth as needed for Erectile Dysfunction 22   Nicole Rome MD   rosuvastatin (CRESTOR) 40 MG tablet Take 1 tablet by mouth daily 10/18/22   Concha Denver, DO   levothyroxine (SYNTHROID) 200 MCG tablet Take 1 tablet by mouth daily 10/18/22   Boubacar Li DO   lisinopril (PRINIVIL;ZESTRIL) 10 MG tablet TAKE 1 TABLET DAILY 10/18/22   Katelyn Murguia B DO Jen   hydroCHLOROthiazide (HYDRODIURIL) 25 MG tablet Take 1 tablet by mouth daily 10/18/22   Girish Li DO   naloxone 4 MG/0.1ML LIQD nasal spray 1 spray by Nasal route as needed for Opioid Reversal 10/18/22   Rissa Pa DO   Tens Unit MISC by Does not apply route Use as directed. 9/16/20   Venice Curtis MD   Cholecalciferol (VITAMIN D3) 48 MCG (2000 UT) CAPS Take by mouth    Historical Provider, MD     Past Medical History:   Diagnosis Date    Chronic pain syndrome     DDD (degenerative disc disease), cervical     Degenerative joint disease of spinal facet joint     Depressive disorder, not elsewhere classified     Dizziness     Failed back surgical syndrome     GERD (gastroesophageal reflux disease)     Headache(784.0)     HTN (hypertension)     Hyperlipidemia     Lumbar radiculopathy     Sleep apnea     does use CPAP    thyroid cancer     thyroid    Thyroid disease     Wears glasses      Past Surgical History:   Procedure Laterality Date    CARPAL TUNNEL RELEASE Left 3-5-2013    CARPAL TUNNEL RELEASE Right 4-    COLONOSCOPY N/A 10/15/2019    COLONOSCOPY WITH BIOPSY performed by Meron Ashraf MD at [address] [facility] N/A 10/15/2019    COLONOSCOPY POLYPECTOMY SNARE/COLD BIOPSY performed by Meron Ashraf MD at [place] [address] [facility] Left 1-5-2016    First dorsal extensor compartment tendon release.     JOINT REPLACEMENT Right     shoulder    KNEE ARTHROSCOPY Right 2/26/2020    RIGHT KNEE ARTHROSCOPY MEDIAL MENISCECTOMY AND CHONDROPLASTY performed by Tania Friedman MD at [address] F/ARM&/WRST FLXR/XTNSR W/DBRDMT Right 4/8/2019    RIGHT OPEN TENNIS ELBOW RELEASE performed by Eric Quispe MD at [facility]    right    SPINAL FUSION  2006    THYROIDECTOMY  2008    UPPER GASTROINTESTINAL ENDOSCOPY N/A 1/29/2019    EGD DIAGNOSTIC ONLY performed by Hammad Murphy MD at [facility] 19 GASTROINTESTINAL ENDOSCOPY N/A 10/15/2019    EGD DILATION BALLOON performed by Humberto Mendoza MD at [facility] 27 N/A 10/15/2019    EGD BIOPSY performed by Humberto Mendoza MD at [address] [place] [address] Road History   Problem Relation Age of Onset    Diabetes Father     Heart Failure Father     Hypertension Father     High Blood Pressure Father     High Blood Pressure Mother     Stroke Maternal Grandmother      Social History     Socioeconomic History    Marital status: Legally      Spouse name: Not on file    Number of children: 3    Years of education: Not on file    Highest education level: Not on file   Occupational History    Occupation:    Tobacco Use    Smoking status: Never     Passive exposure: Past    Smokeless tobacco: Never   Vaping Use    Vaping Use: Never used   Substance and Sexual Activity    Alcohol use:  Yes     Alcohol/week: 0.0 standard drinks     Comment: occasional use    Drug use: Never    Sexual activity: Not Currently     Partners: Female   Other Topics Concern    Not on file   Social History Narrative     (wife=np), 3 daughters, 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, now on disability, loves baseball     Social Determinants of Health     Financial Resource Strain: Not on file   Food Insecurity: Not on file   Transportation Needs: Not on file   Physical Activity: Not on file   Stress: Not on file   Social Connections: Not on file   Intimate Partner Violence: Not on file   Housing Stability: Not on file     Allergies   Allergen Reactions    Oxycontin [Oxycodone] Nausea And Vomiting and Nausea Only    Percocet [Oxycodone-Acetaminophen] Nausea And Vomiting         Review of Systems  12 point review of systems was reviewed and negative except for that listed in HPI    Physical Exam  Vitals:    02/11/23 1129 02/11/23 1344 02/11/23 1507 02/11/23 1600   BP: 129/69 109/74 129/83 119/78   Pulse: (!) 103 89 93 (!) 103   Resp: 16 16 16 16   Temp: 98.2 °F (36.8 °C)  98 °F (36.7 °C) 98.4 °F (36.9 °C)   TempSrc: Oral  Oral Oral   SpO2: 97% 99% 99% 98%   Weight: 258 lb 2.5 oz (117.1 kg)      Height: 6' 2\" (1.88 m)          General/Appearance: NAD, alert oriented x3  HEENT: NCAT, PERRLA, Conjunctiva non injected, no scleral icterus. External ears and nares normal bilaterally. Mucous membranes pink and moist.   Neck: Neck is symmetrical. Trachea appears midline. Lung: clear to auscultation bilaterally, normal rate and effort  Cardiac: regular rate and rhythm  Abdomen: soft, ND. Tender right mid abdomen  Neuro: No gross motor or sensory deficits. Skin: No open wounds or rashes. MSK: normal ROM, no edema  Psych: normal insight, judgment    Labs  Recent Labs     02/11/23  1205   WBC 9.1   HGB 15.5   HCT 46.3        Recent Labs     02/11/23  1205      K 4.2      CO2 28   BUN 13     Recent Labs     02/11/23  1205   AST 13*   ALT 19     No results for input(s): UOSM in the last 72 hours. Invalid input(s): UAPR, [place], [facility], [facility], [facility], [facility]    Imaging  CT ABDOMEN PELVIS W IV CONTRAST Additional Contrast? None   Final Result   Uncomplicated acute appendicitis. Assessment  Adelina Kehr is a 47 y.o. male admitted for appendicitis    Plan    1. Acute appendicitis  Admit for IV abx  OR for laparoscopic appendectomy, possible open  The risks, benefits and alternatives to the planned procedure were discussed. Patient expressed an understanding and is willing to proceed.   Potentially home later this evening if pain controlled and non-perforated      Electronically signed by Prince Mcgraw MD on 2/11/2023 at 4:27 PM

## 2023-02-11 NOTE — ANESTHESIA PRE PROCEDURE
Department of Anesthesiology  Preprocedure Note       Name:  Lilian Cowden   Age:  47 y.o.  :  1970                                          MRN:  [id number]         Date:  2023      Surgeon: Kathryn Barahona):  Abbey Gonsales MD    Procedure: Procedure(s):  LAPAROSCOPIC APPENDECTOMY, POSSIBLE OPEN    Medications prior to admission:   Prior to Admission medications    Medication Sig Start Date End Date Taking? Authorizing Provider   pregabalin (LYRICA) 100 MG capsule Take 1 capsule by mouth 3 times daily for 30 days. Patient taking differently: Take 100 mg by mouth daily. 22  Kelvin Jung MD   sildenafil (VIAGRA) 100 MG tablet Take 1 tablet by mouth as needed for Erectile Dysfunction 22   Kelvin Jung MD   rosuvastatin (CRESTOR) 40 MG tablet Take 1 tablet by mouth daily 10/18/22   Noreen Reed, DO   levothyroxine (SYNTHROID) 200 MCG tablet Take 1 tablet by mouth daily 10/18/22   Franki Li, DO   lisinopril (PRINIVIL;ZESTRIL) 10 MG tablet TAKE 1 TABLET DAILY 10/18/22   Franki Li, DO   hydroCHLOROthiazide (HYDRODIURIL) 25 MG tablet Take 1 tablet by mouth daily 10/18/22   Franki Li, DO   naloxone 4 MG/0.1ML LIQD nasal spray 1 spray by Nasal route as needed for Opioid Reversal 10/18/22   Noreen Reed, DO   Tens Unit MISC by Does not apply route Use as directed.  20   Kelvin Jung MD   Cholecalciferol (VITAMIN D3) 50 MCG (2000) CAPS Take by mouth    Historical Provider, MD       Current medications:    Current Facility-Administered Medications   Medication Dose Route Frequency Provider Last Rate Last Admin    sodium chloride flush 0.9 % injection 5-40 mL  5-40 mL IntraVENous 2 times per day Abbey Gonsales MD        sodium chloride flush 0.9 % injection 5-40 mL  5-40 mL IntraVENous PRN Abbey Gonsales MD        0.9 % sodium chloride infusion   IntraVENous PRN Abbey Gonsales MD        ondansetron (ZOFRAN-ODT) disintegrating tablet 4 mg  4 mg Oral Q8H PRN Glenys Dotson MD        Or    ondansetron [facility]) injection 4 mg  4 mg IntraVENous Q6H PRN Glenys Dotson MD        enoxaparin Sodium (LOVENOX) injection 30 mg  30 mg SubCUTAneous BID Glenys Dotson MD        0.9 % sodium chloride infusion   IntraVENous Continuous Glenys Dotson MD        acetaminophen (TYLENOL) tablet 650 mg  650 mg Oral Q6H PRN Glenys Dotson MD        morphine (PF) injection 2 mg  2 mg IntraVENous Q2H PRN Glenys Dotson MD        Or    morphine (PF) injection 4 mg  4 mg IntraVENous Q2H PRN Glenys Dotson MD        HYDROcodone-acetaminophen [facility]) 5-325 MG per tablet 1 tablet  1 tablet Oral Q4H PRN Glenys Dotson MD        Or    HYDROcodone-acetaminophen [facility]) 5-325 MG per tablet 2 tablet  2 tablet Oral Q4H PRN Glenys Dotson MD       [facility] Michelet Hoyt ON 2/12/2023] hydroCHLOROthiazide (HYDRODIURIL) tablet 25 mg  25 mg Oral Daily MD Paula Pittman ON 2/12/2023] levothyroxine (SYNTHROID) tablet 200 mcg  200 mcg Oral QAM AC MD Paula Pittman ON 2/12/2023] lisinopril (PRINIVIL;ZESTRIL) tablet 10 mg  10 mg Oral Daily Glenys Dotson MD        pregabalin (LYRICA) capsule 100 mg  100 mg Oral TID Glenys Dotson MD           Allergies:     Allergies   Allergen Reactions    Oxycontin [Oxycodone] Nausea And Vomiting and Nausea Only    Percocet [Oxycodone-Acetaminophen] Nausea And Vomiting       Problem List:    Patient Active Problem List   Diagnosis Code    Chronic pain syndrome G89.4    Degenerative joint disease of spinal facet joint M47.819    Recurrent major depressive disorder ([facility] [address] F33.9    DDD (degenerative disc disease), cervical M50.30    Lumbar radiculopathy M54.16    Hyperlipidemia E78.5    DDD (degenerative disc disease), lumbosacral M51.37    History of thyroid cancer Z85.850    Major depressive disorder in partial remission ([address] F32.4    Acquired hypothyroidism E03.9    Gastroesophageal reflux disease K21.9    Essential hypertension I10    KAR (obstructive sleep apnea) G47.33    Prediabetes R73.03    Vitamin D deficiency E55.9    Biceps tendonitis on left M75.22    Appendicitis K37       Past Medical History:        Diagnosis Date    Chronic pain syndrome     DDD (degenerative disc disease), cervical     Degenerative joint disease of spinal facet joint     Depressive disorder, not elsewhere classified     Dizziness     Failed back surgical syndrome     GERD (gastroesophageal reflux disease)     Headache(784.0)     HTN (hypertension)     Hyperlipidemia     Lumbar radiculopathy     Sleep apnea     does use CPAP    thyroid cancer     thyroid    Thyroid disease     Wears glasses        Past Surgical History:        Procedure Laterality Date    CARPAL TUNNEL RELEASE Left 3-5-2013    CARPAL TUNNEL RELEASE Right 4-    COLONOSCOPY N/A 10/15/2019    COLONOSCOPY WITH BIOPSY performed by Seun Chun MD at [address] [facility] [address] N/A 10/15/2019    COLONOSCOPY POLYPECTOMY SNARE/COLD BIOPSY performed by Seun Chun MD at [place] [address] Left 1-5-2016    First dorsal extensor compartment tendon release.     JOINT REPLACEMENT Right     shoulder    KNEE ARTHROSCOPY Right 2/26/2020    RIGHT KNEE ARTHROSCOPY MEDIAL MENISCECTOMY AND CHONDROPLASTY performed by Frandy Payne MD at [facility] F/ARM&/WRST FLXR/XTNSR W/DBRDMT Right 4/8/2019    RIGHT OPEN TENNIS ELBOW RELEASE performed by Sunil Hooker MD at [address] [place] [address] Right 1993    right    SPINAL FUSION  2006    THYROIDECTOMY  2008    UPPER GASTROINTESTINAL ENDOSCOPY N/A 1/29/2019    EGD DIAGNOSTIC ONLY [facility] [address] [facility] N/A 10/15/2019    EGD DILATION BALLOON performed [facility] [address] [facility] 10/15/2019    EGD BIOPSY performed by Seun Chun MD at [address] [facility] History:    Social History     Tobacco Use    Smoking status: Never     Passive exposure: Past    Smokeless tobacco: Never   Substance Use Topics    Alcohol use: Yes     Alcohol/week: 0.0 standard drinks     Comment: occasional use                                Counseling given: Not Answered      Vital Signs (Current):   Vitals:    02/11/23 1129 02/11/23 1344 02/11/23 1507 02/11/23 1600   BP: 129/69 109/74 129/83 119/78   Pulse: (!) 103 89 93 (!) 103   Resp: 16 16 16 16   Temp: 98.2 °F (36.8 °C)  98 °F (36.7 °C) 98.4 °F (36.9 °C)   TempSrc: Oral  Oral Oral   SpO2: 97% 99% 99% 98%   Weight: 258 lb 2.5 oz (117.1 kg)      Height: 6' 2\" (1.88 m)                                                 BP Readings from Last 3 Encounters:   02/11/23 119/78   12/06/22 123/85   10/18/22 121/78       NPO Status:                                                                                 BMI:   Wt Readings from Last 3 Encounters:   02/11/23 258 lb 2.5 oz (117.1 kg)   12/06/22 271 lb (122.9 kg)   10/18/22 289 lb (131.1 kg)     Body mass index is 33.15 kg/m².     CBC:   Lab Results   Component Value Date/Time    WBC 9.1 02/11/2023 12:05 PM    RBC 5.39 02/11/2023 12:05 PM    HGB 15.5 02/11/2023 12:05 PM    HCT 46.3 02/11/2023 12:05 PM    MCV 85.9 02/11/2023 12:05 PM    RDW 13.1 02/11/2023 12:05 PM     02/11/2023 12:05 PM       CMP:   Lab Results   Component Value Date/Time     02/11/2023 12:05 PM    K 4.2 02/11/2023 12:05 PM     02/11/2023 12:05 PM    CO2 28 02/11/2023 12:05 PM    BUN 13 02/11/2023 12:05 PM    CREATININE 0.7 02/11/2023 12:05 PM    GFRAA >60 06/13/2022 12:37 PM    GFRAA >60 05/24/2011 09:20 PM    AGRATIO 1.3 02/11/2023 12:05 PM    LABGLOM >60 02/11/2023 12:05 PM    GLUCOSE 103 02/11/2023 12:05 PM    PROT 7.4 02/11/2023 12:05 PM    CALCIUM 9.5 02/11/2023 12:05 PM    BILITOT 0.5 02/11/2023 12:05 PM    ALKPHOS 88 02/11/2023 12:05 PM    AST 13 02/11/2023 12:05 PM    ALT 19 02/11/2023 12:05 PM POC Tests: No results for input(s): POCGLU, POCNA, POCK, POCCL, POCBUN, POCHEMO, POCHCT in the last 72 hours. Coags:   Lab Results   Component Value Date/Time    PROTIME 10.5 06/21/2017 09:29 AM    INR 0.93 06/21/2017 09:29 AM    APTT 29.0 06/21/2017 09:29 AM       HCG (If Applicable): No results found for: PREGTESTUR, PREGSERUM, HCG, HCGQUANT     ABGs: No results found for: PHART, PO2ART, QJL5PSB, SKX7ONI, BEART, D5HNRUHI     Type & Screen (If Applicable):  No results found for: LABABO, LABRH    Drug/Infectious Status (If Applicable):  No results found for: HIV, HEPCAB    COVID-19 Screening (If Applicable): No results found for: COVID19        Anesthesia Evaluation  Patient summary reviewed no history of anesthetic complications:   Airway: Mallampati: II  TM distance: >3 FB   Neck ROM: full  Mouth opening: > = 3 FB   Dental: normal exam         Pulmonary:normal exam  breath sounds clear to auscultation  (+) sleep apnea:      (-) shortness of breath and not a current smoker                           Cardiovascular:  Exercise tolerance: good (>4 METS),   (+) hypertension:,         Rhythm: regular  Rate: normal                    Neuro/Psych:   (+) neuromuscular disease:, headaches:, depression/anxiety             GI/Hepatic/Renal:   (+) GERD:,           Endo/Other:    (+) hypothyroidism::., .                 Abdominal:             Vascular: negative vascular ROS. Other Findings:           Anesthesia Plan      general     ASA 3 - emergent       Induction: intravenous. MIPS: Postoperative opioids intended and Prophylactic antiemetics administered. Anesthetic plan and risks discussed with patient. Plan discussed with CRNA.     Attending anesthesiologist reviewed and agrees with Jaymie Champagne MD   2/11/2023

## 2023-02-11 NOTE — OP NOTE
Laparoscopic Appendectomy Operative Report      Patient: Dipesh Aldana MRN: [id number]     YOB: 1970  Age: 47 y.o. Sex: male        Primary Care Physician: Hitesh Esteves DO         DATE OF OPERATION: 2/11/2023     PREOPERATIVE DIAGNOSIS: acute appendicitis    POSTOPERATIVE DIAGNOSIS: acute appendicitis - nonperforated    PROCEDURE PERFORMED: Laparoscopic appendectomy     SURGEON: Lorenza Macdonald MD, MD    ANESTHESIA: GETA with local anesthetic. ASA CLASS: 3E    ANTIBIOTICS: Zosyn IV. DVT PROPHYLAXIS: Bilateral pneumatic compression boots. INDICATIONS:  The patient came to the emergency department with acute onset of generalized abdominal pain that localized to the right lower quadrant. History, physical exam and CAT scan confirmed acute appendicitis. As a result, the risks, benefits, and alternatives of appendectomy were discussed at length including risks of bleeding, infection, injury to other organs and conversion to open. All questions were answered and the patient was agreeable to proceed. PROCEDURE:   The patient was brought to the operating suite, placed in a supine position on the operating room table. General anesthesia was induced which he tolerated well. The abdomen was then clipped free of hair and then prepped and draped in the usual sterile fashion and a timeout was performed. Pneumoperitoneum was established via Veress needle through a supraumbilical Incision. A 5 mm trocar was then inserted and the laparoscope followed. No injury from Veress needle placement was encountered. As a result, additional 5 mm port was placed in the suprapubic area and another 10 mm port in the left lower quadrant. We turned our attention then back to the right lower quadrant and there was some inflammation noted. The appendix itself was identified and noted to be inflamed mid body and tip but intact.   Using a LigaSure we took the mesoappendix down to a healthy base of the appendix. The appendiceal cecal junction near the appendix was transected with ZAINAB stapler with a blue load. The appendix was then placed in a laparoscopic retrieval bag and removed from the abdomen. We returned our attention back to the surgical site and staple line was intact and hemostatic. The mesoappendix itself was hemostatic as well. At that point, no additional pathology was encountered throughout the abdomen. The 10 mm port was then removed and the fascia closed with an 0 Vicryl using a laparoscopic suture passer. The abdomen was then desufflated. The remaining trocars were removed. The skin was closed with 4-0 Vicryl. Long-acting anesthetic was injected at the end. Skin affix dermal adhesive was applied to the wounds. The patient tolerated the procedure well. He was extubated in the operating room and taken to PACU in stable condition. All counts were correct at the end of the case.     Complications: none    Specimens: appendix    EBL: less than 50 ml    Electronically signed by Abeba Olivares MD on 2/11/2023 at 5:32 PM

## 2023-02-11 NOTE — ED NOTES
Pt. Nevaeh Lemos on nothing to eat or drink, last oral intake was 10:00am.     Tristen Barnard RN  02/11/23 1938

## 2023-02-11 NOTE — ED PROVIDER NOTES
[address] ENCOUNTER      Pt Name: Demetris Quach  MRN: [id number]  [place] 1970  Date of evaluation: 2/11/2023  Provider: Silverio Hubbard MD    CHIEF COMPLAINT       Chief Complaint   Patient presents with    Abdominal Pain     Pt. Started with abdominal pain with nausea yesterday, pain moved to right lower abdominal later, no vomiting, no diarrhea       HISTORY OF PRESENT ILLNESS    Demetris Quach is a 47 y.o. male who  has a past medical history of Chronic pain syndrome, DDD (degenerative disc disease), cervical, Degenerative joint disease of spinal facet joint, Depressive disorder, not elsewhere classified, Dizziness, Failed back surgical syndrome, GERD (gastroesophageal reflux disease), Headache(784.0), HTN (hypertension), Hyperlipidemia, Lumbar radiculopathy, Sleep apnea, thyroid cancer, Thyroid disease, and Wears glasses. who presents to the emergency department with complaints of right-sided abdominal pain is not present over the past 2 days. Patient states that pain was much worse yesterday and has gotten slightly better today. States that his pain is in the right lower quadrant of his abdomen. Patient states he was concerned for possible appendicitis. Has had some nausea and decreased appetite. No vomiting. No fevers. Denies any left-sided abdominal pain. States he have a normal urinary habits. Normal bowel movements. He is passing flatus. Denies any previous abdominal surgeries. Nursing Notes were reviewed. Including nursing noted for FM, Surgical History, Past Medical History, Social History, vitals, and allergies; agree with all. REVIEW OF SYSTEMS       Review of Systems   Constitutional:  Positive for appetite change. Negative for chills, diaphoresis and fever. HENT:  Negative for congestion and sore throat. Respiratory:  Negative for chest tightness, shortness of breath and wheezing.     Cardiovascular:  Negative for chest pain and leg swelling. Gastrointestinal:  Positive for abdominal pain and nausea. Negative for constipation, diarrhea and vomiting. Genitourinary:  Positive for flank pain. Negative for dysuria, frequency and urgency. Musculoskeletal:  Negative for arthralgias and neck pain. Skin:  Negative for rash and wound. Neurological:  Negative for dizziness, weakness and numbness. Except as noted above the remainder of the review of systems was reviewed and negative. PAST MEDICAL HISTORY     Past Medical History:   Diagnosis Date    Chronic pain syndrome     DDD (degenerative disc disease), cervical     Degenerative joint disease of spinal facet joint     Depressive disorder, not elsewhere classified     Dizziness     Failed back surgical syndrome     GERD (gastroesophageal reflux disease)     Headache(784.0)     HTN (hypertension)     Hyperlipidemia     Lumbar radiculopathy     Sleep apnea     does use CPAP    thyroid cancer     thyroid    Thyroid disease     Wears glasses        SURGICAL HISTORY       Past Surgical History:   Procedure Laterality Date    CARPAL TUNNEL RELEASE Left 3-5-2013    CARPAL TUNNEL RELEASE Right 4-    COLONOSCOPY N/A 10/15/2019    COLONOSCOPY WITH BIOPSY performed by Vivien Izquierdo MD at [address] [place] [address] N/A 10/15/2019    COLONOSCOPY POLYPECTOMY SNARE/COLD BIOPSY performed by Vivien Izquierdo MD at [address] [facility] Left 1-5-2016    First dorsal extensor compartment tendon release.     JOINT REPLACEMENT Right     shoulder    KNEE ARTHROSCOPY Right 2/26/2020    RIGHT KNEE ARTHROSCOPY MEDIAL MENISCECTOMY AND CHONDROPLASTY performed by Yamel Rojo MD at [address]  F/ARM&/WRST FLXR/XTNSR W/DBRDMT Right 4/8/2019    RIGHT OPEN TENNIS ELBOW RELEASE performed by Delma Murguia MD at [address]    right    SPINAL FUSION  2006    THYROIDECTOMY  2008    UPPER GASTROINTESTINAL ENDOSCOPY N/A 1/29/2019    EGD DIAGNOSTIC ONLY performed by Oliver Rashid MD at [address] 10/15/2019    EGD DILATION BALLOON performed by Calvin Cha MD at [address] 10/15/2019    [facility] [address] [facility]       Previous Medications    CHOLECALCIFEROL (VITAMIN D3) 50 MCG (2000 UT) CAPS    Take by mouth    HYDROCHLOROTHIAZIDE (HYDRODIURIL) 25 MG TABLET    Take 1 tablet by mouth daily    LEVOTHYROXINE (SYNTHROID) 200 MCG TABLET    Take 1 tablet by mouth daily    LISINOPRIL (PRINIVIL;ZESTRIL) 10 MG TABLET    TAKE 1 TABLET DAILY    NALOXONE 4 MG/0.1ML LIQD NASAL SPRAY    1 spray by Nasal route as needed for Opioid Reversal    PREGABALIN (LYRICA) 100 MG CAPSULE    Take 1 capsule by mouth 3 times daily for 30 days. ROSUVASTATIN (CRESTOR) 40 MG TABLET    Take 1 tablet by mouth daily    SILDENAFIL (VIAGRA) 100 MG TABLET    Take 1 tablet by mouth as needed for Erectile Dysfunction    TENS UNIT MISC    by Does not apply route Use as directed. ALLERGIES     Oxycontin [oxycodone] and Percocet [oxycodone-acetaminophen]    FAMILY HISTORY        Family History   Problem Relation Age of Onset    Diabetes Father     Heart Failure Father     Hypertension Father     High Blood Pressure Father     High Blood Pressure Mother     Stroke Maternal Grandmother        SOCIAL HISTORY       Social History     Socioeconomic History    Marital status: Legally      Spouse name: None    Number of children: 3    Years of education: None    Highest education level: None   Occupational History    Occupation:    Tobacco Use    Smoking status: Never     Passive exposure: Past    Smokeless tobacco: Never   Vaping Use    Vaping Use: Never used   Substance and Sexual Activity    Alcohol use:  Yes     Alcohol/week: 0.0 standard drinks     Comment: occasional use    Drug use: Never    Sexual activity: Not Currently     Partners: Female   Social History Narrative     (wife=np), 3 daughters, 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 Constitutional:       General: He is not in acute distress. Appearance: He is well-developed. He is obese. He is not diaphoretic. HENT:      Head: Normocephalic and atraumatic. Right Ear: Tympanic membrane normal.      Left Ear: Tympanic membrane normal.      Nose: Nose normal.      Mouth/Throat:      Mouth: Mucous membranes are moist.      Pharynx: Oropharynx is clear. Eyes:      Extraocular Movements: Extraocular movements intact. Conjunctiva/sclera: Conjunctivae normal.      Pupils: Pupils are equal, round, and reactive to light. Cardiovascular:      Rate and Rhythm: Normal rate and regular rhythm. Heart sounds: Normal heart sounds. No murmur heard. No friction rub. No gallop. Pulmonary:      Effort: Pulmonary effort is normal.      Breath sounds: Normal breath sounds. No wheezing, rhonchi or rales. Abdominal:      General: Bowel sounds are normal. There is no distension. Palpations: Abdomen is soft. There is no shifting dullness or pulsatile mass. Tenderness: There is abdominal tenderness in the right upper quadrant and right lower quadrant. There is no guarding or rebound. Negative signs include Fuller's sign. Hernia: No hernia is present. Musculoskeletal:         General: No tenderness. Normal range of motion. Cervical back: Normal range of motion and neck supple. Skin:     General: Skin is warm and dry. Capillary Refill: Capillary refill takes less than 2 seconds. Findings: No erythema or rash. Comments:  On exposed surfaces Neurological:      General: No focal deficit present. Mental Status: He is alert and oriented to person, place, and time. Psychiatric:         Thought Content: Thought content normal.         Judgment: Judgment normal.       DIAGNOSTIC RESULTS     EKG: All EKG's are interpreted by the Emergency Department Physician who either signs or Co-signs this chart in the absence of acardiologist.    Interpreted by myself       RADIOLOGY:   Non-plain film images such as CT, Ultrasoundand MRI are read by the radiologist. Plain radiographic images are visualized and preliminarily interpreted by the emergency physician with the below findings:    CT abdomen pelvis with acute appendicitis    ED BEDSIDE ULTRASOUND:   Performed by ED Physician - none    LABS:  Labs Reviewed   CBC WITH AUTO DIFFERENTIAL - Abnormal; Notable for the following components:       Result Value    MPV 11.0 (*)     All other components within normal limits   COMPREHENSIVE METABOLIC PANEL W/ REFLEX TO MG FOR LOW K - Abnormal; Notable for the following components:    Glucose 103 (*)     Creatinine 0.7 (*)     AST 13 (*)     All other components within normal limits   URINALYSIS WITH REFLEX TO CULTURE - Abnormal; Notable for the following components:    Blood, Urine TRACE-INTACT (*)     Protein, UA 30 (*)     All other components within normal limits   MICROSCOPIC URINALYSIS - Abnormal; Notable for the following components:    Mucus, UA 1+ (*)     Bacteria, UA Rare (*)     All other components within normal limits   LIPASE       All other labs were withinnormal range or not returned as of this dictation.     PROCEDURES:  Procedures    EMERGENCY DEPARTMENT COURSE and DIFFERENTIAL DIAGNOSIS/MDM:     PMH, Surgical Hx, FH, Social Hx reviewed by myself (ETOH usage, Tobacco usage, Drug usage reviewed by myself, no pertinent Hx)-  has a past medical history of Chronic pain syndrome, DDD (degenerative disc disease), cervical, Degenerative joint disease of spinal facet joint, Depressive disorder, not elsewhere classified, Dizziness, Failed back surgical syndrome, GERD (gastroesophageal reflux disease), Headache(784.0), HTN (hypertension), Hyperlipidemia, Lumbar radiculopathy, Sleep apnea, thyroid cancer, Thyroid disease, and Wears glasses. Old records were reviewed by me     MDM    Patient seen and evaluated. History and physical as above. Nontoxic and afebrile. Patient with right-sided abdominal pain. Does have tenderness in the right upper quadrant as well as right lower quadrant. There is no peritoneal signs or rigidity. Plan for routine labs, urinalysis and CT abdomen pelvis. Patient agreeable with care plan. DDX- GERD, PUD, pancreatitis, cholecystitis, GB colic, cholangitis, SBO, AAA, mesenteric ischemia, perforated viscous, acute gastroenteritis, NSAP, pyelonephritis, kidney stone, appendicitis, hernia, D-TICS, UTI, constipation    Social Determinants (Poverty, Education, uninsured) -none  Prior notes-pain management office visit from 12/6/2022  Name and route all medications-IV Zosyn  Charting interpretations all by myself-CT abdomen pelvis  Diagnosis descriptions-acute appendicitis  Consults-General surgery, Dr. Aminata Gillis -     Is this patient to be included in the SEP-1 Core Measure due to severe sepsis or septic shock? No   Exclusion criteria - the patient is NOT to be included for SEP-1 Core Measure due to:  2+ SIRS criteria are not met    ED Course as of 02/11/23 1505   Sat Feb 11, 2023   1234 WBC 9.1, hemoglobin 15.5. Platelets 097. Metabolic panel within normal limits with normal electrolytes. Liver enzymes normal.  Urinalysis negative for infection. No blood in the urine. Awaiting CT abdomen pelvis. [DS]   1320 Patient CT abdomen pelvis shows uncomplicated acute appendicitis. Patient updated on results. Discussed that he would need admission to [facility] for  general surgery evaluation and appendectomy.  [DS] 1331 Patient states he does not want to be admitted to the hospital overnight if he is not can have surgery today. Discussed that he would need to sign out [address] if he wants to leave at this time. Patient states understanding. He requested that he speak with the general surgery team to discuss care plan. Consult placed to general surgery at [facility]. [DS]   80 Spoke with Dr. Valdez, general surgery, they discussed patient's care plan and admission for acute appendicitis. Admission was accepted. Awaiting room and transport. Zosyn ordered for antibiotics. [DS]   3924 Dr. Valdez called back and requested patient come by private vehicle if his is stable as medical transport will take several hours. Patient states he does have a friend that can take him to [facility] at this time. Patient is refusing the medical transport and wants to go by private vehicle. Did discuss risks of worsening pain, vomiting, shortness of breath, death. Patient states understanding and still would like to go by private vehicle. Patient's room is available and patient has been accepted for admission. [DS]      ED Course User Index  [DS] Radhika Armenta MD         I PERSONALLY SAW THE PATIENT AND PERFORMED A SUBSTANTIVE PORTION OF THE VISIT INCLUDING ALL ASPECTS OF THE MEDICAL DECISION MAKING PROCESS. The primary clinician of record Radhika Armenta MD    CRITICAL CARE TIME   Total Critical Caretime was 35 minutes, excluding separately reportable procedures. There was a high probability of clinically significant/life threatening deterioration in the patient's condition which required my urgent intervention. CRITICAL CARE  I personally saw the patient and independently provided 35 minutes of non-concurrent critical care out of the total shared critical care time provided. This excludes seperately billable procedures.  Critical care time was provided for acute appendicitis that required close evaluation and/or intervention with concern for potential patient decompensation. FINAL IMPRESSION      1. Acute appendicitis with localized peritonitis, without perforation, abscess, or gangrene          DISPOSITION/PLAN   DISPOSITION Admitted 02/11/2023 02:01:24 PM    PATIENT REFERRED TO:  No follow-up provider specified.     DISCHARGE MEDICATIONS:  New Prescriptions    No medications on file          (Please note that portions ofthis note were completed with a voice recognition program.  Efforts were made to edit the dictations but occasionally words are mis-transcribed.)    Kelvin Fernandez MD(electronically signed)  Attending Emergency Physician        Kelvin Fernandez MD  02/11/23 7177

## 2023-02-11 NOTE — ED TRIAGE NOTES
Pt. Started with abdominal pain with nausea yesterday, pain moved to right lower abdominal later, no vomiting, no diarrhea

## 2023-02-11 NOTE — ED NOTES
Dr. Howard Baca is requesting patient now. Patient's room is still being cleaned. Called talked with [address] She states, the room is almost done and it's okay to send the patient.       Breanne Klein RN  02/11/23 5494

## 2023-02-12 LAB
EKG ATRIAL RATE: 103 BPM
EKG DIAGNOSIS: NORMAL
EKG P AXIS: 61 DEGREES
EKG P-R INTERVAL: 130 MS
EKG Q-T INTERVAL: 330 MS
EKG QRS DURATION: 80 MS
EKG QTC CALCULATION (BAZETT): 432 MS
EKG R AXIS: 47 DEGREES
EKG T AXIS: 39 DEGREES
EKG VENTRICULAR RATE: 103 BPM

## 2023-02-12 PROCEDURE — 93010 ELECTROCARDIOGRAM REPORT: CPT | Performed by: INTERNAL MEDICINE

## 2023-02-12 NOTE — DISCHARGE SUMMARY
General Surgery Discharge Summary        Lisa Carmona   : 1970 MRN: [id number]  Date of Admission: 2023  Date of Discharge: 23   Admitting Physician:Jakob Ahmadi MD  Primary Care Physician: Ирина Ram DO  Summary by: Alexandro Fountain MD    Diagnosis Present on Admission:   Appendicitis      Secondary diagnosis:   Patient Active Problem List   Diagnosis    Chronic pain syndrome    Degenerative joint disease of spinal facet joint    Recurrent major depressive disorder ([facility] [address]    DDD (degenerative disc disease), cervical    Lumbar radiculopathy    Hyperlipidemia    DDD (degenerative disc disease), lumbosacral    History of thyroid cancer    Major depressive disorder in partial remission ([facility] [address]    Acquired hypothyroidism    Gastroesophageal reflux disease    Essential hypertension    KAR (obstructive sleep apnea)    Prediabetes    Vitamin D deficiency    Biceps tendonitis on left    Appendicitis       Consults: none    Procedures: Procedure(s):  LAPAROSCOPIC APPENDECTOMY    Pathology: pending     Summary of hospital stay:   Lisa Carmona is a 46 y.o. male admitted for appendicitis. Underwent uneventful lap appy and was D/C same evening in good condition when tolerating PO and pain controlled    Discharge Medications:  Discharge Medication List as of 2023  7:35 PM        START taking these medications    Details   HYDROcodone-acetaminophen (NORCO) 5-325 MG per tablet Take 1 tablet by mouth every 6 hours as needed for Pain for up to 5 days. Intended supply: 5 days. Take lowest dose possible to manage pain Max Daily Amount: 4 tablets, Disp-20 tablet, R-0Normal           CONTINUE these medications which have NOT CHANGED    Details   pregabalin (LYRICA) 100 MG capsule Take 1 capsule by mouth 3 times daily for 30 days. , Disp-90 capsule, R-1Normal      sildenafil (VIAGRA) 100 MG tablet Take 1 tablet by mouth as needed for Erectile Dysfunction, Disp-10 tablet, R-1Normal      rosuvastatin (CRESTOR) 40 MG tablet Take 1 tablet by mouth daily, Disp-90 tablet, R-3Normal      levothyroxine (SYNTHROID) 200 MCG tablet Take 1 tablet by mouth daily, Disp-90 tablet, R-3Normal      lisinopril (PRINIVIL;ZESTRIL) 10 MG tablet TAKE 1 TABLET DAILY, Disp-90 tablet, R-3Normal      hydroCHLOROthiazide (HYDRODIURIL) 25 MG tablet Take 1 tablet by mouth daily, Disp-90 tablet, R-3Normal      naloxone 4 MG/0.1ML LIQD nasal spray 1 spray by Nasal route as needed for Opioid Reversal, Disp-1 each, R-0Normal      Tens Unit MISC Starting Wed 9/16/2020, Disp-1 each,R-0, PrintUse as directed. Cholecalciferol (VITAMIN D3) 50 MCG (2000 UT) CAPS Take by mouthHistorical Med           STOP taking these medications       Semaglutide-Weight Management (WEGOVY) 0.25 MG/0.5ML SOAJ SC injection Comments:   Reason for Stopping:         desvenlafaxine succinate (PRISTIQ) 100 MG TB24 extended release tablet Comments:   Reason for Stopping:         testosterone cypionate (DEPOTESTOTERONE CYPIONATE) 200 MG/ML injection Comments:   Reason for Stopping:         tiZANidine (ZANAFLEX) 4 MG tablet Comments:   Reason for Stopping:               Discharged to:  home    Instruction:  The patient is instructed to return to the hospital or call the office for fevers > 101.5F, inability to tolerate a diet, or unexpected pain. Surgery specific discharge instruction sheet was provided to the patient.   Diet: regular diet   Activity: no heavy lifting for 2 weeks    Follow up:  Dr. Bonilla Points 2 weeks    Electronically signed by Tamara Dodson MD on 2/12/2023 at 9:49 AM

## 2023-02-12 NOTE — PROGRESS NOTES
Patient ambulating in halls. Tolerated turkey sandwich, water and jello without nausea. Patient requesting to discharge. Adult children transported patient home.  Incisions clean dry and intact

## 2023-02-12 NOTE — PLAN OF CARE
Problem: Discharge Planning  Goal: Discharge to home or other facility with appropriate resources  Outcome: Completed  Flowsheets (Taken 2/11/2023 1610)  Discharge to home or other facility with appropriate resources:   Identify barriers to discharge with patient and caregiver   Arrange for needed discharge resources and transportation as appropriate   Identify discharge learning needs (meds, wound care, etc)   Arrange for interpreters to assist at discharge as needed   Refer to discharge planning if patient needs post-hospital services based on physician order or complex needs related to functional status, cognitive ability or social support system     Problem: Pain  Goal: Verbalizes/displays adequate comfort level or baseline comfort level  Outcome: Completed     Problem: ABCDS Injury Assessment  Goal: Absence of physical injury  Outcome: Completed

## 2023-02-14 ENCOUNTER — TELEPHONE (OUTPATIENT)
Dept: FAMILY MEDICINE CLINIC | Age: 53
End: 2023-02-14

## 2023-02-15 ENCOUNTER — TELEPHONE (OUTPATIENT)
Dept: FAMILY MEDICINE CLINIC | Age: 53
End: 2023-02-15

## 2023-02-15 ENCOUNTER — OFFICE VISIT (OUTPATIENT)
Dept: PAIN MANAGEMENT | Age: 53
End: 2023-02-15
Payer: COMMERCIAL

## 2023-02-15 VITALS
SYSTOLIC BLOOD PRESSURE: 130 MMHG | DIASTOLIC BLOOD PRESSURE: 81 MMHG | HEIGHT: 74 IN | BODY MASS INDEX: 32.98 KG/M2 | WEIGHT: 257 LBS | HEART RATE: 104 BPM | OXYGEN SATURATION: 96 %

## 2023-02-15 DIAGNOSIS — M54.50 LOW BACK PAIN RADIATING TO RIGHT LEG: ICD-10-CM

## 2023-02-15 DIAGNOSIS — M50.30 DDD (DEGENERATIVE DISC DISEASE), CERVICAL: ICD-10-CM

## 2023-02-15 DIAGNOSIS — M25.561 CHRONIC PAIN OF BOTH KNEES: ICD-10-CM

## 2023-02-15 DIAGNOSIS — E34.9 TESTOSTERONE DEFICIENCY: ICD-10-CM

## 2023-02-15 DIAGNOSIS — M19.011 OSTEOARTHRITIS OF RIGHT GLENOHUMERAL JOINT: ICD-10-CM

## 2023-02-15 DIAGNOSIS — G89.29 CHRONIC PAIN OF BOTH KNEES: ICD-10-CM

## 2023-02-15 DIAGNOSIS — M54.16 LUMBAR RADICULOPATHY: ICD-10-CM

## 2023-02-15 DIAGNOSIS — M25.562 CHRONIC PAIN OF BOTH KNEES: ICD-10-CM

## 2023-02-15 DIAGNOSIS — M51.37 DDD (DEGENERATIVE DISC DISEASE), LUMBOSACRAL: ICD-10-CM

## 2023-02-15 DIAGNOSIS — M47.819 DEGENERATIVE JOINT DISEASE OF SPINAL FACET JOINT: ICD-10-CM

## 2023-02-15 DIAGNOSIS — F39 MOOD DISORDER (HCC): ICD-10-CM

## 2023-02-15 DIAGNOSIS — M79.604 LOW BACK PAIN RADIATING TO RIGHT LEG: ICD-10-CM

## 2023-02-15 DIAGNOSIS — M96.1 FAILED BACK SURGICAL SYNDROME: ICD-10-CM

## 2023-02-15 DIAGNOSIS — F51.01 PRIMARY INSOMNIA: ICD-10-CM

## 2023-02-15 DIAGNOSIS — G89.4 CHRONIC PAIN SYNDROME: ICD-10-CM

## 2023-02-15 PROCEDURE — 3074F SYST BP LT 130 MM HG: CPT | Performed by: INTERNAL MEDICINE

## 2023-02-15 PROCEDURE — 99214 OFFICE O/P EST MOD 30 MIN: CPT | Performed by: INTERNAL MEDICINE

## 2023-02-15 PROCEDURE — 3078F DIAST BP <80 MM HG: CPT | Performed by: INTERNAL MEDICINE

## 2023-02-15 RX ORDER — SILDENAFIL 100 MG/1
100 TABLET, FILM COATED ORAL PRN
Qty: 10 TABLET | Refills: 1 | Status: SHIPPED | OUTPATIENT
Start: 2023-02-15

## 2023-02-15 RX ORDER — PREGABALIN 100 MG/1
100 CAPSULE ORAL DAILY
Qty: 30 CAPSULE | Refills: 1 | Status: SHIPPED | OUTPATIENT
Start: 2023-02-15 | End: 2023-03-17

## 2023-02-15 RX ORDER — HYDROCODONE BITARTRATE AND ACETAMINOPHEN 5; 325 MG/1; MG/1
1 TABLET ORAL EVERY 6 HOURS PRN
Qty: 30 TABLET | Refills: 0 | Status: SHIPPED | OUTPATIENT
Start: 2023-02-15 | End: 2023-04-12

## 2023-02-15 NOTE — TELEPHONE ENCOUNTER
Pt returned [facility] call I spoke with Tawana Andrade and she advised he just needed a hospital follow up. I advised Pt of this but Pt had questions, Like why does he need a hospital follow up? Pt also wanted to know why he keeps getting messages stating his results are in mychart, Pt just wants a call about his results. Pt had other medical questions that I could not answer. Pt would like Ana to give him a call back. Pt has a doctor appt at 11:45 so he would like a call after that.  Pt is reachable at [phone]

## 2023-02-15 NOTE — TELEPHONE ENCOUNTER
Ana please call pt. You had left message for pt to call back to make a hospital follow up appointment per TCM report and pt has questions.

## 2023-02-20 NOTE — PROGRESS NOTES
Jos Mercado  1970  [id number]    HISTORY OF PRESENT ILLNESS:  Mr. Phil Redmond is a 46 y.o. male returns for a follow up visit for multiple medical problems. His  presenting problems are   1. Chronic pain syndrome    2. Osteoarthritis of right glenohumeral joint    3. Chronic pain of both knees    4. Failed back surgical syndrome    5. DDD (degenerative disc disease), cervical    6. Lumbar radiculopathy    7. Degenerative joint disease of spinal facet joint    8. Low back pain radiating to right leg    9. DDD (degenerative disc disease), lumbosacral    .    As per information/history obtained from the PADT(patient assessment and documentation tool) -  He complains of pain in the lower back with radiation to the buttocks and hips Bilateral He rates the pain 6/10 and describes it as aching, burning. Pain is made worse by: nothing, movement, walking, standing, sitting, bending, lifting. Current treatment regimen has helped relieve about 60% of the pain. He denies side effects from the current pain regimen. Patient reports that since the last follow up visit the physical functioning is unchanged, family/social relationships are unchanged, mood is unchanged and sleep patterns are unchanged, and that the overall functioning is unchanged. Patient denies neurological bowel or bladder. Patient denies misusing/abusing his narcotic pain medications or using any illegal drugs. There are No indicators for possible drug abuse, addiction or diversion problems. Upon obtaining the medical history from Mr. Phil Redmond regarding today's office visit for his presenting problems, patient states he has been doing fair. Mr. Phil Redmond states he had his appendix removed recently, he was given Norco but did not fill it. He mentions he is using Lyrica every other day still. Patient states he is having muscle spasms in the back, he had to go to the ER twice. He has been non compliant with his medications.  Patient states he is out of Norco some months ago. ALLERGIES/PAST MED/FAM/SOC HISTORY: Mr. Coleen Rodríguez allergies, past medical, family and social history were reviewed in the chart. Mr. Coleen Rodríguez current medications are   Outpatient Medications Prior to Visit   Medication Sig Dispense Refill    rosuvastatin (CRESTOR) 40 MG tablet Take 1 tablet by mouth daily 90 tablet 3    levothyroxine (SYNTHROID) 200 MCG tablet Take 1 tablet by mouth daily 90 tablet 3    lisinopril (PRINIVIL;ZESTRIL) 10 MG tablet TAKE 1 TABLET DAILY 90 tablet 3    hydroCHLOROthiazide (HYDRODIURIL) 25 MG tablet Take 1 tablet by mouth daily 90 tablet 3    naloxone 4 MG/0.1ML LIQD nasal spray 1 spray by Nasal route as needed for Opioid Reversal 1 each 0    Tens Unit MISC by Does not apply route Use as directed. 1 each 0    Cholecalciferol (VITAMIN D3) 50 MCG (2000 UT) CAPS Take by mouth      HYDROcodone-acetaminophen (NORCO) 5-325 MG per tablet Take 1 tablet by mouth every 6 hours as needed for Pain for up to 5 days. Intended supply: 5 days. Take lowest dose possible to manage pain Max Daily Amount: 4 tablets 20 tablet 0    pregabalin (LYRICA) 100 MG capsule Take 1 capsule by mouth 3 times daily for 30 days. (Patient taking differently: Take 100 mg by mouth daily.) 90 capsule 1    sildenafil (VIAGRA) 100 MG tablet Take 1 tablet by mouth as needed for Erectile Dysfunction 10 tablet 1     No facility-administered medications prior to visit. REVIEW OF SYSTEMS: .   Respiratory: Negative for shortness of breath. Cardiovascular: Negative for chest pain, palpitations  Gastrointestinal: Negative for blood in stool, abdominal distention, nausea, vomiting, abdominal pain, diarrhea,constipation. Neurological: Negative for speech difficulty, weakness and light-headedness, dizziness, tremors, sleepiness  Psychiatric/Behavioral: Negative for suicidal ideas, hallucinations, behavioral problems, self-injury, decreased concentration/cognition, agitation, confusion.      PHYSICAL EXAM: Nursing note and vitals reviewed. /81   Pulse (!) 104   Ht 6' 2\" (1.88 m)   Wt 257 lb (116.6 kg)   SpO2 96%   BMI 33.00 kg/m²   General Appearance: Patient is well nourished, well developed, well groomed and in no acute distress. Skin: Skin is warm and dry, good turgor . No rash or lesions noted. He is not diaphoretic. Pulmonary/Chest: Effort normal. No respiratory distress or use of accessory muscles. Auscultation revealing normal air entry. He does not have wheezes in the lung fields. He has no rales. Cardiovascular: Normal rate, regular rhythm, normal heart sounds, and does not have murmur. Exam reveals no gallop and no friction rub. Musculoskeletal / Extremities: Range of motion is normal. Gait is normal, assistive devices use: none. He exhibits edema: Trace, and no tenderness. Neurological/Psychiatric:He is alert and oriented to person, place, and time. Coordination is  normal.   Judgement and Insight is normal  His mood is Appropriate and affect is Neutral/Euthymic(normal) . His behavior is normal.   thought content normal.        IMPRESSION:     1. Chronic pain syndrome    2. Osteoarthritis of right glenohumeral joint    3. Chronic pain of both knees    4. Failed back surgical syndrome    5. DDD (degenerative disc disease), cervical    6. Lumbar radiculopathy    7. Degenerative joint disease of spinal facet joint    8. Low back pain radiating to right leg    9. DDD (degenerative disc disease), lumbosacral    10. Mood disorder ([facility] [address] [address]    11. Primary insomnia        PLAN:  Informed verbal consent was obtained.  -Interm history reviewed    -Labs reviewed   -[place] record was obtained and reviewed  for the last one year and no indicators of drug misuse  were found. Any other controlled substance prescriptions  seen on the record have been accounted for, I am aware of the patient receiving these medications. [employer]  [place] record will be rechecked as part of office protocol.    -Continue with Lyrica, will decrease to 1 per day  -Discussed use, benefit, and side effects of prescribed medications. Barriers to medication compliance addressed. All patient questions answered. Pt voiced understanding.    -Restart Zanaflex PRN for spasms  -Will restart Norco 1 per day PRN   -He was advised weight reduction, diet changes- 800-1200 flex diet, diet diary, exercising, nutritional  consult increased physical activity as tolerated   -Walking as tolerated    -He was advised to increase fluids ( 5-7  glasses of fluid daily), limit caffeine, avoid cheese products, increase dietary fiber, increase activity and exercise as tolerated and relax regularly and enjoy meals   -Urine drug screen with GC/MS for opiates and drugs of abuse was ordered and will follow up on results. Mr. Vandana Holm will be prescribed  the medications  listed below which are for treatment of his presenting  medical problems which for this visit include:   Diagnoses of Chronic pain syndrome, Osteoarthritis of right glenohumeral joint, Chronic pain of both knees, Failed back surgical syndrome, DDD (degenerative disc disease), cervical, Lumbar radiculopathy, Degenerative joint disease of spinal facet joint, Low back pain radiating to right leg, and DDD (degenerative disc disease), lumbosacral were pertinent to this visit. Medications/orders associated with this visit:    Current Outpatient Medications   Medication Sig Dispense Refill    pregabalin (LYRICA) 100 MG capsule Take 1 capsule by mouth daily for 30 days. Max Daily Amount: 100 mg 30 capsule 1    HYDROcodone-acetaminophen (NORCO) 5-325 MG per tablet Take 1 tablet by mouth every 6 hours as needed for Pain (1-2 a day) for up to 56 days.  . Take lowest dose possible to manage pain Max Daily Amount: 1 tablet 30 tablet 0    sildenafil (VIAGRA) 100 MG tablet Take 1 tablet by mouth as needed for Erectile Dysfunction 10 tablet 1    rosuvastatin (CRESTOR) 40 MG tablet Take 1 tablet by mouth daily 90 tablet 3 levothyroxine (SYNTHROID) 200 MCG tablet Take 1 tablet by mouth daily 90 tablet 3    lisinopril (PRINIVIL;ZESTRIL) 10 MG tablet TAKE 1 TABLET DAILY 90 tablet 3    hydroCHLOROthiazide (HYDRODIURIL) 25 MG tablet Take 1 tablet by mouth daily 90 tablet 3    naloxone 4 MG/0.1ML LIQD nasal spray 1 spray by Nasal route as needed for Opioid Reversal 1 each 0    Tens Unit MISC by Does not apply route Use as directed. 1 each 0    Cholecalciferol (VITAMIN D3) 50 MCG (2000 UT) CAPS Take by mouth       No current facility-administered medications for this visit. Goals of current treatment regimen include improvement in pain, restoration of functioning- with focus on improvement in physical performance, general activity, work or disability,emotional distress, health care utilization and  decreased medication consumption. Will continue to monitor progress towards achieving/maintaining therapeutic goals with special emphasis on  1. Improvement in perceived interfernce  of pain with ADL's. Ability to do home exercises independently. Ability to do household chores indoor and/or outdoor work and social and leisure activities. To increase flexibility/ROM, strength and endurance. Improve psychosocial and physical functioning.- he is not showing any significant progress/or showing regression  towards this goal and reassessment and adjustment of goals/treatment have been made. 2. Improving sleep to 6-7 hours a night. Improve mood/ anxiety and depression symptoms such as crying spells, low energy, problems with concentration, motivation.- he is showing progression towards this treatment goal with the current regimen. 3. Reduction of reliance on opioid analgesia/more appropriate opioid use. - he is showing progression towards this treatment goal with the current regimen. Risks and benefits of the medications and other alternative treatments have been/were  discussed with the patient.  Any questions on the  common side effects of these medications were also answered. He was advised against drinking alcohol with the narcotic pain medicines, advised against driving or handling machinery when  starting or adjusting the dose of medicines, feeling groggy or drowsy, or if having any cognitive issues related to the current medications. Heis fully aware of the risk of overdose and death, if medicines are misused and not taken as prescribed. If he develops new symptoms or if the symptoms worsen, he was told to call the office. .  Thank you for allowing me to participate in the care of this patient.     Mohinder Nunez MD    Cc: Kasandra Orellana DO

## 2023-02-23 RX ORDER — TESTOSTERONE CYPIONATE 200 MG/ML
200 INJECTION INTRAMUSCULAR
Qty: 2 ML | Refills: 0 | Status: SHIPPED | OUTPATIENT
Start: 2023-02-23 | End: 2023-04-20

## 2023-02-23 NOTE — TELEPHONE ENCOUNTER
Medication:   Requested Prescriptions     Pending Prescriptions Disp Refills    testosterone cypionate (DEPOTESTOTERONE CYPIONATE) 200 MG/ML injection 2 mL 0     Sig: Inject 1 mL into the muscle every 14 days for 56 days.        Last Filled:      Patient Phone Number: [phone] (home)     Last appt: 10/18/2022   Next appt: Visit date not found

## 2023-03-23 ENCOUNTER — OFFICE VISIT (OUTPATIENT)
Dept: ORTHOPEDIC SURGERY | Age: 53
End: 2023-03-23

## 2023-03-23 VITALS — WEIGHT: 257 LBS | HEIGHT: 74 IN | BODY MASS INDEX: 32.98 KG/M2

## 2023-03-23 DIAGNOSIS — M25.562 ACUTE PAIN OF LEFT KNEE: Primary | ICD-10-CM

## 2023-03-23 DIAGNOSIS — M17.12 PRIMARY OSTEOARTHRITIS OF LEFT KNEE: ICD-10-CM

## 2023-03-23 RX ORDER — LIDOCAINE HYDROCHLORIDE 10 MG/ML
4 INJECTION, SOLUTION INFILTRATION; PERINEURAL ONCE
Status: COMPLETED | OUTPATIENT
Start: 2023-03-23 | End: 2023-03-23

## 2023-03-23 RX ORDER — LORAZEPAM 0.5 MG/1
TABLET ORAL
COMMUNITY
Start: 2022-10-18

## 2023-03-23 RX ORDER — METHYLPREDNISOLONE ACETATE 40 MG/ML
80 INJECTION, SUSPENSION INTRA-ARTICULAR; INTRALESIONAL; INTRAMUSCULAR; SOFT TISSUE ONCE
Status: COMPLETED | OUTPATIENT
Start: 2023-03-23 | End: 2023-03-23

## 2023-03-23 RX ADMIN — METHYLPREDNISOLONE ACETATE 80 MG: 40 INJECTION, SUSPENSION INTRA-ARTICULAR; INTRALESIONAL; INTRAMUSCULAR; SOFT TISSUE at 13:39

## 2023-03-23 RX ADMIN — LIDOCAINE HYDROCHLORIDE 4 ML: 10 INJECTION, SOLUTION INFILTRATION; PERINEURAL at 13:38

## 2023-03-23 NOTE — PROGRESS NOTES
Extremity: inspection reveals no rashes, ulcerations or lesions. Examination of the bilateral hips reveals normal flexion and extension. There is no restriction in rotation. There is no tenderness to palpation anteriorly posteriorly or laterally. Right knee exam is relatively benign. He has some very mild tenderness and crepitation. Left knee has no effusion. He has full motion. He has tenderness over the medial joint line. He is stable. Calf is soft. X-rays were obtained today in the office and interpreted by me. AP standing, PA flexed, and merchant views of the bilateral knees as well as a lateral of the left knee. These demonstrate: Bilateral patellofemoral arthritis with medial compartment arthritis right dramatically worse than left    Left knee MRI dated 6/13/2021 is reviewed and demonstrates:  CONCLUSION:   1. New subcentimeter thin inner edge flap tear in the posterior horn of the medial meniscus. 2. New low grade medial compartment chondromalacia, and small shallow chondral fissure at the    midline patellar articular surface. Moderate to high grade chondromalacia at the trochlear    groove, increased in severity. 3. 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 Advise    continued followup. Assessment: Exacerbation of chronic pain with degenerative change in the left knee. Plan: He says that his right knee arthroscopy really did not provide any relief and he would like to avoid surgery on the left. I certainly agree with that plan. We will need to get prior authorization from his insurance company for viscosupplementation which could be completed in mid April. In the short-term we offered a cortisone injection. The indication for viscosupplementation is symptomatic osteoarthritis in attempt to alleviate the need for arthroplasty.     After obtaining verbal consent under sterile technique left knee was injected into the

## 2023-03-24 DIAGNOSIS — E34.9 TESTOSTERONE DEFICIENCY: ICD-10-CM

## 2023-03-24 RX ORDER — TESTOSTERONE CYPIONATE 200 MG/ML
200 INJECTION, SOLUTION INTRAMUSCULAR
Qty: 2 ML | Refills: 1 | Status: SHIPPED | OUTPATIENT
Start: 2023-03-24 | End: 2023-05-19

## 2023-03-24 NOTE — TELEPHONE ENCOUNTER
Patient is calling requesting refills for:    Medication:   Requested Prescriptions     Pending Prescriptions Disp Refills    testosterone cypionate (DEPOTESTOTERONE CYPIONATE) 200 MG/ML injection 2 mL 0     Sig: Inject 1 mL into the muscle every 14 days for 56 days.  Will need to follow-up with endocrinology in the next 6 weeks         Patient Phone Number: [phone] (home)     Last appt: 10/18/2022   Next appt: Visit date not found    Pharmacy:   [facility] [id number] [facility], [address] [place] [address] [phone] [facility] [phone]  [address] [place] [address]  Phone: [phone] Fax: [phone]

## 2023-03-24 NOTE — TELEPHONE ENCOUNTER
Pt asking if this can be called in with refills as well. Pt states that he is due to have the shot on Sunday and does not have anything to take. Pt asking for a call back when its completed. Please advise.

## 2023-04-03 ENCOUNTER — OFFICE VISIT (OUTPATIENT)
Dept: ORTHOPEDIC SURGERY | Age: 53
End: 2023-04-03
Payer: COMMERCIAL

## 2023-04-03 VITALS — WEIGHT: 257 LBS | RESPIRATION RATE: 12 BRPM | BODY MASS INDEX: 32.98 KG/M2 | HEIGHT: 74 IN

## 2023-04-03 DIAGNOSIS — M25.562 ACUTE PAIN OF LEFT KNEE: Primary | ICD-10-CM

## 2023-04-03 DIAGNOSIS — M17.12 PRIMARY OSTEOARTHRITIS OF LEFT KNEE: ICD-10-CM

## 2023-04-03 PROCEDURE — 20610 DRAIN/INJ JOINT/BURSA W/O US: CPT | Performed by: ORTHOPAEDIC SURGERY

## 2023-04-03 PROCEDURE — 99999 PR OFFICE/OUTPT VISIT,PROCEDURE ONLY: CPT | Performed by: ORTHOPAEDIC SURGERY

## 2023-04-12 ENCOUNTER — OFFICE VISIT (OUTPATIENT)
Dept: PAIN MANAGEMENT | Age: 53
End: 2023-04-12
Payer: COMMERCIAL

## 2023-04-12 VITALS
WEIGHT: 257 LBS | DIASTOLIC BLOOD PRESSURE: 97 MMHG | BODY MASS INDEX: 33 KG/M2 | SYSTOLIC BLOOD PRESSURE: 135 MMHG | HEART RATE: 94 BPM

## 2023-04-12 DIAGNOSIS — M47.819 DEGENERATIVE JOINT DISEASE OF SPINAL FACET JOINT: ICD-10-CM

## 2023-04-12 DIAGNOSIS — M19.011 OSTEOARTHRITIS OF RIGHT GLENOHUMERAL JOINT: ICD-10-CM

## 2023-04-12 DIAGNOSIS — M25.561 CHRONIC PAIN OF BOTH KNEES: ICD-10-CM

## 2023-04-12 DIAGNOSIS — M54.16 LUMBAR RADICULOPATHY: ICD-10-CM

## 2023-04-12 DIAGNOSIS — M79.604 LOW BACK PAIN RADIATING TO RIGHT LEG: ICD-10-CM

## 2023-04-12 DIAGNOSIS — G89.4 CHRONIC PAIN SYNDROME: ICD-10-CM

## 2023-04-12 DIAGNOSIS — M50.30 DDD (DEGENERATIVE DISC DISEASE), CERVICAL: ICD-10-CM

## 2023-04-12 DIAGNOSIS — M25.562 CHRONIC PAIN OF BOTH KNEES: ICD-10-CM

## 2023-04-12 DIAGNOSIS — M54.50 LOW BACK PAIN RADIATING TO RIGHT LEG: ICD-10-CM

## 2023-04-12 DIAGNOSIS — M96.1 FAILED BACK SURGICAL SYNDROME: ICD-10-CM

## 2023-04-12 DIAGNOSIS — G89.29 CHRONIC PAIN OF BOTH KNEES: ICD-10-CM

## 2023-04-12 DIAGNOSIS — M51.37 DDD (DEGENERATIVE DISC DISEASE), LUMBOSACRAL: ICD-10-CM

## 2023-04-12 PROCEDURE — 3078F DIAST BP <80 MM HG: CPT | Performed by: INTERNAL MEDICINE

## 2023-04-12 PROCEDURE — 99213 OFFICE O/P EST LOW 20 MIN: CPT | Performed by: INTERNAL MEDICINE

## 2023-04-12 PROCEDURE — 3074F SYST BP LT 130 MM HG: CPT | Performed by: INTERNAL MEDICINE

## 2023-04-12 RX ORDER — HYDROCODONE BITARTRATE AND ACETAMINOPHEN 5; 325 MG/1; MG/1
1 TABLET ORAL EVERY 6 HOURS PRN
Qty: 30 TABLET | Refills: 0 | Status: SHIPPED | OUTPATIENT
Start: 2023-04-12 | End: 2023-06-07

## 2023-04-12 RX ORDER — PREGABALIN 100 MG/1
100 CAPSULE ORAL DAILY
Qty: 30 CAPSULE | Refills: 1 | Status: SHIPPED | OUTPATIENT
Start: 2023-04-12 | End: 2023-05-12

## 2023-04-12 RX ORDER — SILDENAFIL 100 MG/1
100 TABLET, FILM COATED ORAL PRN
Qty: 10 TABLET | Refills: 1 | Status: SHIPPED | OUTPATIENT
Start: 2023-04-12

## 2023-04-17 ENCOUNTER — OFFICE VISIT (OUTPATIENT)
Dept: ORTHOPEDIC SURGERY | Age: 53
End: 2023-04-17
Payer: COMMERCIAL

## 2023-04-17 VITALS — HEIGHT: 74 IN | BODY MASS INDEX: 32.98 KG/M2 | WEIGHT: 257 LBS

## 2023-04-17 DIAGNOSIS — M17.12 PRIMARY OSTEOARTHRITIS OF LEFT KNEE: Primary | ICD-10-CM

## 2023-04-17 PROCEDURE — 20610 DRAIN/INJ JOINT/BURSA W/O US: CPT | Performed by: ORTHOPAEDIC SURGERY

## 2023-04-17 PROCEDURE — 99999 PR OFFICE/OUTPT VISIT,PROCEDURE ONLY: CPT | Performed by: ORTHOPAEDIC SURGERY

## 2023-04-17 NOTE — PROGRESS NOTES
Kimberlee Saez returns today for his left knee. He reports good success thus far and says he has pain that is 0-1 out of 10. He reports some mild swelling over the weekend when he was working on uneven terrain and scrubbing his floors. General Exam:     Vitals: Resp. rate 12, height 6' 2\" (1.88 m), weight 257 lb (116.6 kg). Constitutional: Patient is adequately groomed with no evidence of malnutrition  Mental Status: The patient is oriented to time, place and person. The patient's mood and affect are appropriate. Right knee exam is relatively benign. He has some very mild tenderness and crepitation. Left knee has no effusion. He has full motion. He has tenderness over the medial joint line. He is stable. Calf is soft. Assessment: Symptomatic osteoarthritis left knee     Plan:   The indication for viscosupplementation is symptomatic osteoarthritis in attempt to alleviate the need for arthroplasty. Left knee was then injected under sterile technique after obtaining verbal consent into the anterior arthroscopy portal with 30 milligrams of Orthovisc.

## 2023-05-11 ENCOUNTER — OFFICE VISIT (OUTPATIENT)
Dept: FAMILY MEDICINE CLINIC | Age: 53
End: 2023-05-11
Payer: COMMERCIAL

## 2023-05-11 VITALS
DIASTOLIC BLOOD PRESSURE: 78 MMHG | BODY MASS INDEX: 33.24 KG/M2 | SYSTOLIC BLOOD PRESSURE: 120 MMHG | HEIGHT: 74 IN | OXYGEN SATURATION: 98 % | WEIGHT: 259 LBS | HEART RATE: 93 BPM

## 2023-05-11 DIAGNOSIS — J40 BRONCHITIS: Primary | ICD-10-CM

## 2023-05-11 DIAGNOSIS — E34.9 TESTOSTERONE DEFICIENCY: ICD-10-CM

## 2023-05-11 PROCEDURE — 99213 OFFICE O/P EST LOW 20 MIN: CPT | Performed by: NURSE PRACTITIONER

## 2023-05-11 PROCEDURE — 3074F SYST BP LT 130 MM HG: CPT | Performed by: NURSE PRACTITIONER

## 2023-05-11 PROCEDURE — 3078F DIAST BP <80 MM HG: CPT | Performed by: NURSE PRACTITIONER

## 2023-05-11 RX ORDER — TESTOSTERONE CYPIONATE 200 MG/ML
200 INJECTION, SOLUTION INTRAMUSCULAR
Qty: 2 ML | Refills: 5 | Status: CANCELLED | OUTPATIENT
Start: 2023-05-11 | End: 2023-07-06

## 2023-05-11 RX ORDER — AZITHROMYCIN 250 MG/1
250 TABLET, FILM COATED ORAL SEE ADMIN INSTRUCTIONS
Qty: 6 TABLET | Refills: 0 | Status: SHIPPED | OUTPATIENT
Start: 2023-05-11 | End: 2023-05-16

## 2023-05-11 ASSESSMENT — PATIENT HEALTH QUESTIONNAIRE - PHQ9
8. MOVING OR SPEAKING SO SLOWLY THAT OTHER PEOPLE COULD HAVE NOTICED. OR THE OPPOSITE, BEING SO FIGETY OR RESTLESS THAT YOU HAVE BEEN MOVING AROUND A LOT MORE THAN USUAL: 0
SUM OF ALL RESPONSES TO PHQ QUESTIONS 1-9: 0
2. FEELING DOWN, DEPRESSED OR HOPELESS: 0
1. LITTLE INTEREST OR PLEASURE IN DOING THINGS: 0
SUM OF ALL RESPONSES TO PHQ QUESTIONS 1-9: 0
7. TROUBLE CONCENTRATING ON THINGS, SUCH AS READING THE NEWSPAPER OR WATCHING TELEVISION: 0
10. IF YOU CHECKED OFF ANY PROBLEMS, HOW DIFFICULT HAVE THESE PROBLEMS MADE IT FOR YOU TO DO YOUR WORK, TAKE CARE OF THINGS AT HOME, OR GET ALONG WITH OTHER PEOPLE: 0
3. TROUBLE FALLING OR STAYING ASLEEP: 0
9. THOUGHTS THAT YOU WOULD BE BETTER OFF DEAD, OR OF HURTING YOURSELF: 0
SUM OF ALL RESPONSES TO PHQ QUESTIONS 1-9: 0
5. POOR APPETITE OR OVEREATING: 0
6. FEELING BAD ABOUT YOURSELF - OR THAT YOU ARE A FAILURE OR HAVE LET YOURSELF OR YOUR FAMILY DOWN: 0
4. FEELING TIRED OR HAVING LITTLE ENERGY: 0
SUM OF ALL RESPONSES TO PHQ9 QUESTIONS 1 & 2: 0
SUM OF ALL RESPONSES TO PHQ QUESTIONS 1-9: 0

## 2023-05-16 RX ORDER — TESTOSTERONE CYPIONATE 200 MG/ML
200 INJECTION, SOLUTION INTRAMUSCULAR
Qty: 2 ML | Refills: 0 | Status: SHIPPED | OUTPATIENT
Start: 2023-05-16 | End: 2023-06-13

## 2023-05-31 ENCOUNTER — TELEPHONE (OUTPATIENT)
Dept: FAMILY MEDICINE CLINIC | Age: 53
End: 2023-05-31

## 2023-05-31 NOTE — TELEPHONE ENCOUNTER
Pt called state ing he has been having SOB and he has no allergies that he knows of. Pt states about a week ago he say one of the NP's and they gave him a steroid pack and it worked for a temporary time. Pt sates he hasn't been able to sleep. Pt states last night he slept for about 3 hours and hasn't been sleep since. Pt states he has no chest pains or heart problems. Pt states he has this pressure on his chest like if something is clogged. Pt states he also has a cough to where it feels like something is scratching at the back of his throat. Pt states he is concerned and that he wants to be seen asap.

## 2023-05-31 NOTE — TELEPHONE ENCOUNTER
Called and spoke with patient, advised of below. States he I unable to make it in today but can come in tomorrow. Scheduled patient for an appt. Advised patient to go to ED or call 911 if sxs worsen overnite. Patient expressed understanding.

## 2023-06-01 ENCOUNTER — TELEPHONE (OUTPATIENT)
Dept: FAMILY MEDICINE CLINIC | Age: 53
End: 2023-06-01

## 2023-06-01 ENCOUNTER — HOSPITAL ENCOUNTER (OUTPATIENT)
Age: 53
Discharge: HOME | End: 2023-06-01
Payer: COMMERCIAL

## 2023-06-01 ENCOUNTER — HOSPITAL ENCOUNTER (OUTPATIENT)
Dept: GENERAL RADIOLOGY | Age: 53
Discharge: HOME | End: 2023-06-01
Payer: COMMERCIAL

## 2023-06-01 ENCOUNTER — OFFICE VISIT (OUTPATIENT)
Dept: FAMILY MEDICINE CLINIC | Age: 53
End: 2023-06-01
Payer: COMMERCIAL

## 2023-06-01 VITALS
HEART RATE: 90 BPM | SYSTOLIC BLOOD PRESSURE: 128 MMHG | WEIGHT: 263 LBS | DIASTOLIC BLOOD PRESSURE: 83 MMHG | BODY MASS INDEX: 33.75 KG/M2 | OXYGEN SATURATION: 97 % | HEIGHT: 74 IN

## 2023-06-01 DIAGNOSIS — R06.02 SHORTNESS OF BREATH: Primary | ICD-10-CM

## 2023-06-01 DIAGNOSIS — R94.31 NONSPECIFIC ST-T WAVE ELECTROCARDIOGRAPHIC CHANGES: ICD-10-CM

## 2023-06-01 DIAGNOSIS — Z51.81 ENCOUNTER FOR MONITORING TESTOSTERONE REPLACEMENT THERAPY: ICD-10-CM

## 2023-06-01 DIAGNOSIS — E78.5 DYSLIPIDEMIA: ICD-10-CM

## 2023-06-01 DIAGNOSIS — Z79.890 ENCOUNTER FOR MONITORING TESTOSTERONE REPLACEMENT THERAPY: ICD-10-CM

## 2023-06-01 DIAGNOSIS — R06.02 SHORTNESS OF BREATH: ICD-10-CM

## 2023-06-01 LAB
ALBUMIN SERPL-MCNC: 4.7 G/DL (ref 3.4–5)
ALBUMIN/GLOB SERPL: 2 {RATIO} (ref 1.1–2.2)
ALP SERPL-CCNC: 77 U/L (ref 40–129)
ALT SERPL-CCNC: 24 U/L (ref 10–40)
ANION GAP SERPL CALCULATED.3IONS-SCNC: 10 MMOL/L (ref 3–16)
AST SERPL-CCNC: 14 U/L (ref 15–37)
BASOPHILS # BLD: 0.1 K/UL (ref 0–0.2)
BASOPHILS NFR BLD: 0.5 %
BILIRUB SERPL-MCNC: 0.3 MG/DL (ref 0–1)
BUN SERPL-MCNC: 12 MG/DL (ref 7–20)
CALCIUM SERPL-MCNC: 9.6 MG/DL (ref 8.3–10.6)
CHLORIDE SERPL-SCNC: 102 MMOL/L (ref 99–110)
CO2 SERPL-SCNC: 25 MMOL/L (ref 21–32)
CREAT SERPL-MCNC: 0.6 MG/DL (ref 0.9–1.3)
DEPRECATED RDW RBC AUTO: 13.3 % (ref 12.4–15.4)
EOSINOPHIL # BLD: 0 K/UL (ref 0–0.6)
EOSINOPHIL NFR BLD: 0.2 %
GFR SERPLBLD CREATININE-BSD FMLA CKD-EPI: >60 ML/MIN/{1.73_M2}
GLUCOSE SERPL-MCNC: 116 MG/DL (ref 70–99)
HCT VFR BLD AUTO: 47.1 % (ref 40.5–52.5)
HGB BLD-MCNC: 15.5 G/DL (ref 13.5–17.5)
LDLC SERPL-MCNC: 85 MG/DL
LYMPHOCYTES # BLD: 1.1 K/UL (ref 1–5.1)
LYMPHOCYTES NFR BLD: 8.5 %
MCH RBC QN AUTO: 30.1 PG (ref 26–34)
MCHC RBC AUTO-ENTMCNC: 32.9 G/DL (ref 31–36)
MCV RBC AUTO: 91.5 FL (ref 80–100)
MONOCYTES # BLD: 0.7 K/UL (ref 0–1.3)
MONOCYTES NFR BLD: 5.2 %
NEUTROPHILS # BLD: 11.1 K/UL (ref 1.7–7.7)
NEUTROPHILS NFR BLD: 85.6 %
NT-PROBNP SERPL-MCNC: <36 PG/ML (ref 0–124)
PLATELET # BLD AUTO: 176 K/UL (ref 135–450)
PMV BLD AUTO: 11.2 FL (ref 5–10.5)
POTASSIUM SERPL-SCNC: 4.3 MMOL/L (ref 3.5–5.1)
PROT SERPL-MCNC: 7 G/DL (ref 6.4–8.2)
RBC # BLD AUTO: 5.15 M/UL (ref 4.2–5.9)
SODIUM SERPL-SCNC: 137 MMOL/L (ref 136–145)
WBC # BLD AUTO: 13 K/UL (ref 4–11)

## 2023-06-01 PROCEDURE — 36415 COLL VENOUS BLD VENIPUNCTURE: CPT | Performed by: FAMILY MEDICINE

## 2023-06-01 PROCEDURE — 93000 ELECTROCARDIOGRAM COMPLETE: CPT | Performed by: FAMILY MEDICINE

## 2023-06-01 PROCEDURE — 99215 OFFICE O/P EST HI 40 MIN: CPT | Performed by: FAMILY MEDICINE

## 2023-06-01 PROCEDURE — 3078F DIAST BP <80 MM HG: CPT | Performed by: FAMILY MEDICINE

## 2023-06-01 PROCEDURE — 71046 X-RAY EXAM CHEST 2 VIEWS: CPT

## 2023-06-01 PROCEDURE — 3074F SYST BP LT 130 MM HG: CPT | Performed by: FAMILY MEDICINE

## 2023-06-01 SDOH — ECONOMIC STABILITY: INCOME INSECURITY: HOW HARD IS IT FOR YOU TO PAY FOR THE VERY BASICS LIKE FOOD, HOUSING, MEDICAL CARE, AND HEATING?: NOT HARD AT ALL

## 2023-06-01 SDOH — ECONOMIC STABILITY: FOOD INSECURITY: WITHIN THE PAST 12 MONTHS, THE FOOD YOU BOUGHT JUST DIDN'T LAST AND YOU DIDN'T HAVE MONEY TO GET MORE.: NEVER TRUE

## 2023-06-01 SDOH — ECONOMIC STABILITY: FOOD INSECURITY: WITHIN THE PAST 12 MONTHS, YOU WORRIED THAT YOUR FOOD WOULD RUN OUT BEFORE YOU GOT MONEY TO BUY MORE.: NEVER TRUE

## 2023-06-01 SDOH — ECONOMIC STABILITY: HOUSING INSECURITY
IN THE LAST 12 MONTHS, WAS THERE A TIME WHEN YOU DID NOT HAVE A STEADY PLACE TO SLEEP OR SLEPT IN A SHELTER (INCLUDING NOW)?: NO

## 2023-06-01 NOTE — TELEPHONE ENCOUNTER
Please let Rayna Codi know his labs look pretty good so far. His BNP, heart strain level, is in the normal range. His kidney, liver levels look good. His white blood cell count is a bit elevated, but I suspect this is due to the recent prednisone. I would like him to continue Medrol dose pack since he has improved some on it. Will update him once x-ray results reviewed.

## 2023-06-02 ENCOUNTER — TELEPHONE (OUTPATIENT)
Dept: FAMILY MEDICINE CLINIC | Age: 53
End: 2023-06-02

## 2023-06-02 DIAGNOSIS — R94.31 EKG ABNORMALITIES: ICD-10-CM

## 2023-06-02 DIAGNOSIS — R06.02 SHORTNESS OF BREATH: Primary | ICD-10-CM

## 2023-06-02 LAB
EST. AVERAGE GLUCOSE BLD GHB EST-MCNC: 105.4 MG/DL
HBA1C MFR BLD: 5.3 %

## 2023-06-02 RX ORDER — DOXYCYCLINE HYCLATE 100 MG
100 TABLET ORAL 2 TIMES DAILY
Qty: 14 TABLET | Refills: 0 | Status: SHIPPED | OUTPATIENT
Start: 2023-06-02 | End: 2023-06-09

## 2023-06-02 NOTE — TELEPHONE ENCOUNTER
Please let patient know his chest x-ray was clear. I would like him to finish Medrol Dosepak. I have also sent prescription for doxycycline x7 days to the pharmacy. I have placed referral to cardiology. Please give him phone number to schedule.

## 2023-06-03 LAB
SHBG SERPL-SCNC: 21 NMOL/L (ref 11–80)
TESTOST FREE SERPL-MCNC: 92.8 PG/ML (ref 47–244)
TESTOST SERPL-MCNC: 365 NG/DL (ref 220–1000)

## 2023-06-27 ENCOUNTER — OFFICE VISIT (OUTPATIENT)
Dept: CARDIOLOGY CLINIC | Age: 53
End: 2023-06-27
Payer: COMMERCIAL

## 2023-06-27 VITALS
WEIGHT: 264 LBS | OXYGEN SATURATION: 98 % | BODY MASS INDEX: 33.88 KG/M2 | SYSTOLIC BLOOD PRESSURE: 134 MMHG | DIASTOLIC BLOOD PRESSURE: 72 MMHG | HEART RATE: 110 BPM | HEIGHT: 74 IN

## 2023-06-27 DIAGNOSIS — G47.33 OSA (OBSTRUCTIVE SLEEP APNEA): ICD-10-CM

## 2023-06-27 DIAGNOSIS — E78.2 MIXED HYPERLIPIDEMIA: ICD-10-CM

## 2023-06-27 DIAGNOSIS — I10 ESSENTIAL HYPERTENSION: ICD-10-CM

## 2023-06-27 DIAGNOSIS — R06.02 SHORTNESS OF BREATH: Primary | ICD-10-CM

## 2023-06-27 DIAGNOSIS — Z85.850 HISTORY OF THYROID CANCER: ICD-10-CM

## 2023-06-27 PROCEDURE — 3074F SYST BP LT 130 MM HG: CPT | Performed by: INTERNAL MEDICINE

## 2023-06-27 PROCEDURE — 3078F DIAST BP <80 MM HG: CPT | Performed by: INTERNAL MEDICINE

## 2023-06-27 PROCEDURE — 93000 ELECTROCARDIOGRAM COMPLETE: CPT | Performed by: INTERNAL MEDICINE

## 2023-06-27 PROCEDURE — 99204 OFFICE O/P NEW MOD 45 MIN: CPT | Performed by: INTERNAL MEDICINE

## 2023-07-05 ENCOUNTER — OFFICE VISIT (OUTPATIENT)
Dept: PAIN MANAGEMENT | Age: 53
End: 2023-07-05
Payer: COMMERCIAL

## 2023-07-05 VITALS
BODY MASS INDEX: 33.77 KG/M2 | OXYGEN SATURATION: 97 % | DIASTOLIC BLOOD PRESSURE: 73 MMHG | WEIGHT: 263 LBS | SYSTOLIC BLOOD PRESSURE: 112 MMHG | HEART RATE: 90 BPM

## 2023-07-05 DIAGNOSIS — M79.604 LOW BACK PAIN RADIATING TO RIGHT LEG: ICD-10-CM

## 2023-07-05 DIAGNOSIS — M25.561 CHRONIC PAIN OF BOTH KNEES: ICD-10-CM

## 2023-07-05 DIAGNOSIS — M47.819 DEGENERATIVE JOINT DISEASE OF SPINAL FACET JOINT: ICD-10-CM

## 2023-07-05 DIAGNOSIS — M19.011 OSTEOARTHRITIS OF RIGHT GLENOHUMERAL JOINT: ICD-10-CM

## 2023-07-05 DIAGNOSIS — M54.16 LUMBAR RADICULOPATHY: ICD-10-CM

## 2023-07-05 DIAGNOSIS — M25.562 CHRONIC PAIN OF BOTH KNEES: ICD-10-CM

## 2023-07-05 DIAGNOSIS — M54.50 LOW BACK PAIN RADIATING TO RIGHT LEG: ICD-10-CM

## 2023-07-05 DIAGNOSIS — G89.29 CHRONIC PAIN OF BOTH KNEES: ICD-10-CM

## 2023-07-05 DIAGNOSIS — M51.37 DDD (DEGENERATIVE DISC DISEASE), LUMBOSACRAL: ICD-10-CM

## 2023-07-05 DIAGNOSIS — M96.1 FAILED BACK SURGICAL SYNDROME: ICD-10-CM

## 2023-07-05 DIAGNOSIS — G89.4 CHRONIC PAIN SYNDROME: ICD-10-CM

## 2023-07-05 DIAGNOSIS — M50.30 DDD (DEGENERATIVE DISC DISEASE), CERVICAL: ICD-10-CM

## 2023-07-05 PROCEDURE — 99213 OFFICE O/P EST LOW 20 MIN: CPT | Performed by: INTERNAL MEDICINE

## 2023-07-05 PROCEDURE — 3078F DIAST BP <80 MM HG: CPT | Performed by: INTERNAL MEDICINE

## 2023-07-05 PROCEDURE — 3074F SYST BP LT 130 MM HG: CPT | Performed by: INTERNAL MEDICINE

## 2023-07-05 RX ORDER — SILDENAFIL 100 MG/1
100 TABLET, FILM COATED ORAL PRN
Qty: 10 TABLET | Refills: 1 | Status: SHIPPED | OUTPATIENT
Start: 2023-07-05

## 2023-07-05 RX ORDER — PREGABALIN 100 MG/1
100 CAPSULE ORAL DAILY
Qty: 30 CAPSULE | Refills: 1 | Status: SHIPPED | OUTPATIENT
Start: 2023-07-05 | End: 2023-09-03

## 2023-08-30 ENCOUNTER — OFFICE VISIT (OUTPATIENT)
Dept: PAIN MANAGEMENT | Age: 53
End: 2023-08-30
Payer: COMMERCIAL

## 2023-08-30 VITALS
DIASTOLIC BLOOD PRESSURE: 83 MMHG | OXYGEN SATURATION: 97 % | BODY MASS INDEX: 33.38 KG/M2 | SYSTOLIC BLOOD PRESSURE: 123 MMHG | HEART RATE: 94 BPM | WEIGHT: 260 LBS

## 2023-08-30 DIAGNOSIS — M19.011 OSTEOARTHRITIS OF RIGHT GLENOHUMERAL JOINT: ICD-10-CM

## 2023-08-30 DIAGNOSIS — M79.604 LOW BACK PAIN RADIATING TO RIGHT LEG: ICD-10-CM

## 2023-08-30 DIAGNOSIS — M54.16 LUMBAR RADICULOPATHY: ICD-10-CM

## 2023-08-30 DIAGNOSIS — G89.29 CHRONIC PAIN OF BOTH KNEES: ICD-10-CM

## 2023-08-30 DIAGNOSIS — M54.50 LOW BACK PAIN RADIATING TO RIGHT LEG: ICD-10-CM

## 2023-08-30 DIAGNOSIS — M25.561 CHRONIC PAIN OF BOTH KNEES: ICD-10-CM

## 2023-08-30 DIAGNOSIS — M47.819 DEGENERATIVE JOINT DISEASE OF SPINAL FACET JOINT: ICD-10-CM

## 2023-08-30 DIAGNOSIS — M51.37 DDD (DEGENERATIVE DISC DISEASE), LUMBOSACRAL: ICD-10-CM

## 2023-08-30 DIAGNOSIS — G89.4 CHRONIC PAIN SYNDROME: ICD-10-CM

## 2023-08-30 DIAGNOSIS — M96.1 FAILED BACK SURGICAL SYNDROME: ICD-10-CM

## 2023-08-30 DIAGNOSIS — M25.562 CHRONIC PAIN OF BOTH KNEES: ICD-10-CM

## 2023-08-30 DIAGNOSIS — M50.30 DDD (DEGENERATIVE DISC DISEASE), CERVICAL: ICD-10-CM

## 2023-08-30 PROCEDURE — 99213 OFFICE O/P EST LOW 20 MIN: CPT | Performed by: INTERNAL MEDICINE

## 2023-08-30 PROCEDURE — 3078F DIAST BP <80 MM HG: CPT | Performed by: INTERNAL MEDICINE

## 2023-08-30 PROCEDURE — 3074F SYST BP LT 130 MM HG: CPT | Performed by: INTERNAL MEDICINE

## 2023-08-30 RX ORDER — METHOCARBAMOL 500 MG/1
500 TABLET, FILM COATED ORAL DAILY PRN
Qty: 30 TABLET | Refills: 0 | Status: SHIPPED | OUTPATIENT
Start: 2023-08-30 | End: 2023-09-29

## 2023-08-30 RX ORDER — PREGABALIN 100 MG/1
100 CAPSULE ORAL DAILY
Qty: 30 CAPSULE | Refills: 1 | Status: SHIPPED | OUTPATIENT
Start: 2023-08-30 | End: 2023-10-29

## 2023-08-30 NOTE — PROGRESS NOTES
medications. Risk of overdose and death, if medicines not taken as prescribed, were also discussed. If the patient develops new symptoms or if the symptoms worsen, the patient should call the office. While transcribing every attempt was made to maintain the accuracy of the note in terms of it's contents,there may have been some errors made inadvertently. Thank you for allowing me to participate in the care of this patient.     Sean Cavazos MD.    Cc: [address] [place],

## 2023-10-16 RX ORDER — LEVOTHYROXINE SODIUM 0.2 MG/1
200 TABLET ORAL DAILY
Qty: 90 TABLET | Refills: 0 | Status: SHIPPED | OUTPATIENT
Start: 2023-10-16

## 2023-10-18 ENCOUNTER — OFFICE VISIT (OUTPATIENT)
Dept: SLEEP MEDICINE | Age: 53
End: 2023-10-18
Payer: COMMERCIAL

## 2023-10-18 VITALS
WEIGHT: 258 LBS | HEART RATE: 85 BPM | TEMPERATURE: 97.2 F | RESPIRATION RATE: 18 BRPM | SYSTOLIC BLOOD PRESSURE: 120 MMHG | OXYGEN SATURATION: 100 % | BODY MASS INDEX: 33.11 KG/M2 | DIASTOLIC BLOOD PRESSURE: 70 MMHG | HEIGHT: 74 IN

## 2023-10-18 DIAGNOSIS — I10 ESSENTIAL HYPERTENSION: ICD-10-CM

## 2023-10-18 DIAGNOSIS — L23.9 ALLERGIC CONTACT DERMATITIS, UNSPECIFIED TRIGGER: ICD-10-CM

## 2023-10-18 DIAGNOSIS — Z99.89 DEPENDENCE ON OTHER ENABLING MACHINES AND DEVICES: ICD-10-CM

## 2023-10-18 DIAGNOSIS — E66.9 OBESITY (BMI 30.0-34.9): ICD-10-CM

## 2023-10-18 DIAGNOSIS — G47.33 OSA ON CPAP: Primary | ICD-10-CM

## 2023-10-18 PROCEDURE — 99214 OFFICE O/P EST MOD 30 MIN: CPT | Performed by: PSYCHIATRY & NEUROLOGY

## 2023-10-18 PROCEDURE — 3074F SYST BP LT 130 MM HG: CPT | Performed by: PSYCHIATRY & NEUROLOGY

## 2023-10-18 PROCEDURE — 3078F DIAST BP <80 MM HG: CPT | Performed by: PSYCHIATRY & NEUROLOGY

## 2023-10-18 ASSESSMENT — SLEEP AND FATIGUE QUESTIONNAIRES
HOW LIKELY ARE YOU TO NOD OFF OR FALL ASLEEP WHILE SITTING AND READING: 2
HOW LIKELY ARE YOU TO NOD OFF OR FALL ASLEEP WHILE LYING DOWN TO REST IN THE AFTERNOON WHEN CIRCUMSTANCES PERMIT: 2
HOW LIKELY ARE YOU TO NOD OFF OR FALL ASLEEP IN A CAR, WHILE STOPPED FOR A FEW MINUTES IN TRAFFIC: 0
HOW LIKELY ARE YOU TO NOD OFF OR FALL ASLEEP WHILE WATCHING TV: 2
HOW LIKELY ARE YOU TO NOD OFF OR FALL ASLEEP WHILE SITTING QUIETLY AFTER LUNCH WITHOUT ALCOHOL: 0
HOW LIKELY ARE YOU TO NOD OFF OR FALL ASLEEP WHILE SITTING INACTIVE IN A PUBLIC PLACE: 2
HOW LIKELY ARE YOU TO NOD OFF OR FALL ASLEEP WHILE SITTING AND TALKING TO SOMEONE: 0
ESS TOTAL SCORE: 8
HOW LIKELY ARE YOU TO NOD OFF OR FALL ASLEEP WHEN YOU ARE A PASSENGER IN A CAR FOR AN HOUR WITHOUT A BREAK: 0

## 2023-10-18 NOTE — PROGRESS NOTES
BP Readings from Last 3 Encounters:   10/18/23 120/70   08/30/23 123/83   07/05/23 112/73    Pulse Readings from Last 3 Encounters:   10/18/23 85   08/30/23 94   07/05/23 90    SpO2 Readings from Last 3 Encounters:   10/18/23 100%   08/30/23 97%   07/05/23 97%        Themandibular molar Class :   [x]1 []2 []3      Mallampati I Airway Classification:   []1 []2 [x]3 []4      Physical Exam  Vitals and nursing note reviewed. Constitutional:       Appearance: Normal appearance. Cardiovascular:      Rate and Rhythm: Normal rate and regular rhythm. Pulmonary:      Effort: Pulmonary effort is normal.      Breath sounds: Normal breath sounds. Skin:     Findings: Rash (face) present.         :        Diagnosis Orders   1. KAR on CPAP        2. Dependence on other enabling machines and devices        3. Essential hypertension        4. Obesity (BMI 30.0-34.9)        5. Allergic contact dermatitis, unspecified trigger            Assessment/Plan:   1. Mild Obstructive sleep apnea syndrome syndrome   Assessment & Plan:  Feels the pressure is not enough,  on PAP at 12.3 cmwp (6-16 cm), I will continue the PAP at 10-16 cmwp. I Encouraged the patient to use the machine on nightly basis, at least 4 hours a night. I instructed the patient to adjust the humidifier (turn it on)as needed for dry mouth. Reviewed and analyzed results of physiologic download from patient's machine and reviewed with patient. Supplies and parts as needed for her machine. These are medically necessary. We dicussed . amd and inspire      2. Hypertension:  Assessment & Plan:  Chronic- Stable. Discussed the importance of treating sleep apnea as part of the management of this disorder. Cont any meds per PCP and other physicians. 3. Morbid obesity:  Assessment & Plan:  Chronic-not stable:  Discussed importance of treating sleep apnea and getting sufficient sleep to assist with weight control.   Encouraged her to work on weight loss through

## 2023-10-27 ENCOUNTER — TELEPHONE (OUTPATIENT)
Dept: ORTHOPEDIC SURGERY | Age: 53
End: 2023-10-27

## 2023-10-27 DIAGNOSIS — M17.12 PRIMARY OSTEOARTHRITIS OF LEFT KNEE: Primary | ICD-10-CM

## 2023-10-27 DIAGNOSIS — M25.562 ACUTE PAIN OF LEFT KNEE: ICD-10-CM

## 2023-10-27 NOTE — TELEPHONE ENCOUNTER
General Question     Subject: patient call and he just need to know if it is time for his Orthovisc injection and he would like to get them scheduled before 11/07/23 if possible. Please Advise.    Patient Teresita Ruiz  Contact Number: [phone]

## 2023-10-30 NOTE — TELEPHONE ENCOUNTER
Called and left message for patient that request will be sent to [address] [place] [address]. I will call patient to schedule once pre cert has checked and obtained authorization.

## 2023-10-31 ENCOUNTER — TELEPHONE (OUTPATIENT)
Dept: ORTHOPEDIC SURGERY | Age: 53
End: 2023-10-31

## 2023-10-31 NOTE — TELEPHONE ENCOUNTER
Called and left message for patient that left knee Orthovisc has been approved. Please call to schedule appointments.

## 2023-11-06 ENCOUNTER — TELEPHONE (OUTPATIENT)
Dept: PAIN MANAGEMENT | Age: 53
End: 2023-11-06

## 2023-11-15 ENCOUNTER — TELEPHONE (OUTPATIENT)
Dept: ORTHOPEDIC SURGERY | Age: 53
End: 2023-11-15

## 2023-11-15 NOTE — TELEPHONE ENCOUNTER
General Question     Subject: INJ QUESTION  Patient and /or Facility Request: Kirstie Katz  Contact Number: [phone]    PT CLLED BECAUSE HIS KNEE FEELS SOME BETTER NOT SURE IF HE NEEDS INJ WANTED TO KNOW HOW LONG THE PA WILL BE GOOD FOR    PLEASE CLL PT TO ADV

## 2023-11-16 NOTE — TELEPHONE ENCOUNTER
Called and spoke with patient. He is feeling better at this time and wants to wait on the injections. He will call back to schedule if he has further issues. Patient aware that if it is in 2024, we will need to recheck insurance for pre cert.

## 2023-11-21 ENCOUNTER — OFFICE VISIT (OUTPATIENT)
Dept: PAIN MANAGEMENT | Age: 53
End: 2023-11-21
Payer: COMMERCIAL

## 2023-11-21 VITALS
DIASTOLIC BLOOD PRESSURE: 61 MMHG | OXYGEN SATURATION: 95 % | SYSTOLIC BLOOD PRESSURE: 101 MMHG | HEART RATE: 104 BPM | BODY MASS INDEX: 33.13 KG/M2 | WEIGHT: 258 LBS

## 2023-11-21 DIAGNOSIS — M54.16 LUMBAR RADICULOPATHY: ICD-10-CM

## 2023-11-21 DIAGNOSIS — M47.819 DEGENERATIVE JOINT DISEASE OF SPINAL FACET JOINT: ICD-10-CM

## 2023-11-21 DIAGNOSIS — G89.4 CHRONIC PAIN SYNDROME: ICD-10-CM

## 2023-11-21 DIAGNOSIS — M96.1 FAILED BACK SURGICAL SYNDROME: ICD-10-CM

## 2023-11-21 DIAGNOSIS — M51.37 DDD (DEGENERATIVE DISC DISEASE), LUMBOSACRAL: ICD-10-CM

## 2023-11-21 DIAGNOSIS — G89.29 CHRONIC PAIN OF BOTH KNEES: ICD-10-CM

## 2023-11-21 DIAGNOSIS — M25.562 CHRONIC PAIN OF BOTH KNEES: ICD-10-CM

## 2023-11-21 DIAGNOSIS — M19.011 OSTEOARTHRITIS OF RIGHT GLENOHUMERAL JOINT: ICD-10-CM

## 2023-11-21 DIAGNOSIS — M54.50 LOW BACK PAIN RADIATING TO RIGHT LEG: ICD-10-CM

## 2023-11-21 DIAGNOSIS — M79.604 LOW BACK PAIN RADIATING TO RIGHT LEG: ICD-10-CM

## 2023-11-21 DIAGNOSIS — M50.30 DDD (DEGENERATIVE DISC DISEASE), CERVICAL: ICD-10-CM

## 2023-11-21 DIAGNOSIS — M25.561 CHRONIC PAIN OF BOTH KNEES: ICD-10-CM

## 2023-11-21 PROCEDURE — 3074F SYST BP LT 130 MM HG: CPT | Performed by: INTERNAL MEDICINE

## 2023-11-21 PROCEDURE — 99213 OFFICE O/P EST LOW 20 MIN: CPT | Performed by: INTERNAL MEDICINE

## 2023-11-21 PROCEDURE — 3078F DIAST BP <80 MM HG: CPT | Performed by: INTERNAL MEDICINE

## 2023-11-21 RX ORDER — PREGABALIN 100 MG/1
100 CAPSULE ORAL DAILY
Qty: 30 CAPSULE | Refills: 1 | Status: SHIPPED | OUTPATIENT
Start: 2023-11-21 | End: 2024-01-20

## 2023-11-21 RX ORDER — SILDENAFIL 100 MG/1
100 TABLET, FILM COATED ORAL PRN
Qty: 10 TABLET | Refills: 1 | Status: SHIPPED | OUTPATIENT
Start: 2023-11-21

## 2023-11-21 RX ORDER — METHOCARBAMOL 500 MG/1
500 TABLET, FILM COATED ORAL DAILY PRN
Qty: 30 TABLET | Refills: 1 | Status: SHIPPED | OUTPATIENT
Start: 2023-11-21 | End: 2024-01-20

## 2023-11-22 NOTE — PROGRESS NOTES
MG/0.1ML LIQD nasal spray 1 spray by Nasal route as needed for Opioid Reversal 1 each 0    Tens Unit MISC by Does not apply route Use as directed. 1 each 0    Cholecalciferol (VITAMIN D3) 50 MCG (2000 UT) CAPS Take by mouth      testosterone cypionate (DEPOTESTOTERONE CYPIONATE) 200 MG/ML injection Inject 1 mL into the muscle every 14 days for 28 days. Must follow-up with endocrinology in the next 8 weeks Max Daily Amount: 200 mg 2 mL 0     No current facility-administered medications for this visit. General Goals of current treatment regimen include improvement in pain, restoration of functioning- with focus on improvement in physical performance, general activity, work or disability,emotional distress, health care utilization and  decreased medication consumption. Will continue to monitor progress towards achieving/maintaining therapeutic goals with special emphasis on  1. Improvement in perceived interfernce  of pain with ADL's. Ability to do home exercises independently. Ability to do household chores indoor and/or outdoor work and social and leisure activities. Improve psychosocial and physical functioning. he is showing progression towards this treatment goal with the current regimen. He was advised against drinking alcohol with the narcotic pain medicines, advised against driving or handling machinery while adjusting the dose of medicines or if having cognitive  issues related to the current medications. Risk of overdose and death, if medicines not taken as prescribed, were also discussed. If the patient develops new symptoms or if the symptoms worsen, the patient should call the office. While transcribing every attempt was made to maintain the accuracy of the note in terms of it's contents,there may have been some errors made inadvertently. Thank you for allowing me to participate in the care of this patient.     Liban Wynne MD.    Cc: [id number], Tashi Oscar DO

## 2023-11-27 RX ORDER — LISINOPRIL 10 MG/1
TABLET ORAL
Qty: 90 TABLET | Refills: 3 | Status: SHIPPED | OUTPATIENT
Start: 2023-11-27

## 2023-11-27 RX ORDER — ROSUVASTATIN CALCIUM 40 MG/1
40 TABLET, COATED ORAL EVERY MORNING
Qty: 90 TABLET | Refills: 3 | Status: SHIPPED | OUTPATIENT
Start: 2023-11-27

## 2023-11-27 RX ORDER — HYDROCHLOROTHIAZIDE 25 MG/1
25 TABLET ORAL EVERY MORNING
Qty: 90 TABLET | Refills: 3 | Status: SHIPPED | OUTPATIENT
Start: 2023-11-27

## 2023-11-27 NOTE — TELEPHONE ENCOUNTER
Medication:   Requested Prescriptions     Pending Prescriptions Disp Refills    rosuvastatin (CRESTOR) 40 MG tablet [Pharmacy Med Name: ROSUVASTATIN CALCIUM 40 MG TAB] 90 tablet 3     Sig: TAKE ONE TABLET BY MOUTH EVERY MORNING    hydroCHLOROthiazide (HYDRODIURIL) 25 MG tablet [Pharmacy Med Name: hydroCHLOROthiazide 25 MG TABLET] 90 tablet 3     Sig: TAKE ONE TABLET BY MOUTH EVERY MORNING    lisinopril (PRINIVIL;ZESTRIL) 10 MG tablet [Pharmacy Med Name: LISINOPRIL 10 MG TABLET] 90 tablet 3     Sig: TAKE ONE TABLET BY MOUTH DAILY       Last Filled:  10/18/2022    Patient Phone Number: [phone] (home)     Last appt: 6/1/2023   Next appt: Visit date not found

## 2023-11-29 ENCOUNTER — TELEPHONE (OUTPATIENT)
Dept: ORTHOPEDIC SURGERY | Age: 53
End: 2023-11-29

## 2023-11-29 NOTE — TELEPHONE ENCOUNTER
General Question     Subject: REQUESTING A CALL REGARDING WHEN TO GET HIS EUFLEXXA INJ AGAIN. KNEE ARE DOING OKAY NOW.   Jaylyn EAGLE   Contact Number: [phone]

## 2023-12-07 LAB
ESTIMATED AVERAGE GLUCOSE: 114
HBA1C MFR BLD: 5.6 %

## 2024-01-05 ENCOUNTER — TELEPHONE (OUTPATIENT)
Dept: PULMONOLOGY | Age: 54
End: 2024-01-05

## 2024-01-05 NOTE — TELEPHONE ENCOUNTER
Pt called because he found mold in his CPAP machine. [facility] and the patient's insurance have agreed to go ahead and get him a new machine but they need a new order sent. Pt states he also needs a new full face mask, hose, & headgear. Please follow up with patient when sent. Pt asked if this request can be expedited.

## 2024-01-08 NOTE — PROGRESS NOTES
Xavier EAGLE David         : 1970  [] [facility]     [] [facility]      [] [facility]     [][facility]    [] [facility]  [x] [facility]  [] [facility]   [] [facility] [] Other:  Diagnosis: [x] KAR (G47.33) [] CSA (G47.31) [] Apnea (G47.30)   Length of Need: [] 12 Months [x] 99 Months [] Other:    Machine (THOM!):  [x] ResMed AirSense     Auto [] Other:     [x]  CPAP () [] Bilevel ()   Mode: [x] Auto [] Spontaneous    Mode: [] Auto [] Spontaneous         New machine.   Between 8 and 14 cm                 Comfort Settings:     If the order for CPAP  - Ramp Pressure: 5 cmH2O                                        - Ramp time: auto min                                     -  Flex/EPR - 3 full time  If the order is for BiLevel:                                    - For ResMed Bilevel (TiMax-4 sec   TiMin- 0.2 sec)     Humidifier: [x] Heated ()        [x] Water chamber replacement ()/ 1 per 6 months        Mask:  Please always start with the mask the patient used during the titraion    [x] Full Face () /1 per 3 months    [x] Patient Choice - Size and fit mask    [] Dispense:     [x] Headgear () / 1 per 3 months    [x] Interface Replacement ()/1 per month            Tubing: [x] Heated ()/1 per 3 months    [] Standard ()/1 per 3 months [] Other:           Filters: [x] Non-disposable ()/1 per 6 months     [x] Ultra-Fine, Disposable ()/2 per month        Miscellaneous: [x] Chin Strap ()/ 1 per 6 months [] O2 bleed-in:       LPM   [] Oximetry on CPAP/Bilevel []  Other:          Start Order Date: 24    MEDICAL JUSTIFICATION:  I, the undersigned, certify that the above prescribed supplies are medically necessary for this patient’s wellbeing.  In my opinion, the supplies are both reasonable and necessary in reference to accepted standards of medicalpractice in treatment of this patient’s condition.    Radha Rehman MD      NPI: [id number]

## 2024-01-10 ENCOUNTER — OFFICE VISIT (OUTPATIENT)
Dept: ORTHOPEDIC SURGERY | Age: 54
End: 2024-01-10
Payer: COMMERCIAL

## 2024-01-10 VITALS — BODY MASS INDEX: 33.11 KG/M2 | WEIGHT: 258 LBS | HEIGHT: 74 IN

## 2024-01-10 DIAGNOSIS — Z96.611 S/P REVERSE TOTAL SHOULDER ARTHROPLASTY, RIGHT: ICD-10-CM

## 2024-01-10 DIAGNOSIS — M25.512 LEFT SHOULDER PAIN, UNSPECIFIED CHRONICITY: Primary | ICD-10-CM

## 2024-01-10 DIAGNOSIS — M75.112 NONTRAUMATIC INCOMPLETE TEAR OF LEFT ROTATOR CUFF: ICD-10-CM

## 2024-01-10 PROCEDURE — 99214 OFFICE O/P EST MOD 30 MIN: CPT | Performed by: ORTHOPAEDIC SURGERY

## 2024-01-10 NOTE — PROGRESS NOTES
external and axillary lateral were taken in our office today reveal no fractures, dislocations, visible tumors, or signs of acute trauma.      Radiographic Impression: Normal shoulder radiographs.      Assessment:  Xavier Hernandez is a 53 y.o. male with anatomic total shoulder of the right shoulder 6 years ago, now seeing us for reinjury of left partial-thickness rotator cuff tear of the left shoulder..    Impression:  Encounter Diagnoses   Name Primary?    Left shoulder pain, unspecified chronicity Yes    Nontraumatic incomplete tear of left rotator cuff     S/P reverse total shoulder arthroplasty, right        Office Procedures:  Orders Placed This Encounter   Procedures    XR SHOULDER LEFT (MIN 2 VIEWS)     Standing Status:   Future     Number of Occurrences:   1     Standing Expiration Date:   1/10/2025     Order Specific Question:   Reason for exam:     Answer:   PAIN    MRI SHOULDER LEFT WO CONTRAST     Standing Status:   Future     Standing Expiration Date:   1/10/2025     Scheduling Instructions:      PROSGAIL ARMAS     Order Specific Question:   Reason for exam:     Answer:   R/O RCT     Order Specific Question:   What is the sedation requirement?     Answer:   None       Plan:   Xavier Hernandez presents for repeat follow-up of left shoulder pain.  At his last follow-up visit he was diagnosed with a partial-thickness rotator cuff tear and underwent an injection and did well with this 18 months ago.  He is back again today stating that he has flared up his left shoulder and that is giving him daily pain.  He is interested in injection today but since his last MRI was in 2019 we would like to obtain a repeat MRI to follow the progression of his partial-thickness rotator cuff tear.  When we see him back from his MRI we will discuss the possibility of another injection of his left shoulder.  At that next visit would also like to obtain radiographs of his right shoulder for monitoring.  We will see him back

## 2024-01-18 ENCOUNTER — TELEPHONE (OUTPATIENT)
Dept: ORTHOPEDIC SURGERY | Age: 54
End: 2024-01-18

## 2024-01-22 ENCOUNTER — OFFICE VISIT (OUTPATIENT)
Dept: ORTHOPEDIC SURGERY | Age: 54
End: 2024-01-22
Payer: COMMERCIAL

## 2024-01-22 VITALS — BODY MASS INDEX: 33.37 KG/M2 | WEIGHT: 260 LBS | HEIGHT: 74 IN

## 2024-01-22 DIAGNOSIS — M25.562 LEFT KNEE PAIN, UNSPECIFIED CHRONICITY: ICD-10-CM

## 2024-01-22 DIAGNOSIS — M25.561 RIGHT KNEE PAIN, UNSPECIFIED CHRONICITY: Primary | ICD-10-CM

## 2024-01-22 PROCEDURE — 99214 OFFICE O/P EST MOD 30 MIN: CPT | Performed by: ORTHOPAEDIC SURGERY

## 2024-01-22 NOTE — PROGRESS NOTES
Date:  2024    Name:  Xavier Hernandez  Address:  [address] [facility] [address] [facility] [address]    :  1970      Age:   53 y.o.    SSN:  [id number]      Medical Record Number:  [id number]    Reason for Visit:    Chief Complaint    Knee Pain (New patient bilateral knees )      DOS:2024     HPI: Xavier Hernandez is a 53 y.o. male here today for both knees.  He has a history of chronic knee pain and known arthritis in both knees, left side more symptomatic than the right.  He has undergone gel injections in the past with good results and his last gel injection was in the spring 2023.  He has pain when walking longer distances and going up and down the stairs.  He actually says that recently his knee pain has been a little bit better controlled.      Pain Assessment  Location of Pain: Knee  ROS: Review of systems reviewed from Patient History Form completed today and available in the patient's chart under the Media tab.       Past Medical History:   Diagnosis Date    Chronic pain syndrome     DDD (degenerative disc disease), cervical     Degenerative joint disease of spinal facet joint     Depressive disorder, not elsewhere classified     Dizziness     Failed back surgical syndrome     GERD (gastroesophageal reflux disease)     Headache(784.0)     HTN (hypertension)     Hyperlipidemia     Lumbar radiculopathy     Sleep apnea     does use CPAP    thyroid cancer     thyroid    Thyroid disease     Wears glasses         Past Surgical History:   Procedure Laterality Date    CARPAL TUNNEL RELEASE Left 3-5-2013    CARPAL TUNNEL RELEASE Right 2013    COLONOSCOPY N/A 10/15/2019    COLONOSCOPY WITH BIOPSY performed by Charles Chávez MD at [facility] ENDOSCOPY    COLONOSCOPY N/A 10/15/2019    COLONOSCOPY POLYPECTOMY SNARE/COLD BIOPSY performed by Charles Chávez MD at [facility] ENDOSCOPY    HAND SURGERY Left 2016    First dorsal extensor compartment tendon release.    JOINT REPLACEMENT Right     shoulder    KNEE ARTHROSCOPY Right

## 2024-02-06 ENCOUNTER — OFFICE VISIT (OUTPATIENT)
Dept: ORTHOPEDIC SURGERY | Age: 54
End: 2024-02-06

## 2024-02-06 VITALS — HEIGHT: 74 IN | BODY MASS INDEX: 33.37 KG/M2 | WEIGHT: 260 LBS

## 2024-02-06 DIAGNOSIS — M77.12 LEFT TENNIS ELBOW: ICD-10-CM

## 2024-02-06 DIAGNOSIS — M75.22 BICEPS TENDONITIS, LEFT: ICD-10-CM

## 2024-02-06 DIAGNOSIS — M75.112 INCOMPLETE TEAR OF LEFT ROTATOR CUFF, UNSPECIFIED WHETHER TRAUMATIC: Primary | ICD-10-CM

## 2024-02-06 RX ORDER — LIDOCAINE HYDROCHLORIDE 10 MG/ML
8 INJECTION, SOLUTION INFILTRATION; PERINEURAL ONCE
Status: COMPLETED | OUTPATIENT
Start: 2024-02-06 | End: 2024-02-06

## 2024-02-06 RX ORDER — METHYLPREDNISOLONE ACETATE 40 MG/ML
80 INJECTION, SUSPENSION INTRA-ARTICULAR; INTRALESIONAL; INTRAMUSCULAR; SOFT TISSUE ONCE
Status: COMPLETED | OUTPATIENT
Start: 2024-02-06 | End: 2024-02-06

## 2024-02-06 RX ADMIN — METHYLPREDNISOLONE ACETATE 80 MG: 40 INJECTION, SUSPENSION INTRA-ARTICULAR; INTRALESIONAL; INTRAMUSCULAR; SOFT TISSUE at 16:50

## 2024-02-06 RX ADMIN — LIDOCAINE HYDROCHLORIDE 8 ML: 10 INJECTION, SOLUTION INFILTRATION; PERINEURAL at 16:49

## 2024-02-06 NOTE — PROGRESS NOTES
demonstrates a near full-thickness tear of the superior one third of the subscapularis.  There is biceps tendinopathy without dislocation rupture.  There is moderate supraspinatus tendinopathy and interstitial tearing without a retracted tear.  There is AC joint arthropathy and mild glenohumeral arthropathy and superior labral tear.    Assessment:  Xavier Hernandez is a 53 y.o. male with left shoulder pain consistent with biceps tendinitis and partial thickness rotator cuff tearing of the supraspinatus and subscapularis. There has been only very modest progression since the last MRI.    Impression:  Encounter Diagnoses   Name Primary?    Incomplete tear of left rotator cuff, unspecified whether traumatic Yes    Biceps tendonitis, left     Left tennis elbow        Office Procedures:  Orders Placed This Encounter   Procedures    91940 - [place] DRAIN/INJECT LARGE JOINT/BURSA     The indications and risks of steroid injection as well as treatment alternatives were discussed with the patient who consented to the procedure. Under sterile conditions and after informed consent was obtained the patient was given an injection into the left knee. 2 cc of 40 mg Depo-Medrol (methylprednisolone) and 4 ml of 1.0% Lidocaine were placed in the shoulder after it was prepped with chlorhexidine.  This resulted in good relief of symptoms.  There were no complications. The patient was advised to ice the shoulder this evening and to avoid vigorous activities for the next 2 days.  They were advised to call us if there was any erythema, enduration, swelling or increasing pain.      Plan:   We discussed the diagnosis and treatment options with Xavier Hernandez.  There has been some interval progression of his partial-thickness tearing to the supraspinatus in addition to some partial upper border subscapularis tearing.  However, his function is quite good and his strength is good on exam today.  He is not interested in proceeding with any surgical

## 2024-02-13 ENCOUNTER — OFFICE VISIT (OUTPATIENT)
Dept: PAIN MANAGEMENT | Age: 54
End: 2024-02-13
Payer: COMMERCIAL

## 2024-02-13 VITALS
WEIGHT: 256 LBS | SYSTOLIC BLOOD PRESSURE: 124 MMHG | BODY MASS INDEX: 32.85 KG/M2 | OXYGEN SATURATION: 98 % | HEART RATE: 107 BPM | DIASTOLIC BLOOD PRESSURE: 74 MMHG

## 2024-02-13 DIAGNOSIS — M25.562 CHRONIC PAIN OF BOTH KNEES: ICD-10-CM

## 2024-02-13 DIAGNOSIS — M19.011 OSTEOARTHRITIS OF RIGHT GLENOHUMERAL JOINT: ICD-10-CM

## 2024-02-13 DIAGNOSIS — M25.561 CHRONIC PAIN OF BOTH KNEES: ICD-10-CM

## 2024-02-13 DIAGNOSIS — M54.16 LUMBAR RADICULOPATHY: ICD-10-CM

## 2024-02-13 DIAGNOSIS — M51.37 DDD (DEGENERATIVE DISC DISEASE), LUMBOSACRAL: ICD-10-CM

## 2024-02-13 DIAGNOSIS — G89.4 CHRONIC PAIN SYNDROME: ICD-10-CM

## 2024-02-13 DIAGNOSIS — M50.30 DDD (DEGENERATIVE DISC DISEASE), CERVICAL: ICD-10-CM

## 2024-02-13 DIAGNOSIS — G89.29 CHRONIC PAIN OF BOTH KNEES: ICD-10-CM

## 2024-02-13 DIAGNOSIS — M96.1 FAILED BACK SURGICAL SYNDROME: ICD-10-CM

## 2024-02-13 DIAGNOSIS — M79.604 LOW BACK PAIN RADIATING TO RIGHT LEG: ICD-10-CM

## 2024-02-13 DIAGNOSIS — M47.819 DEGENERATIVE JOINT DISEASE OF SPINAL FACET JOINT: ICD-10-CM

## 2024-02-13 DIAGNOSIS — M54.50 LOW BACK PAIN RADIATING TO RIGHT LEG: ICD-10-CM

## 2024-02-13 PROCEDURE — 3078F DIAST BP <80 MM HG: CPT | Performed by: INTERNAL MEDICINE

## 2024-02-13 PROCEDURE — 99213 OFFICE O/P EST LOW 20 MIN: CPT | Performed by: INTERNAL MEDICINE

## 2024-02-13 PROCEDURE — 3074F SYST BP LT 130 MM HG: CPT | Performed by: INTERNAL MEDICINE

## 2024-02-13 RX ORDER — PREGABALIN 100 MG/1
100 CAPSULE ORAL DAILY
Qty: 30 CAPSULE | Refills: 1 | Status: SHIPPED | OUTPATIENT
Start: 2024-02-13 | End: 2024-04-13

## 2024-02-13 RX ORDER — SILDENAFIL 100 MG/1
100 TABLET, FILM COATED ORAL PRN
Qty: 10 TABLET | Refills: 1 | Status: SHIPPED | OUTPATIENT
Start: 2024-02-13

## 2024-02-13 NOTE — PROGRESS NOTES
medicines not taken as prescribed, were also discussed. If the patient develops new symptoms or if the symptoms worsen, the patient should call the office.    While transcribing every attempt was made to maintain the accuracy of the note in terms of it's contents,there may have been some errors made inadvertently.  Thank you for allowing me to participate in the care of this patient.    Arron Musa MD.    Cc: Carlita Dalal DO    I, Ilda Gonzalez, scribing for in the presence  of Dr. Arron Musa.   02/13/24  Ilda Gonzalez, Medical Assistant   I, Dr. Arron Musa, personally performed the services described in this documentation as scribed by  Ilda Gonzalez MA in my presence and it is both accurate and complete

## 2024-02-19 ENCOUNTER — TELEPHONE (OUTPATIENT)
Dept: PAIN MANAGEMENT | Age: 54
End: 2024-02-19

## 2024-02-19 NOTE — TELEPHONE ENCOUNTER
Pt had appt on 02.13 and only got Lyrical refilled.    Pt states all meds were supposed to be refilled at office visit.    Pt request the rest of his meds get refilled.      [facility] [id number] - [place], [place] - [address] [place] [address] - P [phone]

## 2024-02-19 NOTE — TELEPHONE ENCOUNTER
Called patient LVM, Please be advised when patient calls please inform him which medication does he needs, due to his last visit Per RSM has sent his Lyrica, Viagra has been sent to his local pharmacy. Per [facility]SM discontinued the Robxin because the patient stated that he is no longer taking the Robaxin medication.    Please be advised.

## 2024-05-07 ENCOUNTER — OFFICE VISIT (OUTPATIENT)
Dept: PAIN MANAGEMENT | Age: 54
End: 2024-05-07

## 2024-05-07 VITALS
BODY MASS INDEX: 35.42 KG/M2 | SYSTOLIC BLOOD PRESSURE: 116 MMHG | WEIGHT: 276 LBS | OXYGEN SATURATION: 94 % | HEART RATE: 90 BPM | DIASTOLIC BLOOD PRESSURE: 81 MMHG

## 2024-05-07 DIAGNOSIS — M50.30 DDD (DEGENERATIVE DISC DISEASE), CERVICAL: ICD-10-CM

## 2024-05-07 DIAGNOSIS — M96.1 FAILED BACK SURGICAL SYNDROME: ICD-10-CM

## 2024-05-07 DIAGNOSIS — M51.37 DDD (DEGENERATIVE DISC DISEASE), LUMBOSACRAL: ICD-10-CM

## 2024-05-07 DIAGNOSIS — M79.604 LOW BACK PAIN RADIATING TO RIGHT LEG: ICD-10-CM

## 2024-05-07 DIAGNOSIS — M19.011 OSTEOARTHRITIS OF RIGHT GLENOHUMERAL JOINT: ICD-10-CM

## 2024-05-07 DIAGNOSIS — G89.29 CHRONIC PAIN OF BOTH KNEES: ICD-10-CM

## 2024-05-07 DIAGNOSIS — M47.819 DEGENERATIVE JOINT DISEASE OF SPINAL FACET JOINT: ICD-10-CM

## 2024-05-07 DIAGNOSIS — M54.50 LOW BACK PAIN RADIATING TO RIGHT LEG: ICD-10-CM

## 2024-05-07 DIAGNOSIS — M25.561 CHRONIC PAIN OF BOTH KNEES: ICD-10-CM

## 2024-05-07 DIAGNOSIS — M25.562 CHRONIC PAIN OF BOTH KNEES: ICD-10-CM

## 2024-05-07 DIAGNOSIS — M54.16 LUMBAR RADICULOPATHY: ICD-10-CM

## 2024-05-07 DIAGNOSIS — G89.4 CHRONIC PAIN SYNDROME: ICD-10-CM

## 2024-05-07 RX ORDER — PREGABALIN 100 MG/1
100 CAPSULE ORAL DAILY
Qty: 30 CAPSULE | Refills: 1 | Status: SHIPPED | OUTPATIENT
Start: 2024-05-07 | End: 2024-07-06

## 2024-05-07 RX ORDER — METHOCARBAMOL 500 MG/1
500 TABLET, FILM COATED ORAL DAILY PRN
Qty: 30 TABLET | Refills: 1 | Status: SHIPPED | OUTPATIENT
Start: 2024-05-07 | End: 2024-07-06

## 2024-05-07 NOTE — PROGRESS NOTES
Xavier Hernandez  1970  [id number]    HISTORY OF PRESENT ILLNESS:  Mr. Hernandez is a 54 y.o. male returns for a follow up visit for multiple medical problems.  His  presenting problems are   1. Chronic pain syndrome    2. Chronic pain of both knees    3. DDD (degenerative disc disease), cervical    4. Low back pain radiating to right leg    5. Degenerative joint disease of spinal facet joint    6. Failed back surgical syndrome    7. Lumbar radiculopathy    8. Osteoarthritis of right glenohumeral joint    9. Mood disorder (HCC)    10. DDD (degenerative disc disease), lumbosacral    11. Primary insomnia    .    As per information/history obtained from the PADT(patient assessment and documentation tool) -  He complains of pain in the lower back with radiation to the buttocks and hips Bilateral He rates the pain 5/10 and describes it as aching, burning.  Pain is made worse by: movement, walking, standing.  Current treatment regimen has helped relieve about 60% of the pain.  He denies side effects from the current pain regimen.   Patient reports that since last follow up visit the physical functioning is unchanged, family/social relationships are unchanged, mood is unchanged sleep patterns are better.  Mr. Hernandez states that since starting the treatment with the current regimen the  overall functioning  in the above aspects is  better,Patient denies neurological bowel or bladder. Patient denies misusing/abusing his narcotic pain medications or using any illegal drugs.  There are No indicators for possible drug abuse, addiction or diversion problems, Upon obtaining the medical history from Mr. Hernandez regarding today's office visit for his presenting problems, patient states he has been doing fair, he is managing with the medications. Mr. Hernandez reports he is still working part time still and is on disability from [employer]. He mentions he is using Robaxin along with Lyrica PRN. Patient denies any constipation symptoms. He

## 2024-06-26 ENCOUNTER — TELEPHONE (OUTPATIENT)
Dept: FAMILY MEDICINE CLINIC | Age: 54
End: 2024-06-26

## 2024-07-01 NOTE — TELEPHONE ENCOUNTER
GAVE [facility] MEDICAL RECORDS 10/09/2019 TO PRESENT TO MARGARITO CRENSHAW TO SCAN IN MRO TO [facility]ays / [address] [place] [address] OFFICE VISIT      Patient: Donna Lopez Date of Service: 2024   : 1958 MRN: [id number]     SUBJECTIVE:     Chief Complaint   Patient presents with   • Office Visit     Pt  presents with c/o bronchitis.    • Follow-up     Discuss Labs          HISTORY OF PRESENT ILLNESS:  The patient is a 65-year-old female who is here to follow up on her blood pressure and discuss blood work. At her last visit, we increased her valsartan from 80 to 160 mg. We did repeat her basic metabolic panel. We are concerned about the creatinine. The creatinine did elevate a little bit and so today she is here for follow-up to discuss that and repeat her basic metabolic panel.    The patient's blood pressure readings at the allergist's office were recorded as 112/78 in the left arm and 124/80 in the right arm. Despite not monitoring her blood pressure at home, she reports feeling well overall.    The patient presents with a new complaint of potential bronchitis, characterized by sinus pressure, morning sneezing, and coughing, a symptom she typically experiences twice a year. These symptoms commenced approximately 5 days post-recovery of her kitchen remodeling. She attributes these symptoms to a combination of inhalations and recurrent nosebleeds.    PAST MEDICAL HISTORY:  History reviewed. No pertinent past medical history.    MEDICATIONS:  Current Outpatient Medications   Medication Sig   • valsartan (DIOVAN) 160 MG tablet Take 1 tablet by mouth daily.   • azithromycin (Zithromax Z-Eric) 250 MG tablet 2 tablets day one, then 1 tablet days 2-5   • EPINEPHrine 0.3 MG/0.3ML auto-injector USE AS DIRECTED FOR ANAPHYLACTIC EMERGENCY   • BD Insulin Syringe U/F 31G X \" 1 ML Misc USE 1 SYRINGE EVERY OTHER DAY FOR ALLERGY USE AS DIRECTED   • amLODIPine (NORVASC) 2.5 MG tablet TAKE 1 TABLET BY MOUTH EVERY DAY   • albuterol (ProAir HFA) 108 (90 Base) MCG/ACT inhaler INHALE 1-2 PUFFS EVERY 4-6 HOURS AS NEEDED AS DIRECTED   • valACYclovir  (VALTREX) 500 MG tablet Take 500 mg by mouth as needed.   • fluticasone (FLONASE) 50 MCG/ACT nasal spray Spray 2 sprays in each nostril as needed.   • loratadine (CLARITIN) 10 MG tablet Take 10 mg by mouth as needed.     No current facility-administered medications for this visit.       ALLERGIES:  ALLERGIES:   Allergen Reactions   • Levaquin Other (See Comments)     had multiple side effects, ringing in ears, fatigue, weak legs, gen. weakness.     • Penicillins PRURITUS   • Prevacid HIVES              PAST SURGICAL HISTORY:  Past Surgical History:   Procedure Laterality Date   • Esophagogastroduodenoscopy transoral flex w/bx single or mult     • Open access colonoscopy     • [place] tooth extraction         FAMILY HISTORY:  Family History   Problem Relation Age of Onset   • Congestive Heart Failure Mother    • Cancer, Prostate Father        SOCIAL HISTORY:  Social History     Tobacco Use   Smoking Status Former   • Current packs/day: 0.00   • Types: Cigarettes   • Quit date:    • Years since quittin.5   Smokeless Tobacco Never     Social History     Substance and Sexual Activity   Alcohol Use Yes   • Alcohol/week: 7.0 standard drinks of alcohol   • Types: 7 Glasses of wine per week    Comment: occasionally       Review of Systems   All other systems reviewed and are negative.      Patient Active Problem List   Diagnosis   • Allergic rhinitis   • Asthma, mild intermittent (CMD)   • Benign essential HTN   • Gastroesophageal reflux disease without esophagitis   • Impaired fasting glucose   • Menopause   • Screening for colon cancer   • Chondromalacia of knee   • Vitamin D deficiency   • Hypercholesterolemia with hypertriglyceridemia   • Elevated hemoglobin (CMD)   • Rheumatoid arthritis of hand  (CMD)   • ANGEL (nonalcoholic steatohepatitis)       Patient's medications, allergies, past medical, surgical, social and family histories were reviewed and updated as appropriate.  Patient's past medical, surgical,  social and family histories were reviewed and updated as appropriate.    OBJECTIVE:     Visit Vitals  /86 (BP Location: LUE - Left upper extremity, Patient Position: Sitting, Cuff Size: Regular)   Pulse (!) 106   Temp 97.6 °F (36.4 °C) (Temporal)   Resp 16   Ht 5' 4\" (1.626 m)   Wt 81.5 kg (179 lb 11.2 oz)   SpO2 97%   BMI 30.85 kg/m²       Physical Exam  Constitutional:       Appearance: She is well-developed.   HENT:      Head: Normocephalic and atraumatic.      Neck: Neck supple.   Eyes:      Conjunctiva/sclera: Conjunctivae normal.      Pupils: Pupils are equal, round, and reactive to light.   Cardiovascular:      Rate and Rhythm: Normal rate and regular rhythm.      Heart sounds: Normal heart sounds. No murmur heard.  Pulmonary:      Effort: Pulmonary effort is normal.      Breath sounds: Normal breath sounds. No wheezing or rales.   Abdominal:      General: Bowel sounds are normal.      Palpations: Abdomen is soft.      Tenderness: There is no abdominal tenderness.   Skin:     Findings: No rash.   Neurological:      Mental Status: She is alert and oriented to person, place, and time.      Sensory: No sensory deficit.         DIAGNOSTIC STUDIES:   LAB RESULTS:  Office Visit on 06/08/2024   Component Date Value Ref Range Status   • Sodium 06/08/2024 142  135 - 145 mmol/L Final   • Potassium 06/08/2024 4.2  3.4 - 5.1 mmol/L Final   • Chloride 06/08/2024 110  97 - 110 mmol/L Final   • Carbon Dioxide 06/08/2024 27  21 - 32 mmol/L Final   • Anion Gap 06/08/2024 9  7 - 19 mmol/L Final   • Glucose 06/08/2024 105 (H)  70 - 99 mg/dL Final   • BUN 06/08/2024 24 (H)  6 - 20 mg/dL Final   • Creatinine 06/08/2024 1.18 (H)  0.51 - 0.95 mg/dL Final   • Glomerular Filtration Rate 06/08/2024 51 (L)  >=60 Final   • BUN/Cr 06/08/2024 20  7 - 25 Final   • Calcium 06/08/2024 9.2  8.4 - 10.2 mg/dL Final       Assessment AND PLAN:   This is a 65 year old year-old female who presents with :    ASSESSMENT/PLAN:  1.  Hypertension.  The patient's hypertension medication has been reviewed. She is currently on a regimen of valsartan 80 mg, administered once in the morning and once in the afternoon. Consequently, the dosage will be adjusted to 160 mg. As long as her creatinine remains stable, the current dosage will be maintained. A follow-up appointment is scheduled for 11/2024 for her routine visit, with lab tests to be conducted prior to the visit. Should her creatinine levels remain slightly elevated, the current 160 mg dosage will be continued and a repeat blood test will be conducted in 2 to 4 weeks. If the creatinine levels are significantly elevated, the valsartan will be discontinued and the initiation of a beta-blocker will be initiated.    2. Acute sinusitis and acute bronchitis.  Azithromycin will be prescribed. She has previously tolerated azithromycin well. She understands and agrees with the plan.    Follow-up  The patient is scheduled for a follow-up in 11/2024 or as required.      Schedule follow up: 4 month(s)

## 2024-07-31 NOTE — TELEPHONE ENCOUNTER
Left message for patient Dr Sol Atkins is still working on forms Refill Routing Note   Medication(s) are not appropriate for processing by [facility] for the following reason(s):        Required vitals abnormal  Outside of protocol    [facility] action(s):  Defer  Route               Appointments  past 12m or future 3m with PCP    Date Provider   Last Visit   5/15/2024 Esther Whitley MD   Next Visit   8/14/2024 Esther Whitley MD   ED visits in past 90 days: 1        Note composed:4:53 PM 07/31/2024

## 2024-10-24 ENCOUNTER — OFFICE VISIT (OUTPATIENT)
Dept: SLEEP MEDICINE | Age: 54
End: 2024-10-24
Payer: MEDICARE

## 2024-10-24 VITALS
DIASTOLIC BLOOD PRESSURE: 80 MMHG | WEIGHT: 275 LBS | HEART RATE: 96 BPM | HEIGHT: 74 IN | RESPIRATION RATE: 18 BRPM | BODY MASS INDEX: 35.29 KG/M2 | SYSTOLIC BLOOD PRESSURE: 130 MMHG | OXYGEN SATURATION: 100 % | TEMPERATURE: 98 F

## 2024-10-24 DIAGNOSIS — Z99.89 DEPENDENCE ON OTHER ENABLING MACHINES AND DEVICES: ICD-10-CM

## 2024-10-24 DIAGNOSIS — I10 ESSENTIAL HYPERTENSION: ICD-10-CM

## 2024-10-24 DIAGNOSIS — E66.01 CLASS 2 SEVERE OBESITY DUE TO EXCESS CALORIES WITH SERIOUS COMORBIDITY AND BODY MASS INDEX (BMI) OF 35.0 TO 35.9 IN ADULT: ICD-10-CM

## 2024-10-24 DIAGNOSIS — E66.812 CLASS 2 SEVERE OBESITY DUE TO EXCESS CALORIES WITH SERIOUS COMORBIDITY AND BODY MASS INDEX (BMI) OF 35.0 TO 35.9 IN ADULT: ICD-10-CM

## 2024-10-24 DIAGNOSIS — G47.33 OSA ON CPAP: Primary | ICD-10-CM

## 2024-10-24 PROCEDURE — G8427 DOCREV CUR MEDS BY ELIG CLIN: HCPCS | Performed by: PSYCHIATRY & NEUROLOGY

## 2024-10-24 PROCEDURE — 99214 OFFICE O/P EST MOD 30 MIN: CPT | Performed by: PSYCHIATRY & NEUROLOGY

## 2024-10-24 PROCEDURE — 1036F TOBACCO NON-USER: CPT | Performed by: PSYCHIATRY & NEUROLOGY

## 2024-10-24 PROCEDURE — 3079F DIAST BP 80-89 MM HG: CPT | Performed by: PSYCHIATRY & NEUROLOGY

## 2024-10-24 PROCEDURE — G8417 CALC BMI ABV UP PARAM F/U: HCPCS | Performed by: PSYCHIATRY & NEUROLOGY

## 2024-10-24 PROCEDURE — 3075F SYST BP GE 130 - 139MM HG: CPT | Performed by: PSYCHIATRY & NEUROLOGY

## 2024-10-24 PROCEDURE — G8484 FLU IMMUNIZE NO ADMIN: HCPCS | Performed by: PSYCHIATRY & NEUROLOGY

## 2024-10-24 PROCEDURE — 3017F COLORECTAL CA SCREEN DOC REV: CPT | Performed by: PSYCHIATRY & NEUROLOGY

## 2024-10-24 ASSESSMENT — SLEEP AND FATIGUE QUESTIONNAIRES
HOW LIKELY ARE YOU TO NOD OFF OR FALL ASLEEP WHILE SITTING AND TALKING TO SOMEONE: WOULD NEVER DOZE
HOW LIKELY ARE YOU TO NOD OFF OR FALL ASLEEP WHILE WATCHING TV: MODERATE CHANCE OF DOZING
HOW LIKELY ARE YOU TO NOD OFF OR FALL ASLEEP WHILE SITTING QUIETLY AFTER LUNCH WITHOUT ALCOHOL: WOULD NEVER DOZE
HOW LIKELY ARE YOU TO NOD OFF OR FALL ASLEEP WHILE SITTING AND READING: SLIGHT CHANCE OF DOZING
HOW LIKELY ARE YOU TO NOD OFF OR FALL ASLEEP WHILE SITTING INACTIVE IN A PUBLIC PLACE: WOULD NEVER DOZE
HOW LIKELY ARE YOU TO NOD OFF OR FALL ASLEEP WHEN YOU ARE A PASSENGER IN A CAR FOR AN HOUR WITHOUT A BREAK: WOULD NEVER DOZE
HOW LIKELY ARE YOU TO NOD OFF OR FALL ASLEEP IN A CAR, WHILE STOPPED FOR A FEW MINUTES IN TRAFFIC: WOULD NEVER DOZE
HOW LIKELY ARE YOU TO NOD OFF OR FALL ASLEEP WHILE LYING DOWN TO REST IN THE AFTERNOON WHEN CIRCUMSTANCES PERMIT: WOULD NEVER DOZE
ESS TOTAL SCORE: 3

## 2024-10-24 NOTE — PROGRESS NOTES
Xavier Hernandez         : 1970  [] [facility]     [] [facility]      [] [facility]     [facility]    [] [facility]  [[facility]] [facility]  [] [facility]   [] [facility] [] Other:  Diagnosis: [x] KAR (G47.33) [] CSA (G47.31) [] Apnea (G47.30)   Length of Need: [] 12 Months [x] 99 Months [] Other:    Machine (THOM!):  [x] ResMed AirSense     Auto [] Other:       Humidifier: [x] Heated ()        [x] Water chamber replacement ()/ 1 per 6 months        Mask:  Please always start with the mask the patient used during the titraion    [x] Full Face () /1 per 3 months    [x] Patient Choice - Size and fit mask    [] Dispense:     [x] Headgear () / 1 per 3 months    [x] Interface Replacement ()/1 per month            Tubing: [x] Heated ()/1 per 3 months    [] Standard ()/1 per 3 months [] Other:           Filters: [x] Non-disposable ()/1 per 6 months     [x] Ultra-Fine, Disposable ()/2 per month        Miscellaneous: [x] Chin Strap ()/ 1 per 6 months [] O2 bleed-in:       LPM   [] Oximetry on CPAP/Bilevel []  Other:          Start Order Date: 10/24/24    MEDICAL JUSTIFICATION:  I, the undersigned, certify that the above prescribed supplies are medically necessary for this patient’s wellbeing.  In my opinion, the supplies are both reasonable and necessary in reference to accepted standards of medicalpractice in treatment of this patient’s condition.    Radha Rehman MD      NPI: [id number]       Order Signed Date: 10/24/24    Electronically signed by Radha Rehman MD on 10/24/2024 at 10:44 AM

## 2024-10-24 NOTE — PROGRESS NOTES
cervical     Degenerative joint disease of spinal facet joint     Depressive disorder, not elsewhere classified     Dizziness     Failed back surgical syndrome     GERD (gastroesophageal reflux disease)     Headache(784.0)     HTN (hypertension)     Hyperlipidemia     Lumbar radiculopathy     Sleep apnea     does use CPAP    thyroid cancer     thyroid    Thyroid disease     Wears glasses        Past Surgical History:   Procedure Laterality Date    CARPAL TUNNEL RELEASE Left 3-5-2013    CARPAL TUNNEL RELEASE Right 4-    COLONOSCOPY N/A 10/15/2019    COLONOSCOPY WITH BIOPSY performed by Charles Chávez MD at [facility] ENDOSCOPY    COLONOSCOPY N/A 10/15/2019    COLONOSCOPY POLYPECTOMY SNARE/COLD BIOPSY performed by Charles Chávez MD at [facility] ENDOSCOPY    HAND SURGERY Left 1-5-2016    First dorsal extensor compartment tendon release.    JOINT REPLACEMENT Right     shoulder    KNEE ARTHROSCOPY Right 2/26/2020    RIGHT KNEE ARTHROSCOPY MEDIAL MENISCECTOMY AND CHONDROPLASTY performed by Yury Knox MD at [facility] OR    LAPAROSCOPIC APPENDECTOMY N/A 2/11/2023    LAPAROSCOPIC APPENDECTOMY performed by Jakob Mejia MD at [facility] OR    WI DCMPRN FASCT F/ARM&/WRST FLXR/XTNSR W/DBRDMT Right 4/8/2019    RIGHT OPEN TENNIS ELBOW RELEASE performed by Chago Ruelas MD at [facility] OR    ROTATOR CUFF REPAIR Right 1993    right    SPINAL FUSION  2006    THYROIDECTOMY  2008    UPPER GASTROINTESTINAL ENDOSCOPY N/A 1/29/2019    EGD DIAGNOSTIC ONLY performed by Jimmy Queen MD at [facility] ENDOSCOPY    UPPER GASTROINTESTINAL ENDOSCOPY N/A 10/15/2019    EGD DILATION BALLOON performed by Charles Chávez MD at [facility] ENDOSCOPY    UPPER GASTROINTESTINAL ENDOSCOPY N/A 10/15/2019    EGD BIOPSY performed by Charles Chávez MD at [facility] ENDOSCOPY       Family History   Problem Relation Age of Onset    High Blood Pressure Mother     Narcolepsy Father     Diabetes Father     Heart Failure Father     Hypertension Father     High Blood Pressure Father     Stroke

## 2024-11-15 ENCOUNTER — OFFICE VISIT (OUTPATIENT)
Dept: ORTHOPEDIC SURGERY | Age: 54
End: 2024-11-15

## 2024-11-15 VITALS — RESPIRATION RATE: 16 BRPM | WEIGHT: 275 LBS | HEIGHT: 74 IN | BODY MASS INDEX: 35.29 KG/M2

## 2024-11-15 DIAGNOSIS — M65.30 TRIGGER FINGER, ACQUIRED: Primary | ICD-10-CM

## 2024-11-15 RX ORDER — BUPIVACAINE HYDROCHLORIDE 2.5 MG/ML
0.5 INJECTION, SOLUTION INFILTRATION; PERINEURAL ONCE
Status: COMPLETED | OUTPATIENT
Start: 2024-11-15 | End: 2024-11-15

## 2024-11-15 RX ORDER — TRIAMCINOLONE ACETONIDE 40 MG/ML
20 INJECTION, SUSPENSION INTRA-ARTICULAR; INTRAMUSCULAR ONCE
Status: COMPLETED | OUTPATIENT
Start: 2024-11-15 | End: 2024-11-15

## 2024-11-15 RX ADMIN — BUPIVACAINE HYDROCHLORIDE 1.25 MG: 2.5 INJECTION, SOLUTION INFILTRATION; PERINEURAL at 14:50

## 2024-11-15 RX ADMIN — TRIAMCINOLONE ACETONIDE 20 MG: 40 INJECTION, SUSPENSION INTRA-ARTICULAR; INTRAMUSCULAR at 14:50

## 2024-11-16 NOTE — PATIENT INSTRUCTIONS
Information & Instructions   After Finger, Hand, Wrist, or Elbow Injection    Robe Almaraz MD    You have received an injection of local anesthetic (Bupivicaine without Epinephrine) for comfort & a steroid (Kenalog) for it’s strong anti-inflammatory effects. In order to give the medication a chance to reduce your inflammation and discomfort, it is recommended that you take it easy for a day or so.  You may use your hand and arm as you feel comfortable, but you should avoid highly strenuous activity and heavy use for several days.    Relief from the injection will often not begin for several days, and you may not feel full relief for up to one month.      It is not uncommon to experience some local discomfort or pain at or around the injection site for a few days.  To relieve these symptoms you may do the following if you feel necessary:       Apply ice to the affected area 20 minutes on and 20 minutes off.   Do not apply ice directly to the skin. Use a thin layer (T-shirt, pillowcase, towel, etc.) to protect the skin.     - If allowed by your other medical physicians, you may take -     2 Tylenol extra strength tablets every 4-6 hours       1-2 Aleve tablets twice a day     2-3 Advil tablets two to three times a day    If you are diabetic, the steroid medication may increase your blood sugar, so you are advised to monitor your sugar more closely so you can adjust it accordingly for a few days following your injection.  If you need assistance with the control of you blood sugar, please contact you primary care physician for further advice.    I will request that you please call the office one month after your injection at [phone] if you have not experienced relief of your symptoms (unless I have instructed you otherwise).  If your injection has given you good relief of you symptoms as expected, then you only need to call the office if your symptoms return.

## 2024-11-16 NOTE — PROGRESS NOTES
Mr. Xavier Hernandez is a 54 y.o. right handed man  who is seen today in [facility] at the request of Carlita Li DO.'    He presents today regarding right symptoms which have been present for approximately 2 years.  A history of antecedent trauma or injury is Absent.  He reports symptoms to include moderate pain and stiffness with occasional popping, catching or locking of the right Middle Finger.  Finger symptoms are Frequently worse in the morning or overnight.  He reports moderate pain located at the base of the symptomatic finger(s). Symptoms show no change over time.      Previous treatment has included conservative measures.  He does not claim relation of his symptoms to his required work activities.  He has not undergone any form of testing.    I have today reviewed with Xavier Hernandez the clinically relevant, past medical history, medications, allergies,  family history, social history, and Review Of Systems & I have documented any details relevant to today's presenting complaints in my history above.  Mr. Xavier Tenorios self-reported past medical history, medications, allergies,  family history, social history, and Review Of Systems have been scanned into the chart under the \"Media\" tab.    Physical Exam:  Mr. Xavier Hernandez's most recent vitals:  Vitals  Respirations: 16  Height: 188 cm (6' 2\")  Weight - Scale: 124.7 kg (275 lb)    He is well nourished, oriented to person, place & time.  He demonstrates appropriate mood and affect as well as normal gait and station.    Skin: Normal in appearance, Normal Color, and Free of Lesions Bilaterally   Digital range of motion is limited by pain and stiffness  on the Right, normal on the Left.  Inducible triggering is noted upon flexion in the right Middle Finger.  There is an associated flexion contracture of the PIP joint.  No other digit demonstrates evidence for stenosing tenosynovitis bilaterally.  Wrist range of motion is without

## 2025-03-04 NOTE — PROGRESS NOTES
Motion Extension Flexion Pulses (0-4) Dorsalis  Pedis Posterior  Tibialis   Right 0   136       Degrees Right       Left   0   136       Degrees Left         Test Including Ligamentous Stability/ Positive or Negative                                       Test          Right         Left   Valgus neg    Varus  neg                              Lachman neg    Anterior Drawer neg    Posterior Drawer neg    Ariana     Pivot Shift     Quadraceps Active     Atrophy           MRI Findings: of right knee done on 12/30/19 was reviewed and shows a radial tear of his medial meniscus seen on both sagittal and axial views. His articular surface otherwise is in good shape for all 3 compartments. There is also noted to be mild edema at his infrapatellar tendon insertion into the patella consistent with patellar tendinitis. Impression:   1. Radial tear of medial meniscus appears to be causing his current sharp right knee pain. 2.  In addition he does have patellar tendinitis at the patellar insertion likely related to his weight. Plan/Treatment:   1. Because his radial tear of medial meniscus is symptomatic I have recommended that he be referred for arthroscopic surgery to Dr. Sandee Menon. 2.  He was told that his patellar tendinitis will be treated with weight loss and exercise. 3.  He will return here as needed. Lydia Augustine MD  1/7/2020    This dictation was done with Dragon dictation and may contain mechanical errors related to translation. 37.2

## 2025-04-01 ENCOUNTER — TELEPHONE (OUTPATIENT)
Dept: FAMILY MEDICINE CLINIC | Age: 55
End: 2025-04-01

## 2025-04-01 NOTE — TELEPHONE ENCOUNTER
----- Message from Abeba CACERES sent at 4/1/2025  2:28 PM EDT -----  Regarding: [facility] Appointment Request  [facility] Appointment Request    Patient needs appointment for [facility] Appointment Type: New Patient.    Patient Requested Dates(s): any day  Patient Requested Time: any time  Provider Name: claribel Villagomez    Reason for Appointment Request: New Patient - Requested Provider unavailable  --------------------------------------------------------------------------------------------------------------------------    Relationship to Patient: Self     Call Back Information: OK to leave message on voicemail  Preferred Call Back Number: Phone [phone] (home)         Note: PT mother referred him to Dr Villagomez

## 2025-04-01 NOTE — TELEPHONE ENCOUNTER
Pt states that his mother is a patient of Dr. Villagomez and you gave the ok for pt to be seen.    Please advise if ok for new patient?

## 2025-05-24 NOTE — PROGRESS NOTES
ED Handoff Info      Note: Please review patient's handover report in Epic under Summary tab under ED to IP Handoff      ED Nurse: Juliana Lam, RN   Extension:  (7-8-7A)      Precautions:    Fall  Language Barrier      Violent Patient Behavior BPA   No data recorded      Mental Status: alert, oriented to person, and confused calm & cooperative   Baseline? [x] Yes      Cardiac/Tele on arrival: Normal Sinus Rhythm   Baseline? [x] Yes      Respiratory needs on arrival: None   Baseline? [x] Yes      Medications pending and/or not given in ED: None      Pending Labs/Tests to be done on Unit: None      Additional Information:    Xiao Catheter: [x] Not Applicable   Indication: Not applicable      Preferred Language:  Polish      Living Arrangement: With family      COMMENTS: here with vomiting and weakness. Found to have significant stool burden, inflammation near burden. Pt baseline oriented x1.       **Per family, would like pt to be DNR. Need order**       injection.

## 2025-08-18 ENCOUNTER — OFFICE VISIT (OUTPATIENT)
Dept: FAMILY MEDICINE CLINIC | Age: 55
End: 2025-08-18
Payer: MEDICARE

## 2025-08-18 VITALS
SYSTOLIC BLOOD PRESSURE: 134 MMHG | TEMPERATURE: 97.7 F | BODY MASS INDEX: 35.11 KG/M2 | HEART RATE: 88 BPM | OXYGEN SATURATION: 97 % | DIASTOLIC BLOOD PRESSURE: 88 MMHG | HEIGHT: 74 IN | WEIGHT: 273.6 LBS | RESPIRATION RATE: 18 BRPM

## 2025-08-18 DIAGNOSIS — Z85.850 HISTORY OF THYROID CANCER: ICD-10-CM

## 2025-08-18 DIAGNOSIS — M54.2 NECK PAIN: ICD-10-CM

## 2025-08-18 DIAGNOSIS — I10 PRIMARY HYPERTENSION: ICD-10-CM

## 2025-08-18 DIAGNOSIS — R09.89 LEFT CAROTID BRUIT: ICD-10-CM

## 2025-08-18 DIAGNOSIS — L73.9 FOLLICULITIS: ICD-10-CM

## 2025-08-18 DIAGNOSIS — R73.03 PREDIABETES: ICD-10-CM

## 2025-08-18 DIAGNOSIS — E78.00 HYPERCHOLESTEREMIA: ICD-10-CM

## 2025-08-18 DIAGNOSIS — Z11.59 ENCOUNTER FOR HEPATITIS C SCREENING TEST FOR LOW RISK PATIENT: ICD-10-CM

## 2025-08-18 DIAGNOSIS — Z12.5 PROSTATE CANCER SCREENING: ICD-10-CM

## 2025-08-18 DIAGNOSIS — R79.89 LOW TESTOSTERONE IN MALE: ICD-10-CM

## 2025-08-18 DIAGNOSIS — G47.33 OSA (OBSTRUCTIVE SLEEP APNEA): ICD-10-CM

## 2025-08-18 DIAGNOSIS — R53.83 FATIGUE, UNSPECIFIED TYPE: ICD-10-CM

## 2025-08-18 DIAGNOSIS — H92.03 EAR PAIN, BILATERAL: Primary | ICD-10-CM

## 2025-08-18 LAB
ALBUMIN SERPL-MCNC: 4.2 G/DL (ref 3.4–5)
ALBUMIN/GLOB SERPL: 1.5 {RATIO} (ref 1.1–2.2)
ALP SERPL-CCNC: 98 U/L (ref 40–129)
ALT SERPL-CCNC: 22 U/L (ref 10–40)
ANION GAP SERPL CALCULATED.3IONS-SCNC: 11 MMOL/L (ref 3–16)
AST SERPL-CCNC: 19 U/L (ref 15–37)
BASOPHILS # BLD: 0.1 K/UL (ref 0–0.2)
BASOPHILS NFR BLD: 0.9 %
BILIRUB SERPL-MCNC: 0.3 MG/DL (ref 0–1)
BUN SERPL-MCNC: 15 MG/DL (ref 7–20)
CALCIUM SERPL-MCNC: 9.6 MG/DL (ref 8.3–10.6)
CHLORIDE SERPL-SCNC: 104 MMOL/L (ref 99–110)
CHOLEST SERPL-MCNC: 217 MG/DL (ref 0–199)
CO2 SERPL-SCNC: 26 MMOL/L (ref 21–32)
CREAT SERPL-MCNC: 0.6 MG/DL (ref 0.9–1.3)
DEPRECATED RDW RBC AUTO: 13.6 % (ref 12.4–15.4)
EOSINOPHIL # BLD: 0.2 K/UL (ref 0–0.6)
EOSINOPHIL NFR BLD: 2.3 %
GFR SERPLBLD CREATININE-BSD FMLA CKD-EPI: >90 ML/MIN/{1.73_M2}
GLUCOSE SERPL-MCNC: 109 MG/DL (ref 70–99)
HCT VFR BLD AUTO: 46 % (ref 40.5–52.5)
HCV AB SERPL QL IA: NORMAL
HDLC SERPL-MCNC: 35 MG/DL (ref 40–60)
HGB BLD-MCNC: 15.3 G/DL (ref 13.5–17.5)
LDLC SERPL CALC-MCNC: 158 MG/DL
LYMPHOCYTES # BLD: 1.4 K/UL (ref 1–5.1)
LYMPHOCYTES NFR BLD: 21.1 %
MCH RBC QN AUTO: 29.3 PG (ref 26–34)
MCHC RBC AUTO-ENTMCNC: 33.4 G/DL (ref 31–36)
MCV RBC AUTO: 87.8 FL (ref 80–100)
MONOCYTES # BLD: 0.6 K/UL (ref 0–1.3)
MONOCYTES NFR BLD: 9.7 %
NEUTROPHILS # BLD: 4.3 K/UL (ref 1.7–7.7)
NEUTROPHILS NFR BLD: 66 %
PLATELET # BLD AUTO: 197 K/UL (ref 135–450)
PMV BLD AUTO: 11.4 FL (ref 5–10.5)
POTASSIUM SERPL-SCNC: 4.3 MMOL/L (ref 3.5–5.1)
PROT SERPL-MCNC: 7 G/DL (ref 6.4–8.2)
PSA SERPL DL<=0.01 NG/ML-MCNC: 0.85 NG/ML (ref 0–4)
RBC # BLD AUTO: 5.23 M/UL (ref 4.2–5.9)
SODIUM SERPL-SCNC: 141 MMOL/L (ref 136–145)
TRIGL SERPL-MCNC: 122 MG/DL (ref 0–150)
VLDLC SERPL CALC-MCNC: 24 MG/DL
WBC # BLD AUTO: 6.5 K/UL (ref 4–11)

## 2025-08-18 PROCEDURE — G8417 CALC BMI ABV UP PARAM F/U: HCPCS | Performed by: INTERNAL MEDICINE

## 2025-08-18 PROCEDURE — 3075F SYST BP GE 130 - 139MM HG: CPT | Performed by: INTERNAL MEDICINE

## 2025-08-18 PROCEDURE — G8427 DOCREV CUR MEDS BY ELIG CLIN: HCPCS | Performed by: INTERNAL MEDICINE

## 2025-08-18 PROCEDURE — 3017F COLORECTAL CA SCREEN DOC REV: CPT | Performed by: INTERNAL MEDICINE

## 2025-08-18 PROCEDURE — 3079F DIAST BP 80-89 MM HG: CPT | Performed by: INTERNAL MEDICINE

## 2025-08-18 PROCEDURE — 99214 OFFICE O/P EST MOD 30 MIN: CPT | Performed by: INTERNAL MEDICINE

## 2025-08-18 PROCEDURE — 1036F TOBACCO NON-USER: CPT | Performed by: INTERNAL MEDICINE

## 2025-08-18 RX ORDER — OFLOXACIN 3 MG/ML
5 SOLUTION AURICULAR (OTIC) 2 TIMES DAILY
Qty: 10 ML | Refills: 0 | Status: SHIPPED | OUTPATIENT
Start: 2025-08-18 | End: 2025-09-07

## 2025-08-18 RX ORDER — CEFUROXIME AXETIL 500 MG/1
500 TABLET ORAL 2 TIMES DAILY
Qty: 14 TABLET | Refills: 0 | Status: SHIPPED | OUTPATIENT
Start: 2025-08-18 | End: 2025-08-25

## 2025-08-18 RX ORDER — NAPROXEN 500 MG/1
500 TABLET ORAL 2 TIMES DAILY WITH MEALS
Qty: 60 TABLET | Refills: 3 | Status: SHIPPED | OUTPATIENT
Start: 2025-08-18 | End: 2026-08-18

## 2025-08-18 SDOH — ECONOMIC STABILITY: FOOD INSECURITY: WITHIN THE PAST 12 MONTHS, YOU WORRIED THAT YOUR FOOD WOULD RUN OUT BEFORE YOU GOT MONEY TO BUY MORE.: NEVER TRUE

## 2025-08-18 SDOH — ECONOMIC STABILITY: FOOD INSECURITY: WITHIN THE PAST 12 MONTHS, THE FOOD YOU BOUGHT JUST DIDN'T LAST AND YOU DIDN'T HAVE MONEY TO GET MORE.: NEVER TRUE

## 2025-08-18 ASSESSMENT — PATIENT HEALTH QUESTIONNAIRE - PHQ9
2. FEELING DOWN, DEPRESSED OR HOPELESS: NEARLY EVERY DAY
5. POOR APPETITE OR OVEREATING: NOT AT ALL
SUM OF ALL RESPONSES TO PHQ QUESTIONS 1-9: 10
SUM OF ALL RESPONSES TO PHQ QUESTIONS 1-9: 10
1. LITTLE INTEREST OR PLEASURE IN DOING THINGS: SEVERAL DAYS
4. FEELING TIRED OR HAVING LITTLE ENERGY: NEARLY EVERY DAY
SUM OF ALL RESPONSES TO PHQ QUESTIONS 1-9: 10
9. THOUGHTS THAT YOU WOULD BE BETTER OFF DEAD, OR OF HURTING YOURSELF: NOT AT ALL
8. MOVING OR SPEAKING SO SLOWLY THAT OTHER PEOPLE COULD HAVE NOTICED. OR THE OPPOSITE, BEING SO FIGETY OR RESTLESS THAT YOU HAVE BEEN MOVING AROUND A LOT MORE THAN USUAL: NOT AT ALL
7. TROUBLE CONCENTRATING ON THINGS, SUCH AS READING THE NEWSPAPER OR WATCHING TELEVISION: NOT AT ALL
6. FEELING BAD ABOUT YOURSELF - OR THAT YOU ARE A FAILURE OR HAVE LET YOURSELF OR YOUR FAMILY DOWN: NEARLY EVERY DAY
SUM OF ALL RESPONSES TO PHQ QUESTIONS 1-9: 10
3. TROUBLE FALLING OR STAYING ASLEEP: NOT AT ALL
10. IF YOU CHECKED OFF ANY PROBLEMS, HOW DIFFICULT HAVE THESE PROBLEMS MADE IT FOR YOU TO DO YOUR WORK, TAKE CARE OF THINGS AT HOME, OR GET ALONG WITH OTHER PEOPLE: SOMEWHAT DIFFICULT

## 2025-08-18 ASSESSMENT — ENCOUNTER SYMPTOMS
SINUS PRESSURE: 0
SHORTNESS OF BREATH: 0
COUGH: 0
VOMITING: 0
BLOOD IN STOOL: 0
APNEA: 1
BACK PAIN: 1
DIARRHEA: 0
CONSTIPATION: 0
ABDOMINAL PAIN: 0
NAUSEA: 0
TROUBLE SWALLOWING: 0

## 2025-08-19 LAB
EST. AVERAGE GLUCOSE BLD GHB EST-MCNC: 114 MG/DL
HBA1C MFR BLD: 5.6 %

## 2025-08-21 PROBLEM — E78.00 HYPERCHOLESTEREMIA: Status: ACTIVE | Noted: 2025-08-21

## 2025-08-22 ENCOUNTER — TELEPHONE (OUTPATIENT)
Dept: ADMINISTRATIVE | Age: 55
End: 2025-08-22

## (undated) DEVICE — DRESSING,GAUZE,XEROFORM,CURAD,1"X8",ST: Brand: CURAD

## (undated) DEVICE — GLOVE SURG SZ 85 L12IN FNGR THK94MIL STD WHT LTX FREE

## (undated) DEVICE — TRAP POLYP ETRAP

## (undated) DEVICE — SNARE VASC L240CM LOOP W10MM SHTH DIA2.4MM RND STIFF CLD

## (undated) DEVICE — GARMENT COMPR STD FOR 17IN CALF UNIF THER FLOTRN

## (undated) DEVICE — GLOVE ORANGE PI 7   MSG9070

## (undated) DEVICE — GLOVE SURG SZ 7 L12IN FNGR THK79MIL GRN LTX FREE

## (undated) DEVICE — PRE OP PACK: Brand: MEDLINE INDUSTRIES, INC.

## (undated) DEVICE — GOWN SIRUS NONREIN XL W/TWL: Brand: MEDLINE INDUSTRIES, INC.

## (undated) DEVICE — Device

## (undated) DEVICE — ZIMMER® STERILE DISPOSABLE TOURNIQUET CUFF WITH PLC, DUAL PORT, SINGLE BLADDER, 18 IN. (46 CM)

## (undated) DEVICE — COVER LT HNDL BLU PLAS

## (undated) DEVICE — CONTAINER SPEC 480ML CLR POLYSTYR 10% NEUT BUFF FRMLN ZN

## (undated) DEVICE — PADDING CAST W4INXL4YD SPUN DACRON POLY POR SYN VERSATILE

## (undated) DEVICE — COVER,MAYO STAND,XL,STERILE: Brand: MEDLINE

## (undated) DEVICE — DRAPE SURGICAL HAND PROX AURORA

## (undated) DEVICE — SUTURE MCRYL SZ 4-0 L27IN ABSRB UD L19MM PS-2 1/2 CIR PRIM Y426H

## (undated) DEVICE — TROCAR: Brand: KII SHIELDED BLADED ACCESS SYSTEM

## (undated) DEVICE — CABLE BPLR L12FT FLYING LD DISPOSABLE

## (undated) DEVICE — COTTON UNDERCAST PADDING,CRIMPED FINISH: Brand: WEBRIL

## (undated) DEVICE — PLATE ES AD W 9FT CRD 2

## (undated) DEVICE — GLOVE ORANGE PI 8 1/2   MSG9085

## (undated) DEVICE — ENDOSCOPY KIT: Brand: MEDLINE INDUSTRIES, INC.

## (undated) DEVICE — FORCEPS BX 240CM 2.4MM L NDL RAD JAW 4 M00513334

## (undated) DEVICE — ESOPHAGEAL/COLONIC/BILIARY WIREGUIDED BALLOON DILATATION CATHETER: Brand: CRE™ PRO

## (undated) DEVICE — DRAPE 70X60IN SPLIT IMPERV ADHES STRIP

## (undated) DEVICE — PAD,NON-ADHERENT,3X8,STERILE,LF,1/PK: Brand: MEDLINE

## (undated) DEVICE — CHLORAPREP 26ML ORANGE

## (undated) DEVICE — 3M™ WARMING BLANKET, LOWER BODY, 10 PER CASE, 42568: Brand: BAIR HUGGER™

## (undated) DEVICE — PMI DISPOSABLE PUNCTURE CLOSURE DEVICE / SUTURE GRASPER: Brand: PMI

## (undated) DEVICE — CUTTER ENDOSCP L340MM LIN ARTC SGL STROKE FIRING ENDOPATH

## (undated) DEVICE — SUTURE VCRL SZ 3-0 L18IN ABSRB UD L26MM SH 1/2 CIR J864D

## (undated) DEVICE — SOLUTION IV 1000ML 0.9% SOD CHL

## (undated) DEVICE — SPONGE,LAP,18"X18",DLX,XR,ST,5/PK,40/PK: Brand: MEDLINE

## (undated) DEVICE — ESOPHAGEAL/PYLORIC/COLONIC/BILIARY WIREGUIDED BALLOON DILATATION CATHETER: Brand: CRE™ PRO

## (undated) DEVICE — SUTURE VCRL SZ 0 L18IN ABSRB UD L36MM CT-1 1/2 CIR J840D

## (undated) DEVICE — TUBING PMP L16FT MAIN DISP FOR AR-6400 AR-6475

## (undated) DEVICE — SET INSUF TUBE HEAT ISO CONN DISP

## (undated) DEVICE — SYRINGE IRRIG 60ML SFT PLIABLE BLB EZ TO GRP 1 HND USE W/

## (undated) DEVICE — GOWN,SIRUS,POLYRNF,BRTHSLV,XLN/XXL,18/CS: Brand: MEDLINE

## (undated) DEVICE — SEALER ENDOSCP L37CM NANO COAT BLNT TIP LAP DIV

## (undated) DEVICE — GLOVE SURG SZ 8 L12IN FNGR THK79MIL GRN LTX FREE

## (undated) DEVICE — MOUTHPIECE ENDOSCP L CTRL OPN AND SIDE PORTS DISP

## (undated) DEVICE — Z INACTIVE USE 2660664 SOLUTION IRRIG 3000ML 0.9% SOD CHL USP UROMATIC PLAS CONT

## (undated) DEVICE — SOLUTION IV IRRIG POUR BRL 0.9% SODIUM CHL 2F7124

## (undated) DEVICE — SUTURE VCRL + SZ 4-0 L18IN ABSRB UD L19MM PS-2 3/8 CIR PRIM VCP496H

## (undated) DEVICE — TURNOVER KIT RM INF CTRL TECH

## (undated) DEVICE — BLADE SHV L13CM DIA4MM EXCALIBUR AGG COOLCUT

## (undated) DEVICE — INSUFFLATION NEEDLE TO ESTABLISH PNEUMOPERITONEUM.: Brand: INSUFFLATION NEEDLE

## (undated) DEVICE — MERCY HEALTH WEST TURNOVER: Brand: MEDLINE INDUSTRIES, INC.

## (undated) DEVICE — LOOP,VESSEL,MAXI,BLUE,2/PK,STERILE: Brand: MEDLINE

## (undated) DEVICE — TISSUE RETRIEVAL SYSTEM: Brand: INZII RETRIEVAL SYSTEM

## (undated) DEVICE — BLADE SHV L13CM DIA4MM TAPR TIP SCIS LIKE CUT OVL OUTER

## (undated) DEVICE — PODIATRY PK

## (undated) DEVICE — RELOAD STPL SZ 0 L45MM DIA3.5MM 0DEG STD REG TISS BLU TI

## (undated) DEVICE — SST BUR, WIRE PASS DRILL, 2 FLUTES, MED., 1.5MM DIA: Brand: MICROAIRE®

## (undated) DEVICE — GLOVE ORANGE PI 7 1/2   MSG9075

## (undated) DEVICE — GOWN,SIRUS,POLYRNF,BRTHSLV,XL,30/CS: Brand: MEDLINE

## (undated) DEVICE — SEALER LAP L37CM MARYLAND JAW OPN NANO COAT MULTIFUNCTIONAL

## (undated) DEVICE — NEEDLE HYPO 22GA L1 1/2IN PIVOTING SHLD FOR LUERLOCK SYR

## (undated) DEVICE — GENERAL LAPAROSCOPY: Brand: MEDLINE INDUSTRIES, INC.

## (undated) DEVICE — 3M™ STERI-DRAPE™ U-DRAPE 1015: Brand: STERI-DRAPE™

## (undated) DEVICE — BANDAGE COBAN 4 IN COMPR W4INXL5YD FOAM COHESIVE QUIK STK SELF ADH SFT

## (undated) DEVICE — E-Z CLEAN, NON-STICK, PTFE COATED, ELECTROSURGICAL BLADE ELECTRODE, 2.5 INCH (6.35 CM): Brand: EZ CLEAN

## (undated) DEVICE — BITE BLK 60FR GRN ENDOSCP AD W STRP SLD DISPOSABLE

## (undated) DEVICE — ENDO CARRY-ON PROCEDURE KIT: Brand: ENDO CARRY-ON PROCEDURE KIT

## (undated) DEVICE — ADHESIVE SKIN CLOSURE TOP 36 CC HI VISC DERMBND MINI

## (undated) DEVICE — PADDING UNDERCAST W4INXL4YD 100% COT CRIMPED FINISH WBRL II

## (undated) DEVICE — SLING ARM XL L20.25IN D9IN COT POLY BILAT WEB SHLDR STRP

## (undated) DEVICE — TROCAR: Brand: KII SLEEVE

## (undated) DEVICE — 4-PORT MANIFOLD: Brand: NEPTUNE 2

## (undated) DEVICE — GLOVE SURG SZ 8 L12IN FNGR THK75MIL WHT LTX POLYMER BEAD

## (undated) DEVICE — SUTURE NONABSORBABLE MONOFILAMENT 4-0 FS-2 18 IN ETHILON 662H

## (undated) DEVICE — SYSTEM SKIN CLSR 22CM DERMBND PRINEO